# Patient Record
Sex: MALE | Race: BLACK OR AFRICAN AMERICAN | NOT HISPANIC OR LATINO | Employment: OTHER | RURAL
[De-identification: names, ages, dates, MRNs, and addresses within clinical notes are randomized per-mention and may not be internally consistent; named-entity substitution may affect disease eponyms.]

---

## 2019-04-16 ENCOUNTER — HISTORICAL (OUTPATIENT)
Dept: ADMINISTRATIVE | Facility: HOSPITAL | Age: 79
End: 2019-04-16

## 2019-04-17 LAB
LAB AP DIAGNOSIS - HISTORICAL: NORMAL
LAB AP GROSS PATHOLOGY - HISTORICAL: NORMAL
LAB AP SPECIMEN SUBMITTED - HISTORICAL: NORMAL

## 2021-02-26 ENCOUNTER — HISTORICAL (OUTPATIENT)
Dept: ADMINISTRATIVE | Facility: HOSPITAL | Age: 81
End: 2021-02-26

## 2021-02-26 LAB — SARS-COV-2 RNA AMPLIFICATION, QUAL: NEGATIVE

## 2021-06-02 ENCOUNTER — TELEPHONE (OUTPATIENT)
Dept: FAMILY MEDICINE | Facility: CLINIC | Age: 81
End: 2021-06-02

## 2021-06-10 ENCOUNTER — TELEPHONE (OUTPATIENT)
Dept: FAMILY MEDICINE | Facility: CLINIC | Age: 81
End: 2021-06-10

## 2021-06-10 RX ORDER — EZETIMIBE 10 MG/1
10 TABLET ORAL DAILY
COMMUNITY
End: 2021-06-10 | Stop reason: SDUPTHER

## 2021-06-10 RX ORDER — EZETIMIBE 10 MG/1
10 TABLET ORAL DAILY
Qty: 30 TABLET | Refills: 3 | Status: SHIPPED | OUTPATIENT
Start: 2021-06-10 | End: 2021-10-20 | Stop reason: SDUPTHER

## 2021-07-20 ENCOUNTER — OFFICE VISIT (OUTPATIENT)
Dept: FAMILY MEDICINE | Facility: CLINIC | Age: 81
End: 2021-07-20
Payer: MEDICARE

## 2021-07-20 VITALS
WEIGHT: 167.13 LBS | SYSTOLIC BLOOD PRESSURE: 116 MMHG | TEMPERATURE: 98 F | OXYGEN SATURATION: 98 % | HEART RATE: 65 BPM | DIASTOLIC BLOOD PRESSURE: 82 MMHG | RESPIRATION RATE: 18 BRPM | BODY MASS INDEX: 23.4 KG/M2 | HEIGHT: 71 IN

## 2021-07-20 DIAGNOSIS — I10 HYPERTENSION, UNSPECIFIED TYPE: ICD-10-CM

## 2021-07-20 DIAGNOSIS — E11.9 TYPE 2 DIABETES MELLITUS WITHOUT COMPLICATION, WITHOUT LONG-TERM CURRENT USE OF INSULIN: Primary | ICD-10-CM

## 2021-07-20 PROCEDURE — 99213 OFFICE O/P EST LOW 20 MIN: CPT | Mod: ,,, | Performed by: NURSE PRACTITIONER

## 2021-07-20 PROCEDURE — 99213 PR OFFICE/OUTPT VISIT, EST, LEVL III, 20-29 MIN: ICD-10-PCS | Mod: ,,, | Performed by: NURSE PRACTITIONER

## 2021-07-20 RX ORDER — CARVEDILOL 3.12 MG/1
3.12 TABLET ORAL 2 TIMES DAILY WITH MEALS
Qty: 90 TABLET | Refills: 1 | Status: SHIPPED | OUTPATIENT
Start: 2021-07-20 | End: 2021-10-20 | Stop reason: SDUPTHER

## 2021-07-20 RX ORDER — GLIPIZIDE 5 MG/1
5 TABLET ORAL
COMMUNITY
End: 2021-10-20 | Stop reason: SDUPTHER

## 2021-07-20 RX ORDER — SACUBITRIL AND VALSARTAN 97; 103 MG/1; MG/1
1 TABLET, FILM COATED ORAL 2 TIMES DAILY
COMMUNITY
End: 2021-10-20 | Stop reason: SDUPTHER

## 2021-07-20 RX ORDER — ASPIRIN 81 MG/1
81 TABLET ORAL DAILY
COMMUNITY

## 2021-07-20 RX ORDER — CARVEDILOL 3.12 MG/1
3.12 TABLET ORAL 2 TIMES DAILY WITH MEALS
COMMUNITY
End: 2021-07-20 | Stop reason: SDUPTHER

## 2021-07-20 RX ORDER — DAPAGLIFLOZIN 5 MG/1
5 TABLET, FILM COATED ORAL DAILY
COMMUNITY
End: 2021-07-20 | Stop reason: SDUPTHER

## 2021-07-20 RX ORDER — CHLORTHALIDONE 25 MG/1
25 TABLET ORAL DAILY
COMMUNITY
End: 2021-10-20 | Stop reason: SDUPTHER

## 2021-07-20 RX ORDER — FUROSEMIDE 80 MG/1
80 TABLET ORAL DAILY
COMMUNITY
End: 2021-10-20 | Stop reason: SDUPTHER

## 2021-07-20 RX ORDER — TAMSULOSIN HYDROCHLORIDE 0.4 MG/1
0.4 CAPSULE ORAL DAILY
COMMUNITY
End: 2021-10-20 | Stop reason: SDUPTHER

## 2021-07-20 RX ORDER — DAPAGLIFLOZIN 5 MG/1
5 TABLET, FILM COATED ORAL DAILY
Qty: 90 TABLET | Refills: 1 | Status: SHIPPED | OUTPATIENT
Start: 2021-07-20 | End: 2021-10-20 | Stop reason: SDUPTHER

## 2021-07-20 RX ORDER — OMEPRAZOLE 40 MG/1
40 CAPSULE, DELAYED RELEASE ORAL DAILY
COMMUNITY
End: 2021-10-20 | Stop reason: SDUPTHER

## 2021-10-20 ENCOUNTER — OFFICE VISIT (OUTPATIENT)
Dept: FAMILY MEDICINE | Facility: CLINIC | Age: 81
End: 2021-10-20
Payer: COMMERCIAL

## 2021-10-20 VITALS
SYSTOLIC BLOOD PRESSURE: 132 MMHG | DIASTOLIC BLOOD PRESSURE: 68 MMHG | OXYGEN SATURATION: 97 % | TEMPERATURE: 97 F | BODY MASS INDEX: 26.6 KG/M2 | WEIGHT: 190 LBS | HEART RATE: 66 BPM | HEIGHT: 71 IN

## 2021-10-20 DIAGNOSIS — I48.91 ATRIAL FIBRILLATION, UNSPECIFIED TYPE: ICD-10-CM

## 2021-10-20 DIAGNOSIS — I10 HYPERTENSION, UNSPECIFIED TYPE: Primary | ICD-10-CM

## 2021-10-20 DIAGNOSIS — E78.5 HYPERLIPIDEMIA, UNSPECIFIED HYPERLIPIDEMIA TYPE: ICD-10-CM

## 2021-10-20 DIAGNOSIS — K21.9 GASTROESOPHAGEAL REFLUX DISEASE, UNSPECIFIED WHETHER ESOPHAGITIS PRESENT: ICD-10-CM

## 2021-10-20 DIAGNOSIS — I50.9 HEART FAILURE, UNSPECIFIED HF CHRONICITY, UNSPECIFIED HEART FAILURE TYPE: ICD-10-CM

## 2021-10-20 DIAGNOSIS — N40.0 BENIGN PROSTATIC HYPERPLASIA, UNSPECIFIED WHETHER LOWER URINARY TRACT SYMPTOMS PRESENT: ICD-10-CM

## 2021-10-20 DIAGNOSIS — E11.9 TYPE 2 DIABETES MELLITUS WITHOUT COMPLICATION, WITHOUT LONG-TERM CURRENT USE OF INSULIN: ICD-10-CM

## 2021-10-20 LAB
ALBUMIN SERPL BCP-MCNC: 3.6 G/DL (ref 3.5–5)
ALBUMIN/GLOB SERPL: 0.8 {RATIO}
ALP SERPL-CCNC: 87 U/L (ref 45–115)
ALT SERPL W P-5'-P-CCNC: 30 U/L (ref 16–61)
ANION GAP SERPL CALCULATED.3IONS-SCNC: 13 MMOL/L (ref 7–16)
AST SERPL W P-5'-P-CCNC: 21 U/L (ref 15–37)
BASOPHILS # BLD AUTO: 0.04 K/UL (ref 0–0.2)
BASOPHILS NFR BLD AUTO: 0.6 % (ref 0–1)
BILIRUB SERPL-MCNC: 0.4 MG/DL (ref 0–1.2)
BUN SERPL-MCNC: 40 MG/DL (ref 7–18)
BUN/CREAT SERPL: 22 (ref 6–20)
CALCIUM SERPL-MCNC: 9.4 MG/DL (ref 8.5–10.1)
CHLORIDE SERPL-SCNC: 97 MMOL/L (ref 98–107)
CHOLEST SERPL-MCNC: 148 MG/DL (ref 0–200)
CHOLEST/HDLC SERPL: 5.3 {RATIO}
CO2 SERPL-SCNC: 27 MMOL/L (ref 21–32)
CREAT SERPL-MCNC: 1.86 MG/DL (ref 0.7–1.3)
DIFFERENTIAL METHOD BLD: ABNORMAL
EOSINOPHIL # BLD AUTO: 0.11 K/UL (ref 0–0.5)
EOSINOPHIL NFR BLD AUTO: 1.7 % (ref 1–4)
ERYTHROCYTE [DISTWIDTH] IN BLOOD BY AUTOMATED COUNT: 14.6 % (ref 11.5–14.5)
GLOBULIN SER-MCNC: 4.4 G/DL (ref 2–4)
GLUCOSE SERPL-MCNC: 305 MG/DL (ref 74–106)
HCT VFR BLD AUTO: 57.2 % (ref 40–54)
HDLC SERPL-MCNC: 28 MG/DL (ref 40–60)
HGB BLD-MCNC: 18 G/DL (ref 13.5–18)
IMM GRANULOCYTES # BLD AUTO: 0.03 K/UL (ref 0–0.04)
IMM GRANULOCYTES NFR BLD: 0.5 % (ref 0–0.4)
LDLC SERPL CALC-MCNC: ABNORMAL MG/DL
LDLC/HDLC SERPL: ABNORMAL {RATIO}
LYMPHOCYTES # BLD AUTO: 1.67 K/UL (ref 1–4.8)
LYMPHOCYTES NFR BLD AUTO: 25.8 % (ref 27–41)
MCH RBC QN AUTO: 28.7 PG (ref 27–31)
MCHC RBC AUTO-ENTMCNC: 31.5 G/DL (ref 32–36)
MCV RBC AUTO: 91.2 FL (ref 80–96)
MONOCYTES # BLD AUTO: 0.52 K/UL (ref 0–0.8)
MONOCYTES NFR BLD AUTO: 8 % (ref 2–6)
MPC BLD CALC-MCNC: 13.2 FL (ref 9.4–12.4)
NEUTROPHILS # BLD AUTO: 4.1 K/UL (ref 1.8–7.7)
NEUTROPHILS NFR BLD AUTO: 63.4 % (ref 53–65)
NONHDLC SERPL-MCNC: 120 MG/DL
NRBC # BLD AUTO: 0 X10E3/UL
NRBC, AUTO (.00): 0 %
PLATELET # BLD AUTO: 213 K/UL (ref 150–400)
PLATELET MORPHOLOGY: ABNORMAL
POTASSIUM SERPL-SCNC: 4.2 MMOL/L (ref 3.5–5.1)
PROT SERPL-MCNC: 8 G/DL (ref 6.4–8.2)
RBC # BLD AUTO: 6.27 M/UL (ref 4.6–6.2)
RBC MORPH BLD: NORMAL
SODIUM SERPL-SCNC: 133 MMOL/L (ref 136–145)
TRIGL SERPL-MCNC: 427 MG/DL (ref 35–150)
VLDLC SERPL-MCNC: ABNORMAL MG/DL
WBC # BLD AUTO: 6.47 K/UL (ref 4.5–11)

## 2021-10-20 PROCEDURE — 83036 HEMOGLOBIN GLYCOSYLATED A1C: CPT | Mod: ,,, | Performed by: CLINICAL MEDICAL LABORATORY

## 2021-10-20 PROCEDURE — 80053 COMPREHEN METABOLIC PANEL: CPT | Mod: ,,, | Performed by: CLINICAL MEDICAL LABORATORY

## 2021-10-20 PROCEDURE — 80061 LIPID PANEL: ICD-10-PCS | Mod: ,,, | Performed by: CLINICAL MEDICAL LABORATORY

## 2021-10-20 PROCEDURE — 80061 LIPID PANEL: CPT | Mod: ,,, | Performed by: CLINICAL MEDICAL LABORATORY

## 2021-10-20 PROCEDURE — 99214 PR OFFICE/OUTPT VISIT, EST, LEVL IV, 30-39 MIN: ICD-10-PCS | Mod: ,,, | Performed by: NURSE PRACTITIONER

## 2021-10-20 PROCEDURE — 83036 HEMOGLOBIN A1C: ICD-10-PCS | Mod: ,,, | Performed by: CLINICAL MEDICAL LABORATORY

## 2021-10-20 PROCEDURE — 80053 COMPREHENSIVE METABOLIC PANEL: ICD-10-PCS | Mod: ,,, | Performed by: CLINICAL MEDICAL LABORATORY

## 2021-10-20 PROCEDURE — 85025 CBC WITH DIFFERENTIAL: ICD-10-PCS | Mod: ,,, | Performed by: CLINICAL MEDICAL LABORATORY

## 2021-10-20 PROCEDURE — 99214 OFFICE O/P EST MOD 30 MIN: CPT | Mod: ,,, | Performed by: NURSE PRACTITIONER

## 2021-10-20 PROCEDURE — 85025 COMPLETE CBC W/AUTO DIFF WBC: CPT | Mod: ,,, | Performed by: CLINICAL MEDICAL LABORATORY

## 2021-10-20 RX ORDER — TAMSULOSIN HYDROCHLORIDE 0.4 MG/1
0.4 CAPSULE ORAL DAILY
Qty: 90 CAPSULE | Refills: 1 | Status: SHIPPED | OUTPATIENT
Start: 2021-10-20

## 2021-10-20 RX ORDER — SACUBITRIL AND VALSARTAN 97; 103 MG/1; MG/1
1 TABLET, FILM COATED ORAL 2 TIMES DAILY
Qty: 180 TABLET | Refills: 1 | Status: ON HOLD | OUTPATIENT
Start: 2021-10-20 | End: 2022-02-03 | Stop reason: SDUPTHER

## 2021-10-20 RX ORDER — GLIPIZIDE 5 MG/1
5 TABLET ORAL
Qty: 180 TABLET | Refills: 1 | Status: SHIPPED | OUTPATIENT
Start: 2021-10-20 | End: 2021-10-21

## 2021-10-20 RX ORDER — CHLORTHALIDONE 25 MG/1
12.5 TABLET ORAL DAILY
Qty: 45 TABLET | Refills: 1 | Status: ON HOLD | OUTPATIENT
Start: 2021-10-20 | End: 2022-02-03 | Stop reason: SDUPTHER

## 2021-10-20 RX ORDER — OMEPRAZOLE 40 MG/1
40 CAPSULE, DELAYED RELEASE ORAL DAILY
Qty: 90 CAPSULE | Refills: 1 | Status: SHIPPED | OUTPATIENT
Start: 2021-10-20 | End: 2021-12-01 | Stop reason: SDUPTHER

## 2021-10-20 RX ORDER — FUROSEMIDE 80 MG/1
80 TABLET ORAL DAILY
Qty: 90 TABLET | Refills: 1 | Status: SHIPPED | OUTPATIENT
Start: 2021-10-20 | End: 2021-12-01 | Stop reason: SDUPTHER

## 2021-10-20 RX ORDER — CARVEDILOL 3.12 MG/1
3.12 TABLET ORAL 2 TIMES DAILY WITH MEALS
Qty: 180 TABLET | Refills: 1 | Status: SHIPPED | OUTPATIENT
Start: 2021-10-20 | End: 2021-12-01 | Stop reason: DRUGHIGH

## 2021-10-20 RX ORDER — DAPAGLIFLOZIN 5 MG/1
5 TABLET, FILM COATED ORAL DAILY
Qty: 90 TABLET | Refills: 1 | Status: ON HOLD | OUTPATIENT
Start: 2021-10-20 | End: 2022-02-03 | Stop reason: HOSPADM

## 2021-10-20 RX ORDER — EZETIMIBE 10 MG/1
10 TABLET ORAL DAILY
Qty: 90 TABLET | Refills: 1 | Status: SHIPPED | OUTPATIENT
Start: 2021-10-20

## 2021-10-21 LAB
EST. AVERAGE GLUCOSE BLD GHB EST-MCNC: 224 MG/DL
HBA1C MFR BLD HPLC: 9.3 % (ref 4.5–6.6)

## 2021-10-21 RX ORDER — GLIPIZIDE 10 MG/1
10 TABLET ORAL
COMMUNITY
End: 2021-10-21 | Stop reason: SDUPTHER

## 2021-10-21 RX ORDER — GLIPIZIDE 10 MG/1
10 TABLET ORAL
Qty: 180 TABLET | Refills: 1 | Status: SHIPPED | OUTPATIENT
Start: 2021-10-21 | End: 2022-02-10

## 2021-11-14 ENCOUNTER — HOSPITAL ENCOUNTER (EMERGENCY)
Facility: HOSPITAL | Age: 81
Discharge: HOME | End: 2021-11-14
Payer: COMMERCIAL

## 2021-11-14 VITALS
DIASTOLIC BLOOD PRESSURE: 82 MMHG | WEIGHT: 208 LBS | SYSTOLIC BLOOD PRESSURE: 153 MMHG | TEMPERATURE: 98 F | OXYGEN SATURATION: 98 % | RESPIRATION RATE: 16 BRPM | HEIGHT: 71 IN | BODY MASS INDEX: 29.12 KG/M2 | HEART RATE: 112 BPM

## 2021-11-14 DIAGNOSIS — K21.9 GASTROESOPHAGEAL REFLUX DISEASE: ICD-10-CM

## 2021-11-14 DIAGNOSIS — N40.0 BENIGN PROSTATIC HYPERPLASIA: ICD-10-CM

## 2021-11-14 DIAGNOSIS — I50.9 HEART FAILURE: ICD-10-CM

## 2021-11-14 DIAGNOSIS — R31.9 HEMATURIA, UNSPECIFIED TYPE: Primary | ICD-10-CM

## 2021-11-14 DIAGNOSIS — I48.91 ATRIAL FIBRILLATION: ICD-10-CM

## 2021-11-14 DIAGNOSIS — E11.9 TYPE 2 DIABETES MELLITUS WITHOUT COMPLICATION, WITHOUT LONG-TERM CURRENT USE OF INSULIN: ICD-10-CM

## 2021-11-14 DIAGNOSIS — I10 HYPERTENSION: ICD-10-CM

## 2021-11-14 DIAGNOSIS — E78.5 HYPERLIPIDEMIA: ICD-10-CM

## 2021-11-14 LAB
BACTERIA #/AREA URNS HPF: ABNORMAL /HPF
BILIRUB UR QL STRIP: ABNORMAL
CLARITY UR: ABNORMAL
COLOR UR: ABNORMAL
GLUCOSE SERPL-MCNC: 158 MG/DL (ref 70–105)
GLUCOSE UR STRIP-MCNC: >=1000 MG/DL
KETONES UR STRIP-SCNC: 15 MG/DL
LEUKOCYTE ESTERASE UR QL STRIP: NEGATIVE
NITRITE UR QL STRIP: NEGATIVE
PH UR STRIP: 6 PH UNITS
PROT UR QL STRIP: 100
RBC # UR STRIP: ABNORMAL /UL
RBC #/AREA URNS HPF: ABNORMAL /HPF
SP GR UR STRIP: 1.02
SQUAMOUS #/AREA URNS LPF: ABNORMAL /LPF
UROBILINOGEN UR STRIP-ACNC: 0.2 MG/DL
WBC #/AREA URNS HPF: ABNORMAL /HPF

## 2021-11-14 PROCEDURE — 82962 GLUCOSE BLOOD TEST: CPT

## 2021-11-14 PROCEDURE — 99282 EMERGENCY DEPT VISIT SF MDM: CPT

## 2021-11-14 PROCEDURE — 81001 URINALYSIS AUTO W/SCOPE: CPT | Performed by: NURSE PRACTITIONER

## 2021-11-14 PROCEDURE — 81003 URINALYSIS AUTO W/O SCOPE: CPT | Performed by: NURSE PRACTITIONER

## 2021-11-14 PROCEDURE — 99282 EMERGENCY DEPT VISIT SF MDM: CPT | Mod: ,,, | Performed by: NURSE PRACTITIONER

## 2021-11-14 PROCEDURE — 99282 PR EMERGENCY DEPT VISIT,LEVEL II: ICD-10-PCS | Mod: ,,, | Performed by: NURSE PRACTITIONER

## 2021-11-17 ENCOUNTER — OFFICE VISIT (OUTPATIENT)
Dept: FAMILY MEDICINE | Facility: CLINIC | Age: 81
End: 2021-11-17
Payer: COMMERCIAL

## 2021-11-17 VITALS
HEART RATE: 81 BPM | DIASTOLIC BLOOD PRESSURE: 82 MMHG | HEIGHT: 71 IN | TEMPERATURE: 98 F | BODY MASS INDEX: 27.58 KG/M2 | SYSTOLIC BLOOD PRESSURE: 138 MMHG | RESPIRATION RATE: 18 BRPM | OXYGEN SATURATION: 98 % | WEIGHT: 197 LBS

## 2021-11-17 DIAGNOSIS — I48.0 PAROXYSMAL ATRIAL FIBRILLATION: ICD-10-CM

## 2021-11-17 DIAGNOSIS — R31.0 GROSS HEMATURIA: Primary | ICD-10-CM

## 2021-11-17 DIAGNOSIS — Z12.5 ENCOUNTER FOR SCREENING FOR MALIGNANT NEOPLASM OF PROSTATE: ICD-10-CM

## 2021-11-17 DIAGNOSIS — Z23 ENCOUNTER FOR IMMUNIZATION: ICD-10-CM

## 2021-11-17 DIAGNOSIS — N18.9 CHRONIC KIDNEY DISEASE, UNSPECIFIED CKD STAGE: ICD-10-CM

## 2021-11-17 DIAGNOSIS — I42.0 DILATED CARDIOMYOPATHY: ICD-10-CM

## 2021-11-17 LAB
ALBUMIN SERPL BCP-MCNC: 3.6 G/DL (ref 3.5–5)
ALBUMIN/GLOB SERPL: 0.8 {RATIO}
ALP SERPL-CCNC: 105 U/L (ref 45–115)
ALT SERPL W P-5'-P-CCNC: 89 U/L (ref 16–61)
ANION GAP SERPL CALCULATED.3IONS-SCNC: 10 MMOL/L (ref 7–16)
AST SERPL W P-5'-P-CCNC: 32 U/L (ref 15–37)
BACTERIA #/AREA URNS HPF: ABNORMAL /HPF
BASOPHILS # BLD AUTO: 0.02 K/UL (ref 0–0.2)
BASOPHILS NFR BLD AUTO: 0.3 % (ref 0–1)
BILIRUB SERPL-MCNC: 0.5 MG/DL (ref 0–1.2)
BILIRUB UR QL STRIP: NEGATIVE
BUN SERPL-MCNC: 27 MG/DL (ref 7–18)
BUN/CREAT SERPL: 15 (ref 6–20)
CALCIUM SERPL-MCNC: 9.1 MG/DL (ref 8.5–10.1)
CHLORIDE SERPL-SCNC: 106 MMOL/L (ref 98–107)
CLARITY UR: CLEAR
CO2 SERPL-SCNC: 28 MMOL/L (ref 21–32)
COLOR UR: YELLOW
CREAT SERPL-MCNC: 1.85 MG/DL (ref 0.7–1.3)
DIFFERENTIAL METHOD BLD: ABNORMAL
EOSINOPHIL # BLD AUTO: 0.11 K/UL (ref 0–0.5)
EOSINOPHIL NFR BLD AUTO: 1.5 % (ref 1–4)
ERYTHROCYTE [DISTWIDTH] IN BLOOD BY AUTOMATED COUNT: 15.1 % (ref 11.5–14.5)
GLOBULIN SER-MCNC: 4.5 G/DL (ref 2–4)
GLUCOSE SERPL-MCNC: 144 MG/DL (ref 74–106)
GLUCOSE UR STRIP-MCNC: 500 MG/DL
HCT VFR BLD AUTO: 51.6 % (ref 40–54)
HGB BLD-MCNC: 16.4 G/DL (ref 13.5–18)
HYALINE CASTS #/AREA URNS LPF: ABNORMAL /LPF
IMM GRANULOCYTES # BLD AUTO: 0.04 K/UL (ref 0–0.04)
IMM GRANULOCYTES NFR BLD: 0.6 % (ref 0–0.4)
KETONES UR STRIP-SCNC: NEGATIVE MG/DL
LEUKOCYTE ESTERASE UR QL STRIP: NEGATIVE
LYMPHOCYTES # BLD AUTO: 1.62 K/UL (ref 1–4.8)
LYMPHOCYTES NFR BLD AUTO: 22.6 % (ref 27–41)
MCH RBC QN AUTO: 27.6 PG (ref 27–31)
MCHC RBC AUTO-ENTMCNC: 31.8 G/DL (ref 32–36)
MCV RBC AUTO: 86.7 FL (ref 80–96)
MONOCYTES # BLD AUTO: 0.85 K/UL (ref 0–0.8)
MONOCYTES NFR BLD AUTO: 11.9 % (ref 2–6)
MPC BLD CALC-MCNC: 12.6 FL (ref 9.4–12.4)
NEUTROPHILS # BLD AUTO: 4.53 K/UL (ref 1.8–7.7)
NEUTROPHILS NFR BLD AUTO: 63.1 % (ref 53–65)
NITRITE UR QL STRIP: NEGATIVE
NRBC # BLD AUTO: 0 X10E3/UL
NRBC, AUTO (.00): 0 %
PH UR STRIP: 6 PH UNITS
PLATELET # BLD AUTO: 282 K/UL (ref 150–400)
POTASSIUM SERPL-SCNC: 4 MMOL/L (ref 3.5–5.1)
PROT SERPL-MCNC: 8.1 G/DL (ref 6.4–8.2)
PROT UR QL STRIP: 30
PSA SERPL-MCNC: 12.5 NG/ML (ref 0–4.4)
RBC # BLD AUTO: 5.95 M/UL (ref 4.6–6.2)
RBC # UR STRIP: ABNORMAL /UL
RBC #/AREA URNS HPF: ABNORMAL /HPF
SODIUM SERPL-SCNC: 140 MMOL/L (ref 136–145)
SP GR UR STRIP: 1.02
UROBILINOGEN UR STRIP-ACNC: 0.2 MG/DL
WBC # BLD AUTO: 7.17 K/UL (ref 4.5–11)
WBC #/AREA URNS HPF: ABNORMAL /HPF

## 2021-11-17 PROCEDURE — 99213 OFFICE O/P EST LOW 20 MIN: CPT | Mod: 25,,, | Performed by: FAMILY MEDICINE

## 2021-11-17 PROCEDURE — 90472 PR IMMUNIZ,ADMIN,EACH ADDL: ICD-10-PCS | Mod: GZ,,, | Performed by: FAMILY MEDICINE

## 2021-11-17 PROCEDURE — G0008 ADMIN INFLUENZA VIRUS VAC: HCPCS | Mod: ,,, | Performed by: FAMILY MEDICINE

## 2021-11-17 PROCEDURE — G0103 PSA SCREENING: HCPCS | Mod: ,,, | Performed by: CLINICAL MEDICAL LABORATORY

## 2021-11-17 PROCEDURE — 90715 TDAP VACCINE 7 YRS/> IM: CPT | Mod: GZ,,, | Performed by: FAMILY MEDICINE

## 2021-11-17 PROCEDURE — 90662 FLU VACCINE - QUADRIVALENT - HIGH DOSE (65+) PRESERVATIVE FREE IM: ICD-10-PCS | Mod: ,,, | Performed by: FAMILY MEDICINE

## 2021-11-17 PROCEDURE — 80053 COMPREHENSIVE METABOLIC PANEL: ICD-10-PCS | Mod: ,,, | Performed by: CLINICAL MEDICAL LABORATORY

## 2021-11-17 PROCEDURE — 85025 CBC WITH DIFFERENTIAL: ICD-10-PCS | Mod: ,,, | Performed by: CLINICAL MEDICAL LABORATORY

## 2021-11-17 PROCEDURE — 90472 IMMUNIZATION ADMIN EACH ADD: CPT | Mod: GZ,,, | Performed by: FAMILY MEDICINE

## 2021-11-17 PROCEDURE — G0103 PSA, SCREENING: ICD-10-PCS | Mod: ,,, | Performed by: CLINICAL MEDICAL LABORATORY

## 2021-11-17 PROCEDURE — 81001 URINALYSIS AUTO W/SCOPE: CPT | Mod: ,,, | Performed by: CLINICAL MEDICAL LABORATORY

## 2021-11-17 PROCEDURE — 85025 COMPLETE CBC W/AUTO DIFF WBC: CPT | Mod: ,,, | Performed by: CLINICAL MEDICAL LABORATORY

## 2021-11-17 PROCEDURE — G0008 FLU VACCINE - QUADRIVALENT - HIGH DOSE (65+) PRESERVATIVE FREE IM: ICD-10-PCS | Mod: ,,, | Performed by: FAMILY MEDICINE

## 2021-11-17 PROCEDURE — 90715 TDAP VACCINE GREATER THAN OR EQUAL TO 7YO IM: ICD-10-PCS | Mod: GZ,,, | Performed by: FAMILY MEDICINE

## 2021-11-17 PROCEDURE — 90662 IIV NO PRSV INCREASED AG IM: CPT | Mod: ,,, | Performed by: FAMILY MEDICINE

## 2021-11-17 PROCEDURE — 81001 URINALYSIS, REFLEX TO URINE CULTURE: ICD-10-PCS | Mod: ,,, | Performed by: CLINICAL MEDICAL LABORATORY

## 2021-11-17 PROCEDURE — 99213 PR OFFICE/OUTPT VISIT, EST, LEVL III, 20-29 MIN: ICD-10-PCS | Mod: 25,,, | Performed by: FAMILY MEDICINE

## 2021-11-17 PROCEDURE — 80053 COMPREHEN METABOLIC PANEL: CPT | Mod: ,,, | Performed by: CLINICAL MEDICAL LABORATORY

## 2021-11-18 ENCOUNTER — HOSPITAL ENCOUNTER (EMERGENCY)
Facility: HOSPITAL | Age: 81
Discharge: HOME | End: 2021-11-18
Payer: COMMERCIAL

## 2021-11-18 VITALS
DIASTOLIC BLOOD PRESSURE: 84 MMHG | WEIGHT: 196 LBS | RESPIRATION RATE: 18 BRPM | BODY MASS INDEX: 27.44 KG/M2 | OXYGEN SATURATION: 98 % | HEIGHT: 71 IN | SYSTOLIC BLOOD PRESSURE: 130 MMHG | HEART RATE: 104 BPM | TEMPERATURE: 99 F

## 2021-11-18 DIAGNOSIS — K21.9 GASTROESOPHAGEAL REFLUX DISEASE: ICD-10-CM

## 2021-11-18 DIAGNOSIS — I48.91 ATRIAL FIBRILLATION: ICD-10-CM

## 2021-11-18 DIAGNOSIS — N40.0 BENIGN PROSTATIC HYPERPLASIA: ICD-10-CM

## 2021-11-18 DIAGNOSIS — R31.9 HEMATURIA, UNSPECIFIED TYPE: Primary | ICD-10-CM

## 2021-11-18 DIAGNOSIS — I10 HYPERTENSION: ICD-10-CM

## 2021-11-18 DIAGNOSIS — E11.9 TYPE 2 DIABETES MELLITUS WITHOUT COMPLICATION, WITHOUT LONG-TERM CURRENT USE OF INSULIN: ICD-10-CM

## 2021-11-18 DIAGNOSIS — I50.9 HEART FAILURE: ICD-10-CM

## 2021-11-18 DIAGNOSIS — N28.1 BILATERAL RENAL CYSTS: ICD-10-CM

## 2021-11-18 DIAGNOSIS — E78.5 HYPERLIPIDEMIA: ICD-10-CM

## 2021-11-18 LAB
ALBUMIN SERPL BCP-MCNC: 3.7 G/DL (ref 3.5–5)
ALBUMIN/GLOB SERPL: 0.9 {RATIO}
ALP SERPL-CCNC: 116 U/L (ref 45–115)
ALT SERPL W P-5'-P-CCNC: 97 U/L (ref 16–61)
ANION GAP SERPL CALCULATED.3IONS-SCNC: 10 MMOL/L (ref 7–16)
APTT PPP: 39.9 SECONDS (ref 25.2–37.3)
AST SERPL W P-5'-P-CCNC: 49 U/L (ref 15–37)
BACTERIA #/AREA URNS HPF: ABNORMAL /HPF
BASOPHILS # BLD AUTO: 0.02 K/UL (ref 0–0.2)
BASOPHILS NFR BLD AUTO: 0.3 % (ref 0–1)
BILIRUB SERPL-MCNC: 0.7 MG/DL (ref 0–1.2)
BILIRUB UR QL STRIP: NEGATIVE
BUN SERPL-MCNC: 24 MG/DL (ref 7–18)
BUN/CREAT SERPL: 13 (ref 6–20)
CALCIUM SERPL-MCNC: 8.5 MG/DL (ref 8.5–10.1)
CHLORIDE SERPL-SCNC: 105 MMOL/L (ref 98–107)
CLARITY UR: CLEAR
CO2 SERPL-SCNC: 28 MMOL/L (ref 21–32)
COLOR UR: YELLOW
CREAT SERPL-MCNC: 1.79 MG/DL (ref 0.7–1.3)
DIFFERENTIAL METHOD BLD: ABNORMAL
EOSINOPHIL # BLD AUTO: 0.04 K/UL (ref 0–0.5)
EOSINOPHIL NFR BLD AUTO: 0.6 % (ref 1–4)
ERYTHROCYTE [DISTWIDTH] IN BLOOD BY AUTOMATED COUNT: 14.9 % (ref 11.5–14.5)
GLOBULIN SER-MCNC: 4.1 G/DL (ref 2–4)
GLUCOSE SERPL-MCNC: 184 MG/DL (ref 74–106)
GLUCOSE UR STRIP-MCNC: >=1000 MG/DL
HCT VFR BLD AUTO: 49.5 % (ref 40–54)
HGB BLD-MCNC: 16.3 G/DL (ref 13.5–18)
IMM GRANULOCYTES # BLD AUTO: 0.04 K/UL (ref 0–0.04)
IMM GRANULOCYTES NFR BLD: 0.6 % (ref 0–0.4)
INR BLD: 1.09 (ref 0.9–1.1)
KETONES UR STRIP-SCNC: ABNORMAL MG/DL
LEUKOCYTE ESTERASE UR QL STRIP: NEGATIVE
LYMPHOCYTES # BLD AUTO: 1.26 K/UL (ref 1–4.8)
LYMPHOCYTES NFR BLD AUTO: 18.2 % (ref 27–41)
MCH RBC QN AUTO: 27.7 PG (ref 27–31)
MCHC RBC AUTO-ENTMCNC: 32.9 G/DL (ref 32–36)
MCV RBC AUTO: 84.2 FL (ref 80–96)
MONOCYTES # BLD AUTO: 0.69 K/UL (ref 0–0.8)
MONOCYTES NFR BLD AUTO: 9.9 % (ref 2–6)
MPC BLD CALC-MCNC: 11.7 FL (ref 9.4–12.4)
NEUTROPHILS # BLD AUTO: 4.89 K/UL (ref 1.8–7.7)
NEUTROPHILS NFR BLD AUTO: 70.4 % (ref 53–65)
NITRITE UR QL STRIP: NEGATIVE
NRBC # BLD AUTO: 0 X10E3/UL
NRBC, AUTO (.00): 0 %
PH UR STRIP: 6 PH UNITS
PLATELET # BLD AUTO: 255 K/UL (ref 150–400)
POTASSIUM SERPL-SCNC: 4.1 MMOL/L (ref 3.5–5.1)
PROT SERPL-MCNC: 7.8 G/DL (ref 6.4–8.2)
PROT UR QL STRIP: 100
PROTHROMBIN TIME: 14.1 SECONDS (ref 11.7–14.7)
RBC # BLD AUTO: 5.88 M/UL (ref 4.6–6.2)
RBC # UR STRIP: ABNORMAL /UL
RBC #/AREA URNS HPF: ABNORMAL /HPF
SODIUM SERPL-SCNC: 139 MMOL/L (ref 136–145)
SP GR UR STRIP: 1.01
SQUAMOUS #/AREA URNS LPF: ABNORMAL /LPF
UROBILINOGEN UR STRIP-ACNC: 0.2 MG/DL
WBC # BLD AUTO: 6.94 K/UL (ref 4.5–11)
WBC #/AREA URNS HPF: ABNORMAL /HPF
YEAST #/AREA URNS HPF: ABNORMAL /HPF

## 2021-11-18 PROCEDURE — 81003 URINALYSIS AUTO W/O SCOPE: CPT | Performed by: NURSE PRACTITIONER

## 2021-11-18 PROCEDURE — 80053 COMPREHEN METABOLIC PANEL: CPT | Performed by: NURSE PRACTITIONER

## 2021-11-18 PROCEDURE — 81001 URINALYSIS AUTO W/SCOPE: CPT | Performed by: NURSE PRACTITIONER

## 2021-11-18 PROCEDURE — 99283 PR EMERGENCY DEPT VISIT,LEVEL III: ICD-10-PCS | Mod: ,,, | Performed by: NURSE PRACTITIONER

## 2021-11-18 PROCEDURE — 85730 THROMBOPLASTIN TIME PARTIAL: CPT | Performed by: NURSE PRACTITIONER

## 2021-11-18 PROCEDURE — 85025 COMPLETE CBC W/AUTO DIFF WBC: CPT | Performed by: NURSE PRACTITIONER

## 2021-11-18 PROCEDURE — 85610 PROTHROMBIN TIME: CPT | Performed by: NURSE PRACTITIONER

## 2021-11-18 PROCEDURE — 99283 EMERGENCY DEPT VISIT LOW MDM: CPT | Mod: ,,, | Performed by: NURSE PRACTITIONER

## 2021-11-18 PROCEDURE — 36415 COLL VENOUS BLD VENIPUNCTURE: CPT | Performed by: NURSE PRACTITIONER

## 2021-11-18 PROCEDURE — 99285 EMERGENCY DEPT VISIT HI MDM: CPT | Mod: 25

## 2021-12-01 ENCOUNTER — OFFICE VISIT (OUTPATIENT)
Dept: CARDIOLOGY | Facility: CLINIC | Age: 81
End: 2021-12-01
Payer: COMMERCIAL

## 2021-12-01 VITALS
SYSTOLIC BLOOD PRESSURE: 132 MMHG | RESPIRATION RATE: 14 BRPM | HEIGHT: 71 IN | HEART RATE: 100 BPM | BODY MASS INDEX: 30.06 KG/M2 | DIASTOLIC BLOOD PRESSURE: 72 MMHG | WEIGHT: 214.75 LBS

## 2021-12-01 DIAGNOSIS — I42.0 DILATED CARDIOMYOPATHY: Chronic | ICD-10-CM

## 2021-12-01 DIAGNOSIS — I10 HYPERTENSION, UNSPECIFIED TYPE: Chronic | ICD-10-CM

## 2021-12-01 DIAGNOSIS — I48.11 LONGSTANDING PERSISTENT ATRIAL FIBRILLATION: Primary | Chronic | ICD-10-CM

## 2021-12-01 DIAGNOSIS — E11.9 DIABETES MELLITUS WITHOUT COMPLICATION: Chronic | ICD-10-CM

## 2021-12-01 DIAGNOSIS — E78.5 HYPERLIPIDEMIA, UNSPECIFIED HYPERLIPIDEMIA TYPE: Chronic | ICD-10-CM

## 2021-12-01 DIAGNOSIS — K21.9 GASTROESOPHAGEAL REFLUX DISEASE, UNSPECIFIED WHETHER ESOPHAGITIS PRESENT: ICD-10-CM

## 2021-12-01 DIAGNOSIS — I50.9 HEART FAILURE, UNSPECIFIED HF CHRONICITY, UNSPECIFIED HEART FAILURE TYPE: ICD-10-CM

## 2021-12-01 DIAGNOSIS — R06.09 DOE (DYSPNEA ON EXERTION): ICD-10-CM

## 2021-12-01 DIAGNOSIS — F10.21 ALCOHOLISM IN REMISSION: Chronic | ICD-10-CM

## 2021-12-01 PROCEDURE — 93010 ELECTROCARDIOGRAM REPORT: CPT | Mod: S$PBB,,, | Performed by: INTERNAL MEDICINE

## 2021-12-01 PROCEDURE — 93010 EKG 12-LEAD: ICD-10-PCS | Mod: S$PBB,,, | Performed by: INTERNAL MEDICINE

## 2021-12-01 PROCEDURE — 99214 PR OFFICE/OUTPT VISIT, EST, LEVL IV, 30-39 MIN: ICD-10-PCS | Mod: S$PBB,,, | Performed by: INTERNAL MEDICINE

## 2021-12-01 PROCEDURE — 93005 ELECTROCARDIOGRAM TRACING: CPT | Mod: PBBFAC | Performed by: INTERNAL MEDICINE

## 2021-12-01 PROCEDURE — 99214 OFFICE O/P EST MOD 30 MIN: CPT | Mod: S$PBB,,, | Performed by: INTERNAL MEDICINE

## 2021-12-01 PROCEDURE — 99215 OFFICE O/P EST HI 40 MIN: CPT | Mod: PBBFAC | Performed by: INTERNAL MEDICINE

## 2021-12-01 RX ORDER — FUROSEMIDE 80 MG/1
80 TABLET ORAL DAILY
Qty: 90 TABLET | Refills: 3 | Status: ON HOLD | OUTPATIENT
Start: 2021-12-01 | End: 2022-02-03 | Stop reason: SDUPTHER

## 2021-12-01 RX ORDER — CARVEDILOL 6.25 MG/1
6.25 TABLET ORAL 2 TIMES DAILY WITH MEALS
Qty: 180 TABLET | Refills: 3 | Status: ON HOLD | OUTPATIENT
Start: 2021-12-01 | End: 2022-03-04 | Stop reason: HOSPADM

## 2021-12-01 RX ORDER — OMEPRAZOLE 40 MG/1
40 CAPSULE, DELAYED RELEASE ORAL DAILY
Qty: 90 CAPSULE | Refills: 1 | Status: SHIPPED | OUTPATIENT
Start: 2021-12-01

## 2021-12-06 PROBLEM — I42.0 DILATED CARDIOMYOPATHY: Chronic | Status: ACTIVE | Noted: 2021-12-06

## 2021-12-06 PROBLEM — R06.09 DOE (DYSPNEA ON EXERTION): Status: ACTIVE | Noted: 2021-12-06

## 2021-12-06 PROBLEM — F10.21 ALCOHOLISM IN REMISSION: Chronic | Status: ACTIVE | Noted: 2021-12-06

## 2021-12-08 ENCOUNTER — HOSPITAL ENCOUNTER (OUTPATIENT)
Dept: CARDIOLOGY | Facility: HOSPITAL | Age: 81
Discharge: HOME | End: 2021-12-08
Attending: INTERNAL MEDICINE
Payer: COMMERCIAL

## 2021-12-08 DIAGNOSIS — R06.09 DOE (DYSPNEA ON EXERTION): ICD-10-CM

## 2021-12-08 DIAGNOSIS — I48.11 LONGSTANDING PERSISTENT ATRIAL FIBRILLATION: ICD-10-CM

## 2021-12-08 PROCEDURE — 93306 TTE W/DOPPLER COMPLETE: CPT | Mod: 26,,, | Performed by: INTERNAL MEDICINE

## 2021-12-08 PROCEDURE — 93306 ECHO (CUPID ONLY): ICD-10-PCS | Mod: 26,,, | Performed by: INTERNAL MEDICINE

## 2021-12-08 PROCEDURE — 93306 TTE W/DOPPLER COMPLETE: CPT

## 2021-12-21 LAB
AORTIC VALVE CUSP SEPERATION: 15.8 CM
AV INDEX (PROSTH): 0.7
AV MEAN GRADIENT: 4 MMHG
AV VALVE AREA: 2.31 CM2
CV ECHO LV RWT: 0.3 CM
DOP CALC AO VTI: 19.82 CM
DOP CALC LVOT AREA: 3.3 CM2
DOP CALC LVOT DIAMETER: 2.05 CM
DOP CALC LVOT STROKE VOLUME: 45.82 CM3
DOP CALCLVOT PEAK VEL VTI: 13.89 CM
E WAVE DECELERATION TIME: 148 MSEC
ECHO EF ESTIMATED: 16 %
ECHO LV POSTERIOR WALL: 0.92 CM (ref 0.6–1.1)
EJECTION FRACTION: 15 %
FRACTIONAL SHORTENING: 7 % (ref 28–44)
INTERVENTRICULAR SEPTUM: 0.92 CM (ref 0.6–1.1)
IVC DIAMETER: 2.24 CM
LEFT ATRIUM SIZE: 4.63 CM
LEFT INTERNAL DIMENSION IN SYSTOLE: 5.77 CM (ref 2.1–4)
LEFT VENTRICLE DIASTOLIC VOLUME: 196.13 ML
LEFT VENTRICLE SYSTOLIC VOLUME: 164.59 ML
LEFT VENTRICULAR INTERNAL DIMENSION IN DIASTOLE: 6.23 CM (ref 3.5–6)
LEFT VENTRICULAR MASS: 236.68 G
LVOT MG: 2 MMHG
MV PEAK E VEL: 0.76 M/S
PISA TR MAX VEL: 3.42 M/S
RIGHT VENTRICULAR END-DIASTOLIC DIMENSION: 3.77 CM
TR MAX PG: 47 MMHG

## 2022-01-01 ENCOUNTER — HOSPITAL ENCOUNTER (EMERGENCY)
Facility: HOSPITAL | Age: 82
Discharge: HOME | End: 2022-01-01
Attending: EMERGENCY MEDICINE
Payer: COMMERCIAL

## 2022-01-01 VITALS
TEMPERATURE: 98 F | OXYGEN SATURATION: 80 % | SYSTOLIC BLOOD PRESSURE: 118 MMHG | HEART RATE: 127 BPM | BODY MASS INDEX: 40.18 KG/M2 | RESPIRATION RATE: 19 BRPM | DIASTOLIC BLOOD PRESSURE: 94 MMHG | WEIGHT: 287 LBS | HEIGHT: 71 IN

## 2022-01-01 DIAGNOSIS — R07.9 CHEST PAIN: ICD-10-CM

## 2022-01-01 DIAGNOSIS — R06.02 SHORTNESS OF BREATH: ICD-10-CM

## 2022-01-01 DIAGNOSIS — I31.39 PERICARDIAL EFFUSION: ICD-10-CM

## 2022-01-01 DIAGNOSIS — I42.9 CARDIOMYOPATHY, UNSPECIFIED TYPE: ICD-10-CM

## 2022-01-01 DIAGNOSIS — K42.9 UMBILICAL HERNIA WITHOUT OBSTRUCTION AND WITHOUT GANGRENE: Primary | ICD-10-CM

## 2022-01-01 DIAGNOSIS — I50.9 HEART FAILURE: ICD-10-CM

## 2022-01-01 DIAGNOSIS — J90 PLEURAL EFFUSION: ICD-10-CM

## 2022-01-01 DIAGNOSIS — R06.09 DOE (DYSPNEA ON EXERTION): ICD-10-CM

## 2022-01-01 DIAGNOSIS — I50.9 CHRONIC CONGESTIVE HEART FAILURE, UNSPECIFIED HEART FAILURE TYPE: ICD-10-CM

## 2022-01-01 DIAGNOSIS — K21.9 GASTROESOPHAGEAL REFLUX DISEASE: ICD-10-CM

## 2022-01-01 DIAGNOSIS — I10 HYPERTENSION: ICD-10-CM

## 2022-01-01 DIAGNOSIS — R10.9 ABDOMINAL PAIN: ICD-10-CM

## 2022-01-01 DIAGNOSIS — E11.9 DIABETES MELLITUS WITHOUT COMPLICATION: ICD-10-CM

## 2022-01-01 DIAGNOSIS — I48.91 ATRIAL FIBRILLATION: ICD-10-CM

## 2022-01-01 DIAGNOSIS — N40.0 BENIGN PROSTATIC HYPERPLASIA: ICD-10-CM

## 2022-01-01 DIAGNOSIS — F10.21 ALCOHOLISM IN REMISSION: Chronic | ICD-10-CM

## 2022-01-01 DIAGNOSIS — E78.5 HYPERLIPIDEMIA: ICD-10-CM

## 2022-01-01 DIAGNOSIS — I42.0 DILATED CARDIOMYOPATHY: Chronic | ICD-10-CM

## 2022-01-01 LAB
ALBUMIN SERPL BCP-MCNC: 3.6 G/DL (ref 3.5–5)
ALBUMIN/GLOB SERPL: 1.1 {RATIO}
ALP SERPL-CCNC: 108 U/L (ref 45–115)
ALT SERPL W P-5'-P-CCNC: 39 U/L (ref 16–61)
ANION GAP SERPL CALCULATED.3IONS-SCNC: 15 MMOL/L (ref 7–16)
AST SERPL W P-5'-P-CCNC: 29 U/L (ref 15–37)
BACTERIA #/AREA URNS HPF: ABNORMAL /HPF
BASOPHILS # BLD AUTO: 0.02 K/UL (ref 0–0.2)
BASOPHILS NFR BLD AUTO: 0.3 % (ref 0–1)
BILIRUB SERPL-MCNC: 1.3 MG/DL (ref 0–1.2)
BILIRUB UR QL STRIP: NEGATIVE
BUN SERPL-MCNC: 26 MG/DL (ref 7–18)
BUN/CREAT SERPL: 13 (ref 6–20)
CALCIUM SERPL-MCNC: 9.3 MG/DL (ref 8.5–10.1)
CHLORIDE SERPL-SCNC: 102 MMOL/L (ref 98–107)
CLARITY UR: CLEAR
CO2 SERPL-SCNC: 27 MMOL/L (ref 21–32)
COLOR UR: YELLOW
CREAT SERPL-MCNC: 1.95 MG/DL (ref 0.7–1.3)
DIFFERENTIAL METHOD BLD: ABNORMAL
EOSINOPHIL # BLD AUTO: 0.06 K/UL (ref 0–0.5)
EOSINOPHIL NFR BLD AUTO: 0.8 % (ref 1–4)
ERYTHROCYTE [DISTWIDTH] IN BLOOD BY AUTOMATED COUNT: 17.2 % (ref 11.5–14.5)
FINE GRAN CASTS #/AREA URNS LPF: ABNORMAL /LPF
GLOBULIN SER-MCNC: 3.3 G/DL (ref 2–4)
GLUCOSE SERPL-MCNC: 211 MG/DL (ref 74–106)
GLUCOSE UR STRIP-MCNC: 500 MG/DL
HCT VFR BLD AUTO: 52.5 % (ref 40–54)
HGB BLD-MCNC: 16.9 G/DL (ref 13.5–18)
HYALINE CASTS #/AREA URNS LPF: ABNORMAL /LPF
IMM GRANULOCYTES # BLD AUTO: 0.04 K/UL (ref 0–0.04)
IMM GRANULOCYTES NFR BLD: 0.5 % (ref 0–0.4)
KETONES UR STRIP-SCNC: NEGATIVE MG/DL
LEUKOCYTE ESTERASE UR QL STRIP: NEGATIVE
LIPASE SERPL-CCNC: 157 U/L (ref 73–393)
LYMPHOCYTES # BLD AUTO: 1.38 K/UL (ref 1–4.8)
LYMPHOCYTES NFR BLD AUTO: 18.6 % (ref 27–41)
MAGNESIUM SERPL-MCNC: 2.5 MG/DL (ref 1.7–2.3)
MCH RBC QN AUTO: 26.9 PG (ref 27–31)
MCHC RBC AUTO-ENTMCNC: 32.2 G/DL (ref 32–36)
MCV RBC AUTO: 83.6 FL (ref 80–96)
MONOCYTES # BLD AUTO: 0.67 K/UL (ref 0–0.8)
MONOCYTES NFR BLD AUTO: 9 % (ref 2–6)
MPC BLD CALC-MCNC: 12.1 FL (ref 9.4–12.4)
MUCOUS THREADS #/AREA URNS HPF: ABNORMAL /HPF
NEUTROPHILS # BLD AUTO: 5.25 K/UL (ref 1.8–7.7)
NEUTROPHILS NFR BLD AUTO: 70.8 % (ref 53–65)
NITRITE UR QL STRIP: NEGATIVE
NRBC # BLD AUTO: 0 X10E3/UL
NRBC, AUTO (.00): 0 %
NT-PROBNP SERPL-MCNC: ABNORMAL PG/ML (ref 1–450)
PH UR STRIP: 6 PH UNITS
PLATELET # BLD AUTO: 296 K/UL (ref 150–400)
POTASSIUM SERPL-SCNC: 4.1 MMOL/L (ref 3.5–5.1)
PROT SERPL-MCNC: 6.9 G/DL (ref 6.4–8.2)
PROT UR QL STRIP: >=300
RBC # BLD AUTO: 6.28 M/UL (ref 4.6–6.2)
RBC # UR STRIP: ABNORMAL /UL
RBC #/AREA URNS HPF: ABNORMAL /HPF
SODIUM SERPL-SCNC: 140 MMOL/L (ref 136–145)
SP GR UR STRIP: >=1.03
SQUAMOUS #/AREA URNS LPF: ABNORMAL /LPF
TROPONIN I SERPL HS-MCNC: 548.8 PG/ML
TROPONIN I SERPL HS-MCNC: 593.5 PG/ML
UROBILINOGEN UR STRIP-ACNC: 0.2 MG/DL
WBC # BLD AUTO: 7.42 K/UL (ref 4.5–11)
WBC #/AREA URNS HPF: ABNORMAL /HPF

## 2022-01-01 PROCEDURE — 93005 ELECTROCARDIOGRAM TRACING: CPT

## 2022-01-01 PROCEDURE — 93010 EKG 12-LEAD: ICD-10-PCS | Mod: ,,, | Performed by: INTERNAL MEDICINE

## 2022-01-01 PROCEDURE — 84484 ASSAY OF TROPONIN QUANT: CPT | Performed by: EMERGENCY MEDICINE

## 2022-01-01 PROCEDURE — 83690 ASSAY OF LIPASE: CPT | Performed by: EMERGENCY MEDICINE

## 2022-01-01 PROCEDURE — 87077 CULTURE AEROBIC IDENTIFY: CPT | Performed by: EMERGENCY MEDICINE

## 2022-01-01 PROCEDURE — 87086 URINE CULTURE/COLONY COUNT: CPT | Performed by: EMERGENCY MEDICINE

## 2022-01-01 PROCEDURE — 99285 EMERGENCY DEPT VISIT HI MDM: CPT | Mod: 25

## 2022-01-01 PROCEDURE — 85025 COMPLETE CBC W/AUTO DIFF WBC: CPT | Performed by: EMERGENCY MEDICINE

## 2022-01-01 PROCEDURE — 99284 PR EMERGENCY DEPT VISIT,LEVEL IV: ICD-10-PCS | Mod: ,,, | Performed by: EMERGENCY MEDICINE

## 2022-01-01 PROCEDURE — 83735 ASSAY OF MAGNESIUM: CPT | Performed by: EMERGENCY MEDICINE

## 2022-01-01 PROCEDURE — 81001 URINALYSIS AUTO W/SCOPE: CPT | Performed by: EMERGENCY MEDICINE

## 2022-01-01 PROCEDURE — 83880 ASSAY OF NATRIURETIC PEPTIDE: CPT | Performed by: EMERGENCY MEDICINE

## 2022-01-01 PROCEDURE — 93010 ELECTROCARDIOGRAM REPORT: CPT | Mod: ,,, | Performed by: INTERNAL MEDICINE

## 2022-01-01 PROCEDURE — 36415 COLL VENOUS BLD VENIPUNCTURE: CPT | Performed by: EMERGENCY MEDICINE

## 2022-01-01 PROCEDURE — 96374 THER/PROPH/DIAG INJ IV PUSH: CPT

## 2022-01-01 PROCEDURE — 25000003 PHARM REV CODE 250: Performed by: EMERGENCY MEDICINE

## 2022-01-01 PROCEDURE — 80053 COMPREHEN METABOLIC PANEL: CPT | Performed by: EMERGENCY MEDICINE

## 2022-01-01 PROCEDURE — 99284 EMERGENCY DEPT VISIT MOD MDM: CPT | Mod: ,,, | Performed by: EMERGENCY MEDICINE

## 2022-01-01 PROCEDURE — 63600175 PHARM REV CODE 636 W HCPCS: Performed by: EMERGENCY MEDICINE

## 2022-01-01 RX ORDER — FUROSEMIDE 10 MG/ML
40 INJECTION INTRAMUSCULAR; INTRAVENOUS
Status: COMPLETED | OUTPATIENT
Start: 2022-01-01 | End: 2022-01-01

## 2022-01-01 RX ORDER — ASPIRIN 325 MG
325 TABLET ORAL
Status: COMPLETED | OUTPATIENT
Start: 2022-01-01 | End: 2022-01-01

## 2022-01-01 RX ADMIN — ASPIRIN 325 MG ORAL TABLET 325 MG: 325 PILL ORAL at 10:01

## 2022-01-01 RX ADMIN — FUROSEMIDE 40 MG: 10 INJECTION INTRAMUSCULAR; INTRAVENOUS at 10:01

## 2022-01-01 NOTE — ED PROVIDER NOTES
Encounter Date: 1/1/2022       History     Chief Complaint   Patient presents with    Chest Pain     Patient says he has been feeling bad over the past 2 weeks since he had his flu shot.  Over the past 4 days he has been feeling moderately weak and sleeping poorly.  Patient thinks that 1 of his main problems is that he is having difficulty having a bowel movement.  He has been having some straining with a bowel movement and feels some vague discomfort in the epigastrium and periumbilical area associated with some bloating.  Patient did have a bowel movement last night after straining.  He does take stool softeners but denies taking laxatives or enemas.  Over the past couple weeks he has had some shortness of breath with walking.  No associated chest pain other than the vague epigastric discomfort.  Associated some chronic swelling of lower extremities.  Occasional chronic cough.  Past history of prostate cancer 2005 and ER visits in November with hematuria but that has resolved and no longer is been an issue.  Follows with Dr. Leigh says he was told in the recent past that he may need a pacemaker but that changed after his medications were adjusted.  No other associated symptoms or modifying factors.  He has been vaccinated for COVID including the booster.  One episode of vomiting a few days ago.  Has been able to eat and drink near normally        Review of patient's allergies indicates:  No Known Allergies  Past Medical History:   Diagnosis Date    Atrial fibrillation     CHF (congestive heart failure)     Chronic kidney disease     CVA (cerebral vascular accident)     Dilated cardiomyopathy     DM type 2 (diabetes mellitus, type 2)     GERD (gastroesophageal reflux disease)     Hyperlipidemia     Hypertension     Hypertensive heart disease     Hypothyroidism     Left bundle branch block     Prostate disorder      Past Surgical History:   Procedure Laterality Date    CARDIAC CATHETERIZATION Left  11/2018    PROSTATECTOMY       Family History   Problem Relation Age of Onset    Hypertension Mother     Pacemaker/defibrilator Mother     Heart disease Father      Social History     Tobacco Use    Smoking status: Former Smoker     Packs/day: 1.00     Types: Cigarettes, Cigars    Smokeless tobacco: Current User     Types: Chew    Tobacco comment: QUIT 2017   Substance Use Topics    Alcohol use: Not Currently    Drug use: Never     Review of Systems   Constitutional: Positive for fatigue. Negative for fever.   HENT: Negative for congestion and sore throat.    Respiratory: Positive for cough and shortness of breath.    Cardiovascular: Positive for chest pain and leg swelling.   Gastrointestinal: Positive for abdominal distention, constipation, nausea and vomiting.   Genitourinary: Negative for difficulty urinating, dysuria and frequency.   Musculoskeletal: Negative for arthralgias, back pain and myalgias.   Skin: Negative for rash.   Neurological: Negative for syncope, speech difficulty, weakness, light-headedness and numbness.   Hematological: Does not bruise/bleed easily.       Physical Exam     Initial Vitals [01/01/22 0731]   BP Pulse Resp Temp SpO2   114/77 76 18 97.8 °F (36.6 °C) 95 %      MAP       --         Physical Exam    Constitutional: He appears well-developed and well-nourished.   HENT:   Head: Normocephalic and atraumatic.   Eyes: EOM are normal. Pupils are equal, round, and reactive to light.   Neck: Neck supple.   Normal range of motion.  Cardiovascular: Normal rate and regular rhythm.   Pulmonary/Chest: Breath sounds normal. No respiratory distress. He has no wheezes.   Abdominal: Abdomen is soft. Bowel sounds are normal. He exhibits no distension. There is no abdominal tenderness. There is no rebound and no guarding.   Musculoskeletal:         General: Edema (Pitting edema bilateral lower extremities) present. Normal range of motion.      Cervical back: Normal range of motion and neck  supple.     Neurological: He is alert and oriented to person, place, and time.   Skin: Skin is warm and dry.   Psychiatric: He has a normal mood and affect. Thought content normal.         Medical Screening Exam   See Full Note    ED Course   Procedures  Labs Reviewed   COMPREHENSIVE METABOLIC PANEL - Abnormal; Notable for the following components:       Result Value    Glucose 211 (*)     BUN 26 (*)     Creatinine 1.95 (*)     Bilirubin, Total 1.3 (*)     eGFR 35 (*)     All other components within normal limits   MAGNESIUM - Abnormal; Notable for the following components:    Magnesium 2.5 (*)     All other components within normal limits   URINALYSIS, REFLEX TO URINE CULTURE - Abnormal; Notable for the following components:    Protein, UA >=300 (*)     Glucose,   (*)     Blood, UA Trace-Intact (*)     Specific Gravity, UA >=1.030 (*)     All other components within normal limits   CBC WITH DIFFERENTIAL - Abnormal; Notable for the following components:    RBC 6.28 (*)     MCH 26.9 (*)     RDW 17.2 (*)     Neutrophils % 70.8 (*)     Lymphocytes % 18.6 (*)     Monocytes % 9.0 (*)     Eosinophils % 0.8 (*)     Immature Granulocytes % 0.5 (*)     All other components within normal limits   TROPONIN I - Abnormal; Notable for the following components:    Troponin I High Sensitivity 593.5 (*)     All other components within normal limits   NT-PRO NATRIURETIC PEPTIDE - Abnormal; Notable for the following components:    ProBNP 10,552 (*)     All other components within normal limits   URINALYSIS, MICROSCOPIC - Abnormal; Notable for the following components:    Bacteria, UA Moderate (*)     Squamous Epithelial Cells, UA Occasional (*)     Mucus, UA Moderate (*)     Hyaline Casts, UA 2-5 (*)     Fine Granular Casts, UA 0-2 (*)     All other components within normal limits   TROPONIN I - Abnormal; Notable for the following components:    Troponin I High Sensitivity 548.8 (*)     All other components within normal limits    LIPASE - Normal   CULTURE, URINE   CBC W/ AUTO DIFFERENTIAL    Narrative:     The following orders were created for panel order CBC auto differential.  Procedure                               Abnormality         Status                     ---------                               -----------         ------                     CBC with Differential[[id number]]        Abnormal            Final result                 Please view results for these tests on the individual orders.   EXTRA TUBES    Narrative:     The following orders were created for panel order EXTRA TUBES.  Procedure                               Abnormality         Status                     ---------                               -----------         ------                     Light Blue Top Hold[[id number]]                              In process                   Please view results for these tests on the individual orders.   LIGHT BLUE TOP HOLD          Imaging Results          CT Chest Abdomen Without Contrast (XPD) (Final result)  Result time 01/01/22 10:20:31    Final result by Lauren Chaves MD (01/01/22 10:20:31)                 Impression:      1. Generalized anasarca.  2. Moderately large bilateral pleural effusions  3. Compressive atelectasis of the lower lung fields.  4. Small pericardial effusion.  5. Coronary artery calcification.  6. Cardiomegaly.  7. Development of ascites.  8. Development of herniation of bowel into an umbilical hernia previously only containing fat.  I cannot tell with certainty whether this is large or small intestine per 2 doing into the defect, as both are seen adjacent.  Partial obstruction at the hernia cannot be excluded.  9. Urinary bladder indistinctness, correlate with UA for cystitis.  10. Enlarged prostate gland.  Heterogeneous.  Low-density area centrally could be related to previous TURP, and is similar to the November exam, but prostate abscess or prostatitis cannot be excluded.  11. Suspected renal cysts,  some of which appear hemorrhagic.  Ultrasound is necessary for cysts solid differentiation.      Electronically signed by: Lauren Chaves  Date:    01/01/2022  Time:    10:20             Narrative:    EXAMINATION:  CT CHEST ABDOMEN WITHOUT CONTRAST (XPD)    CLINICAL HISTORY:  abdominal pain;  cough shortness of breath chest pain abdominal distension constipation nausea and vomiting.  Pitting edema of the lower extremities    TECHNIQUE:  No intravenous or oral contrast given.    COMPARISON:  CT of the abdomen and pelvis without contrast dated November 18th, 2021    FINDINGS:  CT of the chest:    There is bilaterally symmetric moderate gynecomastia.  There is diffuse anasarca.  The thyroid gland is small.  There is moderate calcific plaque present within a normal caliber thoracic aorta, extending into the origins of the great vessels.  There is aortic valve calcification, and coronary artery calcification.  The heart is enlarged.  There is a small pericardial effusion, with a thickness of 10 mm.  There are mild moderately large bilateral pleural effusions, relatively symmetric.  Compressive atelectasis is noted within the lung bases, no discrete infiltrates are seen.  No pneumothorax is identified.  No discrete pulmonary masses.  No hilar or mediastinal lymphadenopathy.  Bridging osteophytes are noted within the thoracic spine.    CT of the abdomen and pelvis:    Generalized anasarca continues over the abdomen and pelvis.  The liver is normal in size and density.  The spleen is normal in size and contains granulomas.  There is moderate calcific plaque present within a normal caliber abdominal aorta, extending into the origins of the SMA, renal arteries, and both common iliac arteries.  There is a mild amount of ascites present.  It is four-quadrant in location.  There is no pancreatic enlargement or pancreatic ductal dilatation.  There is no adrenal enlargement.  The renal cortical thickness is normal.  There is no  hydronephrosis.  As was noted previously, there are multiple cortical based lesions, some are low density and some are high density.  Overall there is no significant interval change.  These likely represent small hemorrhagic cysts.  Ultrasound is necessary for further characterization.    The prostate gland is enlarged and heterogeneous, and there are findings consistent with a previous TURP.  There could be inflammation of the prostate gland, the areas of low density have not changed significantly since the November exam but correlate with UA for any indication of prostatitis.    The gastric contour is normal.  The loops of small intestine are not dilated.  There is an upper ventral abdominal wall hernia, which contains only fat.  There is an umbilical hernia, which contains bowel, I cannot tell with certainty whether this is smaller large intestine.  No bowel extended into this hernia on the previous exam.  There is gas and fecal material within the colon, but the colon is not as distended as the small intestine.  The urinary bladder demonstrates mild edematous wall thickening.  There is stranding in the surrounding fat.  Severe degenerative changes of the spinal column.  Mild degenerative changes of the hips.                               X-Ray Abdomen Flat And Erect (Final result)  Result time 01/01/22 09:05:00    Final result by Lauren Chaves MD (01/01/22 09:05:00)                 Impression:      Nonspecific gaseous distension of bowel of moderate severity.  No obstruction seen.      Electronically signed by: Lauren Chaves  Date:    01/01/2022  Time:    09:05             Narrative:    EXAMINATION:  XR ABDOMEN FLAT AND ERECT    CLINICAL HISTORY:  Unspecified abdominal pain    TECHNIQUE:  Flat and erect AP views of the abdomen were performed.    COMPARISON:  November 5, 2018.    FINDINGS:  The heart is enlarged, and there are increased parenchymal opacities at the left lung base.  Correlate with chest x-ray.   No free air.  No intra-abdominal organomegaly.  Calcifications project over the left renal outline, in the 4 mm range, and remains stable.  There is gaseous distention of bowel in the upper abdomen.  No obstruction is seen with gas noted in the rectum as well.  Degenerative changes are noted within the spinal column.                               X-Ray Chest 1 View (Final result)  Result time 01/01/22 09:29:16    Final result by Lauren Chaves MD (01/01/22 09:29:16)                 Impression:      Left basilar infiltrate.  Right basilar atelectasis.  Small bilateral pleural effusions.  Cardiomegaly.      Electronically signed by: Lauren Chaves  Date:    01/01/2022  Time:    09:29             Narrative:    EXAMINATION:  XR CHEST 1 VIEW    CLINICAL HISTORY:  Shortness of breath    TECHNIQUE:  Single frontal view of the chest was performed.    COMPARISON:  November 16, 2018.    FINDINGS:  The heart is enlarged.  The pulmonary vasculature is normal. Mild atelectasis at the right mid lung field. Small bilateral pleural effusions. Patchy infiltrate at the left base. Degenerative changes of the spinal column and shoulders.                                 Medications   aspirin tablet 325 mg (325 mg Oral Given 1/1/22 1005)   furosemide injection 40 mg (40 mg Intravenous Given 1/1/22 1003)                 ED Course as of 01/01/22 1102   Sat Jan 01, 2022   0933 Echocardiogram note reviewed from this past month couple weeks ago showed EF of 16%. [PK]   1040 Discussed with cardiologist on-call specifically about the cardiac issues and placed on the cardiac issues with congestive heart failure along patient would not require hospitalization.  However patient is showing on CT scan some evidence of potential partial bowel obstruction.  Will discuss with general surgeon [PK]   1046 Discussed with general surgeon on-call about the periumbilical hernia containing bowel and the patient's symptoms.  No recent vomiting other than a few  days ago.  Patient will be seen by general started Monday.  Patient voiced understanding to return in the interim if his symptoms are worsening or new symptoms develop. [PK]   1056 Repeat exam.  Patient breathing comfortably.  No shortness of breath.  Nontoxic appearing.  Will be discharged home.  Patient understands plan and agrees.  Will double his Lasix and take 80 mg twice a day for 3 days [PK]      ED Course User Index  [PK] Karsten Clifton MD          Clinical Impression:   Final diagnoses:  [R07.9] Chest pain  [R10.9] Abdominal pain  [R06.02] Shortness of breath  [K42.9] Umbilical hernia without obstruction and without gangrene (Primary)  [J90] Pleural effusion  [I31.3] Pericardial effusion  [I42.9] Cardiomyopathy, unspecified type  [I50.9] Chronic congestive heart failure, unspecified heart failure type          ED Disposition Condition    Discharge Stable        ED Prescriptions     None        Follow-up Information    None          Karsten Clifton MD  01/01/22 4386

## 2022-01-01 NOTE — DISCHARGE INSTRUCTIONS
Start taking your Lasix twice a day for the next 3 days.    For your constipation use the stool softener and also use MiraLax daily.    Come back to the ER he you develop vomiting worse abdominal swelling or abdominal pain.    Follow-up with general surgeon Monday at 9:00 a.m. Dr. Cici rosales about your hernia and abdominal discomfort    Also follow-up with your cardiologist Dr. Carlson about your congestive heart failure.

## 2022-01-01 NOTE — ED TRIAGE NOTES
Pt presents to ED with chest pain, shortness of breath, and abdominal pain with nausea and vomiting. Pt reports shortness of breath with exertion. Pt reports symptoms started 4 days ago.

## 2022-01-03 LAB — UA COMPLETE W REFLEX CULTURE PNL UR: ABNORMAL

## 2022-01-21 ENCOUNTER — OFFICE VISIT (OUTPATIENT)
Dept: CARDIOLOGY | Facility: CLINIC | Age: 82
End: 2022-01-21
Payer: COMMERCIAL

## 2022-01-21 VITALS
WEIGHT: 243.81 LBS | HEIGHT: 71 IN | HEART RATE: 52 BPM | SYSTOLIC BLOOD PRESSURE: 110 MMHG | DIASTOLIC BLOOD PRESSURE: 60 MMHG | OXYGEN SATURATION: 96 % | BODY MASS INDEX: 34.13 KG/M2 | RESPIRATION RATE: 16 BRPM

## 2022-01-21 DIAGNOSIS — I48.11 LONGSTANDING PERSISTENT ATRIAL FIBRILLATION: Chronic | ICD-10-CM

## 2022-01-21 DIAGNOSIS — I42.0 DILATED CARDIOMYOPATHY: Chronic | ICD-10-CM

## 2022-01-21 DIAGNOSIS — I10 HYPERTENSION, UNSPECIFIED TYPE: Chronic | ICD-10-CM

## 2022-01-21 DIAGNOSIS — E78.5 HYPERLIPIDEMIA, UNSPECIFIED HYPERLIPIDEMIA TYPE: Chronic | ICD-10-CM

## 2022-01-21 DIAGNOSIS — I50.22 CHRONIC SYSTOLIC CONGESTIVE HEART FAILURE: Primary | Chronic | ICD-10-CM

## 2022-01-21 DIAGNOSIS — Z01.818 OTHER SPECIFIED PRE-OPERATIVE EXAMINATION: ICD-10-CM

## 2022-01-21 DIAGNOSIS — Z79.01 LONG TERM (CURRENT) USE OF ANTICOAGULANTS: ICD-10-CM

## 2022-01-21 DIAGNOSIS — I31.39 PERICARDIAL EFFUSION: ICD-10-CM

## 2022-01-21 DIAGNOSIS — Z01.812 PRE-OPERATIVE LABORATORY EXAMINATION: ICD-10-CM

## 2022-01-21 DIAGNOSIS — N18.9 CHRONIC KIDNEY DISEASE, UNSPECIFIED CKD STAGE: Chronic | ICD-10-CM

## 2022-01-21 PROCEDURE — 99214 OFFICE O/P EST MOD 30 MIN: CPT | Mod: S$PBB,,, | Performed by: INTERNAL MEDICINE

## 2022-01-21 PROCEDURE — 1159F MED LIST DOCD IN RCRD: CPT | Mod: CPTII,,, | Performed by: INTERNAL MEDICINE

## 2022-01-21 PROCEDURE — 1159F PR MEDICATION LIST DOCUMENTED IN MEDICAL RECORD: ICD-10-PCS | Mod: CPTII,,, | Performed by: INTERNAL MEDICINE

## 2022-01-21 PROCEDURE — 99214 PR OFFICE/OUTPT VISIT, EST, LEVL IV, 30-39 MIN: ICD-10-PCS | Mod: S$PBB,,, | Performed by: INTERNAL MEDICINE

## 2022-01-21 PROCEDURE — 93005 ELECTROCARDIOGRAM TRACING: CPT | Mod: PBBFAC | Performed by: INTERNAL MEDICINE

## 2022-01-21 PROCEDURE — 3078F DIAST BP <80 MM HG: CPT | Mod: CPTII,,, | Performed by: INTERNAL MEDICINE

## 2022-01-21 PROCEDURE — 99215 OFFICE O/P EST HI 40 MIN: CPT | Mod: PBBFAC | Performed by: INTERNAL MEDICINE

## 2022-01-21 PROCEDURE — 1160F RVW MEDS BY RX/DR IN RCRD: CPT | Mod: CPTII,,, | Performed by: INTERNAL MEDICINE

## 2022-01-21 PROCEDURE — 1160F PR REVIEW ALL MEDS BY PRESCRIBER/CLIN PHARMACIST DOCUMENTED: ICD-10-PCS | Mod: CPTII,,, | Performed by: INTERNAL MEDICINE

## 2022-01-21 PROCEDURE — 3074F SYST BP LT 130 MM HG: CPT | Mod: CPTII,,, | Performed by: INTERNAL MEDICINE

## 2022-01-21 PROCEDURE — 93010 ELECTROCARDIOGRAM REPORT: CPT | Mod: S$PBB,,, | Performed by: INTERNAL MEDICINE

## 2022-01-21 PROCEDURE — 3078F PR MOST RECENT DIASTOLIC BLOOD PRESSURE < 80 MM HG: ICD-10-PCS | Mod: CPTII,,, | Performed by: INTERNAL MEDICINE

## 2022-01-21 PROCEDURE — 3074F PR MOST RECENT SYSTOLIC BLOOD PRESSURE < 130 MM HG: ICD-10-PCS | Mod: CPTII,,, | Performed by: INTERNAL MEDICINE

## 2022-01-21 PROCEDURE — 93010 EKG 12-LEAD: ICD-10-PCS | Mod: S$PBB,,, | Performed by: INTERNAL MEDICINE

## 2022-01-21 RX ORDER — DIAZEPAM 2 MG/1
5 TABLET ORAL
Status: CANCELLED | OUTPATIENT
Start: 2022-02-24

## 2022-01-21 RX ORDER — DIPHENHYDRAMINE HCL 25 MG
50 CAPSULE ORAL
Status: CANCELLED | OUTPATIENT
Start: 2022-02-24

## 2022-01-21 RX ORDER — SODIUM CHLORIDE 450 MG/100ML
INJECTION, SOLUTION INTRAVENOUS CONTINUOUS
Status: CANCELLED | OUTPATIENT
Start: 2022-02-24

## 2022-01-21 NOTE — PROGRESS NOTES
[facility] note        DATE OF SERVICE: 01/21/2022       PCP: Regina Sprague MD      CHIEF COMPLAINT:   Chief Complaint   Patient presents with    Hospital Follow Up     Chest pain    Shortness of Breath     With exertion and wakes up at night SOB        HISTORY OF PRESENT ILLNESS:  Jeanmarie Michelle is a 81 y.o. male with a PMH of   Past Medical History:   Diagnosis Date    Atrial fibrillation     CHF (congestive heart failure)     Chronic kidney disease     CVA (cerebral vascular accident)     Dilated cardiomyopathy     DM type 2 (diabetes mellitus, type 2)     GERD (gastroesophageal reflux disease)     Hyperlipidemia     Hypertension     Hypertensive heart disease     Hypothyroidism     Left bundle branch block     Prostate disorder      who presents for   Chief Complaint   Patient presents with    Hospital Follow Up     Chest pain    Shortness of Breath     With exertion and wakes up at night SOB          Review of Systems: Review of Systems   Respiratory: Positive for shortness of breath.    Cardiovascular: Negative for chest pain, palpitations and leg swelling.   Neurological: Negative for dizziness and loss of consciousness.        PAST MEDICAL HISTORY:  Past Medical History:   Diagnosis Date    Atrial fibrillation     CHF (congestive heart failure)     Chronic kidney disease     CVA (cerebral vascular accident)     Dilated cardiomyopathy     DM type 2 (diabetes mellitus, type 2)     GERD (gastroesophageal reflux disease)     Hyperlipidemia     Hypertension     Hypertensive heart disease     Hypothyroidism     Left bundle branch block     Prostate disorder        PAST SURGICAL HISTORY:  Past Surgical History:   Procedure Laterality Date    CARDIAC CATHETERIZATION Left 11/2018    PROSTATECTOMY         SOCIAL HISTORY:  Social History     Socioeconomic History    Marital status: Unknown   Tobacco Use    Smoking status: Former Smoker     Packs/day: 1.00     Types:  Cigarettes, Cigars    Smokeless tobacco: Current User     Types: Chew    Tobacco comment: QUIT 2017   Substance and Sexual Activity    Alcohol use: Not Currently    Drug use: Never    Sexual activity: Not Currently       FAMILY HISTORY:  Family History   Problem Relation Age of Onset    Hypertension Mother     Pacemaker/defibrilator Mother     Heart disease Father          ALLERGIES:  Review of patient's allergies indicates:  No Known Allergies     MEDICATIONS:    Current Outpatient Medications:     apixaban (ELIQUIS) 5 mg Tab, Take 1 tablet (5 mg total) by mouth 2 (two) times daily., Disp: 180 tablet, Rfl: 1    aspirin (ECOTRIN) 81 MG EC tablet, Take 81 mg by mouth once daily., Disp: , Rfl:     carvediloL (COREG) 6.25 MG tablet, Take 1 tablet (6.25 mg total) by mouth 2 (two) times daily with meals., Disp: 180 tablet, Rfl: 3    chlorthalidone (HYGROTEN) 25 MG Tab, Take 0.5 tablets (12.5 mg total) by mouth once daily. 1/2 tab po daily, Disp: 45 tablet, Rfl: 1    dapagliflozin (FARXIGA) 5 mg Tab tablet, Take 1 tablet (5 mg total) by mouth once daily., Disp: 90 tablet, Rfl: 1    ezetimibe (ZETIA) 10 mg tablet, Take 1 tablet (10 mg total) by mouth once daily., Disp: 90 tablet, Rfl: 1    furosemide (LASIX) 80 MG tablet, Take 1 tablet (80 mg total) by mouth once daily., Disp: 90 tablet, Rfl: 3    glipiZIDE (GLUCOTROL) 10 MG tablet, Take 1 tablet (10 mg total) by mouth 2 (two) times daily before meals., Disp: 180 tablet, Rfl: 1    omeprazole (PRILOSEC) 40 MG capsule, Take 1 capsule (40 mg total) by mouth once daily., Disp: 90 capsule, Rfl: 1    sacubitriL-valsartan (ENTRESTO)  mg per tablet, Take 1 tablet by mouth 2 (two) times daily., Disp: 180 tablet, Rfl: 1    tamsulosin (FLOMAX) 0.4 mg Cap, Take 1 capsule (0.4 mg total) by mouth once daily., Disp: 90 capsule, Rfl: 1     PHYSICAL EXAM:  /60 (BP Location: Left arm, Patient Position: Sitting, BP Method: Large (Manual))   Pulse (!) 52    "Resp 16   Ht 5' 11" (1.803 m)   Wt 110.6 kg (243 lb 12.8 oz)   SpO2 96%   BMI 34.00 kg/m²   Wt Readings from Last 3 Encounters:   22 110.6 kg (243 lb 12.8 oz)   22 130.2 kg (287 lb)   21 97.4 kg (214 lb 12 oz)      Body mass index is 34 kg/m².    Physical Exam  Vitals reviewed.   Constitutional:       Appearance: Normal appearance.      Comments: In wheelchair   HENT:      Head: Normocephalic and atraumatic.   Neck:      Vascular: JVD present. No carotid bruit.      Comments: JVD to angle of jaw.  Cardiovascular:      Rate and Rhythm: Normal rate and regular rhythm.      Pulses: Normal pulses.           Radial pulses are 2+ on the right side and 2+ on the left side.        Dorsalis pedis pulses are 2+ on the right side and 2+ on the left side.      Heart sounds: Normal heart sounds.   Pulmonary:      Effort: Pulmonary effort is normal.      Breath sounds: Normal breath sounds.   Musculoskeletal:      Right hand: Swelling present.      Left hand: Swelling present.      Right lower le+ Edema present.      Left lower le+ Edema present.      Comments: 1+ edema in hands   Skin:     General: Skin is warm and dry.   Neurological:      Mental Status: He is alert.         LABS REVIEWED:  Lab Results   Component Value Date    WBC 7.42 2022    RBC 6.28 (H) 2022    HGB 16.9 2022    HCT 52.5 2022    MCV 83.6 2022    MCH 26.9 (L) 2022    MCHC 32.2 2022    RDW 17.2 (H) 2022     2022    MPV 12.1 2022    NRBC 0.0 2022    INR 1.09 2021     Lab Results   Component Value Date     2022    K 4.1 2022     2022    CO2 27 2022    BUN 26 (H) 2022    MG 2.5 (H) 2022     Lab Results   Component Value Date    AST 29 2022    ALT 39 2022     Lab Results   Component Value Date     (H) 2022    HGBA1C 9.3 (H) 10/20/2021     Lab Results   Component Value Date    CHOL 148 " 10/20/2021    HDL 28 (L) 10/20/2021    TRIG 427 (H) 10/20/2021    CHOLHDL 5.3 10/20/2021       CARDIAC STUDIES REVIEWED: Results for orders placed during the hospital encounter of 12/08/21    Echo    Interpretation Summary  · Atrial fibrillation with frequent PVC's, rate 85 and occasional runs of wide complex tachycardia.  · The left ventricle is moderately enlarged with eccentric hypertrophy and  · Atrial fibrillation observed.  · Mild right ventricular enlargement.  · Moderate mitral regurgitation.  · Moderate to severe tricuspid regurgitation.  · There is pulmonary hypertension.  · Moderate left atrial enlargement.  · Mild right atrial enlargement.  · The estimated ejection fraction is 15%.     EKG:  Accelerated junctional rhythm with PVC's, marked ST abn, 89 bpm        ASSESSMENT:  Pt as been on maximum medical management for greater the 3 months.     Active Problem List with Overview Notes    Diagnosis Date Noted    Chronic systolic congestive heart failure 01/21/2022     accelerated, decompensated, NYHA class II-III      Chronic kidney disease 01/21/2022     creatinine 1.9      Pericardial effusion 01/21/2022    Alcoholism in remission 12/06/2021    CELESTIN (dyspnea on exertion) 12/06/2021    Dilated cardiomyopathy 12/06/2021    Hypertension 10/20/2021    Diabetes mellitus without complication 10/20/2021    Gastroesophageal reflux disease 10/20/2021    Hyperlipidemia 10/20/2021    Benign prostatic hyperplasia 10/20/2021    Atrial fibrillation 10/20/2021    Heart failure 10/20/2021     VISIT DIAGNOSIS:  Chronic systolic congestive heart failure  Comments:  accelerated, decompensated, NYHA class II-III  Orders:  -     Case Request-Cath Lab: INSERTION, ICD GENERATOR, SINGLE CHAMBER; Standing    Longstanding persistent atrial fibrillation  -     EKG 12-lead; Future  -     X-Ray Chest PA And Lateral; Future; Expected date: 02/22/2022  -     POCT COVID-19 Rapid Screening; Future; Expected date:  02/22/2022  -     CBC Auto Differential; Future; Expected date: 02/22/2022  -     Basic Metabolic Panel; Future; Expected date: 02/22/2022  -     Protime-INR; Future; Expected date: 02/22/2022    Pericardial effusion  -     Ambulatory referral/consult to Cardiology    Dilated cardiomyopathy  Comments:  EF 15%  Orders:  -     X-Ray Chest PA And Lateral; Future; Expected date: 02/22/2022  -     POCT COVID-19 Rapid Screening; Future; Expected date: 02/22/2022  -     CBC Auto Differential; Future; Expected date: 02/22/2022  -     Basic Metabolic Panel; Future; Expected date: 02/22/2022  -     Protime-INR; Future; Expected date: 02/22/2022  -     Case Request-Cath Lab: INSERTION, ICD GENERATOR, SINGLE CHAMBER; Standing    Hyperlipidemia, unspecified hyperlipidemia type    Hypertension, unspecified type  -     X-Ray Chest PA And Lateral; Future; Expected date: 02/22/2022  -     POCT COVID-19 Rapid Screening; Future; Expected date: 02/22/2022  -     CBC Auto Differential; Future; Expected date: 02/22/2022  -     Basic Metabolic Panel; Future; Expected date: 02/22/2022  -     Protime-INR; Future; Expected date: 02/22/2022    Chronic kidney disease, unspecified CKD stage  Comments:  creatinine 1.9  Orders:  -     X-Ray Chest PA And Lateral; Future; Expected date: 02/22/2022  -     POCT COVID-19 Rapid Screening; Future; Expected date: 02/22/2022  -     CBC Auto Differential; Future; Expected date: 02/22/2022  -     Basic Metabolic Panel; Future; Expected date: 02/22/2022  -     Protime-INR; Future; Expected date: 02/22/2022    Other specified pre-operative examination  -     X-Ray Chest PA And Lateral; Future; Expected date: 02/22/2022    Pre-operative laboratory examination  -     POCT COVID-19 Rapid Screening; Future; Expected date: 02/22/2022  -     CBC Auto Differential; Future; Expected date: 02/22/2022  -     Basic Metabolic Panel; Future; Expected date: 02/22/2022  -     Protime-INR; Future; Expected date:  02/22/2022    Long term (current) use of anticoagulants  -     POCT COVID-19 Rapid Screening; Future; Expected date: 02/22/2022  -     CBC Auto Differential; Future; Expected date: 02/22/2022  -     Basic Metabolic Panel; Future; Expected date: 02/22/2022  -     Protime-INR; Future; Expected date: 02/22/2022         PLAN:  1. Increase Lasix to 120 mg one po qd  2. Weigh daily  3. Call Monday to let us know if the Lasix dose is effective or not.   4. Schedule for ICD.  Risks, benefits, and alternative were explained.     Orders Placed This Encounter   Procedures    X-Ray Chest PA And Lateral     Standing Status:   Future     Standing Expiration Date:   1/21/2023     Order Specific Question:   May the Radiologist modify the order per protocol to meet the clinical needs of the patient?     Answer:   Yes     Order Specific Question:   Release to patient     Answer:   Immediate    CBC Auto Differential     Standing Status:   Future     Standing Expiration Date:   3/22/2023    Basic Metabolic Panel     Standing Status:   Future     Standing Expiration Date:   3/22/2023    Protime-INR     Standing Status:   Future     Standing Expiration Date:   7/21/2023    POCT COVID-19 Rapid Screening     Standing Status:   Future     Standing Expiration Date:   3/21/2022     Order Specific Question:   Is the patient symptomatic?     Answer:   No    EKG 12-lead     Standing Status:   Future     Number of Occurrences:   1     Standing Expiration Date:   1/21/2023     Order Specific Question:   Diagnosis     Answer:   A-fib [[id number]]    Case Request-Cath Lab: INSERTION, ICD GENERATOR, SINGLE CHAMBER     Standing Status:   Standing     Number of Occurrences:   1     Order Specific Question:   CPT Code:     Answer:   ID INSRT PULSE GEN W/MULT LEADS [36989]     Order Specific Question:   Medical Necessity:     Answer:   Medically Non-Urgent [100]     Order Specific Question:   Is an on-site pathologist required for this procedure?      Answer:   N/A      RTC after ICD implant.

## 2022-01-24 ENCOUNTER — OFFICE VISIT (OUTPATIENT)
Dept: FAMILY MEDICINE | Facility: CLINIC | Age: 82
End: 2022-01-24
Payer: COMMERCIAL

## 2022-01-24 ENCOUNTER — HOSPITAL ENCOUNTER (INPATIENT)
Facility: HOSPITAL | Age: 82
LOS: 9 days | Discharge: HOME | DRG: 689 | End: 2022-02-03
Attending: INTERNAL MEDICINE | Admitting: INTERNAL MEDICINE
Payer: COMMERCIAL

## 2022-01-24 VITALS
RESPIRATION RATE: 20 BRPM | SYSTOLIC BLOOD PRESSURE: 110 MMHG | HEART RATE: 77 BPM | WEIGHT: 233 LBS | DIASTOLIC BLOOD PRESSURE: 78 MMHG | TEMPERATURE: 99 F | BODY MASS INDEX: 32.62 KG/M2 | OXYGEN SATURATION: 97 % | HEIGHT: 71 IN

## 2022-01-24 DIAGNOSIS — E78.49 OTHER HYPERLIPIDEMIA: ICD-10-CM

## 2022-01-24 DIAGNOSIS — R31.0 HEMATURIA, GROSS: Primary | ICD-10-CM

## 2022-01-24 DIAGNOSIS — I10 ESSENTIAL HYPERTENSION: ICD-10-CM

## 2022-01-24 DIAGNOSIS — I50.22 CHRONIC SYSTOLIC HEART FAILURE: Primary | ICD-10-CM

## 2022-01-24 DIAGNOSIS — E11.9 TYPE 2 DIABETES MELLITUS WITHOUT COMPLICATION, WITHOUT LONG-TERM CURRENT USE OF INSULIN: ICD-10-CM

## 2022-01-24 DIAGNOSIS — R07.9 CHEST PAIN: ICD-10-CM

## 2022-01-24 DIAGNOSIS — N40.0 BENIGN PROSTATIC HYPERPLASIA: ICD-10-CM

## 2022-01-24 DIAGNOSIS — I50.9 ACUTE ON CHRONIC CONGESTIVE HEART FAILURE: ICD-10-CM

## 2022-01-24 DIAGNOSIS — R31.9 HEMATURIA: ICD-10-CM

## 2022-01-24 DIAGNOSIS — I50.9 HEART FAILURE, UNSPECIFIED HF CHRONICITY, UNSPECIFIED HEART FAILURE TYPE: ICD-10-CM

## 2022-01-24 DIAGNOSIS — I10 HYPERTENSION, UNSPECIFIED TYPE: ICD-10-CM

## 2022-01-24 DIAGNOSIS — R79.89 ELEVATED TROPONIN LEVEL: ICD-10-CM

## 2022-01-24 DIAGNOSIS — I10 HYPERTENSION: ICD-10-CM

## 2022-01-24 DIAGNOSIS — R97.20 ELEVATED PSA: ICD-10-CM

## 2022-01-24 DIAGNOSIS — I50.9 HEART FAILURE: ICD-10-CM

## 2022-01-24 DIAGNOSIS — E11.9 DIABETES MELLITUS WITHOUT COMPLICATION: ICD-10-CM

## 2022-01-24 DIAGNOSIS — R31.9 HEMATURIA, UNSPECIFIED TYPE: ICD-10-CM

## 2022-01-24 DIAGNOSIS — I42.0 DILATED CARDIOMYOPATHY: Chronic | ICD-10-CM

## 2022-01-24 DIAGNOSIS — N18.32 STAGE 3B CHRONIC KIDNEY DISEASE: Chronic | ICD-10-CM

## 2022-01-24 DIAGNOSIS — R06.09 DOE (DYSPNEA ON EXERTION): ICD-10-CM

## 2022-01-24 DIAGNOSIS — N18.9 CHRONIC KIDNEY DISEASE, UNSPECIFIED CKD STAGE: ICD-10-CM

## 2022-01-24 DIAGNOSIS — I50.23 ACUTE ON CHRONIC SYSTOLIC CHF (CONGESTIVE HEART FAILURE): ICD-10-CM

## 2022-01-24 DIAGNOSIS — N39.0 UTI (URINARY TRACT INFECTION): ICD-10-CM

## 2022-01-24 DIAGNOSIS — I50.22 CHRONIC SYSTOLIC CONGESTIVE HEART FAILURE: Chronic | ICD-10-CM

## 2022-01-24 DIAGNOSIS — N30.01 ACUTE CYSTITIS WITH HEMATURIA: ICD-10-CM

## 2022-01-24 DIAGNOSIS — E78.5 HYPERLIPIDEMIA: ICD-10-CM

## 2022-01-24 DIAGNOSIS — E03.8 OTHER SPECIFIED HYPOTHYROIDISM: ICD-10-CM

## 2022-01-24 DIAGNOSIS — K21.9 GASTROESOPHAGEAL REFLUX DISEASE: ICD-10-CM

## 2022-01-24 DIAGNOSIS — I50.9 ACUTE ON CHRONIC CONGESTIVE HEART FAILURE, UNSPECIFIED HEART FAILURE TYPE: ICD-10-CM

## 2022-01-24 DIAGNOSIS — I48.11 LONGSTANDING PERSISTENT ATRIAL FIBRILLATION: ICD-10-CM

## 2022-01-24 DIAGNOSIS — E11.42 TYPE 2 DIABETES MELLITUS WITH DIABETIC POLYNEUROPATHY, WITHOUT LONG-TERM CURRENT USE OF INSULIN: ICD-10-CM

## 2022-01-24 DIAGNOSIS — I31.39 PERICARDIAL EFFUSION: ICD-10-CM

## 2022-01-24 DIAGNOSIS — Z92.29 HX OF LONG TERM USE OF BLOOD THINNERS: ICD-10-CM

## 2022-01-24 DIAGNOSIS — H57.9 EYE EXAM ABNORMAL: ICD-10-CM

## 2022-01-24 DIAGNOSIS — F10.21 ALCOHOLISM IN REMISSION: Chronic | ICD-10-CM

## 2022-01-24 DIAGNOSIS — N18.9 CHRONIC KIDNEY DISEASE: Chronic | ICD-10-CM

## 2022-01-24 DIAGNOSIS — I48.20 CHRONIC A-FIB: ICD-10-CM

## 2022-01-24 LAB
ALBUMIN SERPL BCP-MCNC: 3.4 G/DL (ref 3.5–5)
ALBUMIN/GLOB SERPL: 1.1 {RATIO}
ALP SERPL-CCNC: 104 U/L (ref 45–115)
ALT SERPL W P-5'-P-CCNC: 36 U/L (ref 16–61)
ANION GAP SERPL CALCULATED.3IONS-SCNC: 14 MMOL/L (ref 7–16)
APTT PPP: 39.6 SECONDS (ref 25.2–37.3)
AST SERPL W P-5'-P-CCNC: 30 U/L (ref 15–37)
BACTERIA #/AREA URNS HPF: ABNORMAL /HPF
BASOPHILS # BLD AUTO: 0.01 K/UL (ref 0–0.2)
BASOPHILS NFR BLD AUTO: 0.1 % (ref 0–1)
BILIRUB SERPL-MCNC: 1.4 MG/DL (ref 0–1.2)
BILIRUB UR QL STRIP: ABNORMAL
BUN SERPL-MCNC: 30 MG/DL (ref 7–18)
BUN/CREAT SERPL: 15 (ref 6–20)
CALCIUM SERPL-MCNC: 9.4 MG/DL (ref 8.5–10.1)
CHLORIDE SERPL-SCNC: 103 MMOL/L (ref 98–107)
CLARITY UR: ABNORMAL
CO2 SERPL-SCNC: 33 MMOL/L (ref 21–32)
COLOR UR: ABNORMAL
CREAT SERPL-MCNC: 2.01 MG/DL (ref 0.7–1.3)
DIFFERENTIAL METHOD BLD: ABNORMAL
EOSINOPHIL # BLD AUTO: 0.01 K/UL (ref 0–0.5)
EOSINOPHIL NFR BLD AUTO: 0.1 % (ref 1–4)
ERYTHROCYTE [DISTWIDTH] IN BLOOD BY AUTOMATED COUNT: 18.1 % (ref 11.5–14.5)
FLUAV AG UPPER RESP QL IA.RAPID: NEGATIVE
FLUBV AG UPPER RESP QL IA.RAPID: NEGATIVE
GLOBULIN SER-MCNC: 3.2 G/DL (ref 2–4)
GLUCOSE SERPL-MCNC: 162 MG/DL (ref 74–106)
GLUCOSE UR STRIP-MCNC: 100 MG/DL
HCT VFR BLD AUTO: 52.3 % (ref 40–54)
HGB BLD-MCNC: 16.1 G/DL (ref 13.5–18)
IMM GRANULOCYTES # BLD AUTO: 0.07 K/UL (ref 0–0.04)
IMM GRANULOCYTES NFR BLD: 0.6 % (ref 0–0.4)
INR BLD: 1.33 (ref 0.9–1.1)
KETONES UR STRIP-SCNC: NEGATIVE MG/DL
LEUKOCYTE ESTERASE UR QL STRIP: ABNORMAL
LYMPHOCYTES # BLD AUTO: 0.77 K/UL (ref 1–4.8)
LYMPHOCYTES NFR BLD AUTO: 6.9 % (ref 27–41)
LYMPHOCYTES NFR BLD MANUAL: 8 % (ref 27–41)
MAGNESIUM SERPL-MCNC: 2.6 MG/DL (ref 1.7–2.3)
MCH RBC QN AUTO: 27.1 PG (ref 27–31)
MCHC RBC AUTO-ENTMCNC: 30.8 G/DL (ref 32–36)
MCV RBC AUTO: 87.9 FL (ref 80–96)
MONOCYTES # BLD AUTO: 0.74 K/UL (ref 0–0.8)
MONOCYTES NFR BLD AUTO: 6.6 % (ref 2–6)
MONOCYTES NFR BLD MANUAL: 4 % (ref 2–6)
MPC BLD CALC-MCNC: 12.2 FL (ref 9.4–12.4)
NEUTROPHILS # BLD AUTO: 9.63 K/UL (ref 1.8–7.7)
NEUTROPHILS NFR BLD AUTO: 85.7 % (ref 53–65)
NEUTS SEG NFR BLD MANUAL: 88 % (ref 50–62)
NITRITE UR QL STRIP: NEGATIVE
NRBC # BLD AUTO: 0 X10E3/UL
NRBC, AUTO (.00): 0 %
NT-PROBNP SERPL-MCNC: ABNORMAL PG/ML (ref 1–450)
PH UR STRIP: 6 PH UNITS
PLATELET # BLD AUTO: 245 K/UL (ref 150–400)
PLATELET MORPHOLOGY: NORMAL
POTASSIUM SERPL-SCNC: 3.8 MMOL/L (ref 3.5–5.1)
PROT SERPL-MCNC: 6.6 G/DL (ref 6.4–8.2)
PROT UR QL STRIP: >=300
PROTHROMBIN TIME: 16.4 SECONDS (ref 11.7–14.7)
RBC # BLD AUTO: 5.95 M/UL (ref 4.6–6.2)
RBC # UR STRIP: ABNORMAL /UL
RBC #/AREA URNS HPF: ABNORMAL /HPF
RBC MORPH BLD: NORMAL
SARS-COV+SARS-COV-2 AG RESP QL IA.RAPID: NEGATIVE
SODIUM SERPL-SCNC: 146 MMOL/L (ref 136–145)
SP GR UR STRIP: 1.02
TROPONIN I SERPL HS-MCNC: 596.8 PG/ML
UROBILINOGEN UR STRIP-ACNC: 0.2 MG/DL
WBC # BLD AUTO: 11.23 K/UL (ref 4.5–11)
WBC #/AREA URNS HPF: ABNORMAL /HPF
WBC CLUMPS, UA: ABNORMAL /HPF

## 2022-01-24 PROCEDURE — 99214 OFFICE O/P EST MOD 30 MIN: CPT | Mod: ,,, | Performed by: FAMILY MEDICINE

## 2022-01-24 PROCEDURE — 81001 URINALYSIS AUTO W/SCOPE: CPT | Performed by: NURSE PRACTITIONER

## 2022-01-24 PROCEDURE — 93010 EKG 12-LEAD: ICD-10-PCS | Mod: ,,, | Performed by: INTERNAL MEDICINE

## 2022-01-24 PROCEDURE — 1160F RVW MEDS BY RX/DR IN RCRD: CPT | Mod: ,,, | Performed by: FAMILY MEDICINE

## 2022-01-24 PROCEDURE — 87086 URINE CULTURE/COLONY COUNT: CPT | Performed by: NURSE PRACTITIONER

## 2022-01-24 PROCEDURE — 63600175 PHARM REV CODE 636 W HCPCS: Performed by: NURSE PRACTITIONER

## 2022-01-24 PROCEDURE — 1126F AMNT PAIN NOTED NONE PRSNT: CPT | Mod: ,,, | Performed by: FAMILY MEDICINE

## 2022-01-24 PROCEDURE — 1126F PR PAIN SEVERITY QUANTIFIED, NO PAIN PRESENT: ICD-10-PCS | Mod: ,,, | Performed by: FAMILY MEDICINE

## 2022-01-24 PROCEDURE — 99285 PR EMERGENCY DEPT VISIT,LEVEL V: ICD-10-PCS | Mod: ,,, | Performed by: NURSE PRACTITIONER

## 2022-01-24 PROCEDURE — 1159F PR MEDICATION LIST DOCUMENTED IN MEDICAL RECORD: ICD-10-PCS | Mod: ,,, | Performed by: FAMILY MEDICINE

## 2022-01-24 PROCEDURE — 83735 ASSAY OF MAGNESIUM: CPT | Performed by: NURSE PRACTITIONER

## 2022-01-24 PROCEDURE — 83880 ASSAY OF NATRIURETIC PEPTIDE: CPT | Performed by: NURSE PRACTITIONER

## 2022-01-24 PROCEDURE — 3078F DIAST BP <80 MM HG: CPT | Mod: ,,, | Performed by: FAMILY MEDICINE

## 2022-01-24 PROCEDURE — 96365 THER/PROPH/DIAG IV INF INIT: CPT

## 2022-01-24 PROCEDURE — 1101F PT FALLS ASSESS-DOCD LE1/YR: CPT | Mod: ,,, | Performed by: FAMILY MEDICINE

## 2022-01-24 PROCEDURE — 87077 CULTURE AEROBIC IDENTIFY: CPT | Performed by: NURSE PRACTITIONER

## 2022-01-24 PROCEDURE — 85025 COMPLETE CBC W/AUTO DIFF WBC: CPT | Performed by: NURSE PRACTITIONER

## 2022-01-24 PROCEDURE — 96376 TX/PRO/DX INJ SAME DRUG ADON: CPT

## 2022-01-24 PROCEDURE — 99220 PR INITIAL OBSERVATION CARE,LEVL III: ICD-10-PCS | Mod: ,,, | Performed by: INTERNAL MEDICINE

## 2022-01-24 PROCEDURE — 85610 PROTHROMBIN TIME: CPT | Performed by: NURSE PRACTITIONER

## 2022-01-24 PROCEDURE — 99285 EMERGENCY DEPT VISIT HI MDM: CPT | Mod: ,,, | Performed by: NURSE PRACTITIONER

## 2022-01-24 PROCEDURE — 99285 EMERGENCY DEPT VISIT HI MDM: CPT | Mod: 25

## 2022-01-24 PROCEDURE — 93010 ELECTROCARDIOGRAM REPORT: CPT | Mod: ,,, | Performed by: INTERNAL MEDICINE

## 2022-01-24 PROCEDURE — 3288F FALL RISK ASSESSMENT DOCD: CPT | Mod: ,,, | Performed by: FAMILY MEDICINE

## 2022-01-24 PROCEDURE — 1160F PR REVIEW ALL MEDS BY PRESCRIBER/CLIN PHARMACIST DOCUMENTED: ICD-10-PCS | Mod: ,,, | Performed by: FAMILY MEDICINE

## 2022-01-24 PROCEDURE — 99220 PR INITIAL OBSERVATION CARE,LEVL III: CPT | Mod: ,,, | Performed by: INTERNAL MEDICINE

## 2022-01-24 PROCEDURE — 3078F PR MOST RECENT DIASTOLIC BLOOD PRESSURE < 80 MM HG: ICD-10-PCS | Mod: ,,, | Performed by: FAMILY MEDICINE

## 2022-01-24 PROCEDURE — 80053 COMPREHEN METABOLIC PANEL: CPT | Performed by: NURSE PRACTITIONER

## 2022-01-24 PROCEDURE — 25000003 PHARM REV CODE 250: Performed by: NURSE PRACTITIONER

## 2022-01-24 PROCEDURE — 1101F PR PT FALLS ASSESS DOC 0-1 FALLS W/OUT INJ PAST YR: ICD-10-PCS | Mod: ,,, | Performed by: FAMILY MEDICINE

## 2022-01-24 PROCEDURE — 3074F SYST BP LT 130 MM HG: CPT | Mod: ,,, | Performed by: FAMILY MEDICINE

## 2022-01-24 PROCEDURE — 87428 SARSCOV & INF VIR A&B AG IA: CPT | Performed by: NURSE PRACTITIONER

## 2022-01-24 PROCEDURE — 99214 PR OFFICE/OUTPT VISIT, EST, LEVL IV, 30-39 MIN: ICD-10-PCS | Mod: ,,, | Performed by: FAMILY MEDICINE

## 2022-01-24 PROCEDURE — 1159F MED LIST DOCD IN RCRD: CPT | Mod: ,,, | Performed by: FAMILY MEDICINE

## 2022-01-24 PROCEDURE — 3074F PR MOST RECENT SYSTOLIC BLOOD PRESSURE < 130 MM HG: ICD-10-PCS | Mod: ,,, | Performed by: FAMILY MEDICINE

## 2022-01-24 PROCEDURE — 96375 TX/PRO/DX INJ NEW DRUG ADDON: CPT

## 2022-01-24 PROCEDURE — 36415 COLL VENOUS BLD VENIPUNCTURE: CPT | Performed by: NURSE PRACTITIONER

## 2022-01-24 PROCEDURE — 84484 ASSAY OF TROPONIN QUANT: CPT | Performed by: NURSE PRACTITIONER

## 2022-01-24 PROCEDURE — 3288F PR FALLS RISK ASSESSMENT DOCUMENTED: ICD-10-PCS | Mod: ,,, | Performed by: FAMILY MEDICINE

## 2022-01-24 PROCEDURE — 93005 ELECTROCARDIOGRAM TRACING: CPT

## 2022-01-24 PROCEDURE — 85730 THROMBOPLASTIN TIME PARTIAL: CPT | Performed by: NURSE PRACTITIONER

## 2022-01-24 RX ORDER — LATANOPROST 50 UG/ML
1 SOLUTION/ DROPS OPHTHALMIC DAILY
COMMUNITY
Start: 2022-01-23

## 2022-01-24 RX ORDER — FUROSEMIDE 80 MG/1
80 TABLET ORAL 2 TIMES DAILY
Qty: 180 TABLET | Refills: 1 | Status: CANCELLED | OUTPATIENT
Start: 2022-01-24 | End: 2023-01-24

## 2022-01-24 RX ORDER — FUROSEMIDE 10 MG/ML
40 INJECTION INTRAMUSCULAR; INTRAVENOUS
Status: COMPLETED | OUTPATIENT
Start: 2022-01-24 | End: 2022-01-24

## 2022-01-24 RX ORDER — DORZOLAMIDE HYDROCHLORIDE AND TIMOLOL MALEATE 20; 5 MG/ML; MG/ML
1 SOLUTION/ DROPS OPHTHALMIC 2 TIMES DAILY
COMMUNITY
Start: 2022-01-23

## 2022-01-24 RX ADMIN — FUROSEMIDE 40 MG: 10 INJECTION INTRAMUSCULAR; INTRAVENOUS at 08:01

## 2022-01-24 RX ADMIN — CEFTRIAXONE SODIUM 1 G: 1 INJECTION, POWDER, FOR SOLUTION INTRAMUSCULAR; INTRAVENOUS at 10:01

## 2022-01-24 NOTE — PROGRESS NOTES
Clinic Note    Patient Name: Jeanmarie Michelle  : 1940  MRN: [id number]    HPI:    Chief Complaint   Patient presents with    Edema     Bilateral hands and legs (2-3+ pitting edema)     Breathing Problem     Difficulty breathing at night     Follow-up     3 month follow up      Mr. Jeanmarie Michelle is a 81 y.o. male who present to clinic today with CC of 3 month follow up on chronic disease processes including atrial fibrillation, CHF, CKD, h/o CVA, dilated cardiomyopathy, Type II DM, HTN, HLD, hypothyroidism, and GERD.   Patient reports edema in bilateral legs and hands. States that he does have some intermittent issues with SOB at night. Patient saw Dr. Carlson (Cardiology) last week. Lasix was increased from 80 mg to 120 mg PO Daily. Patient was advised to call back to Dr. Carlson's office this morning if symptoms are not improved. Patient reports that symptoms are not improved and according to his home scales he has gained weight. States his weight at baseline is 212 pounds and he weighed 234 pounds on his scales at home.   Patient's eye doctor (Dr. Valenzuela) also called and recommended carotid dopplers.  Patient has a h/o elevated PSA but reports he did not go to Urology appt. He is agreeable to getting this rescheduled.  Patient reports a h/o CKD but states he has not followed up with Nephrology (Dr. Ta) in some time. States that the last time he went down there they told him he didn't have an appt. States he would like to be scheduled to see Dr. Ta again.   Otherwise, patient reports chronic issues are well controlled on current medication regimen. Report normal blood pressure and blood glucose readings at home.  Denies any problems or side effects with medications.   Patient is, otherwise, without complaints.     Medications:    Current Outpatient Medications on File Prior to Visit   Medication Sig Dispense Refill    apixaban (ELIQUIS) 5 mg Tab Take 1 tablet (5 mg total) by mouth 2 (two) times daily. 180  tablet 1    aspirin (ECOTRIN) 81 MG EC tablet Take 81 mg by mouth once daily.      carvediloL (COREG) 6.25 MG tablet Take 1 tablet (6.25 mg total) by mouth 2 (two) times daily with meals. 180 tablet 3    chlorthalidone (HYGROTEN) 25 MG Tab Take 0.5 tablets (12.5 mg total) by mouth once daily. 1/2 tab po daily 45 tablet 1    dapagliflozin (FARXIGA) 5 mg Tab tablet Take 1 tablet (5 mg total) by mouth once daily. 90 tablet 1    dorzolamide-timolol 2-0.5% (COSOPT) 22.3-6.8 mg/mL ophthalmic solution Place 1 drop into both eyes 2 (two) times a day.      ezetimibe (ZETIA) 10 mg tablet Take 1 tablet (10 mg total) by mouth once daily. 90 tablet 1    furosemide (LASIX) 80 MG tablet Take 1 tablet (80 mg total) by mouth once daily. (Patient taking differently: Take 80 mg by mouth 2 (two) times a day.  Told pt to increase to BID instead daily) 90 tablet 3    glipiZIDE (GLUCOTROL) 10 MG tablet Take 1 tablet (10 mg total) by mouth 2 (two) times daily before meals. 180 tablet 1    latanoprost 0.005 % ophthalmic solution Place 1 drop into both eyes Daily.      omeprazole (PRILOSEC) 40 MG capsule Take 1 capsule (40 mg total) by mouth once daily. 90 capsule 1    sacubitriL-valsartan (ENTRESTO)  mg per tablet Take 1 tablet by mouth 2 (two) times daily. 180 tablet 1    tamsulosin (FLOMAX) 0.4 mg Cap Take 1 capsule (0.4 mg total) by mouth once daily. 90 capsule 1     No current facility-administered medications on file prior to visit.       Allergies: Patient has no known allergies.      Past Medical History:    Past Medical History:   Diagnosis Date    Atrial fibrillation     CHF (congestive heart failure)     Chronic kidney disease     CVA (cerebral vascular accident)     Dilated cardiomyopathy     DM type 2 (diabetes mellitus, type 2)     GERD (gastroesophageal reflux disease)     Hyperlipidemia     Hypertension     Hypertensive heart disease     Hypothyroidism     Left bundle branch block      "Prostate disorder        Past Surgical History:    Past Surgical History:   Procedure Laterality Date    CARDIAC CATHETERIZATION Left 11/2018    PROSTATECTOMY           Social History:    Social History     Tobacco Use   Smoking Status Former Smoker    Packs/day: 1.00    Types: Cigarettes, Cigars   Smokeless Tobacco Current User    Types: Chew   Tobacco Comment    QUIT 2017     Social History     Substance and Sexual Activity   Alcohol Use Not Currently     Social History     Substance and Sexual Activity   Drug Use Never         Family History:    Family History   Problem Relation Age of Onset    Hypertension Mother     Pacemaker/defibrilator Mother     Heart disease Father        Review of Systems:    Review of Systems   Constitutional: Positive for unexpected weight change. Negative for appetite change, chills, fatigue and fever.   Eyes: Negative for visual disturbance.   Respiratory: Positive for shortness of breath. Negative for cough.    Cardiovascular: Positive for leg swelling. Negative for chest pain.   Gastrointestinal: Negative for abdominal pain, change in bowel habit, constipation, diarrhea, nausea, vomiting and change in bowel habit.   Musculoskeletal: Negative for arthralgias.   Integumentary:  Negative for rash.   Neurological: Negative for dizziness and headaches.   Psychiatric/Behavioral: The patient is not nervous/anxious.         Vitals:    Vitals:    01/24/22 1105   BP: 110/78   BP Location: Left arm   Patient Position: Sitting   BP Method: Large (Manual)   Pulse: 77   Resp: 20   Temp: 98.5 °F (36.9 °C)   TempSrc: Oral   SpO2: 97%   Weight: 105.7 kg (233 lb)   Height: 5' 11" (1.803 m)          Physical Exam:    Physical Exam  Constitutional:       General: He is not in acute distress.     Appearance: Normal appearance.   HENT:      Nose: Nose normal.      Mouth/Throat:      Mouth: Mucous membranes are moist.      Pharynx: Oropharynx is clear.   Eyes:      Conjunctiva/sclera: Conjunctivae " normal.   Cardiovascular:      Rate and Rhythm: Normal rate and regular rhythm.      Heart sounds: Normal heart sounds. No murmur heard.      Pulmonary:      Effort: Pulmonary effort is normal. No respiratory distress.      Breath sounds: Normal breath sounds. No wheezing, rhonchi or rales.   Abdominal:      General: Bowel sounds are normal.      Palpations: Abdomen is soft.      Tenderness: There is no abdominal tenderness.   Musculoskeletal:      Cervical back: Neck supple.      Right lower leg: Edema present.      Left lower leg: Edema present.      Comments: 2+ pitting edema bilateral lower extremities  + swelling bilateral hands   Skin:     Findings: No rash.   Neurological:      General: No focal deficit present.      Mental Status: He is alert. Mental status is at baseline.   Psychiatric:         Mood and Affect: Mood normal.         Assessment/Plan:   Chronic systolic heart failure       - Reviewed chart. Patient saw Dr. Carlson last week. Lasix was increased. Dr. Carlson's office advised patient to call today if symptoms did not improve with medication changes. Patient reports he has not talked to Dr. Carlson's office but is not improved. Reports persistent swelling, shortness of breath with exertion and with lying down at night. Reports weight gain. Reports normal weight is 212 and he weighed 234 on home scales this morning.   - Called Dr. Carlson's office. Received call back this afternoon. Dr. Carlson recommends patient go to ER at [facility] in [place] to be admitted for dobutamine infusion. Attempted to notify patient but was unable to reach him. Called patient's nephew (Emergency contact). He is working in [place] but advised he would call his brother to take him. He called back to confirm he had contacted his brother and they were on the way to [facility].   - Spoke with nurse in [facility]. Patient is there and waiting to be evaluated.     Type 2 diabetes mellitus with diabetic polyneuropathy, without  long-term current use of insulin  -     CBC Auto Differential; Future; Expected date: 01/24/2022  -     Comprehensive Metabolic Panel; Future; Expected date: 01/24/2022  -     Lipid Panel; Future; Expected date: 01/24/2022  -     Hemoglobin A1C; Future; Expected date: 01/24/2022    Essential hypertension  -     CBC Auto Differential; Future; Expected date: 01/24/2022  -     Comprehensive Metabolic Panel; Future; Expected date: 01/24/2022  -     Lipid Panel; Future; Expected date: 01/24/2022    Other hyperlipidemia  -     CBC Auto Differential; Future; Expected date: 01/24/2022  -     Comprehensive Metabolic Panel; Future; Expected date: 01/24/2022  -     Lipid Panel; Future; Expected date: 01/24/2022    Other specified hypothyroidism  -     TSH; Future; Expected date: 01/24/2022    Eye exam abnormal  -     Radiology US Carotid Bilateral; Future; Expected date: 01/24/2022    Chronic kidney disease, unspecified CKD stage  -     Ambulatory referral/consult to Nephrology; Future; Expected date: 01/31/2022    Elevated PSA  -     Ambulatory referral/consult to Urology; Future; Expected date: 01/31/2022    RTC in 2 weeks for follow up on edema.  RTC sooner if needed.   Patient voiced understanding and is agreeable to plan.      Mariajose Sprague MD    Family Medicine

## 2022-01-25 PROBLEM — I50.23 ACUTE ON CHRONIC SYSTOLIC CHF (CONGESTIVE HEART FAILURE): Status: ACTIVE | Noted: 2022-01-25

## 2022-01-25 PROBLEM — I48.20 CHRONIC A-FIB: Status: RESOLVED | Noted: 2021-10-20 | Resolved: 2022-01-25

## 2022-01-25 PROBLEM — N30.01 ACUTE CYSTITIS WITH HEMATURIA: Status: RESOLVED | Noted: 2022-01-24 | Resolved: 2022-01-25

## 2022-01-25 PROBLEM — I48.20 CHRONIC A-FIB: Status: ACTIVE | Noted: 2021-10-20

## 2022-01-25 PROBLEM — I50.22 CHRONIC SYSTOLIC CONGESTIVE HEART FAILURE: Chronic | Status: RESOLVED | Noted: 2022-01-21 | Resolved: 2022-01-25

## 2022-01-25 LAB
ANION GAP SERPL CALCULATED.3IONS-SCNC: 11 MMOL/L (ref 7–16)
BUN SERPL-MCNC: 28 MG/DL (ref 7–18)
BUN/CREAT SERPL: 15 (ref 6–20)
CALCIUM SERPL-MCNC: 8.8 MG/DL (ref 8.5–10.1)
CHLORIDE SERPL-SCNC: 105 MMOL/L (ref 98–107)
CO2 SERPL-SCNC: 35 MMOL/L (ref 21–32)
CREAT SERPL-MCNC: 1.86 MG/DL (ref 0.7–1.3)
GLUCOSE SERPL-MCNC: 102 MG/DL (ref 70–105)
GLUCOSE SERPL-MCNC: 108 MG/DL (ref 70–105)
GLUCOSE SERPL-MCNC: 114 MG/DL (ref 70–105)
GLUCOSE SERPL-MCNC: 135 MG/DL (ref 74–106)
GLUCOSE SERPL-MCNC: 151 MG/DL (ref 70–105)
MAGNESIUM SERPL-MCNC: 2.5 MG/DL (ref 1.7–2.3)
POTASSIUM SERPL-SCNC: 3.1 MMOL/L (ref 3.5–5.1)
SODIUM SERPL-SCNC: 148 MMOL/L (ref 136–145)
TROPONIN I SERPL HS-MCNC: 514.8 PG/ML
TROPONIN I SERPL HS-MCNC: 536 PG/ML
TROPONIN I SERPL HS-MCNC: 546.4 PG/ML

## 2022-01-25 PROCEDURE — 99232 PR SUBSEQUENT HOSPITAL CARE,LEVL II: ICD-10-PCS | Mod: ,,, | Performed by: INTERNAL MEDICINE

## 2022-01-25 PROCEDURE — 11000001 HC ACUTE MED/SURG PRIVATE ROOM

## 2022-01-25 PROCEDURE — 80048 BASIC METABOLIC PNL TOTAL CA: CPT | Performed by: INTERNAL MEDICINE

## 2022-01-25 PROCEDURE — 63600175 PHARM REV CODE 636 W HCPCS: Performed by: INTERNAL MEDICINE

## 2022-01-25 PROCEDURE — 25000003 PHARM REV CODE 250: Performed by: INTERNAL MEDICINE

## 2022-01-25 PROCEDURE — 63600175 PHARM REV CODE 636 W HCPCS: Performed by: NURSE PRACTITIONER

## 2022-01-25 PROCEDURE — 99223 1ST HOSP IP/OBS HIGH 75: CPT | Mod: ,,, | Performed by: STUDENT IN AN ORGANIZED HEALTH CARE EDUCATION/TRAINING PROGRAM

## 2022-01-25 PROCEDURE — 99232 SBSQ HOSP IP/OBS MODERATE 35: CPT | Mod: ,,, | Performed by: INTERNAL MEDICINE

## 2022-01-25 PROCEDURE — 83735 ASSAY OF MAGNESIUM: CPT | Performed by: STUDENT IN AN ORGANIZED HEALTH CARE EDUCATION/TRAINING PROGRAM

## 2022-01-25 PROCEDURE — 25000003 PHARM REV CODE 250: Performed by: STUDENT IN AN ORGANIZED HEALTH CARE EDUCATION/TRAINING PROGRAM

## 2022-01-25 PROCEDURE — 63600175 PHARM REV CODE 636 W HCPCS: Performed by: STUDENT IN AN ORGANIZED HEALTH CARE EDUCATION/TRAINING PROGRAM

## 2022-01-25 PROCEDURE — 36415 COLL VENOUS BLD VENIPUNCTURE: CPT | Performed by: INTERNAL MEDICINE

## 2022-01-25 PROCEDURE — 99223 PR INITIAL HOSPITAL CARE,LEVL III: ICD-10-PCS | Mod: ,,, | Performed by: STUDENT IN AN ORGANIZED HEALTH CARE EDUCATION/TRAINING PROGRAM

## 2022-01-25 PROCEDURE — 84484 ASSAY OF TROPONIN QUANT: CPT | Performed by: INTERNAL MEDICINE

## 2022-01-25 PROCEDURE — 82962 GLUCOSE BLOOD TEST: CPT

## 2022-01-25 RX ORDER — CARVEDILOL 3.12 MG/1
6.25 TABLET ORAL 2 TIMES DAILY WITH MEALS
Status: DISCONTINUED | OUTPATIENT
Start: 2022-01-25 | End: 2022-01-25

## 2022-01-25 RX ORDER — CARVEDILOL 3.12 MG/1
3.12 TABLET ORAL 2 TIMES DAILY WITH MEALS
Status: DISCONTINUED | OUTPATIENT
Start: 2022-01-25 | End: 2022-01-26

## 2022-01-25 RX ORDER — SPIRONOLACTONE 25 MG/1
25 TABLET ORAL DAILY
Status: DISCONTINUED | OUTPATIENT
Start: 2022-01-25 | End: 2022-01-29

## 2022-01-25 RX ORDER — GLUCAGON 1 MG
1 KIT INJECTION
Status: DISCONTINUED | OUTPATIENT
Start: 2022-01-25 | End: 2022-02-03 | Stop reason: HOSPADM

## 2022-01-25 RX ORDER — IBUPROFEN 200 MG
24 TABLET ORAL
Status: DISCONTINUED | OUTPATIENT
Start: 2022-01-25 | End: 2022-01-25 | Stop reason: RX

## 2022-01-25 RX ORDER — DOBUTAMINE HYDROCHLORIDE 400 MG/100ML
2.5 INJECTION INTRAVENOUS CONTINUOUS
Status: DISCONTINUED | OUTPATIENT
Start: 2022-01-25 | End: 2022-02-02

## 2022-01-25 RX ORDER — ONDANSETRON 2 MG/ML
4 INJECTION INTRAMUSCULAR; INTRAVENOUS EVERY 8 HOURS PRN
Status: DISCONTINUED | OUTPATIENT
Start: 2022-01-25 | End: 2022-02-03 | Stop reason: HOSPADM

## 2022-01-25 RX ORDER — FUROSEMIDE 10 MG/ML
60 INJECTION INTRAMUSCULAR; INTRAVENOUS
Status: DISCONTINUED | OUTPATIENT
Start: 2022-01-25 | End: 2022-01-25

## 2022-01-25 RX ORDER — ASPIRIN 81 MG/1
81 TABLET ORAL DAILY
Status: DISCONTINUED | OUTPATIENT
Start: 2022-01-25 | End: 2022-02-03 | Stop reason: HOSPADM

## 2022-01-25 RX ORDER — NALOXONE HCL 0.4 MG/ML
0.02 VIAL (ML) INJECTION
Status: DISCONTINUED | OUTPATIENT
Start: 2022-01-25 | End: 2022-02-03 | Stop reason: HOSPADM

## 2022-01-25 RX ORDER — PANTOPRAZOLE SODIUM 40 MG/1
40 TABLET, DELAYED RELEASE ORAL DAILY
Refills: 1 | Status: DISCONTINUED | OUTPATIENT
Start: 2022-01-25 | End: 2022-02-03 | Stop reason: HOSPADM

## 2022-01-25 RX ORDER — FUROSEMIDE 10 MG/ML
20 INJECTION INTRAMUSCULAR; INTRAVENOUS ONCE
Status: COMPLETED | OUTPATIENT
Start: 2022-01-25 | End: 2022-01-25

## 2022-01-25 RX ORDER — INSULIN ASPART 100 [IU]/ML
0-5 INJECTION, SOLUTION INTRAVENOUS; SUBCUTANEOUS
Status: DISCONTINUED | OUTPATIENT
Start: 2022-01-25 | End: 2022-02-03 | Stop reason: HOSPADM

## 2022-01-25 RX ORDER — FUROSEMIDE 10 MG/ML
80 INJECTION INTRAMUSCULAR; INTRAVENOUS DAILY
Status: DISCONTINUED | OUTPATIENT
Start: 2022-01-25 | End: 2022-01-25

## 2022-01-25 RX ORDER — EZETIMIBE 10 MG/1
10 TABLET ORAL DAILY
Status: DISCONTINUED | OUTPATIENT
Start: 2022-01-25 | End: 2022-02-03 | Stop reason: HOSPADM

## 2022-01-25 RX ORDER — SODIUM CHLORIDE 0.9 % (FLUSH) 0.9 %
10 SYRINGE (ML) INJECTION EVERY 12 HOURS PRN
Status: DISCONTINUED | OUTPATIENT
Start: 2022-01-25 | End: 2022-02-03 | Stop reason: HOSPADM

## 2022-01-25 RX ORDER — POTASSIUM CHLORIDE 20 MEQ/1
60 TABLET, EXTENDED RELEASE ORAL ONCE
Status: COMPLETED | OUTPATIENT
Start: 2022-01-25 | End: 2022-01-25

## 2022-01-25 RX ORDER — TAMSULOSIN HYDROCHLORIDE 0.4 MG/1
0.4 CAPSULE ORAL DAILY
Status: DISCONTINUED | OUTPATIENT
Start: 2022-01-25 | End: 2022-01-29

## 2022-01-25 RX ORDER — IBUPROFEN 200 MG
16 TABLET ORAL
Status: DISCONTINUED | OUTPATIENT
Start: 2022-01-25 | End: 2022-01-25 | Stop reason: RX

## 2022-01-25 RX ORDER — FUROSEMIDE 10 MG/ML
80 INJECTION INTRAMUSCULAR; INTRAVENOUS
Status: DISCONTINUED | OUTPATIENT
Start: 2022-01-25 | End: 2022-01-26

## 2022-01-25 RX ADMIN — POTASSIUM CHLORIDE 60 MEQ: 20 TABLET, EXTENDED RELEASE ORAL at 09:01

## 2022-01-25 RX ADMIN — SACUBITRIL AND VALSARTAN 1 TABLET: 97; 103 TABLET, FILM COATED ORAL at 09:01

## 2022-01-25 RX ADMIN — CEFTRIAXONE SODIUM 1 G: 1 INJECTION, POWDER, FOR SOLUTION INTRAMUSCULAR; INTRAVENOUS at 09:01

## 2022-01-25 RX ADMIN — ASPIRIN 81 MG: 81 TABLET, COATED ORAL at 09:01

## 2022-01-25 RX ADMIN — SPIRONOLACTONE 25 MG: 25 TABLET ORAL at 09:01

## 2022-01-25 RX ADMIN — PANTOPRAZOLE SODIUM 40 MG: 40 TABLET, DELAYED RELEASE ORAL at 09:01

## 2022-01-25 RX ADMIN — CARVEDILOL 3.12 MG: 3.12 TABLET, FILM COATED ORAL at 08:01

## 2022-01-25 RX ADMIN — FUROSEMIDE 60 MG: 10 INJECTION, SOLUTION INTRAMUSCULAR; INTRAVENOUS at 09:01

## 2022-01-25 RX ADMIN — CARVEDILOL 3.12 MG: 3.12 TABLET, FILM COATED ORAL at 04:01

## 2022-01-25 RX ADMIN — APIXABAN 5 MG: 5 TABLET, FILM COATED ORAL at 09:01

## 2022-01-25 RX ADMIN — TAMSULOSIN HYDROCHLORIDE 0.4 MG: 0.4 CAPSULE ORAL at 09:01

## 2022-01-25 RX ADMIN — FUROSEMIDE 20 MG: 10 INJECTION, SOLUTION INTRAMUSCULAR; INTRAVENOUS at 11:01

## 2022-01-25 RX ADMIN — DOBUTAMINE HYDROCHLORIDE 2.5 MCG/KG/MIN: 400 INJECTION INTRAVENOUS at 11:01

## 2022-01-25 RX ADMIN — FUROSEMIDE 80 MG: 10 INJECTION INTRAMUSCULAR; INTRAVENOUS at 04:01

## 2022-01-25 NOTE — CONSULTS
[address]  Urology  Consult Note    Patient Name: Jeanmarie Michelle  MRN: [id number]  Admission Date: 1/24/2022  Hospital Length of Stay: 0   Code Status: Full Code   Attending Provider: Geneva Li MD   Consulting Provider: Austin Nath Jr, MD  Primary Care Physician: Regina Sprague MD  Principal Problem:<principal problem not specified>    Consults    Subjective:     HPI:  Patient is an 81-year-old male with a history of persistent atrial fibrillation on Eliquis, end-stage dilated cardiomyopathy the with last known EF of 15% scheduled to undergo ICD per Dr. Leigh in February, history of stage III CKD, type 2 diabetes and hypertension who was initially seen at Dr. Leigh office on Friday for shortness of breath with increased peripheral edema. Lasix was increased from  mg and pt felt better overall.  Patient was seen at his PCPs office earlier today for a 3 month follow-up and patient's PCP felt that patient was volume overloaded and after discussion was Dr. Leigh patient was instructed to come to the ED for evaluation.  In ED, patient was found to have lost weight from the time of Dr. Leigh visit by 14 lb. Chest x-ray showed mild pulmonary edema which appeared chronic from previous chest x-ray 3 weeks ago.  [place]NP [place] 24100 [place] [address]. Troponin was elevated, but pt has chronically elevated troponin and EKG showed no ST-T abnormalities. Pt had hematuria in ED and when asked when this was started, pt stated that this started at late morning today, but failed to mention it to his PCP. Subsequent UA demonstrated a presence of UTI. Source of hematuria is likely due to UTI. Pt will be admitted for further evaluation and intervention.    Hematuria:  This is likely secondary to urinary tract infection.  Will empirically start patient on Rocephin.  Will temporarily hold Eliquis for now until hematuria clears.    Acute cystitis with  hematuria:  Cause of hematuria is felt to be due to UTI.  Will start patient on Rocephin while awaiting culture.    Chronic kidney disease:  Patient has CKD stage 3.  Serum creatinine is at his baseline.  Will continue to monitor serum creatinine and urine output.  Patient likely has cardiorenal syndrome.  ---------------------------------------------------------------------------------------------------------------------------------------------------------  The above note is from Dr. Toni Crain's H&P from today.    I received a phone call from Caridad Arriaga RN from the ER about Mr. Michelle and his hematuria.  Dr. Crain believes his hematuria is from an active UTI.  She has temporarily stopped his Eliquis until his urine clears.  A urine for C&S was taken while he was in the ER and Dr. Crain has started Mr. Michelle on Rocephin while awaiting the urine culture.    I sent my RNFA, Misha Neri, to see Mr. Michelle.  No Epps catheter was inserted in Mr. Michelle and he is voiding without problems.  Mr. Michelle told Mr. Neri that his voiding is getting clearer each time he voids and that his last void was clear.  We will place a nurses order to save his last voided urine for us to see.       No new subjective & objective note has been filed under this hospital service since the last note was generated.    Mr. Michelle was seen along with his daughter and son-in-law who were in the room with him.  He gives a history of having prostate gland surgery about 5 years ago in [place].  It sounds like he had a TURP for bladder outlet obstruction.  He had an episode of gross hematuria few weeks ago that subsided without treatment.  Now he has had gross hematuria again as mentioned and Eliquis has been stopped.  It is possible hematuria is related to infection but it certainly could be due to other causes also.  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  He needs  cystoscopy to make sure that he does not have a bladder tumor or some other serious cause.  We will try to do that while he is hospitalized.  We will follow.      Assessment and Plan:     Hematuria  Mr. Michelle says his urine is getting clearer with each void and that his last urine was clear.  He voided in the toilet so I was unable to see it.  Have placed a nursing order to save patient's LAST voided urine for us to see.        VTE Risk Mitigation (From admission, onward)         Ordered     apixaban tablet 5 mg  2 times daily         01/25/22 1253     Place sequential compression device  Until discontinued         01/25/22 0111                Thank you for your consult. I will follow-up with patient. Please contact us if you have any additional questions.    Austin Nath Jr, MD  Urology  [facility] - [address] [place] [address]

## 2022-01-25 NOTE — CONSULTS
[facility] - Emergency Department  Cardiology  Consult Note    Patient Name: Jeanmarie Michelle  MRN: [id number]  Admission Date: 1/24/2022  Hospital Length of Stay: 0 days  Code Status: Full Code   Attending Provider: Geneva Li MD   Consulting Provider: Ani Munguia DO  Primary Care Physician: Regina Sprague MD  Principal Problem:<principal problem not specified>    Patient information was obtained from patient, past medical records and ER records.     Consults  Subjective:     Chief Complaint:  SOB     HPI:   Mr. Jeanmarie Michelle is a 82y/o M with persistent atrial fibrillation, nonischemic dilated cardiomyopathy- HFrEF with last EF 15% (12/8/21) scheduled to undergo ICD placement with Dr. Carlson in February 2022, stage III CKD, HTN and T2DM presenting with SOB with increased edema. He was recently seen at his cardiologist's office (1/21/22) and his Lasix was increased from 80mg to 120mg PO qd with plans for an ICD next month. On day of admission he had seen his PCP who noted his weight had increased approximately 20 lbs within the past few weeks and was advised to go to the ED. In the ER he describes he has been SOB for 2-3 weeks with increasing lower extremity and abdominal edema. He sleeps on 2 pillows a night and occasionally sleeps in an upright chair. He reports having CELESTIN, orthopnea and nocturnal dyspnea. He also reports having hematuria starting yesterday. In the ED his VS were stable with HR 69, BP 98/69 with pertinent labs including a troponin trending from 596, 536, 514 to 546. His troponins have been consistently elevated in the 500s, 3 weeks ago. His EKG revealed atrial fibrillation with frequent PVCs with HR 89. His U/A revealed TNTC RBC with 20-50 WBC, culture pending.     Currently the patient is in no acute distress but is hypervolemic with significant edema of his lower extremeties and abdomen. He denies chest pain and is able to urinate. He has received IV lasix 40mg once in the ED with  900 cc of urine output for the past 24 hours. Current weight is 104.3kg. Plan is to IV diurese with dobutamine and lasix. Eliquis is being held due to his hematuria. Will continue to monitor.       Past Medical History:   Diagnosis Date    Atrial fibrillation     CHF (congestive heart failure)     Chronic kidney disease     CVA (cerebral vascular accident)     Dilated cardiomyopathy     DM type 2 (diabetes mellitus, type 2)     GERD (gastroesophageal reflux disease)     Hyperlipidemia     Hypertension     Hypertensive heart disease     Hypothyroidism     Left bundle branch block     Prostate disorder        Past Surgical History:   Procedure Laterality Date    CARDIAC CATHETERIZATION Left 11/2018    PROSTATECTOMY         Review of patient's allergies indicates:  No Known Allergies    No current facility-administered medications on file prior to encounter.     Current Outpatient Medications on File Prior to Encounter   Medication Sig    apixaban (ELIQUIS) 5 mg Tab Take 1 tablet (5 mg total) by mouth 2 (two) times daily.    aspirin (ECOTRIN) 81 MG EC tablet Take 81 mg by mouth once daily.    carvediloL (COREG) 6.25 MG tablet Take 1 tablet (6.25 mg total) by mouth 2 (two) times daily with meals.    chlorthalidone (HYGROTEN) 25 MG Tab Take 0.5 tablets (12.5 mg total) by mouth once daily. 1/2 tab po daily    dapagliflozin (FARXIGA) 5 mg Tab tablet Take 1 tablet (5 mg total) by mouth once daily.    dorzolamide-timolol 2-0.5% (COSOPT) 22.3-6.8 mg/mL ophthalmic solution Place 1 drop into both eyes 2 (two) times a day.    ezetimibe (ZETIA) 10 mg tablet Take 1 tablet (10 mg total) by mouth once daily.    furosemide (LASIX) 80 MG tablet Take 1 tablet (80 mg total) by mouth once daily. (Patient taking differently: Take 80 mg by mouth 2 (two) times a day.  Told pt to increase to BID instead daily)    glipiZIDE (GLUCOTROL) 10 MG tablet Take 1 tablet (10 mg total) by mouth 2 (two) times daily before  meals.    latanoprost 0.005 % ophthalmic solution Place 1 drop into both eyes Daily.    omeprazole (PRILOSEC) 40 MG capsule Take 1 capsule (40 mg total) by mouth once daily.    sacubitriL-valsartan (ENTRESTO)  mg per tablet Take 1 tablet by mouth 2 (two) times daily.    tamsulosin (FLOMAX) 0.4 mg Cap Take 1 capsule (0.4 mg total) by mouth once daily.     Family History     Problem Relation (Age of Onset)    Heart disease Father    Hypertension Mother    Pacemaker/defibrilator Mother        Tobacco Use    Smoking status: Former Smoker     Packs/day: 1.00     Types: Cigarettes, Cigars    Smokeless tobacco: Current User     Types: Chew    Tobacco comment: QUIT 2017   Substance and Sexual Activity    Alcohol use: Not Currently    Drug use: Never    Sexual activity: Not Currently     Review of Systems   Constitutional: Positive for weight gain. Negative for chills, diaphoresis, malaise/fatigue and night sweats.   HENT: Negative for congestion and sore throat.    Eyes: Negative for visual disturbance.   Cardiovascular: Positive for dyspnea on exertion, leg swelling, orthopnea and paroxysmal nocturnal dyspnea. Negative for chest pain.   Respiratory: Positive for shortness of breath. Negative for cough and wheezing.    Hematologic/Lymphatic: Negative for adenopathy.   Gastrointestinal: Negative for abdominal pain, constipation, diarrhea, nausea and vomiting.   Genitourinary: Positive for hematuria. Negative for dysuria.   Neurological: Negative for headaches.   Psychiatric/Behavioral: Negative for altered mental status.     Objective:     Vital Signs (Most Recent):  Temp: 97.5 °F (36.4 °C) (01/25/22 0735)  Pulse: 88 (01/25/22 1135)  Resp: (!) 22 (01/25/22 1135)  BP: 99/68 (01/25/22 1139)  SpO2: (!) 94 % (01/25/22 1135) Vital Signs (24h Range):  Temp:  [97.5 °F (36.4 °C)-98.2 °F (36.8 °C)] 97.5 °F (36.4 °C)  Pulse:  [69-99] 88  Resp:  [12-22] 22  SpO2:  [88 %-100 %] 94 %  BP: ()/(56-87) 99/68      Weight: 104.3 kg (230 lb)  Body mass index is 32.08 kg/m².    SpO2: (!) 94 %  O2 Device (Oxygen Therapy): room air      Intake/Output Summary (Last 24 hours) at 1/25/2022 1230  Last data filed at 1/25/2022 0300  Gross per 24 hour   Intake --   Output 900 ml   Net -900 ml       Lines/Drains/Airways     Peripheral Intravenous Line                 Peripheral IV - Single Lumen 01/24/22 2026 22 G Right Hand <1 day                Physical Exam  Vitals reviewed.   Constitutional:       General: He is not in acute distress.     Appearance: Normal appearance.   HENT:      Head: Normocephalic and atraumatic.      Right Ear: External ear normal.      Left Ear: External ear normal.      Nose: Nose normal. No congestion.      Mouth/Throat:      Mouth: Mucous membranes are moist.      Pharynx: Oropharynx is clear. No oropharyngeal exudate.   Eyes:      Extraocular Movements: Extraocular movements intact.      Pupils: Pupils are equal, round, and reactive to light.   Neck:      Comments: Positive JVD  Cardiovascular:      Rate and Rhythm: Normal rate. Rhythm irregular.      Pulses: Normal pulses.      Heart sounds: Normal heart sounds.   Pulmonary:      Effort: Pulmonary effort is normal.      Breath sounds: Normal breath sounds.   Abdominal:      General: Abdomen is flat. Bowel sounds are normal.   Musculoskeletal:         General: Swelling ( 2+ pitting edema extending from the lower extremities to his abdomen) present. Normal range of motion.      Cervical back: Normal range of motion.      Right lower leg: Edema present.      Left lower leg: Edema present.   Skin:     General: Skin is warm.      Capillary Refill: Capillary refill takes less than 2 seconds.   Neurological:      General: No focal deficit present.      Mental Status: He is alert and oriented to person, place, and time.   Psychiatric:         Mood and Affect: Mood normal.         Significant Labs:   Recent Lab Results       01/25/22  1134   01/25/22  0936    01/25/22  0545   01/25/22  0505   01/25/22  0055        Influenza B, Molecular               Albumin/Globulin Ratio               Albumin               Alkaline Phosphatase               ALT               Anion Gap       11         Appearance, UA               aPTT               AST               Bacteria, UA               Baso #               Basophil %               Bilirubin (UA)               BILIRUBIN TOTAL               BUN       28         BUN/CREAT RATIO       15         Calcium       8.8         Chloride       105         CO2       35         Color, UA               COVID-19 Ag               Creatinine       1.86         Differential Type               eGFR if non        37         Eos #               Eosinophil %               Globulin, Total               Glucose       135         Glucose, UA               Hematocrit               Hemoglobin               Immature Grans (Abs)               Immature Granulocytes               Influenza A               INR               Ketones, UA               Leukocytes, UA               Lymph #               Lymph %               Magnesium   2.5             MCH               MCHC               MCV               Mono #               Mono %               MPV               Neutrophils, Abs               Neutrophils Relative               NITRITE UA               nRBC               NT-proBNP               NUCLEATED RBC ABSOLUTE               Occult Blood UA               pH, UA               PLATELET MORPHOLOGY               Platelets               POC Glucose 102     114           Potassium       3.1         PROTEIN TOTAL               Protein, UA               Protime               RBC               RBC Morphology               RBC, UA               RDW               Segmented Neutrophils, Man %               Sodium       148         Specific [place], UA               Troponin I High Sensitivity   546.4     514.8   536.0       Urine Culture, Routine                UROBILINOGEN UA               WBC Clumps, UA               WBC, UA               WBC                                01/24/22 2014 01/24/22  1858   01/24/22  1856        Influenza B, Molecular Negative           Albumin/Globulin Ratio     1.1       Albumin     3.4       Alkaline Phosphatase     104       ALT     36       Anion Gap     14       Appearance, UA   Cloudy         aPTT     39.6       AST     30       Bacteria, UA   Many         Baso #     0.01       Basophil %     0.1       Bilirubin (UA)   Small         BILIRUBIN TOTAL     1.4       BUN     30       BUN/CREAT RATIO     15       Calcium     9.4       Chloride     103       CO2     33       Color, UA   Red         COVID-19 Ag Negative           Creatinine     2.01       Differential Type     Manual       eGFR if non      34       Eos #     0.01       Eosinophil %     0.1       Globulin, Total     3.2       Glucose     162       Glucose, UA   100          Hematocrit     52.3       Hemoglobin     16.1       Immature Grans (Abs)     0.07       Immature Granulocytes     0.6       Influenza A Negative           INR     1.33       Ketones, UA   Negative         Leukocytes, UA   Moderate         Lymph #     0.77       Lymph %     6.9            8       Magnesium     2.6       MCH     27.1       MCHC     30.8       MCV     87.9       Mono #     0.74       Mono %     6.6            4       MPV     12.2       Neutrophils, Abs     9.63       Neutrophils Relative     85.7       NITRITE UA   Negative         nRBC     0.0       NT-proBNP     15,350       NUCLEATED RBC ABSOLUTE     0.00       Occult Blood UA   Large         pH, UA   6.0         PLATELET MORPHOLOGY     Normal       Platelets     245       POC Glucose           Potassium     3.8       PROTEIN TOTAL     6.6       Protein, UA   >=300         Protime     16.4       RBC     5.95       RBC Morphology     Normal       RBC, UA   Too Numerous To Count         RDW     18.1       Segmented  Neutrophils, Man %     88       Sodium     146       Specific [place], UA   1.025         Troponin I High Sensitivity     596.8       Urine Culture, Routine   >100,000 Gram-negative Bacilli  Comment: Identification and Susceptibility to Follow  [P]         UROBILINOGEN UA   0.2         WBC Clumps, UA   Few         WBC, UA   20-50         WBC     11.23              [P] - Preliminary Result             Significant Imaging: All pertinent imaging has been reviewed     Assessment and Plan:     Acute on chronic systolic CHF (congestive heart failure)  HFrEF, nonsichemic cardiomyopathy: EF 15% with moderate-severe TR (12/8/21); NYHA III-IV Stage D  Plan for ICD placement by Dr. Carlson next month  BNP at 15,000 (10,000 at baseline)  GDMT: carvedilol 3.125mg BID, Entresto 97-103mg BID, spironolactone 25mg qd  - holding chlorthalidone after adding spironolactone   - holding farxiga considering hematuria with UTI; restart on discharge   - consider torsemide 20mg BID on discharge   Increased lasix from IV lasix 60mg BID IV lasix 80mg BID  Started a dobutamine infusion to enhance IV diuresis      Atrial fibrillation, chronic  Persistent, longstanding atrial fibrillation  Currently in rate controlled atrial fibrillation with frequent multifocal PVCs  [id number] of 4  Currently on eliquis 5mg BID     Chronic kidney disease  Pt has CKD Stage 3  Serum Cr 2.01 which is at his baseline  Continue to monitor with IV diuresis    Elevated troponin level  Pt has a history of chronically elevated troponins in the 500s  EKG reveals atrial fibrillation with PVCs  Elevated troponins most likely 2/2 myocardial injury due to his heart failure exacerbation    Hematuria  Currently receiving rocephin for UTI  Primary following    Diabetes mellitus without complication  Holding farxiga, currently on SSI    Hypertension  Continue diuretics as above      VTE Risk Mitigation (From admission, onward)         Ordered     apixaban tablet 5 mg  2 times  daily         01/25/22 1253     Place sequential compression device  Until discontinued         01/25/22 0111                Thank you for your consult. I will follow-up with patient. Please contact us if you have any additional questions.    Ani Munguia, DO PGY-3 Resident  Cardiology   [facility] [place] - Emergency Department

## 2022-01-25 NOTE — PLAN OF CARE
Problem: Adult Inpatient Plan of Care  Goal: Plan of Care Review  Outcome: Ongoing, Progressing  Goal: Patient-Specific Goal (Individualized)  Outcome: Ongoing, Progressing  Goal: Absence of Hospital-Acquired Illness or Injury  Outcome: Ongoing, Progressing  Goal: Optimal Comfort and Wellbeing  Outcome: Ongoing, Progressing  Goal: Readiness for Transition of Care  Outcome: Ongoing, Progressing     Problem: Skin Injury Risk Increased  Goal: Skin Health and Integrity  Outcome: Ongoing, Progressing     Problem: Fluid and Electrolyte Imbalance (Acute Kidney Injury/Impairment)  Goal: Fluid and Electrolyte Balance  Outcome: Ongoing, Progressing     Problem: Oral Intake Inadequate (Acute Kidney Injury/Impairment)  Goal: Optimal Nutrition Intake  Outcome: Ongoing, Progressing     Problem: Renal Function Impairment (Acute Kidney Injury/Impairment)  Goal: Effective Renal Function  Outcome: Ongoing, Progressing     Problem: Fall Injury Risk  Goal: Absence of Fall and Fall-Related Injury  Outcome: Ongoing, Progressing     Problem: Diabetes Comorbidity  Goal: Blood Glucose Level Within Targeted Range  Outcome: Ongoing, Progressing

## 2022-01-25 NOTE — ASSESSMENT & PLAN NOTE
Cause of hematuria is felt to be due to UTI.  Will start patient on Rocephin while awaiting culture

## 2022-01-25 NOTE — ASSESSMENT & PLAN NOTE
Patient has a known dilated cardiomyopathy with last known EF of 15%.  Patient was scheduled to have ICD placed by Dr. Leigh next month.  Patient was recently seen by Dr. Leigh last Friday and with findings of volume overload, patient's Lasix has been increased from 80 mg a day to 120 mg a day.  Patient stated that he is feeling better overall.  Patient's weight actually decreased by 14 lb since the time he visited Dr. Leigh.  Nonetheless, chest x-ray showed persistence of pulmonary edema with pleural effusions and BNP was elevated to 15,000 from a baseline of 10,000. Will diurese patient further with IV Lasix    1/25-cardiology to see pt

## 2022-01-25 NOTE — HPI
Patient is an 81-year-old male with a history of persistent atrial fibrillation on Eliquis, end-stage dilated cardiomyopathy the with last known EF of 15% scheduled to undergo ICD per Dr. Leigh in February, history of stage III CKD, type 2 diabetes and hypertension who was initially seen at Dr. Leigh office on Friday for shortness of breath with increased peripheral edema. Lasix was increased from  mg and pt felt better overall.  Patient was seen at his PCPs office earlier today for a 3 month follow-up and patient's PCP felt that patient was volume overloaded and after discussion was Dr. Leigh patient was instructed to come to the ED for evaluation.  In ED, patient was found to have lost weight from the time of Dr. Leigh visit by 14 lb.  Chest x-ray showed mild pulmonary edema which appeared chronic from previous chest x-ray 3 weeks ago.  BNP was elevated at 05476 from a baseline of 08016. Troponin was elevated, but pt has chronically elevated troponin and EKG showed no ST-T abnormalities. Pt had hematuria in ED and when asked when this was started, pt stated that this started at late morning today, but failed to mention it to his PCP. Subsequent UA demonstrated a presence of UTI. Source of hematuria is likely due to UTI. Pt will be admitted for further evaluation and intervention.

## 2022-01-25 NOTE — ASSESSMENT & PLAN NOTE
Patient has a history of chronically elevated troponin level in 500s and today patient's troponin level was 596.  There was no ST-T abnormalities accompanying elevated troponin level.  No recent ischemia evaluation on record. Will consult Cardiology in a.m. for recommendations    1/25-cardiology seen pt   responds to verbal commands/responds to pain/alert and oriented x 3

## 2022-01-25 NOTE — ASSESSMENT & PLAN NOTE
HFrEF, nonsichemic cardiomyopathy: EF 15% with moderate-severe TR (12/8/21); NYHA III-IV Stage D  Plan for ICD placement by Dr. Carlson next month  BNP at 15,000 (10,000 at baseline)  GDMT: carvedilol 3.125mg BID, Entresto 97-103mg BID, spironolactone 25mg qd  - holding chlorthalidone after adding spironolactone   - holding farxiga considering hematuria with UTI; restart on discharge   - consider torsemide 20mg BID on discharge   Increased lasix from IV lasix 60mg BID IV lasix 80mg BID  Started a dobutamine infusion to enhance IV diuresis     Alcohol withdrawal

## 2022-01-25 NOTE — H&P
[facility] Department  Hospital Medicine  History & Physical    Patient Name: Jeanmarie Michelle  MRN: [id number]  Patient Class: Emergency  Admission Date: 1/24/2022  Attending Physician: Toni Crain MD  Primary Care Provider: Regina Sprague MD         Patient information was obtained from patient and ER records.     Subjective:     Principal Problem:<principal problem not specified>    Chief Complaint:   Chief Complaint   Patient presents with    Hematuria        HPI: Patient is an 81-year-old male with a history of persistent atrial fibrillation on Eliquis, end-stage dilated cardiomyopathy the with last known EF of 15% scheduled to undergo ICD per Dr. Leigh in February, history of stage III CKD, type 2 diabetes and hypertension who was initially seen at Dr. Leigh office on Friday for shortness of breath with increased peripheral edema. Lasix was increased from  mg and pt felt better overall.  Patient was seen at his PCPs office earlier today for a 3 month follow-up and patient's PCP felt that patient was volume overloaded and after discussion was Dr. Leigh patient was instructed to come to the ED for evaluation.  In ED, patient was found to have lost weight from the time of Dr. Leigh visit by 14 lb.  Chest x-ray showed mild pulmonary edema which appeared chronic from previous chest x-ray 3 weeks ago.  [place] [address] [place] [address]. Troponin was elevated, but pt has chronically elevated troponin and EKG showed no ST-T abnormalities. Pt had hematuria in ED and when asked when this was started, pt stated that this started at late morning today, but failed to mention it to his PCP. Subsequent UA demonstrated a presence of UTI. Source of hematuria is likely due to UTI. Pt will be admitted for further evaluation and intervention.      No new subjective & objective note has been filed under this hospital service since the last note was  generated.    Assessment/Plan:     Elevated troponin level  Patient has a history of chronically elevated troponin level in 500s and today patient's troponin level was 596.  There was no ST-T abnormalities accompanying elevated troponin level.  No recent ischemia evaluation on record. Will consult Cardiology in a.m. for recommendations      Hematuria  This is likely secondary to urinary tract infection.  Will empirically start patient on Rocephin.  Will temporarily hold Eliquis for now until hematuria clears      Acute cystitis with hematuria  Cause of hematuria is felt to be due to UTI.  Will start patient on Rocephin while awaiting culture      Chronic kidney disease  Patient has CKD stage 3.  Serum creatinine is at his baseline.  Will continue to monitor serum creatinine and urine output.  Patient likely has cardiorenal syndrome      Chronic systolic congestive heart failure  Patient has a known dilated cardiomyopathy with last known EF of 15%.  Patient was scheduled to have ICD placed by Dr. Leigh next month.  Patient was recently seen by Dr. Leigh last Friday and with findings of volume overload, patient's Lasix has been increased from 80 mg a day to 120 mg a day.  Patient stated that he is feeling better overall.  Patient's weight actually decreased by 14 lb since the time he visited Dr. Leigh.  Nonetheless, chest x-ray showed persistence of pulmonary edema with pleural effusions and BNP was elevated to 15,000 from a baseline of 10,000. Will diurese patient further with IV Lasix      Atrial fibrillation  Patient with Long standing persistent (>12 months) atrial fibrillation which is controlled currently with Beta Blocker. Patient is currently in atrial fibrillation.[id number] Score: 3. Anticoagulation indicated. Anticoagulation has been done with Eliquis. However, with gross hematuria, will temporarily hold Eliquis for now until gross hematuria resolves      Hypertension  Continue present  management    T2DM       Start SSI to maintain CBG in the range of 130 to 180's           Min NIA Crain MD  Department of Hospital Medicine   [facility] - Emergency Department

## 2022-01-25 NOTE — ASSESSMENT & PLAN NOTE
Patient with Long standing persistent (>12 months) atrial fibrillation which is controlled currently with Beta Blocker. Patient is currently in atrial fibrillation.[id number] Score: 3. Anticoagulation indicated. Anticoagulation done with Eliquis.  Will elect to continue patient on Eliquis.

## 2022-01-25 NOTE — ASSESSMENT & PLAN NOTE
Patient has CKD stage 3.  Serum creatinine is at his baseline.  Will continue to monitor serum creatinine and urine output.  Patient likely has cardiorenal syndrome

## 2022-01-25 NOTE — ASSESSMENT & PLAN NOTE
Hold oral hypoglycemics and start patient on sliding scale insulin to maintain CBG in the range of 130-180s

## 2022-01-25 NOTE — ASSESSMENT & PLAN NOTE
Patient with Long standing persistent (>12 months) atrial fibrillation which is controlled currently with Beta Blocker. Patient is currently in atrial fibrillation.[id number] Score: 3. Anticoagulation indicated. Anticoagulation done with Eliquis.  Will elect to continue patient on Eliquis.

## 2022-01-25 NOTE — ASSESSMENT & PLAN NOTE
Persistent, longstanding atrial fibrillation  Currently in rate controlled atrial fibrillation with frequent multifocal PVCs  [id number] of 4  Currently on eliquis 5mg BID

## 2022-01-25 NOTE — NURSING
Patient is admitted inpatient but will remain as a  Boarder in Exam Room # 9 until inpatient hospital bed is available.

## 2022-01-25 NOTE — SUBJECTIVE & OBJECTIVE
Interval History:     ISELA  Says he is breathing better    Review of Systems   Constitutional: Negative for chills, diaphoresis, fatigue and fever.   HENT: Negative for congestion, hearing loss, nosebleeds, postnasal drip, sore throat, tinnitus and trouble swallowing.    Eyes: Negative for photophobia, pain, discharge, itching and visual disturbance.   Respiratory: Positive for shortness of breath. Negative for cough, wheezing and stridor.    Cardiovascular: Positive for leg swelling. Negative for chest pain and palpitations.   Gastrointestinal: Negative for abdominal distention, abdominal pain, anal bleeding, blood in stool, constipation, diarrhea, nausea and vomiting.   Endocrine: Negative for cold intolerance, heat intolerance, polydipsia, polyphagia and polyuria.   Genitourinary: Negative for decreased urine volume, difficulty urinating, dysuria, flank pain, frequency, hematuria and urgency.   Musculoskeletal: Negative for arthralgias, back pain, gait problem, joint swelling, myalgias, neck pain and neck stiffness.   Skin: Negative for color change, pallor and rash.   Allergic/Immunologic: Negative for immunocompromised state.   Neurological: Negative for dizziness, tremors, seizures, syncope, facial asymmetry, speech difficulty, weakness, light-headedness, numbness and headaches.   Hematological: Negative for adenopathy. Does not bruise/bleed easily.     Objective:     Vital Signs (Most Recent):  Temp: 97.5 °F (36.4 °C) (01/25/22 0735)  Pulse: 95 (01/25/22 1242)  Resp: 16 (01/25/22 1210)  BP: 133/82 (01/25/22 1242)  SpO2: (!) 94 % (01/25/22 1135) Vital Signs (24h Range):  Temp:  [97.5 °F (36.4 °C)-98.2 °F (36.8 °C)] 97.5 °F (36.4 °C)  Pulse:  [69-99] 95  Resp:  [12-22] 16  SpO2:  [88 %-100 %] 94 %  BP: ()/(56-87) 133/82     Weight: 104.3 kg (230 lb)  Body mass index is 32.08 kg/m².    Intake/Output Summary (Last 24 hours) at 1/25/2022 1249  Last data filed at 1/25/2022 0300  Gross per 24 hour   Intake --    Output 900 ml   Net -900 ml      Physical Exam  Vitals reviewed.   Constitutional:       General: He is not in acute distress.     Appearance: Normal appearance.   HENT:      Head: Normocephalic and atraumatic.      Right Ear: External ear normal.      Left Ear: External ear normal.   Eyes:      Extraocular Movements: Extraocular movements intact.      Pupils: Pupils are equal, round, and reactive to light.   Cardiovascular:      Rate and Rhythm: Normal rate and regular rhythm.      Pulses: Normal pulses.      Heart sounds: Normal heart sounds. No murmur heard.      Pulmonary:      Effort: Pulmonary effort is normal. No respiratory distress.      Breath sounds: Normal breath sounds. No wheezing.   Chest:      Chest wall: No tenderness.   Abdominal:      Palpations: Abdomen is soft. There is no mass.      Tenderness: There is no abdominal tenderness. There is no right CVA tenderness or left CVA tenderness.      Comments: Protuberant with ascites.     Musculoskeletal:         General: No swelling or tenderness. Normal range of motion.      Comments: +2 pitting edema present BLE, and +1 pitting edema noted in bilateral upper extremity   Skin:     General: Skin is warm and dry.      Capillary Refill: Capillary refill takes less than 2 seconds.   Neurological:      General: No focal deficit present.      Mental Status: He is alert and oriented to person, place, and time. Mental status is at baseline.   Psychiatric:         Mood and Affect: Mood normal.         Thought Content: Thought content normal.         Significant Labs: All pertinent labs within the past 24 hours have been reviewed.    Significant Imaging: I have reviewed all pertinent imaging results/findings within the past 24 hours.

## 2022-01-25 NOTE — ASSESSMENT & PLAN NOTE
This is likely secondary to urinary tract infection.  Will empirically start patient on Rocephin.  As stated above, will elect to continue patient on Eliquis ( for Afib )

## 2022-01-25 NOTE — SUBJECTIVE & OBJECTIVE
Past Medical History:   Diagnosis Date    Atrial fibrillation     CHF (congestive heart failure)     Chronic kidney disease     CVA (cerebral vascular accident)     Dilated cardiomyopathy     DM type 2 (diabetes mellitus, type 2)     GERD (gastroesophageal reflux disease)     Hyperlipidemia     Hypertension     Hypertensive heart disease     Hypothyroidism     Left bundle branch block     Prostate disorder        Past Surgical History:   Procedure Laterality Date    CARDIAC CATHETERIZATION Left 11/2018    PROSTATECTOMY         Review of patient's allergies indicates:  No Known Allergies    No current facility-administered medications on file prior to encounter.     Current Outpatient Medications on File Prior to Encounter   Medication Sig    apixaban (ELIQUIS) 5 mg Tab Take 1 tablet (5 mg total) by mouth 2 (two) times daily.    aspirin (ECOTRIN) 81 MG EC tablet Take 81 mg by mouth once daily.    carvediloL (COREG) 6.25 MG tablet Take 1 tablet (6.25 mg total) by mouth 2 (two) times daily with meals.    chlorthalidone (HYGROTEN) 25 MG Tab Take 0.5 tablets (12.5 mg total) by mouth once daily. 1/2 tab po daily    dapagliflozin (FARXIGA) 5 mg Tab tablet Take 1 tablet (5 mg total) by mouth once daily.    dorzolamide-timolol 2-0.5% (COSOPT) 22.3-6.8 mg/mL ophthalmic solution Place 1 drop into both eyes 2 (two) times a day.    ezetimibe (ZETIA) 10 mg tablet Take 1 tablet (10 mg total) by mouth once daily.    furosemide (LASIX) 80 MG tablet Take 1 tablet (80 mg total) by mouth once daily. (Patient taking differently: Take 80 mg by mouth 2 (two) times a day.  Told pt to increase to BID instead daily)    glipiZIDE (GLUCOTROL) 10 MG tablet Take 1 tablet (10 mg total) by mouth 2 (two) times daily before meals.    latanoprost 0.005 % ophthalmic solution Place 1 drop into both eyes Daily.    omeprazole (PRILOSEC) 40 MG capsule Take 1 capsule (40 mg total) by mouth once daily.    sacubitriL-valsartan  (ENTRESTO)  mg per tablet Take 1 tablet by mouth 2 (two) times daily.    tamsulosin (FLOMAX) 0.4 mg Cap Take 1 capsule (0.4 mg total) by mouth once daily.     Family History     Problem Relation (Age of Onset)    Heart disease Father    Hypertension Mother    Pacemaker/defibrilator Mother        Tobacco Use    Smoking status: Former Smoker     Packs/day: 1.00     Types: Cigarettes, Cigars    Smokeless tobacco: Current User     Types: Chew    Tobacco comment: QUIT 2017   Substance and Sexual Activity    Alcohol use: Not Currently    Drug use: Never    Sexual activity: Not Currently     Review of Systems   Constitutional: Negative for chills, diaphoresis, fatigue and fever.   HENT: Negative for congestion, hearing loss, nosebleeds, postnasal drip, sore throat, tinnitus and trouble swallowing.    Eyes: Negative for photophobia, pain, discharge, itching and visual disturbance.   Respiratory: Positive for shortness of breath. Negative for cough, wheezing and stridor.    Cardiovascular: Positive for leg swelling. Negative for chest pain and palpitations.   Gastrointestinal: Negative for abdominal distention, abdominal pain, anal bleeding, blood in stool, constipation, diarrhea, nausea and vomiting.   Endocrine: Negative for cold intolerance, heat intolerance, polydipsia, polyphagia and polyuria.   Genitourinary: Negative for decreased urine volume, difficulty urinating, dysuria, flank pain, frequency, hematuria and urgency.   Musculoskeletal: Negative for arthralgias, back pain, gait problem, joint swelling, myalgias, neck pain and neck stiffness.   Skin: Negative for color change, pallor and rash.   Allergic/Immunologic: Negative for immunocompromised state.   Neurological: Negative for dizziness, tremors, seizures, syncope, facial asymmetry, speech difficulty, weakness, light-headedness, numbness and headaches.   Hematological: Negative for adenopathy. Does not bruise/bleed easily.     Objective:     Vital  Signs (Most Recent):  Temp: 97.5 °F (36.4 °C) (01/24/22 1601)  Pulse: 87 (01/24/22 2209)  Resp: 20 (01/24/22 2206)  BP: 106/70 (01/24/22 2209)  SpO2: 96 % (01/24/22 2206) Vital Signs (24h Range):  Temp:  [97.5 °F (36.4 °C)-98.5 °F (36.9 °C)] 97.5 °F (36.4 °C)  Pulse:  [69-90] 87  Resp:  [16-22] 20  SpO2:  [93 %-100 %] 96 %  BP: ()/(64-87) 106/70     Weight: 104.3 kg (230 lb)  Body mass index is 32.08 kg/m².    Physical Exam  Vitals reviewed.   Constitutional:       General: He is not in acute distress.     Appearance: Normal appearance.   HENT:      Head: Normocephalic and atraumatic.      Right Ear: External ear normal.      Left Ear: External ear normal.   Eyes:      Extraocular Movements: Extraocular movements intact.      Pupils: Pupils are equal, round, and reactive to light.   Cardiovascular:      Rate and Rhythm: Normal rate and regular rhythm.      Pulses: Normal pulses.      Heart sounds: Normal heart sounds. No murmur heard.      Pulmonary:      Effort: Pulmonary effort is normal. No respiratory distress.      Breath sounds: Normal breath sounds. No wheezing.   Chest:      Chest wall: No tenderness.   Abdominal:      Palpations: Abdomen is soft. There is no mass.      Tenderness: There is no abdominal tenderness. There is no right CVA tenderness or left CVA tenderness.      Comments: Protuberant with ascites.     Musculoskeletal:         General: No swelling or tenderness. Normal range of motion.      Comments: +2 pitting edema present BLE, and +1 pitting edema noted in bilateral upper extremity   Skin:     General: Skin is warm and dry.      Capillary Refill: Capillary refill takes less than 2 seconds.   Neurological:      General: No focal deficit present.      Mental Status: He is alert and oriented to person, place, and time. Mental status is at baseline.   Psychiatric:         Mood and Affect: Mood normal.         Thought Content: Thought content normal.           CRANIAL NERVES     CN III, IV,  VI   Pupils are equal, round, and reactive to light.       Significant Labs: All pertinent labs within the past 24 hours have been reviewed.    Significant Imaging: I have reviewed all pertinent imaging results/findings within the past 24 hours.

## 2022-01-25 NOTE — SUBJECTIVE & OBJECTIVE
Past Medical History:   Diagnosis Date    Atrial fibrillation     CHF (congestive heart failure)     Chronic kidney disease     CVA (cerebral vascular accident)     Dilated cardiomyopathy     DM type 2 (diabetes mellitus, type 2)     GERD (gastroesophageal reflux disease)     Hyperlipidemia     Hypertension     Hypertensive heart disease     Hypothyroidism     Left bundle branch block     Prostate disorder        Past Surgical History:   Procedure Laterality Date    CARDIAC CATHETERIZATION Left 11/2018    PROSTATECTOMY         Review of patient's allergies indicates:  No Known Allergies    No current facility-administered medications on file prior to encounter.     Current Outpatient Medications on File Prior to Encounter   Medication Sig    apixaban (ELIQUIS) 5 mg Tab Take 1 tablet (5 mg total) by mouth 2 (two) times daily.    aspirin (ECOTRIN) 81 MG EC tablet Take 81 mg by mouth once daily.    carvediloL (COREG) 6.25 MG tablet Take 1 tablet (6.25 mg total) by mouth 2 (two) times daily with meals.    chlorthalidone (HYGROTEN) 25 MG Tab Take 0.5 tablets (12.5 mg total) by mouth once daily. 1/2 tab po daily    dapagliflozin (FARXIGA) 5 mg Tab tablet Take 1 tablet (5 mg total) by mouth once daily.    dorzolamide-timolol 2-0.5% (COSOPT) 22.3-6.8 mg/mL ophthalmic solution Place 1 drop into both eyes 2 (two) times a day.    ezetimibe (ZETIA) 10 mg tablet Take 1 tablet (10 mg total) by mouth once daily.    furosemide (LASIX) 80 MG tablet Take 1 tablet (80 mg total) by mouth once daily. (Patient taking differently: Take 80 mg by mouth 2 (two) times a day.  Told pt to increase to BID instead daily)    glipiZIDE (GLUCOTROL) 10 MG tablet Take 1 tablet (10 mg total) by mouth 2 (two) times daily before meals.    latanoprost 0.005 % ophthalmic solution Place 1 drop into both eyes Daily.    omeprazole (PRILOSEC) 40 MG capsule Take 1 capsule (40 mg total) by mouth once daily.    sacubitriL-valsartan  (ENTRESTO)  mg per tablet Take 1 tablet by mouth 2 (two) times daily.    tamsulosin (FLOMAX) 0.4 mg Cap Take 1 capsule (0.4 mg total) by mouth once daily.     Family History     Problem Relation (Age of Onset)    Heart disease Father    Hypertension Mother    Pacemaker/defibrilator Mother        Tobacco Use    Smoking status: Former Smoker     Packs/day: 1.00     Types: Cigarettes, Cigars    Smokeless tobacco: Current User     Types: Chew    Tobacco comment: QUIT 2017   Substance and Sexual Activity    Alcohol use: Not Currently    Drug use: Never    Sexual activity: Not Currently     Review of Systems   Constitutional: Positive for weight gain. Negative for chills, diaphoresis, malaise/fatigue and night sweats.   HENT: Negative for congestion and sore throat.    Eyes: Negative for visual disturbance.   Cardiovascular: Positive for dyspnea on exertion, leg swelling, orthopnea and paroxysmal nocturnal dyspnea. Negative for chest pain.   Respiratory: Positive for shortness of breath. Negative for cough and wheezing.    Hematologic/Lymphatic: Negative for adenopathy.   Gastrointestinal: Negative for abdominal pain, constipation, diarrhea, nausea and vomiting.   Genitourinary: Positive for hematuria. Negative for dysuria.   Neurological: Negative for headaches.   Psychiatric/Behavioral: Negative for altered mental status.     Objective:     Vital Signs (Most Recent):  Temp: 97.5 °F (36.4 °C) (01/25/22 0735)  Pulse: 88 (01/25/22 1135)  Resp: (!) 22 (01/25/22 1135)  BP: 99/68 (01/25/22 1139)  SpO2: (!) 94 % (01/25/22 1135) Vital Signs (24h Range):  Temp:  [97.5 °F (36.4 °C)-98.2 °F (36.8 °C)] 97.5 °F (36.4 °C)  Pulse:  [69-99] 88  Resp:  [12-22] 22  SpO2:  [88 %-100 %] 94 %  BP: ()/(56-87) 99/68     Weight: 104.3 kg (230 lb)  Body mass index is 32.08 kg/m².    SpO2: (!) 94 %  O2 Device (Oxygen Therapy): room air      Intake/Output Summary (Last 24 hours) at 1/25/2022 1230  Last data filed at 1/25/2022  0300  Gross per 24 hour   Intake --   Output 900 ml   Net -900 ml       Lines/Drains/Airways     Peripheral Intravenous Line                 Peripheral IV - Single Lumen 01/24/22 2026 22 G Right Hand <1 day                Physical Exam  Vitals reviewed.   Constitutional:       General: He is not in acute distress.     Appearance: Normal appearance.   HENT:      Head: Normocephalic and atraumatic.      Right Ear: External ear normal.      Left Ear: External ear normal.      Nose: Nose normal. No congestion.      Mouth/Throat:      Mouth: Mucous membranes are moist.      Pharynx: Oropharynx is clear. No oropharyngeal exudate.   Eyes:      Extraocular Movements: Extraocular movements intact.      Pupils: Pupils are equal, round, and reactive to light.   Neck:      Comments: Positive JVD  Cardiovascular:      Rate and Rhythm: Normal rate. Rhythm irregular.      Pulses: Normal pulses.      Heart sounds: Normal heart sounds.   Pulmonary:      Effort: Pulmonary effort is normal.      Breath sounds: Normal breath sounds.   Abdominal:      General: Abdomen is flat. Bowel sounds are normal.   Musculoskeletal:         General: Swelling ( 2+ pitting edema extending from the lower extremities to his abdomen) present. Normal range of motion.      Cervical back: Normal range of motion.      Right lower leg: Edema present.      Left lower leg: Edema present.   Skin:     General: Skin is warm.      Capillary Refill: Capillary refill takes less than 2 seconds.   Neurological:      General: No focal deficit present.      Mental Status: He is alert and oriented to person, place, and time.   Psychiatric:         Mood and Affect: Mood normal.         Significant Labs:   Recent Lab Results       01/25/22  1134   01/25/22  0949   01/25/22  0545   01/25/22  0505   01/25/22  0055        Influenza B, Molecular               Albumin/Globulin Ratio               Albumin               Alkaline Phosphatase               ALT               Anion  Gap       11         Appearance, UA               aPTT               AST               Bacteria, UA               Baso #               Basophil %               Bilirubin (UA)               BILIRUBIN TOTAL               BUN       28         BUN/CREAT RATIO       15         Calcium       8.8         Chloride       105         CO2       35         Color, UA               COVID-19 Ag               Creatinine       1.86         Differential Type               eGFR if non        37         Eos #               Eosinophil %               Globulin, Total               Glucose       135         Glucose, UA               Hematocrit               Hemoglobin               Immature Grans (Abs)               Immature Granulocytes               Influenza A               INR               Ketones, UA               Leukocytes, UA               Lymph #               Lymph %               Magnesium   2.5             MCH               MCHC               MCV               Mono #               Mono %               MPV               Neutrophils, Abs               Neutrophils Relative               NITRITE UA               nRBC               NT-proBNP               NUCLEATED RBC ABSOLUTE               Occult Blood UA               pH, UA               PLATELET MORPHOLOGY               Platelets               POC Glucose 102     114           Potassium       3.1         PROTEIN TOTAL               Protein, UA               Protime               RBC               RBC Morphology               RBC, UA               RDW               Segmented Neutrophils, Man %               Sodium       148         Specific [place], UA               Troponin I High Sensitivity   546.4     514.8   536.0       Urine Culture, Routine               UROBILINOGEN UA               WBC Clumps, UA               WBC, UA               WBC                                01/24/22 2014 01/24/22  1858   01/24/22  1856        Influenza B, Molecular  Negative           Albumin/Globulin Ratio     1.1       Albumin     3.4       Alkaline Phosphatase     104       ALT     36       Anion Gap     14       Appearance, UA   Cloudy         aPTT     39.6       AST     30       Bacteria, UA   Many         Baso #     0.01       Basophil %     0.1       Bilirubin (UA)   Small         BILIRUBIN TOTAL     1.4       BUN     30       BUN/CREAT RATIO     15       Calcium     9.4       Chloride     103       CO2     33       Color, UA   Red         COVID-19 Ag Negative           Creatinine     2.01       Differential Type     Manual       eGFR if non      34       Eos #     0.01       Eosinophil %     0.1       Globulin, Total     3.2       Glucose     162       Glucose, UA   100          Hematocrit     52.3       Hemoglobin     16.1       Immature Grans (Abs)     0.07       Immature Granulocytes     0.6       Influenza A Negative           INR     1.33       Ketones, UA   Negative         Leukocytes, UA   Moderate         Lymph #     0.77       Lymph %     6.9            8       Magnesium     2.6       MCH     27.1       MCHC     30.8       MCV     87.9       Mono #     0.74       Mono %     6.6            4       MPV     12.2       Neutrophils, Abs     9.63       Neutrophils Relative     85.7       NITRITE UA   Negative         nRBC     0.0       NT-proBNP     15,350       NUCLEATED RBC ABSOLUTE     0.00       Occult Blood UA   Large         pH, UA   6.0         PLATELET MORPHOLOGY     Normal       Platelets     245       POC Glucose           Potassium     3.8       PROTEIN TOTAL     6.6       Protein, UA   >=300         Protime     16.4       RBC     5.95       RBC Morphology     Normal       RBC, UA   Too Numerous To Count         RDW     18.1       Segmented Neutrophils, Man %     88       Sodium     146       Specific [place], UA   1.025         Troponin I High Sensitivity     596.8       Urine Culture, Routine   >100,000 Gram-negative Bacilli  Comment:  Identification and Susceptibility to Follow  [P]         UROBILINOGEN UA   0.2         WBC Clumps, UA   Few         WBC, UA   20-50         WBC     11.23              [P] - Preliminary Result             Significant Imaging: All pertinent imaging has been reviewed

## 2022-01-25 NOTE — ASSESSMENT & PLAN NOTE
Patient has a known dilated cardiomyopathy with last known EF of 15%.  Patient was scheduled to have ICD placed by Dr. Leigh next month.  Patient was recently seen by Dr. Leigh last Friday and with findings of volume overload, patient's Lasix has been increased from 80 mg a day to 120 mg a day.  Patient stated that he is feeling better overall.  Patient's weight actually decreased by 14 lb since the time he visited Dr. Leigh.  Nonetheless, chest x-ray showed persistence of pulmonary edema with pleural effusions and BNP was elevated to 15,000 from a baseline of 10,000. Will diurese patient further with IV Lasix

## 2022-01-25 NOTE — ASSESSMENT & PLAN NOTE
Mr. Michelle says his urine is getting clearer with each void and that his last urine was clear.  He voided in the toilet so I was unable to see it.  Have placed a nursing order to save patient's LAST voided urine for us to see.

## 2022-01-25 NOTE — PLAN OF CARE
Problem: Adult Inpatient Plan of Care  Goal: Plan of Care Review  Outcome: Ongoing, Progressing  Flowsheets (Taken 1/25/2022 0400)  Plan of Care Reviewed With: patient  Goal: Patient-Specific Goal (Individualized)  Outcome: Ongoing, Progressing  Goal: Absence of Hospital-Acquired Illness or Injury  Outcome: Ongoing, Progressing  Goal: Optimal Comfort and Wellbeing  Outcome: Ongoing, Progressing  Goal: Readiness for Transition of Care  Outcome: Ongoing, Progressing     Problem: Skin Injury Risk Increased  Goal: Skin Health and Integrity  Outcome: Ongoing, Progressing     Problem: Fluid and Electrolyte Imbalance (Acute Kidney Injury/Impairment)  Goal: Fluid and Electrolyte Balance  Outcome: Ongoing, Progressing     Problem: Oral Intake Inadequate (Acute Kidney Injury/Impairment)  Goal: Optimal Nutrition Intake  Outcome: Ongoing, Progressing     Problem: Renal Function Impairment (Acute Kidney Injury/Impairment)  Goal: Effective Renal Function  Outcome: Ongoing, Progressing     Problem: Fall Injury Risk  Goal: Absence of Fall and Fall-Related Injury  Outcome: Ongoing, Progressing

## 2022-01-25 NOTE — HPI
Mr. Jeanmarie Michelle is a 82y/o M with persistent atrial fibrillation, nonischemic dilated cardiomyopathy- HFrEF with last EF 15% (12/8/21) scheduled to undergo ICD placement with Dr. Carlson in February 2022, stage III CKD, HTN and T2DM presenting with SOB with increased edema. He was recently seen at his cardiologist's office (1/21/22) and his Lasix was increased from 80mg to 120mg PO qd with plans for an ICD next month. On day of admission he had seen his PCP who noted his weight had increased approximately 20 lbs within the past few weeks and was advised to go to the ED. In the ER he describes he has been SOB for 2-3 weeks with increasing lower extremity and abdominal edema. He sleeps on 2 pillows a night and occasionally sleeps in an upright chair. He reports having CELESTIN, orthopnea and nocturnal dyspnea. He also reports having hematuria starting yesterday. In the ED his VS were stable with HR 69, BP 98/69 with pertinent labs including a troponin trending from 596, 536, 514 to 546. His troponins have been consistently elevated in the 500s, 3 weeks ago. His EKG revealed atrial fibrillation with frequent PVCs with HR 89. His U/A revealed TNTC RBC with 20-50 WBC, culture pending.     Currently the patient is in no acute distress but is hypervolemic with significant edema of his lower extremeties and abdomen. He denies chest pain and is able to urinate. He has received IV lasix 40mg once in the ED with 900 cc of urine output for the past 24 hours. Current weight is 104.3kg. Plan is to IV diurese with dobutamine and lasix. Eliquis is being held due to his hematuria. Will continue to monitor.

## 2022-01-25 NOTE — ASSESSMENT & PLAN NOTE
Pt has a history of chronically elevated troponins in the 500s  EKG reveals atrial fibrillation with PVCs  Elevated troponins most likely 2/2 myocardial injury due to his heart failure exacerbation

## 2022-01-25 NOTE — HPI
Patient is an 81-year-old male with a history of persistent atrial fibrillation on Eliquis, end-stage dilated cardiomyopathy the with last known EF of 15% scheduled to undergo ICD per Dr. Leigh in February, history of stage III CKD, type 2 diabetes and hypertension who was initially seen at Dr. Leigh office on Friday for shortness of breath with increased peripheral edema. Lasix was increased from  mg and pt felt better overall.  Patient was seen at his PCPs office earlier today for a 3 month follow-up and patient's PCP felt that patient was volume overloaded and after discussion was Dr. Leigh patient was instructed to come to the ED for evaluation.  In ED, patient was found to have lost weight from the time of Dr. Leigh visit by 14 lb. Chest x-ray showed mild pulmonary edema which appeared chronic from previous chest x-ray 3 weeks ago.  [place]NP [place] [address] from [place] [address]. Troponin was elevated, but pt has chronically elevated troponin and EKG showed no ST-T abnormalities. Pt had hematuria in ED and when asked when this was started, pt stated that this started at late morning today, but failed to mention it to his PCP. Subsequent UA demonstrated a presence of UTI. Source of hematuria is likely due to UTI. Pt will be admitted for further evaluation and intervention.    Hematuria:  This is likely secondary to urinary tract infection.  Will empirically start patient on Rocephin.  Will temporarily hold Eliquis for now until hematuria clears.    Acute cystitis with hematuria:  Cause of hematuria is felt to be due to UTI.  Will start patient on Rocephin while awaiting culture.    Chronic kidney disease:  Patient has CKD stage 3.  Serum creatinine is at his baseline.  Will continue to monitor serum creatinine and urine output.  Patient likely has cardiorenal  syndrome.  ---------------------------------------------------------------------------------------------------------------------------------------------------------  The above note is from Dr. Toni Crain's H&P from today.    I received a phone call from Caridad Arriaga RN from the ER about Mr. Michelle and his hematuria.  Dr. Crain believes his hematuria is from an active UTI.  She has temporarily stopped his Eliquis until his urine clears.  A urine for C&S was taken while he was in the ER and Dr. Crain has started Mr. Michelle on Rocephin while awaiting the urine culture.    I sent my RNFA, Misha Neri, to see Mr. Michelle.  No Epps catheter was inserted in Mr. Michelle and he is voiding without problems.  Mr. Michelle told Mr. Neri that his voiding is getting clearer each time he voids and that his last void was clear.  We will place a nurses order to save his last voided urine for us to see.

## 2022-01-25 NOTE — PROGRESS NOTES
[facility] Emergency Department  Hospital Medicine  Progress Note    Patient Name: Jeanmarie Michelle  MRN: [id number]  Patient Class: IP- Inpatient   Admission Date: 1/24/2022  Length of Stay: 0 days  Attending Physician: Geneva Li MD  Primary Care Provider: Regina Sprague MD        Subjective:     Principal Problem:<principal problem not specified>        HPI:  Patient is an 81-year-old male with a history of persistent atrial fibrillation on Eliquis, end-stage dilated cardiomyopathy the with last known EF of 15% scheduled to undergo ICD per Dr. Leigh in February, history of stage III CKD, type 2 diabetes and hypertension who was initially seen at Dr. Leigh office on Friday for shortness of breath with increased peripheral edema. Lasix was increased from  mg and pt felt better overall.  Patient was seen at his PCPs office earlier today for a 3 month follow-up and patient's PCP felt that patient was volume overloaded and after discussion was Dr. Leigh patient was instructed to come to the ED for evaluation.  In ED, patient was found to have lost weight from the time of Dr. Leigh visit by 14 lb.  Chest x-ray showed mild pulmonary edema which appeared chronic from previous chest x-ray 3 weeks ago.  [place] [address] [place] [address]. Troponin was elevated, but pt has chronically elevated troponin and EKG showed no ST-T abnormalities. Pt had hematuria in ED and when asked when this was started, pt stated that this started at late morning today, but failed to mention it to his PCP. Subsequent UA demonstrated a presence of UTI. Source of hematuria is likely due to UTI. Pt will be admitted for further evaluation and intervention.      Overview/Hospital Course:  No notes on file    Interval History:     NAEO  Says he is breathing better    Review of Systems   Constitutional: Negative for chills, diaphoresis, fatigue and fever.   HENT: Negative for congestion, hearing loss,  nosebleeds, postnasal drip, sore throat, tinnitus and trouble swallowing.    Eyes: Negative for photophobia, pain, discharge, itching and visual disturbance.   Respiratory: Positive for shortness of breath. Negative for cough, wheezing and stridor.    Cardiovascular: Positive for leg swelling. Negative for chest pain and palpitations.   Gastrointestinal: Negative for abdominal distention, abdominal pain, anal bleeding, blood in stool, constipation, diarrhea, nausea and vomiting.   Endocrine: Negative for cold intolerance, heat intolerance, polydipsia, polyphagia and polyuria.   Genitourinary: Negative for decreased urine volume, difficulty urinating, dysuria, flank pain, frequency, hematuria and urgency.   Musculoskeletal: Negative for arthralgias, back pain, gait problem, joint swelling, myalgias, neck pain and neck stiffness.   Skin: Negative for color change, pallor and rash.   Allergic/Immunologic: Negative for immunocompromised state.   Neurological: Negative for dizziness, tremors, seizures, syncope, facial asymmetry, speech difficulty, weakness, light-headedness, numbness and headaches.   Hematological: Negative for adenopathy. Does not bruise/bleed easily.     Objective:     Vital Signs (Most Recent):  Temp: 97.5 °F (36.4 °C) (01/25/22 0735)  Pulse: 95 (01/25/22 1242)  Resp: 16 (01/25/22 1210)  BP: 133/82 (01/25/22 1242)  SpO2: (!) 94 % (01/25/22 1135) Vital Signs (24h Range):  Temp:  [97.5 °F (36.4 °C)-98.2 °F (36.8 °C)] 97.5 °F (36.4 °C)  Pulse:  [69-99] 95  Resp:  [12-22] 16  SpO2:  [88 %-100 %] 94 %  BP: ()/(56-87) 133/82     Weight: 104.3 kg (230 lb)  Body mass index is 32.08 kg/m².    Intake/Output Summary (Last 24 hours) at 1/25/2022 1249  Last data filed at 1/25/2022 0300  Gross per 24 hour   Intake --   Output 900 ml   Net -900 ml      Physical Exam  Vitals reviewed.   Constitutional:       General: He is not in acute distress.     Appearance: Normal appearance.   HENT:      Head:  Normocephalic and atraumatic.      Right Ear: External ear normal.      Left Ear: External ear normal.   Eyes:      Extraocular Movements: Extraocular movements intact.      Pupils: Pupils are equal, round, and reactive to light.   Cardiovascular:      Rate and Rhythm: Normal rate and regular rhythm.      Pulses: Normal pulses.      Heart sounds: Normal heart sounds. No murmur heard.      Pulmonary:      Effort: Pulmonary effort is normal. No respiratory distress.      Breath sounds: Normal breath sounds. No wheezing.   Chest:      Chest wall: No tenderness.   Abdominal:      Palpations: Abdomen is soft. There is no mass.      Tenderness: There is no abdominal tenderness. There is no right CVA tenderness or left CVA tenderness.      Comments: Protuberant with ascites.     Musculoskeletal:         General: No swelling or tenderness. Normal range of motion.      Comments: +2 pitting edema present BLE, and +1 pitting edema noted in bilateral upper extremity   Skin:     General: Skin is warm and dry.      Capillary Refill: Capillary refill takes less than 2 seconds.   Neurological:      General: No focal deficit present.      Mental Status: He is alert and oriented to person, place, and time. Mental status is at baseline.   Psychiatric:         Mood and Affect: Mood normal.         Thought Content: Thought content normal.         Significant Labs: All pertinent labs within the past 24 hours have been reviewed.    Significant Imaging: I have reviewed all pertinent imaging results/findings within the past 24 hours.      Assessment/Plan:      Elevated troponin level  Patient has a history of chronically elevated troponin level in 500s and today patient's troponin level was 596.  There was no ST-T abnormalities accompanying elevated troponin level.  No recent ischemia evaluation on record. Will consult Cardiology in a.m. for recommendations    1/25-cardiology seen pt    Hematuria  This is likely secondary to urinary tract  infection.  Will empirically start patient on Rocephin.  As stated above, will elect to continue patient on Eliquis ( for Afib )      Acute cystitis with hematuria  Cause of hematuria is felt to be due to UTI.  Will start patient on Rocephin while awaiting culture      Chronic kidney disease  Patient has CKD stage 3.  Serum creatinine is at his baseline.  Will continue to monitor serum creatinine and urine output.  Patient likely has cardiorenal syndrome      Chronic systolic congestive heart failure  Patient has a known dilated cardiomyopathy with last known EF of 15%.  Patient was scheduled to have ICD placed by Dr. Leigh next month.  Patient was recently seen by Dr. Leigh last Friday and with findings of volume overload, patient's Lasix has been increased from 80 mg a day to 120 mg a day.  Patient stated that he is feeling better overall.  Patient's weight actually decreased by 14 lb since the time he visited Dr. Leigh.  Nonetheless, chest x-ray showed persistence of pulmonary edema with pleural effusions and BNP was elevated to 15,000 from a baseline of 10,000. Will diurese patient further with IV Lasix    1/25-cardiology to see pt      Atrial fibrillation  Patient with Long standing persistent (>12 months) atrial fibrillation which is controlled currently with Beta Blocker. Patient is currently in atrial fibrillation.[id number] Score: 3. Anticoagulation indicated. Anticoagulation done with Eliquis.  Will elect to continue patient on Eliquis.           Diabetes mellitus without complication  Hold oral hypoglycemics and start patient on sliding scale insulin to maintain CBG in the range of 130-180s      Hypertension  Continue present management        VTE Risk Mitigation (From admission, onward)         Ordered     Place sequential compression device  Until discontinued         01/25/22 0111                Discharge Planning   MADDIE:      Code Status: Full Code   Is the patient medically ready for  discharge?:     Reason for patient still in hospital (select all that apply): Treatment                     Rehmat JAY Li MD  Department of Hospital Medicine   [facility] - Emergency Department

## 2022-01-25 NOTE — ED PROVIDER NOTES
Encounter Date: 1/24/2022       History     Chief Complaint   Patient presents with    Hematuria     Patient is brought to ER by his nephew.  He is referred from Dr White office for shortness of breath.  Patient is followed by Dr Carlson also who recommended him coming to ER for admission for worsening of CHF.  Patient states shortness of breath is not worse, but he is having hematuria and swelling of his penis and scrotum.  Patient is taking eliquis for history of Afib.  He states the hematuria is not new and has been discussed with Dr Carlson, but he does not recall plan of treatment.  Patient is poor historian.  He denies chest pain or shortness of breath at present.     The history is provided by the patient and a relative. No  was used.     Review of patient's allergies indicates:  No Known Allergies  Past Medical History:   Diagnosis Date    Atrial fibrillation     CHF (congestive heart failure)     Chronic kidney disease     CVA (cerebral vascular accident)     Dilated cardiomyopathy     DM type 2 (diabetes mellitus, type 2)     GERD (gastroesophageal reflux disease)     Hyperlipidemia     Hypertension     Hypertensive heart disease     Hypothyroidism     Left bundle branch block     Prostate disorder      Past Surgical History:   Procedure Laterality Date    CARDIAC CATHETERIZATION Left 11/2018    PROSTATECTOMY       Family History   Problem Relation Age of Onset    Hypertension Mother     Pacemaker/defibrilator Mother     Heart disease Father      Social History     Tobacco Use    Smoking status: Former Smoker     Packs/day: 1.00     Types: Cigarettes, Cigars    Smokeless tobacco: Current User     Types: Chew    Tobacco comment: QUIT 2017   Substance Use Topics    Alcohol use: Not Currently    Drug use: Never     Review of Systems   Constitutional: Positive for fatigue.   Respiratory: Positive for shortness of breath.    Cardiovascular: Positive for  leg swelling (bilateral).   Genitourinary: Positive for hematuria and scrotal swelling.   All other systems reviewed and are negative.      Physical Exam     Initial Vitals [01/24/22 1601]   BP Pulse Resp Temp SpO2   98/69 87 (!) 22 97.5 °F (36.4 °C) 98 %      MAP       --         Physical Exam    Nursing note and vitals reviewed.  Constitutional: He appears well-developed and well-nourished.   HENT:   Head: Normocephalic.   Right Ear: External ear normal.   Left Ear: External ear normal.   Nose: Nose normal.   Mouth/Throat: Oropharynx is clear and moist.   Eyes: Conjunctivae and EOM are normal. Pupils are equal, round, and reactive to light.   Neck: Neck supple.   Normal range of motion.  Cardiovascular: Normal rate, regular rhythm, normal heart sounds and intact distal pulses.   Pulmonary/Chest: Breath sounds normal.   Abdominal: Abdomen is soft. Bowel sounds are normal.   Genitourinary:    Rectum normal.   Rectum:      Guaiac result positive.   Guaiac positive stool. : Acceptable.Prostate is enlarged. Right testis shows swelling. Left testis shows swelling.    Genitourinary Comments: Penile/ scrotal edema.      Musculoskeletal:         General: Edema (1+ bilateral lower extremities) present. Normal range of motion.      Cervical back: Normal range of motion and neck supple.     Neurological: He is alert and oriented to person, place, and time. He has normal strength. GCS score is 15. GCS eye subscore is 4. GCS verbal subscore is 5. GCS motor subscore is 6.   Skin: Skin is warm and dry. Capillary refill takes less than 2 seconds.   Psychiatric: He has a normal mood and affect. His behavior is normal. Judgment and thought content normal.         Medical Screening Exam   See Full Note    ED Course   Procedures  Labs Reviewed   URINALYSIS, REFLEX TO URINE CULTURE - Abnormal; Notable for the following components:       Result Value    Color, UA Red (*)     Clarity, UA Cloudy (*)     Leukocytes, UA  Moderate (*)     Protein, UA >=300 (*)     Glucose,   (*)     Bilirubin, UA Small (*)     Blood, UA Large (*)     All other components within normal limits   COMPREHENSIVE METABOLIC PANEL - Abnormal; Notable for the following components:    Sodium 146 (*)     CO2 33 (*)     Glucose 162 (*)     BUN 30 (*)     Creatinine 2.01 (*)     Albumin 3.4 (*)     Bilirubin, Total 1.4 (*)     eGFR 34 (*)     All other components within normal limits   APTT - Abnormal; Notable for the following components:    PTT 39.6 (*)     All other components within normal limits   PROTIME-INR - Abnormal; Notable for the following components:    PT 16.4 (*)     INR 1.33 (*)     All other components within normal limits   MAGNESIUM - Abnormal; Notable for the following components:    Magnesium 2.6 (*)     All other components within normal limits   TROPONIN I - Abnormal; Notable for the following components:    Troponin I High Sensitivity 596.8 (*)     All other components within normal limits   NT-PRO NATRIURETIC PEPTIDE - Abnormal; Notable for the following components:    ProBNP 15,350 (*)     All other components within normal limits   CBC WITH DIFFERENTIAL - Abnormal; Notable for the following components:    WBC 11.23 (*)     MCHC 30.8 (*)     RDW 18.1 (*)     Neutrophils % 85.7 (*)     Lymphocytes % 6.9 (*)     Monocytes % 6.6 (*)     Eosinophils % 0.1 (*)     Immature Granulocytes % 0.6 (*)     Neutrophils, Abs 9.63 (*)     Lymphocytes, Absolute 0.77 (*)     Immature Granulocytes, Absolute 0.07 (*)     All other components within normal limits   MANUAL DIFFERENTIAL - Abnormal; Notable for the following components:    Segmented Neutrophils, Man % 88 (*)     Lymphocytes, Man % 8 (*)     All other components within normal limits   URINALYSIS, MICROSCOPIC - Abnormal; Notable for the following components:    WBC, UA 20-50 (*)     RBC, UA Too Numerous To Count (*)     Bacteria, UA Many (*)     WBC Clumps Few (*)     All other components  within normal limits   SARS-COV2 (COVID) W/ FLU ANTIGEN - Normal    Narrative:     Negative SARS-CoV results should not be used as the sole basis for treatment or patient management decisions; negative results should be considered in the context of a patient's recent exposures, history and the presene of clinical signs and symptoms consistent with COVID-19.  Negative results should be treated as presumptive and confirmed by molecular assay, if necessary for patient management.   CULTURE, URINE   CBC W/ AUTO DIFFERENTIAL    Narrative:     The following orders were created for panel order CBC auto differential.  Procedure                               Abnormality         Status                     ---------                               -----------         ------                     CBC with Differential[[id number]]        Abnormal            Final result               Manual Differential[[id number]]          Abnormal            Final result                 Please view results for these tests on the individual orders.   EXTRA TUBES    Narrative:     The following orders were created for panel order EXTRA TUBES.  Procedure                               Abnormality         Status                     ---------                               -----------         ------                     Lavender Top Hold[[id number]]                                In process                 Gold Top Hold[[id number]]                                    In process                 Pink Top Hold[[id number]]                                    In process                   Please view results for these tests on the individual orders.   LAVENDER TOP HOLD   GOLD TOP HOLD   PINK TOP HOLD   POCT OCCULT BLOOD (STOOL)          Imaging Results          X-Ray Chest AP Portable (Final result)  Result time 01/24/22 19:26:41    Final result by Ash Chavez DO (01/24/22 19:26:41)                 Impression:      As above.    Point of Service: [facility]  Hospital      Electronically signed by: Ash Chavez  Date:    01/24/2022  Time:    19:26             Narrative:    EXAMINATION:  XR CHEST AP PORTABLE    CLINICAL HISTORY:  shortness of breath;    COMPARISON:  Chest x-ray January 1, 2022    TECHNIQUE:  Frontal view/views of the chest.    FINDINGS:  Mild-to-moderate cardiomegaly and/or pericardial effusion.  Subtle ground-glass atelectasis/infiltrate of the bilateral lower lungs with questionable small bilateral pleural fluid.  Visualized osseous and surrounding soft tissue structures appear grossly unchanged.                                 Medications   furosemide injection 40 mg (40 mg Intravenous Given 1/24/22 2028)   cefTRIAXone (ROCEPHIN) 1 g in dextrose 5 % in water (D5W) 5 % 50 mL IVPB (MB+) (0 g Intravenous Stopped 1/24/22 2310)           APC / Resident Notes:   Discussed with Dr Alexis, will admit for Dr Carlson if patient agrees for possible left heart cath in AM.   Dr Crain consulted for admission, will admit to hospitalist service.               Clinical Impression:   Final diagnoses:  [I48.20] Chronic a-fib  [R31.0] Hematuria, gross (Primary)  [I50.9] Acute on chronic congestive heart failure, unspecified heart failure type  [Z92.29] Hx of long term use of blood thinners          ED Disposition Condition    Admit               ROLY Chaparro  01/24/22 8035

## 2022-01-26 PROBLEM — N39.0 UTI (URINARY TRACT INFECTION): Status: ACTIVE | Noted: 2022-01-26

## 2022-01-26 LAB
ANION GAP SERPL CALCULATED.3IONS-SCNC: 13 MMOL/L (ref 7–16)
BUN SERPL-MCNC: 30 MG/DL (ref 7–18)
BUN/CREAT SERPL: 16 (ref 6–20)
CALCIUM SERPL-MCNC: 8.6 MG/DL (ref 8.5–10.1)
CHLORIDE SERPL-SCNC: 105 MMOL/L (ref 98–107)
CO2 SERPL-SCNC: 33 MMOL/L (ref 21–32)
CREAT SERPL-MCNC: 1.87 MG/DL (ref 0.7–1.3)
GLUCOSE SERPL-MCNC: 150 MG/DL (ref 74–106)
GLUCOSE SERPL-MCNC: 151 MG/DL (ref 70–105)
GLUCOSE SERPL-MCNC: 182 MG/DL (ref 70–105)
GLUCOSE SERPL-MCNC: 196 MG/DL (ref 70–105)
GLUCOSE SERPL-MCNC: 265 MG/DL (ref 70–105)
POTASSIUM SERPL-SCNC: 3.6 MMOL/L (ref 3.5–5.1)
SODIUM SERPL-SCNC: 147 MMOL/L (ref 136–145)
UA COMPLETE W REFLEX CULTURE PNL UR: ABNORMAL

## 2022-01-26 PROCEDURE — 80048 BASIC METABOLIC PNL TOTAL CA: CPT | Performed by: INTERNAL MEDICINE

## 2022-01-26 PROCEDURE — 51798 US URINE CAPACITY MEASURE: CPT

## 2022-01-26 PROCEDURE — 93005 ELECTROCARDIOGRAM TRACING: CPT

## 2022-01-26 PROCEDURE — 63600175 PHARM REV CODE 636 W HCPCS: Performed by: INTERNAL MEDICINE

## 2022-01-26 PROCEDURE — 93010 ELECTROCARDIOGRAM REPORT: CPT | Mod: ,,, | Performed by: INTERNAL MEDICINE

## 2022-01-26 PROCEDURE — 99233 SBSQ HOSP IP/OBS HIGH 50: CPT | Mod: ,,, | Performed by: STUDENT IN AN ORGANIZED HEALTH CARE EDUCATION/TRAINING PROGRAM

## 2022-01-26 PROCEDURE — 93010 EKG 12-LEAD: ICD-10-PCS | Mod: ,,, | Performed by: INTERNAL MEDICINE

## 2022-01-26 PROCEDURE — 36415 COLL VENOUS BLD VENIPUNCTURE: CPT | Performed by: INTERNAL MEDICINE

## 2022-01-26 PROCEDURE — 82962 GLUCOSE BLOOD TEST: CPT

## 2022-01-26 PROCEDURE — 99233 SBSQ HOSP IP/OBS HIGH 50: CPT | Mod: ,,, | Performed by: INTERNAL MEDICINE

## 2022-01-26 PROCEDURE — 63600175 PHARM REV CODE 636 W HCPCS: Performed by: NURSE PRACTITIONER

## 2022-01-26 PROCEDURE — 11000001 HC ACUTE MED/SURG PRIVATE ROOM

## 2022-01-26 PROCEDURE — 25000003 PHARM REV CODE 250: Performed by: INTERNAL MEDICINE

## 2022-01-26 PROCEDURE — 25000003 PHARM REV CODE 250: Performed by: STUDENT IN AN ORGANIZED HEALTH CARE EDUCATION/TRAINING PROGRAM

## 2022-01-26 PROCEDURE — 99233 PR SUBSEQUENT HOSPITAL CARE,LEVL III: ICD-10-PCS | Mod: ,,, | Performed by: INTERNAL MEDICINE

## 2022-01-26 PROCEDURE — 99233 PR SUBSEQUENT HOSPITAL CARE,LEVL III: ICD-10-PCS | Mod: ,,, | Performed by: STUDENT IN AN ORGANIZED HEALTH CARE EDUCATION/TRAINING PROGRAM

## 2022-01-26 RX ORDER — FUROSEMIDE 10 MG/ML
120 INJECTION INTRAMUSCULAR; INTRAVENOUS
Status: DISCONTINUED | OUTPATIENT
Start: 2022-01-26 | End: 2022-01-28

## 2022-01-26 RX ORDER — POTASSIUM CHLORIDE 20 MEQ/1
40 TABLET, EXTENDED RELEASE ORAL ONCE
Status: COMPLETED | OUTPATIENT
Start: 2022-01-26 | End: 2022-01-26

## 2022-01-26 RX ADMIN — ASPIRIN 81 MG: 81 TABLET, COATED ORAL at 08:01

## 2022-01-26 RX ADMIN — CARVEDILOL 3.12 MG: 3.12 TABLET, FILM COATED ORAL at 08:01

## 2022-01-26 RX ADMIN — APIXABAN 5 MG: 5 TABLET, FILM COATED ORAL at 08:01

## 2022-01-26 RX ADMIN — CEFTRIAXONE SODIUM 1 G: 1 INJECTION, POWDER, FOR SOLUTION INTRAMUSCULAR; INTRAVENOUS at 09:01

## 2022-01-26 RX ADMIN — PANTOPRAZOLE SODIUM 40 MG: 40 TABLET, DELAYED RELEASE ORAL at 08:01

## 2022-01-26 RX ADMIN — SACUBITRIL AND VALSARTAN 1 TABLET: 97; 103 TABLET, FILM COATED ORAL at 10:01

## 2022-01-26 RX ADMIN — EZETIMIBE 10 MG: 10 TABLET ORAL at 08:01

## 2022-01-26 RX ADMIN — APIXABAN 5 MG: 5 TABLET, FILM COATED ORAL at 09:01

## 2022-01-26 RX ADMIN — FUROSEMIDE 120 MG: 10 INJECTION INTRAMUSCULAR; INTRAVENOUS at 04:01

## 2022-01-26 RX ADMIN — POTASSIUM CHLORIDE 40 MEQ: 20 TABLET, EXTENDED RELEASE ORAL at 08:01

## 2022-01-26 RX ADMIN — TAMSULOSIN HYDROCHLORIDE 0.4 MG: 0.4 CAPSULE ORAL at 08:01

## 2022-01-26 RX ADMIN — SPIRONOLACTONE 25 MG: 25 TABLET ORAL at 08:01

## 2022-01-26 RX ADMIN — FUROSEMIDE 80 MG: 10 INJECTION INTRAMUSCULAR; INTRAVENOUS at 06:01

## 2022-01-26 RX ADMIN — SACUBITRIL AND VALSARTAN 1 TABLET: 97; 103 TABLET, FILM COATED ORAL at 09:01

## 2022-01-26 NOTE — PLAN OF CARE
[facility] Emergency Department - [address] [place], [place] [address]  Initial Discharge Assessment       Primary Care Provider: Regina Sprague MD    Admission Diagnosis: Elevated troponin level [R77.8]  Chronic a-fib [I48.20]  Hx of long term use of blood thinners [Z92.29]  Hematuria, gross [R31.0]  Acute cystitis with hematuria [N30.01]  Chest pain [R07.9]  Longstanding persistent atrial fibrillation [I48.11]  Hypertension, unspecified type [I10]  Hematuria, unspecified type [R31.9]  Stage 3b chronic kidney disease [N18.32]  Acute on chronic congestive heart failure, unspecified heart failure type [I50.9]    Admission Date: 1/24/2022  Expected Discharge Date:     Discharge Barriers Identified: None    Payor: [employer] / Plan: [employer] MEDICARE HMO / Product Type: Medicare Advantage /     Extended Emergency Contact Information  Primary Emergency Contact: main real  Mobile Phone: [phone]  Relation: Relative   needed? No    Discharge Plan A: Home  Discharge Plan B: Home      [facility] [address] [place] [address]  Phone: [phone] Fax: [phone]      Initial Assessment (most recent)     Adult Discharge Assessment - 01/26/22 1131        Discharge Assessment    Assessment Type Discharge Planning Assessment     Source of Information patient     Lives With alone     Do you expect to return to your current living situation? Yes     Current cognitive status: Alert/Oriented     Equipment Currently Used at Home none     Do you currently have service(s) that help you manage your care at home? No     Who is going to help you get home at discharge? main real, family member     Discharge Plan A Home     Discharge Plan B Home     DME Needed Upon Discharge  none     Discharge Plan discussed with: Patient     Discharge Barriers Identified None                 SS spoke with pt, states he lives at home alone. Not current with HH and uses no DME. Plan at d/s is  to return home. IM obtained. SS following for d/c needs.

## 2022-01-26 NOTE — PLAN OF CARE
Problem: Adult Inpatient Plan of Care  Goal: Plan of Care Review  Outcome: Ongoing, Progressing  Flowsheets (Taken 1/25/2022 2202)  Plan of Care Reviewed With: patient  Goal: Absence of Hospital-Acquired Illness or Injury  Outcome: Ongoing, Progressing  Intervention: Prevent Skin Injury  Flowsheets (Taken 1/25/2022 2202)  Body Position: position changed independently  Skin Protection:   adhesive use limited   incontinence pads utilized   tubing/devices free from skin contact   transparent dressing maintained  Goal: Optimal Comfort and Wellbeing  Outcome: Ongoing, Progressing  Intervention: Monitor Pain and Promote Comfort  Flowsheets (Taken 1/25/2022 2202)  Pain Management Interventions:   pain management plan reviewed with patient/caregiver   relaxation techniques promoted  Intervention: Provide Person-Centered Care  Flowsheets (Taken 1/25/2022 2202)  Trust Relationship/Rapport:   care explained   questions encouraged   reassurance provided   choices provided   emotional support provided   thoughts/feelings acknowledged   questions answered   empathic listening provided     Problem: Fluid and Electrolyte Imbalance (Acute Kidney Injury/Impairment)  Goal: Fluid and Electrolyte Balance  Outcome: Ongoing, Progressing  Intervention: Monitor and Manage Fluid and Electrolyte Balance  Flowsheets (Taken 1/25/2022 2202)  Fluid/Electrolyte Management: fluids provided     Problem: Fall Injury Risk  Goal: Absence of Fall and Fall-Related Injury  Outcome: Ongoing, Progressing  Intervention: Identify and Manage Contributors  Flowsheets (Taken 1/25/2022 2202)  Self-Care Promotion:   independence encouraged   BADL personal objects within reach   meal set-up provided   BADL personal routines maintained   safe use of adaptive equipment encouraged  Medication Review/Management: medications reviewed     Problem: Diabetes Comorbidity  Goal: Blood Glucose Level Within Targeted Range  Outcome: Ongoing, Progressing  Intervention: Monitor  and Manage Glycemia  Flowsheets (Taken 1/25/2022 2202)  Glycemic Management:   blood glucose monitored   supplemental insulin given

## 2022-01-26 NOTE — PROGRESS NOTES
Mr. Michelle was seen this afternoon.  He is now wearing a condom catheter but no urine is in the bag.  He is having some scrotal swelling and he has his scrotum elevated on a towel and has an ice pack on his scrotum.  Scrotal edemas probably secondary to his CHF.    As far as his hematuria, he had an episode of gross hematuria few weeks ago that subsided without treatment.  Now he has had gross hematuria again as mentioned and Eliquis has been stopped.  It is possible the hematuria is related to infection but Dr. Nath believes it certainly could be due to other causes also.  Dr. Nath says it may be related to bleeding from his prostatic hyperplasia as he is sure he still has residual tissue left behind.    Plan:  Dr. Nath needs to do cystoscopy to make sure that he does not have a bladder tumor and to see if he can find the source of the hematuria.  I explained the procedure to Mr. Michelle and he is agreeable to having the Cystoscopy to see where the bleeding may be coming from.  We will do the Cystoscopy tomorrow at the patient's bedside.    Dr. Nath will continue to follow with you.    Jean-Claude Neri, GETACHEW, TANISHA,  [facility]

## 2022-01-26 NOTE — ASSESSMENT & PLAN NOTE
HFrEF, nonsichemic cardiomyopathy: EF 15% with moderate-severe TR (12/8/21); NYHA III-IV Stage D  Plan for ICD placement by Dr. Carlson next month  BNP at 15,000 (10,000 at baseline)  GDMT: carvedilol 3.125mg BID, Entresto 97-103mg BID, spironolactone 25mg qd  - holding chlorthalidone after adding spironolactone   - holding farxiga considering hematuria with UTI; restart on discharge   - consider torsemide 20mg BID on discharge   Increased lasix from IV lasix 60mg BID IV lasix 80mg BID  Started a dobutamine infusion to enhance IV diuresis    1/26/22  - continue diuresis with IV lasix and dobutamine as above   - daily weights, strict I&Os  - urine is positive for e.coli, do not recommend restarting SGLT2  (Farxiga) at discharge as this may be cause for recurrent UTI   - continue entresto, coreg, aldactone as above  - recommend torsemide 20mg bid in place of lasix at discharge

## 2022-01-26 NOTE — SUBJECTIVE & OBJECTIVE
Interval History:     ISELA  Says he is breathing better  Urine clearing up despite being on apixaban  Continues to be on dobutamin drip    Review of Systems   Constitutional: Negative for chills, diaphoresis, fatigue and fever.   HENT: Negative for congestion, hearing loss, nosebleeds, postnasal drip, sore throat, tinnitus and trouble swallowing.    Eyes: Negative for photophobia, pain, discharge, itching and visual disturbance.   Respiratory: Positive for shortness of breath. Negative for cough, wheezing and stridor.    Cardiovascular: Positive for leg swelling. Negative for chest pain and palpitations.   Gastrointestinal: Negative for abdominal distention, abdominal pain, anal bleeding, blood in stool, constipation, diarrhea, nausea and vomiting.   Endocrine: Negative for cold intolerance, heat intolerance, polydipsia, polyphagia and polyuria.   Genitourinary: Negative for decreased urine volume, difficulty urinating, dysuria, flank pain, frequency, hematuria and urgency.   Musculoskeletal: Negative for arthralgias, back pain, gait problem, joint swelling, myalgias, neck pain and neck stiffness.   Skin: Negative for color change, pallor and rash.   Allergic/Immunologic: Negative for immunocompromised state.   Neurological: Negative for dizziness, tremors, seizures, syncope, facial asymmetry, speech difficulty, weakness, light-headedness, numbness and headaches.   Hematological: Negative for adenopathy. Does not bruise/bleed easily.     Objective:     Vital Signs (Most Recent):  Temp: 98.6 °F (37 °C) (01/26/22 0735)  Pulse: 66 (01/26/22 0735)  Resp: 20 (01/26/22 0735)  BP: 115/71 (01/26/22 0735)  SpO2: 97 % (01/26/22 0735) Vital Signs (24h Range):  Temp:  [97.5 °F (36.4 °C)-98.6 °F (37 °C)] 98.6 °F (37 °C)  Pulse:  [] 66  Resp:  [16-22] 20  SpO2:  [94 %-99 %] 97 %  BP: ()/(56-88) 115/71     Weight: 107.6 kg (237 lb 3.4 oz)  Body mass index is 33.08 kg/m².  No intake or output data in the 24 hours ending  01/26/22 1020   Physical Exam  Vitals reviewed.   Constitutional:       General: He is not in acute distress.     Appearance: Normal appearance.   HENT:      Head: Normocephalic and atraumatic.      Right Ear: External ear normal.      Left Ear: External ear normal.   Eyes:      Extraocular Movements: Extraocular movements intact.      Pupils: Pupils are equal, round, and reactive to light.   Cardiovascular:      Rate and Rhythm: Normal rate and regular rhythm.      Pulses: Normal pulses.      Heart sounds: Normal heart sounds. No murmur heard.      Pulmonary:      Effort: Pulmonary effort is normal. No respiratory distress.      Breath sounds: Normal breath sounds. No wheezing.   Chest:      Chest wall: No tenderness.   Abdominal:      Palpations: Abdomen is soft. There is no mass.      Tenderness: There is no abdominal tenderness. There is no right CVA tenderness or left CVA tenderness.      Comments: Protuberant with ascites.     Musculoskeletal:         General: No swelling or tenderness. Normal range of motion.      Comments: +2 pitting edema present BLE, and +1 pitting edema noted in bilateral upper extremity   Skin:     General: Skin is warm and dry.      Capillary Refill: Capillary refill takes less than 2 seconds.   Neurological:      General: No focal deficit present.      Mental Status: He is alert and oriented to person, place, and time. Mental status is at baseline.   Psychiatric:         Mood and Affect: Mood normal.         Thought Content: Thought content normal.         Significant Labs: All pertinent labs within the past 24 hours have been reviewed.    Significant Imaging: I have reviewed all pertinent imaging results/findings within the past 24 hours.

## 2022-01-26 NOTE — PROGRESS NOTES
[address] Medicine  Progress Note    Patient Name: Jeanmarie Michelle  MRN: [id number]  Patient Class: IP- Inpatient   Admission Date: 1/24/2022  Length of Stay: 1 days  Attending Physician: Geneva Li MD  Primary Care Provider: Regina Sprague MD        Subjective:     Principal Problem:Acute on chronic systolic CHF (congestive heart failure)        HPI:  Patient is an 81-year-old male with a history of persistent atrial fibrillation on Eliquis, end-stage dilated cardiomyopathy the with last known EF of 15% scheduled to undergo ICD per Dr. Leigh in February, history of stage III CKD, type 2 diabetes and hypertension who was initially seen at Dr. Leigh office on Friday for shortness of breath with increased peripheral edema. Lasix was increased from  mg and pt felt better overall.  Patient was seen at his PCPs office earlier today for a 3 month follow-up and patient's PCP felt that patient was volume overloaded and after discussion was Dr. Leigh patient was instructed to come to the ED for evaluation.  In ED, patient was found to have lost weight from the time of Dr. Leigh visit by 14 lb.  Chest x-ray showed mild pulmonary edema which appeared chronic from previous chest x-ray 3 weeks ago.  [place] [address] from [place] [address]. Troponin was elevated, but pt has chronically elevated troponin and EKG showed no ST-T abnormalities. Pt had hematuria in ED and when asked when this was started, pt stated that this started at late morning today, but failed to mention it to his PCP. Subsequent UA demonstrated a presence of UTI. Source of hematuria is likely due to UTI. Pt will be admitted for further evaluation and intervention.      Overview/Hospital Course:  No notes on file    Interval History:     ISELA  Says he is breathing better  Urine clearing up despite being on apixaban  Continues to be on dobutamin drip    Review of Systems    Constitutional: Negative for chills, diaphoresis, fatigue and fever.   HENT: Negative for congestion, hearing loss, nosebleeds, postnasal drip, sore throat, tinnitus and trouble swallowing.    Eyes: Negative for photophobia, pain, discharge, itching and visual disturbance.   Respiratory: Positive for shortness of breath. Negative for cough, wheezing and stridor.    Cardiovascular: Positive for leg swelling. Negative for chest pain and palpitations.   Gastrointestinal: Negative for abdominal distention, abdominal pain, anal bleeding, blood in stool, constipation, diarrhea, nausea and vomiting.   Endocrine: Negative for cold intolerance, heat intolerance, polydipsia, polyphagia and polyuria.   Genitourinary: Negative for decreased urine volume, difficulty urinating, dysuria, flank pain, frequency, hematuria and urgency.   Musculoskeletal: Negative for arthralgias, back pain, gait problem, joint swelling, myalgias, neck pain and neck stiffness.   Skin: Negative for color change, pallor and rash.   Allergic/Immunologic: Negative for immunocompromised state.   Neurological: Negative for dizziness, tremors, seizures, syncope, facial asymmetry, speech difficulty, weakness, light-headedness, numbness and headaches.   Hematological: Negative for adenopathy. Does not bruise/bleed easily.     Objective:     Vital Signs (Most Recent):  Temp: 98.6 °F (37 °C) (01/26/22 0735)  Pulse: 66 (01/26/22 0735)  Resp: 20 (01/26/22 0735)  BP: 115/71 (01/26/22 0735)  SpO2: 97 % (01/26/22 0735) Vital Signs (24h Range):  Temp:  [97.5 °F (36.4 °C)-98.6 °F (37 °C)] 98.6 °F (37 °C)  Pulse:  [] 66  Resp:  [16-22] 20  SpO2:  [94 %-99 %] 97 %  BP: ()/(56-88) 115/71     Weight: 107.6 kg (237 lb 3.4 oz)  Body mass index is 33.08 kg/m².  No intake or output data in the 24 hours ending 01/26/22 1020   Physical Exam  Vitals reviewed.   Constitutional:       General: He is not in acute distress.     Appearance: Normal appearance.   HENT:       Head: Normocephalic and atraumatic.      Right Ear: External ear normal.      Left Ear: External ear normal.   Eyes:      Extraocular Movements: Extraocular movements intact.      Pupils: Pupils are equal, round, and reactive to light.   Cardiovascular:      Rate and Rhythm: Normal rate and regular rhythm.      Pulses: Normal pulses.      Heart sounds: Normal heart sounds. No murmur heard.      Pulmonary:      Effort: Pulmonary effort is normal. No respiratory distress.      Breath sounds: Normal breath sounds. No wheezing.   Chest:      Chest wall: No tenderness.   Abdominal:      Palpations: Abdomen is soft. There is no mass.      Tenderness: There is no abdominal tenderness. There is no right CVA tenderness or left CVA tenderness.      Comments: Protuberant with ascites.     Musculoskeletal:         General: No swelling or tenderness. Normal range of motion.      Comments: +2 pitting edema present BLE, and +1 pitting edema noted in bilateral upper extremity   Skin:     General: Skin is warm and dry.      Capillary Refill: Capillary refill takes less than 2 seconds.   Neurological:      General: No focal deficit present.      Mental Status: He is alert and oriented to person, place, and time. Mental status is at baseline.   Psychiatric:         Mood and Affect: Mood normal.         Thought Content: Thought content normal.         Significant Labs: All pertinent labs within the past 24 hours have been reviewed.    Significant Imaging: I have reviewed all pertinent imaging results/findings within the past 24 hours.      Assessment/Plan:      UTI (urinary tract infection)  uti 2/2 e coli  Cont rocephen      Elevated troponin level  Patient has a history of chronically elevated troponin level in 500s and today patient's troponin level was 596.  There was no ST-T abnormalities accompanying elevated troponin level.  No recent ischemia evaluation on record. Will consult Cardiology in a.m. for  recommendations    1/25-cardiology seen pt    Hematuria  This is likely secondary to urinary tract infection.  Will empirically start patient on Rocephin.  As stated above, will elect to continue patient on Eliquis ( for Afib )      Chronic kidney disease  Patient has CKD stage 3.  Serum creatinine is at his baseline.  Will continue to monitor serum creatinine and urine output.  Patient likely has cardiorenal syndrome      Atrial fibrillation, chronic  Patient with Long standing persistent (>12 months) atrial fibrillation which is controlled currently with Beta Blocker. Patient is currently in atrial fibrillation.[id number] Score: 3. Anticoagulation indicated. Anticoagulation done with Eliquis.  Will elect to continue patient on Eliquis.           Diabetes mellitus without complication  Hold oral hypoglycemics and start patient on sliding scale insulin to maintain CBG in the range of 130-180s      Hypertension  Continue present management      VTE Risk Mitigation (From admission, onward)         Ordered     apixaban tablet 5 mg  2 times daily         01/25/22 1253     Place sequential compression device  Until discontinued         01/25/22 0111                Discharge Planning   MADDIE:      Code Status: Full Code   Is the patient medically ready for discharge?:     Reason for patient still in hospital (select all that apply): Treatment                     Rehmat JAY Li MD  Department of Hospital Medicine   [address]

## 2022-01-26 NOTE — PROGRESS NOTES
[address]  Cardiology  Progress Note    Patient Name: Jeanmarie Michelle  MRN: [id number]  Admission Date: 1/24/2022  Hospital Length of Stay: 1 days  Code Status: Full Code   Attending Physician: Geneva Li MD   Primary Care Physician: Regina Sprague MD  Expected Discharge Date:   Principal Problem:Acute on chronic systolic CHF (congestive heart failure)    Subjective:       Interval History: Unsure of amount of urine output as pt states he is urinating a lot but no output documented for yesterday and urinal contains minimal urine on exam. Pt remains with significant BLE edema and BLE cool to touch.     Review of Systems   Constitutional: Positive for weight gain. Negative for chills, diaphoresis, malaise/fatigue and night sweats.   HENT: Negative for congestion and sore throat.    Eyes: Negative for visual disturbance.   Cardiovascular: Positive for dyspnea on exertion, leg swelling, orthopnea and paroxysmal nocturnal dyspnea. Negative for chest pain.   Respiratory: Positive for shortness of breath. Negative for cough and wheezing.    Hematologic/Lymphatic: Negative for adenopathy.   Gastrointestinal: Negative for abdominal pain, constipation, diarrhea, nausea and vomiting.   Genitourinary: Positive for hematuria. Negative for dysuria.   Neurological: Negative for headaches.   Psychiatric/Behavioral: Negative for altered mental status.     Objective:     Vital Signs (Most Recent):  Temp: 97.9 °F (36.6 °C) (01/26/22 1100)  Pulse: 73 (01/26/22 1100)  Resp: 20 (01/26/22 1100)  BP: (!) 95/58 (01/26/22 1100)  SpO2: 96 % (01/26/22 1100) Vital Signs (24h Range):  Temp:  [97.5 °F (36.4 °C)-98.6 °F (37 °C)] 97.9 °F (36.6 °C)  Pulse:  [] 73  Resp:  [18-20] 20  SpO2:  [95 %-99 %] 96 %  BP: ()/(56-88) 95/58     Weight: 107.6 kg (237 lb 3.4 oz)  Body mass index is 33.08 kg/m².     SpO2: 96 %  O2 Device (Oxygen Therapy): room air    No intake or output data in the 24 hours ending  01/26/22 1300    Lines/Drains/Airways     Peripheral Intravenous Line                 Peripheral IV - Single Lumen 01/26/22 1014 20 G Distal;Posterior;Right Forearm <1 day                Physical Exam  Vitals reviewed.   Constitutional:       General: He is not in acute distress.     Appearance: Normal appearance.   Eyes:      Pupils: Pupils are equal, round, and reactive to light.   Neck:      Comments: Positive JVD  Cardiovascular:      Rate and Rhythm: Normal rate. Rhythm irregular.      Pulses: Normal pulses.      Heart sounds: Normal heart sounds.   Pulmonary:      Effort: Pulmonary effort is normal.      Breath sounds: Normal breath sounds.   Abdominal:      General: Abdomen is flat.   Musculoskeletal:         General: Swelling ( 2+ pitting edema extending from the lower extremities to his abdomen) present. Normal range of motion.      Right lower leg: Edema present.      Left lower leg: Edema present.   Skin:     Comments: BLE cool to touch   Neurological:      General: No focal deficit present.      Mental Status: He is alert and oriented to person, place, and time.   Psychiatric:         Mood and Affect: Mood normal.         Significant Labs:   All pertinent lab results from the last 24 hours have been reviewed. and   Recent Lab Results       01/26/22  1222   01/26/22  0712   01/26/22  0536   01/25/22  2028   01/25/22  1725        Anion Gap   13             BUN   30             BUN/CREAT RATIO   16             Calcium   8.6             Chloride   105             CO2   33             Creatinine   1.87             eGFR if non    37             Glucose   150             POC Glucose 182     151   151   108       Potassium   3.6             Sodium   147                                  Significant Imaging: Echocardiogram:   Transthoracic echo (TTE) complete (Cupid Only):   Results for orders placed or performed during the hospital encounter of 12/08/21   Echo   Result Value Ref Range    IVC  diameter 2.24 cm    Left Ventricular Outflow Tract Mean Gradient 2.00 mmHg    AORTIC VALVE CUSP SEPERATION [id number] cm    LVIDd 6.23 (A) 3.5 - 6.0 cm    IVS 0.92 0.6 - 1.1 cm    Posterior Wall 0.92 0.6 - 1.1 cm    LVIDs 5.77 (A) 2.1 - 4.0 cm    FS 7 28 - 44 %    LV mass 236.68 g    LA size 4.63 cm    RVDD 3.77 cm    Left Ventricle Relative Wall Thickness 0.30 cm    AV mean gradient 4 mmHg    AV valve area 2.31 cm2    AV index (prosthetic) 0.70     E wave deceleration time 148 msec    LVOT diameter 2.05 cm    LVOT area 3.3 cm2    LVOT peak VTI 13.89 cm    Ao VTI 19.82 cm    LVOT stroke volume 45.82 cm3    MV Peak E Arash 0.76 m/s    TR Max Arash 3.42 m/s    LV Systolic Volume 164.59 mL    LV Diastolic Volume 196.13 mL    Echo EF Estimated 16 %    Triscuspid Valve Regurgitation Peak Gradient 47 mmHg    EF 15 %    Narrative    · Atrial fibrillation with frequent PVC's, rate 85 and occasional runs of   wide complex tachycardia.  · The left ventricle is moderately enlarged with  · Atrial fibrillation observed.  · Mild right ventricular enlargement.  · Moderate mitral regurgitation.  · Moderate to severe tricuspid regurgitation.  · There is pulmonary hypertension.  · Moderate left atrial enlargement.  · Mild right atrial enlargement.        Assessment and Plan:     Brief HPI: Mr. Jeanmarie Michelle is a 82y/o M with persistent atrial fibrillation, nonischemic dilated cardiomyopathy- HFrEF with last EF 15% (12/8/21) scheduled to undergo ICD placement with Dr. Carlson in February 2022, stage III CKD, HTN and T2DM presenting with SOB with increased edema. He was recently seen at his cardiologist's office (1/21/22) and his Lasix was increased from 80mg to 120mg PO qd with plans for an ICD next month. On day of admission he had seen his PCP who noted his weight had increased approximately 20 lbs within the past few weeks and was advised to go to the ED. In the ER he describes he has been SOB for 2-3 weeks with increasing lower  extremity and abdominal edema. He sleeps on 2 pillows a night and occasionally sleeps in an upright chair. He reports having CELESTIN, orthopnea and nocturnal dyspnea. He also reports having hematuria starting yesterday. In the ED his VS were stable with HR 69, BP 98/69 with pertinent labs including a troponin trending from 596, 536, 514 to 546. His troponins have been consistently elevated in the 500s, 3 weeks ago. His EKG revealed atrial fibrillation with frequent PVCs with HR 89. His U/A revealed TNTC RBC with 20-50 WBC, culture pending.     Currently the patient is in no acute distress but is hypervolemic with significant edema of his lower extremeties and abdomen. He denies chest pain and is able to urinate. He has received IV lasix 40mg once in the ED with 900 cc of urine output for the past 24 hours. Current weight is 104.3kg. Plan is to IV diurese with dobutamine and lasix. Eliquis is being held due to his hematuria. Will continue to monitor.         * Acute on chronic systolic CHF (congestive heart failure)  HFrEF, nonsichemic cardiomyopathy: EF 15% with moderate-severe TR (12/8/21); NYHA III-IV Stage D  Plan for ICD placement by Dr. Carlson next month  BNP at 15,000 (10,000 at baseline)  GDMT: carvedilol 3.125mg BID, Entresto 97-103mg BID, spironolactone 25mg qd  - holding chlorthalidone after adding spironolactone   - holding farxiga considering hematuria with UTI; restart on discharge   - consider torsemide 20mg BID on discharge   Increased lasix from IV lasix 60mg BID IV lasix 80mg BID  Started a dobutamine infusion to enhance IV diuresis    1/26/22  - continue diuresis with IV lasix and dobutamine as above   - daily weights, strict I&Os  - urine is positive for e.coli, do not recommend restarting SGLT2  (Farxiga) at discharge as this may be cause for recurrent UTI   - continue entresto, coreg, aldactone as above  - recommend torsemide 20mg bid in place of lasix at discharge      Elevated troponin  level  Pt has a history of chronically elevated troponins in the 500s  EKG reveals atrial fibrillation with PVCs  Elevated troponins most likely 2/2 myocardial injury due to his heart failure exacerbation    Hematuria  Currently receiving rocephin for UTI  Primary following    Chronic kidney disease  Pt has CKD Stage 3  Serum Cr 2.01 which is at his baseline  Continue to monitor with IV diuresis    Chronic a-fib  Persistent, longstanding atrial fibrillation  Currently in rate controlled atrial fibrillation with frequent multifocal PVCs  [id number] of 4  Currently on eliquis 5mg BID     Diabetes mellitus without complication  Holding farxiga, currently on SSI    Hypertension  Continue current meds        VTE Risk Mitigation (From admission, onward)         Ordered     apixaban tablet 5 mg  2 times daily         01/25/22 1253     Place sequential compression device  Until discontinued         01/25/22 0111                ROLY Moran  Cardiology  [facility] - [address] [place] [address]

## 2022-01-26 NOTE — ASSESSMENT & PLAN NOTE
Persistent, longstanding atrial fibrillation  Currently in rate controlled atrial fibrillation with frequent multifocal PVCs  [id number] of 4  Currently on eliquis 5mg BID

## 2022-01-26 NOTE — SUBJECTIVE & OBJECTIVE
Interval History: Unsure of amount of urine output as pt states he is urinating a lot but no output documented for yesterday and urinal contains minimal urine on exam. Pt remains with significant BLE edema and BLE cool to touch.     Review of Systems   Constitutional: Positive for weight gain. Negative for chills, diaphoresis, malaise/fatigue and night sweats.   HENT: Negative for congestion and sore throat.    Eyes: Negative for visual disturbance.   Cardiovascular: Positive for dyspnea on exertion, leg swelling, orthopnea and paroxysmal nocturnal dyspnea. Negative for chest pain.   Respiratory: Positive for shortness of breath. Negative for cough and wheezing.    Hematologic/Lymphatic: Negative for adenopathy.   Gastrointestinal: Negative for abdominal pain, constipation, diarrhea, nausea and vomiting.   Genitourinary: Positive for hematuria. Negative for dysuria.   Neurological: Negative for headaches.   Psychiatric/Behavioral: Negative for altered mental status.     Objective:     Vital Signs (Most Recent):  Temp: 97.9 °F (36.6 °C) (01/26/22 1100)  Pulse: 73 (01/26/22 1100)  Resp: 20 (01/26/22 1100)  BP: (!) 95/58 (01/26/22 1100)  SpO2: 96 % (01/26/22 1100) Vital Signs (24h Range):  Temp:  [97.5 °F (36.4 °C)-98.6 °F (37 °C)] 97.9 °F (36.6 °C)  Pulse:  [] 73  Resp:  [18-20] 20  SpO2:  [95 %-99 %] 96 %  BP: ()/(56-88) 95/58     Weight: 107.6 kg (237 lb 3.4 oz)  Body mass index is 33.08 kg/m².     SpO2: 96 %  O2 Device (Oxygen Therapy): room air    No intake or output data in the 24 hours ending 01/26/22 1300    Lines/Drains/Airways     Peripheral Intravenous Line                 Peripheral IV - Single Lumen 01/26/22 1014 20 G Distal;Posterior;Right Forearm <1 day                Physical Exam  Vitals reviewed.   Constitutional:       General: He is not in acute distress.     Appearance: Normal appearance.   Eyes:      Pupils: Pupils are equal, round, and reactive to light.   Neck:      Comments:  Positive JVD  Cardiovascular:      Rate and Rhythm: Normal rate. Rhythm irregular.      Pulses: Normal pulses.      Heart sounds: Normal heart sounds.   Pulmonary:      Effort: Pulmonary effort is normal.      Breath sounds: Normal breath sounds.   Abdominal:      General: Abdomen is flat.   Musculoskeletal:         General: Swelling ( 2+ pitting edema extending from the lower extremities to his abdomen) present. Normal range of motion.      Right lower leg: Edema present.      Left lower leg: Edema present.   Skin:     Comments: BLE cool to touch   Neurological:      General: No focal deficit present.      Mental Status: He is alert and oriented to person, place, and time.   Psychiatric:         Mood and Affect: Mood normal.         Significant Labs:   All pertinent lab results from the last 24 hours have been reviewed. and   Recent Lab Results       01/26/22  1222   01/26/22  0712   01/26/22  0536   01/25/22  2028   01/25/22  1725        Anion Gap   13             BUN   30             BUN/CREAT RATIO   16             Calcium   8.6             Chloride   105             CO2   33             Creatinine   1.87             eGFR if non    37             Glucose   150             POC Glucose 182     151   151   108       Potassium   3.6             Sodium   147                                  Significant Imaging: Echocardiogram:   Transthoracic echo (TTE) complete (Cupid Only):   Results for orders placed or performed during the hospital encounter of 12/08/21   Echo   Result Value Ref Range    IVC diameter 2.24 cm    Left Ventricular Outflow Tract Mean Gradient 2.00 mmHg    AORTIC VALVE CUSP SEPERATION 15.824690202008464 cm    LVIDd 6.23 (A) 3.5 - 6.0 cm    IVS 0.92 0.6 - 1.1 cm    Posterior Wall 0.92 0.6 - 1.1 cm    LVIDs 5.77 (A) 2.1 - 4.0 cm    FS 7 28 - 44 %    LV mass 236.68 g    LA size 4.63 cm    RVDD 3.77 cm    Left Ventricle Relative Wall Thickness 0.30 cm    AV mean gradient 4 mmHg    AV valve  area 2.31 cm2    AV index (prosthetic) 0.70     E wave deceleration time 148 msec    LVOT diameter 2.05 cm    LVOT area 3.3 cm2    LVOT peak VTI 13.89 cm    Ao VTI 19.82 cm    LVOT stroke volume 45.82 cm3    MV Peak E Raash 0.76 m/s    TR Max Arash 3.42 m/s    LV Systolic Volume 164.59 mL    LV Diastolic Volume 196.13 mL    Echo EF Estimated 16 %    Triscuspid Valve Regurgitation Peak Gradient 47 mmHg    EF 15 %    Narrative    · Atrial fibrillation with frequent PVC's, rate 85 and occasional runs of   wide complex tachycardia.  · The left ventricle is moderately enlarged with  · Atrial fibrillation observed.  · Mild right ventricular enlargement.  · Moderate mitral regurgitation.  · Moderate to severe tricuspid regurgitation.  · There is pulmonary hypertension.  · Moderate left atrial enlargement.  · Mild right atrial enlargement.

## 2022-01-26 NOTE — PLAN OF CARE
Problem: Adult Inpatient Plan of Care  Goal: Plan of Care Review  Outcome: Ongoing, Progressing  Goal: Patient-Specific Goal (Individualized)  Outcome: Ongoing, Progressing  Goal: Readiness for Transition of Care  Outcome: Ongoing, Progressing     Problem: Skin Injury Risk Increased  Goal: Skin Health and Integrity  Outcome: Ongoing, Progressing     Problem: Fluid and Electrolyte Imbalance (Acute Kidney Injury/Impairment)  Goal: Fluid and Electrolyte Balance  Outcome: Ongoing, Progressing     Problem: Oral Intake Inadequate (Acute Kidney Injury/Impairment)  Goal: Optimal Nutrition Intake  Outcome: Ongoing, Progressing     Problem: Renal Function Impairment (Acute Kidney Injury/Impairment)  Goal: Effective Renal Function  Outcome: Ongoing, Progressing     Problem: Fall Injury Risk  Goal: Absence of Fall and Fall-Related Injury  Outcome: Ongoing, Progressing     Problem: Diabetes Comorbidity  Goal: Blood Glucose Level Within Targeted Range  Outcome: Ongoing, Progressing     Problem: Infection  Goal: Absence of Infection Signs and Symptoms  Outcome: Ongoing, Progressing

## 2022-01-27 LAB
ANION GAP SERPL CALCULATED.3IONS-SCNC: 15 MMOL/L (ref 7–16)
BUN SERPL-MCNC: 26 MG/DL (ref 7–18)
BUN/CREAT SERPL: 16 (ref 6–20)
CALCIUM SERPL-MCNC: 8.3 MG/DL (ref 8.5–10.1)
CHLORIDE SERPL-SCNC: 104 MMOL/L (ref 98–107)
CO2 SERPL-SCNC: 30 MMOL/L (ref 21–32)
CREAT SERPL-MCNC: 1.58 MG/DL (ref 0.7–1.3)
GLUCOSE SERPL-MCNC: 174 MG/DL (ref 70–105)
GLUCOSE SERPL-MCNC: 183 MG/DL (ref 70–105)
GLUCOSE SERPL-MCNC: 197 MG/DL (ref 74–106)
GLUCOSE SERPL-MCNC: 205 MG/DL (ref 70–105)
GLUCOSE SERPL-MCNC: 244 MG/DL (ref 70–105)
HCT VFR BLD AUTO: 44.3 % (ref 40–54)
HGB BLD-MCNC: 14 G/DL (ref 13.5–18)
POTASSIUM SERPL-SCNC: 3.9 MMOL/L (ref 3.5–5.1)
SODIUM SERPL-SCNC: 145 MMOL/L (ref 136–145)

## 2022-01-27 PROCEDURE — 63600175 PHARM REV CODE 636 W HCPCS: Performed by: NURSE PRACTITIONER

## 2022-01-27 PROCEDURE — 85014 HEMATOCRIT: CPT | Performed by: INTERNAL MEDICINE

## 2022-01-27 PROCEDURE — 25000003 PHARM REV CODE 250: Performed by: STUDENT IN AN ORGANIZED HEALTH CARE EDUCATION/TRAINING PROGRAM

## 2022-01-27 PROCEDURE — 99233 PR SUBSEQUENT HOSPITAL CARE,LEVL III: ICD-10-PCS | Mod: ,,, | Performed by: INTERNAL MEDICINE

## 2022-01-27 PROCEDURE — 25000003 PHARM REV CODE 250: Performed by: INTERNAL MEDICINE

## 2022-01-27 PROCEDURE — 63600175 PHARM REV CODE 636 W HCPCS: Performed by: INTERNAL MEDICINE

## 2022-01-27 PROCEDURE — 99233 SBSQ HOSP IP/OBS HIGH 50: CPT | Mod: ,,, | Performed by: INTERNAL MEDICINE

## 2022-01-27 PROCEDURE — 82962 GLUCOSE BLOOD TEST: CPT

## 2022-01-27 PROCEDURE — 11000001 HC ACUTE MED/SURG PRIVATE ROOM

## 2022-01-27 PROCEDURE — 80048 BASIC METABOLIC PNL TOTAL CA: CPT | Performed by: INTERNAL MEDICINE

## 2022-01-27 RX ADMIN — SPIRONOLACTONE 25 MG: 25 TABLET ORAL at 09:01

## 2022-01-27 RX ADMIN — INSULIN ASPART 2 UNITS: 100 INJECTION, SOLUTION INTRAVENOUS; SUBCUTANEOUS at 05:01

## 2022-01-27 RX ADMIN — CEFTRIAXONE SODIUM 1 G: 1 INJECTION, POWDER, FOR SOLUTION INTRAMUSCULAR; INTRAVENOUS at 08:01

## 2022-01-27 RX ADMIN — FUROSEMIDE 120 MG: 10 INJECTION INTRAMUSCULAR; INTRAVENOUS at 03:01

## 2022-01-27 RX ADMIN — PANTOPRAZOLE SODIUM 40 MG: 40 TABLET, DELAYED RELEASE ORAL at 09:01

## 2022-01-27 RX ADMIN — SACUBITRIL AND VALSARTAN 1 TABLET: 97; 103 TABLET, FILM COATED ORAL at 08:01

## 2022-01-27 RX ADMIN — APIXABAN 5 MG: 5 TABLET, FILM COATED ORAL at 09:01

## 2022-01-27 RX ADMIN — ASPIRIN 81 MG: 81 TABLET, COATED ORAL at 09:01

## 2022-01-27 RX ADMIN — EZETIMIBE 10 MG: 10 TABLET ORAL at 09:01

## 2022-01-27 RX ADMIN — DOBUTAMINE HYDROCHLORIDE 5 MCG/KG/MIN: 400 INJECTION INTRAVENOUS at 09:01

## 2022-01-27 RX ADMIN — TAMSULOSIN HYDROCHLORIDE 0.4 MG: 0.4 CAPSULE ORAL at 09:01

## 2022-01-27 RX ADMIN — APIXABAN 5 MG: 5 TABLET, FILM COATED ORAL at 08:01

## 2022-01-27 RX ADMIN — FUROSEMIDE 120 MG: 10 INJECTION INTRAMUSCULAR; INTRAVENOUS at 05:01

## 2022-01-27 RX ADMIN — SACUBITRIL AND VALSARTAN 1 TABLET: 97; 103 TABLET, FILM COATED ORAL at 09:01

## 2022-01-27 NOTE — SUBJECTIVE & OBJECTIVE
Interval History:     ISELA  Says he is doing much better now , renal fx improving.     Review of Systems   Constitutional: Negative for chills, diaphoresis, fatigue and fever.   HENT: Negative for congestion, hearing loss, nosebleeds, postnasal drip, sore throat, tinnitus and trouble swallowing.    Eyes: Negative for photophobia, pain, discharge, itching and visual disturbance.   Respiratory: Positive for shortness of breath. Negative for cough, wheezing and stridor.    Cardiovascular: Positive for leg swelling. Negative for chest pain and palpitations.   Gastrointestinal: Negative for abdominal distention, abdominal pain, anal bleeding, blood in stool, constipation, diarrhea, nausea and vomiting.   Endocrine: Negative for cold intolerance, heat intolerance, polydipsia, polyphagia and polyuria.   Genitourinary: Negative for decreased urine volume, difficulty urinating, dysuria, flank pain, frequency, hematuria and urgency.   Musculoskeletal: Negative for arthralgias, back pain, gait problem, joint swelling, myalgias, neck pain and neck stiffness.   Skin: Negative for color change, pallor and rash.   Allergic/Immunologic: Negative for immunocompromised state.   Neurological: Negative for dizziness, tremors, seizures, syncope, facial asymmetry, speech difficulty, weakness, light-headedness, numbness and headaches.   Hematological: Negative for adenopathy. Does not bruise/bleed easily.     Objective:     Vital Signs (Most Recent):  Temp: 97.5 °F (36.4 °C) (01/27/22 0745)  Pulse: 76 (01/27/22 0745)  Resp: 18 (01/27/22 0745)  BP: 112/84 (01/27/22 0745)  SpO2: 97 % (01/27/22 0745) Vital Signs (24h Range):  Temp:  [97.5 °F (36.4 °C)-98.6 °F (37 °C)] 97.5 °F (36.4 °C)  Pulse:  [] 76  Resp:  [18-20] 18  SpO2:  [95 %-98 %] 97 %  BP: (109-152)/() 112/84     Weight: 107.6 kg (237 lb 3.4 oz)  Body mass index is 33.08 kg/m².    Intake/Output Summary (Last 24 hours) at 1/27/2022 1138  Last data filed at 1/27/2022  0800  Gross per 24 hour   Intake 170 ml   Output 3050 ml   Net -2880 ml      Physical Exam  Vitals reviewed.   Constitutional:       General: He is not in acute distress.     Appearance: Normal appearance.   HENT:      Head: Normocephalic and atraumatic.      Right Ear: External ear normal.      Left Ear: External ear normal.   Eyes:      Extraocular Movements: Extraocular movements intact.      Pupils: Pupils are equal, round, and reactive to light.   Cardiovascular:      Rate and Rhythm: Normal rate and regular rhythm.      Pulses: Normal pulses.      Heart sounds: Normal heart sounds. No murmur heard.      Pulmonary:      Effort: Pulmonary effort is normal. No respiratory distress.      Breath sounds: Normal breath sounds. No wheezing.   Chest:      Chest wall: No tenderness.   Abdominal:      Palpations: Abdomen is soft. There is no mass.      Tenderness: There is no abdominal tenderness. There is no right CVA tenderness or left CVA tenderness.      Comments: Protuberant with ascites.     Musculoskeletal:         General: No swelling or tenderness. Normal range of motion.      Comments: +2 pitting edema present BLE, and +1 pitting edema noted in bilateral upper extremity   Skin:     General: Skin is warm and dry.      Capillary Refill: Capillary refill takes less than 2 seconds.   Neurological:      General: No focal deficit present.      Mental Status: He is alert and oriented to person, place, and time. Mental status is at baseline.   Psychiatric:         Mood and Affect: Mood normal.         Thought Content: Thought content normal.         Significant Labs: All pertinent labs within the past 24 hours have been reviewed.    Significant Imaging: I have reviewed all pertinent imaging results/findings within the past 24 hours.

## 2022-01-27 NOTE — PROCEDURES
Preoperative diagnosis:  Gross hematuria undetermined etiology    Postoperative diagnosis:  Same with evidence of recent bleeding from prostatic urethra and bladder mucosa    Procedure: Flexible cystoscopy    Description:  The patient was prepared in his bed for flexible cystoscopy.  The urethra could not be anesthetized because of the large amount of edema of the foreskin from patient's congestive failure.  We gave up on being able to anesthetize the urethra.  Preparation was carried out with Betadine best we could.  Foreskin was pulled and the Olympus 16 [employer] [place] flexible cystoscope was passed through the os of the foreskin until we could see the urethral meatus.  Scope was put into the urethral meatus but it was impossible to anesthetize the urethra.  Anterior urethra was clear.  The posterior urethra was entered and there did appear to be some evidence of recent bleeding from the prostatic urethra.  Patient was post prostatectomy and there was irregular appearing regrowth of prostate tissue. He had either had TURP or probably a lesser procedure such as a laser prostatectomy, GreenLight, etc.  as there was of good amount of tissue left but he was open with no evidence of obstructive uropathy.  There was nodular regrowth like would be expected with any previous procedure.  It appeared that there had probably been some recent bleeding from the prostatic urethra.  The bladder was entered and again the mucosa had evidence of recent bleeding like would be seen with hemorrhagic cystitis.  I could not identify ureteral orifices but I did not see any evidence of bladder tumors or other serious causes for hematuria.  There was generalized petechial changes and evidence that probably there had been some bleeding like would be expected with hemorrhagic cystitis.  I did not get the bladder fully distended as I did not want to overdistend because of the danger of creating more bleeding.  The scope was removed and again there  appeared to be changes in the prostatic urethra suggesting recent bleeding.  There is no obstruction of significance present.  Scope was removed and patient allowed to void.  I feel he has benign hematuria.

## 2022-01-27 NOTE — PROGRESS NOTES
[address] Medicine  Progress Note    Patient Name: Jeanmarie Michelle  MRN: [id number]  Patient Class: IP- Inpatient   Admission Date: 1/24/2022  Length of Stay: 2 days  Attending Physician: Geneva Li MD  Primary Care Provider: Regina Sprague MD        Subjective:     Principal Problem:Acute on chronic systolic CHF (congestive heart failure)        HPI:  Patient is an 81-year-old male with a history of persistent atrial fibrillation on Eliquis, end-stage dilated cardiomyopathy the with last known EF of 15% scheduled to undergo ICD per Dr. Leigh in February, history of stage III CKD, type 2 diabetes and hypertension who was initially seen at Dr. Leigh office on Friday for shortness of breath with increased peripheral edema. Lasix was increased from  mg and pt felt better overall.  Patient was seen at his PCPs office earlier today for a 3 month follow-up and patient's PCP felt that patient was volume overloaded and after discussion was Dr. Leigh patient was instructed to come to the ED for evaluation.  In ED, patient was found to have lost weight from the time of Dr. Leigh visit by 14 lb.  Chest x-ray showed mild pulmonary edema which appeared chronic from previous chest x-ray 3 weeks ago.  [place] [address] [place] [address]. Troponin was elevated, but pt has chronically elevated troponin and EKG showed no ST-T abnormalities. Pt had hematuria in ED and when asked when this was started, pt stated that this started at late morning today, but failed to mention it to his PCP. Subsequent UA demonstrated a presence of UTI. Source of hematuria is likely due to UTI. Pt will be admitted for further evaluation and intervention.      Overview/Hospital Course:  No notes on file    Interval History:     ADELAIDEO  Says he is doing much better now , renal fx improving.     Review of Systems   Constitutional: Negative for chills, diaphoresis, fatigue  and fever.   HENT: Negative for congestion, hearing loss, nosebleeds, postnasal drip, sore throat, tinnitus and trouble swallowing.    Eyes: Negative for photophobia, pain, discharge, itching and visual disturbance.   Respiratory: Positive for shortness of breath. Negative for cough, wheezing and stridor.    Cardiovascular: Positive for leg swelling. Negative for chest pain and palpitations.   Gastrointestinal: Negative for abdominal distention, abdominal pain, anal bleeding, blood in stool, constipation, diarrhea, nausea and vomiting.   Endocrine: Negative for cold intolerance, heat intolerance, polydipsia, polyphagia and polyuria.   Genitourinary: Negative for decreased urine volume, difficulty urinating, dysuria, flank pain, frequency, hematuria and urgency.   Musculoskeletal: Negative for arthralgias, back pain, gait problem, joint swelling, myalgias, neck pain and neck stiffness.   Skin: Negative for color change, pallor and rash.   Allergic/Immunologic: Negative for immunocompromised state.   Neurological: Negative for dizziness, tremors, seizures, syncope, facial asymmetry, speech difficulty, weakness, light-headedness, numbness and headaches.   Hematological: Negative for adenopathy. Does not bruise/bleed easily.     Objective:     Vital Signs (Most Recent):  Temp: 97.5 °F (36.4 °C) (01/27/22 0745)  Pulse: 76 (01/27/22 0745)  Resp: 18 (01/27/22 0745)  BP: 112/84 (01/27/22 0745)  SpO2: 97 % (01/27/22 0745) Vital Signs (24h Range):  Temp:  [97.5 °F (36.4 °C)-98.6 °F (37 °C)] 97.5 °F (36.4 °C)  Pulse:  [] 76  Resp:  [18-20] 18  SpO2:  [95 %-98 %] 97 %  BP: (109-152)/() 112/84     Weight: 107.6 kg (237 lb 3.4 oz)  Body mass index is 33.08 kg/m².    Intake/Output Summary (Last 24 hours) at 1/27/2022 1138  Last data filed at 1/27/2022 0800  Gross per 24 hour   Intake 170 ml   Output 3050 ml   Net -2880 ml      Physical Exam  Vitals reviewed.   Constitutional:       General: He is not in acute  distress.     Appearance: Normal appearance.   HENT:      Head: Normocephalic and atraumatic.      Right Ear: External ear normal.      Left Ear: External ear normal.   Eyes:      Extraocular Movements: Extraocular movements intact.      Pupils: Pupils are equal, round, and reactive to light.   Cardiovascular:      Rate and Rhythm: Normal rate and regular rhythm.      Pulses: Normal pulses.      Heart sounds: Normal heart sounds. No murmur heard.      Pulmonary:      Effort: Pulmonary effort is normal. No respiratory distress.      Breath sounds: Normal breath sounds. No wheezing.   Chest:      Chest wall: No tenderness.   Abdominal:      Palpations: Abdomen is soft. There is no mass.      Tenderness: There is no abdominal tenderness. There is no right CVA tenderness or left CVA tenderness.      Comments: Protuberant with ascites.     Musculoskeletal:         General: No swelling or tenderness. Normal range of motion.      Comments: +2 pitting edema present BLE, and +1 pitting edema noted in bilateral upper extremity   Skin:     General: Skin is warm and dry.      Capillary Refill: Capillary refill takes less than 2 seconds.   Neurological:      General: No focal deficit present.      Mental Status: He is alert and oriented to person, place, and time. Mental status is at baseline.   Psychiatric:         Mood and Affect: Mood normal.         Thought Content: Thought content normal.         Significant Labs: All pertinent labs within the past 24 hours have been reviewed.    Significant Imaging: I have reviewed all pertinent imaging results/findings within the past 24 hours.      Assessment/Plan:      UTI (urinary tract infection)  uti 2/2 e coli  Cont rocephen      Elevated troponin level  Patient has a history of chronically elevated troponin level in 500s and today patient's troponin level was 596.  There was no ST-T abnormalities accompanying elevated troponin level.  No recent ischemia evaluation on record. Will  consult Cardiology in a.m. for recommendations    1/25-cardiology seen pt    Hematuria  This is likely secondary to urinary tract infection.  Will empirically start patient on Rocephin.  As stated above, will elect to continue patient on Eliquis ( for Afib )      Chronic kidney disease  Patient has CKD stage 3.  Serum creatinine is at his baseline.  Will continue to monitor serum creatinine and urine output.  Patient likely has cardiorenal syndrome      Chronic a-fib  Patient with Long standing persistent (>12 months) atrial fibrillation which is controlled currently with Beta Blocker. Patient is currently in atrial fibrillation.[id number] Score: 3. Anticoagulation indicated. Anticoagulation done with Eliquis.  Will elect to continue patient on Eliquis.           Diabetes mellitus without complication  Hold oral hypoglycemics and start patient on sliding scale insulin to maintain CBG in the range of 130-180s      Hypertension  Continue present management        VTE Risk Mitigation (From admission, onward)         Ordered     apixaban tablet 5 mg  2 times daily         01/25/22 1253     Place sequential compression device  Until discontinued         01/25/22 0111                Discharge Planning   AMDDIE:      Code Status: Full Code   Is the patient medically ready for discharge?:     Reason for patient still in hospital (select all that apply): Treatment  Discharge Plan A: Home                  Rehmat JAY Li MD  Department of Hospital Medicine   [address]

## 2022-01-27 NOTE — PLAN OF CARE
Problem: Adult Inpatient Plan of Care  Goal: Plan of Care Review  Outcome: Ongoing, Progressing  Goal: Patient-Specific Goal (Individualized)  Outcome: Ongoing, Progressing  Goal: Absence of Hospital-Acquired Illness or Injury  Outcome: Ongoing, Progressing  Goal: Optimal Comfort and Wellbeing  Outcome: Ongoing, Progressing  Goal: Readiness for Transition of Care  Outcome: Ongoing, Progressing     Problem: Skin Injury Risk Increased  Goal: Skin Health and Integrity  Outcome: Ongoing, Progressing     Problem: Fluid and Electrolyte Imbalance (Acute Kidney Injury/Impairment)  Goal: Fluid and Electrolyte Balance  Outcome: Ongoing, Progressing     Problem: Oral Intake Inadequate (Acute Kidney Injury/Impairment)  Goal: Optimal Nutrition Intake  Outcome: Ongoing, Progressing     Problem: Renal Function Impairment (Acute Kidney Injury/Impairment)  Goal: Effective Renal Function  Outcome: Ongoing, Progressing     Problem: Fall Injury Risk  Goal: Absence of Fall and Fall-Related Injury  Outcome: Ongoing, Progressing     Problem: Diabetes Comorbidity  Goal: Blood Glucose Level Within Targeted Range  Outcome: Ongoing, Progressing     Problem: Infection  Goal: Absence of Infection Signs and Symptoms  Outcome: Ongoing, Progressing

## 2022-01-28 LAB
ANION GAP SERPL CALCULATED.3IONS-SCNC: 13 MMOL/L (ref 7–16)
BUN SERPL-MCNC: 24 MG/DL (ref 7–18)
BUN/CREAT SERPL: 15 (ref 6–20)
CALCIUM SERPL-MCNC: 8 MG/DL (ref 8.5–10.1)
CHLORIDE SERPL-SCNC: 101 MMOL/L (ref 98–107)
CO2 SERPL-SCNC: 29 MMOL/L (ref 21–32)
CREAT SERPL-MCNC: 1.63 MG/DL (ref 0.7–1.3)
GLUCOSE SERPL-MCNC: 202 MG/DL (ref 70–105)
GLUCOSE SERPL-MCNC: 208 MG/DL (ref 70–105)
GLUCOSE SERPL-MCNC: 228 MG/DL (ref 70–105)
GLUCOSE SERPL-MCNC: 284 MG/DL (ref 70–105)
GLUCOSE SERPL-MCNC: 308 MG/DL (ref 74–106)
POTASSIUM SERPL-SCNC: 3.2 MMOL/L (ref 3.5–5.1)
SODIUM SERPL-SCNC: 140 MMOL/L (ref 136–145)

## 2022-01-28 PROCEDURE — 25000003 PHARM REV CODE 250: Performed by: INTERNAL MEDICINE

## 2022-01-28 PROCEDURE — 99233 SBSQ HOSP IP/OBS HIGH 50: CPT | Mod: ,,, | Performed by: INTERNAL MEDICINE

## 2022-01-28 PROCEDURE — 99232 PR SUBSEQUENT HOSPITAL CARE,LEVL II: ICD-10-PCS | Mod: ,,, | Performed by: STUDENT IN AN ORGANIZED HEALTH CARE EDUCATION/TRAINING PROGRAM

## 2022-01-28 PROCEDURE — 80048 BASIC METABOLIC PNL TOTAL CA: CPT | Performed by: INTERNAL MEDICINE

## 2022-01-28 PROCEDURE — 63600175 PHARM REV CODE 636 W HCPCS: Performed by: INTERNAL MEDICINE

## 2022-01-28 PROCEDURE — 63600175 PHARM REV CODE 636 W HCPCS: Performed by: NURSE PRACTITIONER

## 2022-01-28 PROCEDURE — 25000003 PHARM REV CODE 250: Performed by: STUDENT IN AN ORGANIZED HEALTH CARE EDUCATION/TRAINING PROGRAM

## 2022-01-28 PROCEDURE — 99233 PR SUBSEQUENT HOSPITAL CARE,LEVL III: ICD-10-PCS | Mod: ,,, | Performed by: INTERNAL MEDICINE

## 2022-01-28 PROCEDURE — 99232 SBSQ HOSP IP/OBS MODERATE 35: CPT | Mod: ,,, | Performed by: STUDENT IN AN ORGANIZED HEALTH CARE EDUCATION/TRAINING PROGRAM

## 2022-01-28 PROCEDURE — 82962 GLUCOSE BLOOD TEST: CPT

## 2022-01-28 PROCEDURE — 36415 COLL VENOUS BLD VENIPUNCTURE: CPT | Performed by: INTERNAL MEDICINE

## 2022-01-28 PROCEDURE — 11000001 HC ACUTE MED/SURG PRIVATE ROOM

## 2022-01-28 RX ORDER — FUROSEMIDE 10 MG/ML
80 INJECTION INTRAMUSCULAR; INTRAVENOUS
Status: DISCONTINUED | OUTPATIENT
Start: 2022-01-28 | End: 2022-01-28

## 2022-01-28 RX ORDER — FUROSEMIDE 10 MG/ML
INJECTION INTRAMUSCULAR; INTRAVENOUS
Status: DISPENSED
Start: 2022-01-28 | End: 2022-01-29

## 2022-01-28 RX ORDER — POTASSIUM CHLORIDE 20 MEQ/1
40 TABLET, EXTENDED RELEASE ORAL ONCE
Status: COMPLETED | OUTPATIENT
Start: 2022-01-28 | End: 2022-01-28

## 2022-01-28 RX ORDER — FUROSEMIDE 10 MG/ML
80 INJECTION INTRAMUSCULAR; INTRAVENOUS
Status: DISCONTINUED | OUTPATIENT
Start: 2022-01-28 | End: 2022-02-01

## 2022-01-28 RX ADMIN — FUROSEMIDE 80 MG: 10 INJECTION INTRAMUSCULAR; INTRAVENOUS at 03:01

## 2022-01-28 RX ADMIN — APIXABAN 5 MG: 5 TABLET, FILM COATED ORAL at 08:01

## 2022-01-28 RX ADMIN — SACUBITRIL AND VALSARTAN 1 TABLET: 97; 103 TABLET, FILM COATED ORAL at 08:01

## 2022-01-28 RX ADMIN — FUROSEMIDE 120 MG: 10 INJECTION INTRAMUSCULAR; INTRAVENOUS at 05:01

## 2022-01-28 RX ADMIN — TAMSULOSIN HYDROCHLORIDE 0.4 MG: 0.4 CAPSULE ORAL at 08:01

## 2022-01-28 RX ADMIN — PANTOPRAZOLE SODIUM 40 MG: 40 TABLET, DELAYED RELEASE ORAL at 08:01

## 2022-01-28 RX ADMIN — POTASSIUM CHLORIDE 40 MEQ: 1500 TABLET, EXTENDED RELEASE ORAL at 01:01

## 2022-01-28 RX ADMIN — INSULIN ASPART 3 UNITS: 100 INJECTION, SOLUTION INTRAVENOUS; SUBCUTANEOUS at 12:01

## 2022-01-28 RX ADMIN — SACUBITRIL AND VALSARTAN 1 TABLET: 97; 103 TABLET, FILM COATED ORAL at 09:01

## 2022-01-28 RX ADMIN — SPIRONOLACTONE 25 MG: 25 TABLET ORAL at 08:01

## 2022-01-28 RX ADMIN — ASPIRIN 81 MG: 81 TABLET, COATED ORAL at 08:01

## 2022-01-28 RX ADMIN — DOBUTAMINE HYDROCHLORIDE 5 MCG/KG/MIN: 400 INJECTION INTRAVENOUS at 03:01

## 2022-01-28 RX ADMIN — EZETIMIBE 10 MG: 10 TABLET ORAL at 08:01

## 2022-01-28 RX ADMIN — INSULIN ASPART 2 UNITS: 100 INJECTION, SOLUTION INTRAVENOUS; SUBCUTANEOUS at 05:01

## 2022-01-28 RX ADMIN — CEFTRIAXONE SODIUM 1 G: 1 INJECTION, POWDER, FOR SOLUTION INTRAMUSCULAR; INTRAVENOUS at 08:01

## 2022-01-28 NOTE — SUBJECTIVE & OBJECTIVE
Interval History:     ISELA  Feels improved  Cystoscopy yesterday  Continues on dobutamine drip per cardiology     Review of Systems   Constitutional: Negative for chills, diaphoresis, fatigue and fever.   HENT: Negative for congestion, hearing loss, nosebleeds, postnasal drip, sore throat, tinnitus and trouble swallowing.    Eyes: Negative for photophobia, pain, discharge, itching and visual disturbance.   Respiratory: Positive for shortness of breath. Negative for cough, wheezing and stridor.    Cardiovascular: Positive for leg swelling. Negative for chest pain and palpitations.   Gastrointestinal: Negative for abdominal distention, abdominal pain, anal bleeding, blood in stool, constipation, diarrhea, nausea and vomiting.   Endocrine: Negative for cold intolerance, heat intolerance, polydipsia, polyphagia and polyuria.   Genitourinary: Negative for decreased urine volume, difficulty urinating, dysuria, flank pain, frequency, hematuria and urgency.   Musculoskeletal: Negative for arthralgias, back pain, gait problem, joint swelling, myalgias, neck pain and neck stiffness.   Skin: Negative for color change, pallor and rash.   Allergic/Immunologic: Negative for immunocompromised state.   Neurological: Negative for dizziness, tremors, seizures, syncope, facial asymmetry, speech difficulty, weakness, light-headedness, numbness and headaches.   Hematological: Negative for adenopathy. Does not bruise/bleed easily.     Objective:     Vital Signs (Most Recent):  Temp: 98.7 °F (37.1 °C) (01/28/22 1100)  Pulse: 64 (01/28/22 1100)  Resp: 18 (01/28/22 1100)  BP: 118/68 (01/28/22 1100)  SpO2: 96 % (01/28/22 1100) Vital Signs (24h Range):  Temp:  [97 °F (36.1 °C)-98.7 °F (37.1 °C)] 98.7 °F (37.1 °C)  Pulse:  [52-84] 64  Resp:  [17-20] 18  SpO2:  [96 %-100 %] 96 %  BP: (111-124)/(64-87) 118/68     Weight: 107.6 kg (237 lb 3.4 oz)  Body mass index is 33.08 kg/m².    Intake/Output Summary (Last 24 hours) at 1/28/2022 1230  Last  data filed at 1/28/2022 1100  Gross per 24 hour   Intake 290 ml   Output 3900 ml   Net -3610 ml      Physical Exam  Vitals reviewed.   Constitutional:       General: He is not in acute distress.     Appearance: Normal appearance.   HENT:      Head: Normocephalic and atraumatic.      Right Ear: External ear normal.      Left Ear: External ear normal.   Eyes:      Extraocular Movements: Extraocular movements intact.      Pupils: Pupils are equal, round, and reactive to light.   Cardiovascular:      Rate and Rhythm: Normal rate and regular rhythm.      Pulses: Normal pulses.      Heart sounds: Normal heart sounds. No murmur heard.      Pulmonary:      Effort: Pulmonary effort is normal. No respiratory distress.      Breath sounds: Normal breath sounds. No wheezing.   Chest:      Chest wall: No tenderness.   Abdominal:      Palpations: Abdomen is soft. There is no mass.      Tenderness: There is no abdominal tenderness. There is no right CVA tenderness or left CVA tenderness.      Comments: Protuberant with ascites.     Musculoskeletal:         General: No swelling or tenderness. Normal range of motion.      Comments: +2 pitting edema present BLE, and +1 pitting edema noted in bilateral upper extremity   Skin:     General: Skin is warm and dry.      Capillary Refill: Capillary refill takes less than 2 seconds.   Neurological:      General: No focal deficit present.      Mental Status: He is alert and oriented to person, place, and time. Mental status is at baseline.   Psychiatric:         Mood and Affect: Mood normal.         Thought Content: Thought content normal.         Significant Labs: All pertinent labs within the past 24 hours have been reviewed.    Significant Imaging: I have reviewed all pertinent imaging results/findings within the past 24 hours.   0

## 2022-01-28 NOTE — ASSESSMENT & PLAN NOTE
HFrEF, nonsichemic cardiomyopathy: EF 15% with moderate-severe TR (12/8/21); NYHA III-IV Stage D  Plan for ICD placement by Dr. Carlson next month  BNP at 15,000 (10,000 at baseline)  GDMT: carvedilol 3.125mg BID, Entresto 97-103mg BID, spironolactone 25mg qd  - holding chlorthalidone after adding spironolactone   - holding farxiga considering hematuria with UTI; restart on discharge   - consider torsemide 20mg BID on discharge   Increased lasix from IV lasix 60mg BID IV lasix 80mg BID  Started a dobutamine infusion to enhance IV diuresis      1/26/22  - continue diuresis with IV lasix and dobutamine as above   - daily weights, strict I&Os  - urine is positive for e.coli, do not recommend restarting SGLT2  (Farxiga) at discharge as this may be cause for recurrent UTI   - continue entresto, coreg, aldactone as above  - recommend torsemide 20mg bid in place of lasix at discharge    1/28/22:  - creatinine has trended up  - decrease IV lasix to 80mg BID (was 120mg BID)  - continue dobutamine gtt  - continue Entresto, Aladactone  - recommend torsemide 20mg BID in place of lasix at discharge  - urine is positive for e.coli, do not recommend restarting SGLT2  (Farxiga) at discharge as this may be cause for recurrent UTI

## 2022-01-28 NOTE — SUBJECTIVE & OBJECTIVE
Interval History: Pt states he is breathing and feeling better today. He is laying almost flat in bed without difficulty. Edema still present to BLE and abdomen. Creatinine is increased from yesterday. Will decrease lasix dose.    Review of Systems   Cardiovascular: Positive for leg swelling. Negative for chest pain, irregular heartbeat, orthopnea and palpitations.   Respiratory: Positive for shortness of breath.    All other systems reviewed and are negative.    Objective:     Vital Signs (Most Recent):  Temp: 98.7 °F (37.1 °C) (01/28/22 1100)  Pulse: 64 (01/28/22 1100)  Resp: 18 (01/28/22 1100)  BP: 118/68 (01/28/22 1100)  SpO2: 96 % (01/28/22 1100) Vital Signs (24h Range):  Temp:  [97 °F (36.1 °C)-98.7 °F (37.1 °C)] 98.7 °F (37.1 °C)  Pulse:  [52-84] 64  Resp:  [17-20] 18  SpO2:  [96 %-99 %] 96 %  BP: (111-124)/(64-87) 118/68     Weight: 107.6 kg (237 lb 3.4 oz)  Body mass index is 33.08 kg/m².     SpO2: 96 %  O2 Device (Oxygen Therapy): room air      Intake/Output Summary (Last 24 hours) at 1/28/2022 1617  Last data filed at 1/28/2022 1100  Gross per 24 hour   Intake 290 ml   Output 2800 ml   Net -2510 ml       Lines/Drains/Airways     Peripheral Intravenous Line                 Peripheral IV - Single Lumen 01/26/22 1014 20 G Distal;Posterior;Right Forearm 2 days                Physical Exam  Vitals reviewed.   Constitutional:       General: He is not in acute distress.     Appearance: Normal appearance.   Eyes:      Pupils: Pupils are equal, round, and reactive to light.   Neck:      Comments: Positive JVD  Cardiovascular:      Rate and Rhythm: Normal rate. Rhythm irregular.      Pulses: Normal pulses.      Heart sounds: Normal heart sounds.   Pulmonary:      Effort: Pulmonary effort is normal.      Breath sounds: Normal breath sounds.   Musculoskeletal:         General: Swelling ( 2+ pitting edema extending from the lower extremities to his abdomen) present. Normal range of motion.      Right lower leg: Edema  present.      Left lower leg: Edema present.   Neurological:      General: No focal deficit present.      Mental Status: He is alert and oriented to person, place, and time.   Psychiatric:         Mood and Affect: Mood normal.         Significant Labs:   All pertinent lab results from the last 24 hours have been reviewed. and   Recent Lab Results       01/28/22  1158   01/28/22  1010   01/28/22  0522   01/27/22  2353   01/27/22  1636        Anion Gap   13             BUN   24             BUN/CREAT RATIO   15             Calcium   8.0             Chloride   101             CO2   29             Creatinine   1.63             eGFR if non    43             Glucose   308             POC Glucose 284     202   208   244       Potassium   3.2             Sodium   140                                  Significant Imaging: Echocardiogram:   Transthoracic echo (TTE) complete (Cupid Only):   Results for orders placed or performed during the hospital encounter of 12/08/21   Echo   Result Value Ref Range    IVC diameter 2.24 cm    Left Ventricular Outflow Tract Mean Gradient 2.00 mmHg    AORTIC VALVE CUSP SEPERATION [id number] cm    LVIDd 6.23 (A) 3.5 - 6.0 cm    IVS 0.92 0.6 - 1.1 cm    Posterior Wall 0.92 0.6 - 1.1 cm    LVIDs 5.77 (A) 2.1 - 4.0 cm    FS 7 28 - 44 %    LV mass 236.68 g    LA size 4.63 cm    RVDD 3.77 cm    Left Ventricle Relative Wall Thickness 0.30 cm    AV mean gradient 4 mmHg    AV valve area 2.31 cm2    AV index (prosthetic) 0.70     E wave deceleration time 148 msec    LVOT diameter 2.05 cm    LVOT area 3.3 cm2    LVOT peak VTI 13.89 cm    Ao VTI 19.82 cm    LVOT stroke volume 45.82 cm3    MV Peak E Arash 0.76 m/s    TR Max Arash 3.42 m/s    LV Systolic Volume 164.59 mL    LV Diastolic Volume 196.13 mL    Echo EF Estimated 16 %    Triscuspid Valve Regurgitation Peak Gradient 47 mmHg    EF 15 %    Narrative    · Atrial fibrillation with frequent PVC's, rate 85 and occasional runs of   wide  complex tachycardia.  · The left ventricle is moderately enlarged with  · Atrial fibrillation observed.  · Mild right ventricular enlargement.  · Moderate mitral regurgitation.  · Moderate to severe tricuspid regurgitation.  · There is pulmonary hypertension.  · Moderate left atrial enlargement.  · Mild right atrial enlargement.

## 2022-01-28 NOTE — PROGRESS NOTES
[address] Medicine  Progress Note    Patient Name: Jeanmarie Michelle  MRN: [id number]  Patient Class: IP- Inpatient   Admission Date: 1/24/2022  Length of Stay: 3 days  Attending Physician: Geneva Li MD  Primary Care Provider: Regina Sprague MD        Subjective:     Principal Problem:Acute on chronic systolic CHF (congestive heart failure)        HPI:  Patient is an 81-year-old male with a history of persistent atrial fibrillation on Eliquis, end-stage dilated cardiomyopathy the with last known EF of 15% scheduled to undergo ICD per Dr. Leigh in February, history of stage III CKD, type 2 diabetes and hypertension who was initially seen at Dr. Leigh office on Friday for shortness of breath with increased peripheral edema. Lasix was increased from  mg and pt felt better overall.  Patient was seen at his PCPs office earlier today for a 3 month follow-up and patient's PCP felt that patient was volume overloaded and after discussion was Dr. Leigh patient was instructed to come to the ED for evaluation.  In ED, patient was found to have lost weight from the time of Dr. Leigh visit by 14 lb.  Chest x-ray showed mild pulmonary edema which appeared chronic from previous chest x-ray 3 weeks ago.  [place] [address] [place] [address]. Troponin was elevated, but pt has chronically elevated troponin and EKG showed no ST-T abnormalities. Pt had hematuria in ED and when asked when this was started, pt stated that this started at late morning today, but failed to mention it to his PCP. Subsequent UA demonstrated a presence of UTI. Source of hematuria is likely due to UTI. Pt will be admitted for further evaluation and intervention.      Overview/Hospital Course:  No notes on file    Interval History:     NAEO  Feels improved  Cystoscopy yesterday  Continues on dobutamine drip per cardiology     Review of Systems   Constitutional: Negative for  chills, diaphoresis, fatigue and fever.   HENT: Negative for congestion, hearing loss, nosebleeds, postnasal drip, sore throat, tinnitus and trouble swallowing.    Eyes: Negative for photophobia, pain, discharge, itching and visual disturbance.   Respiratory: Positive for shortness of breath. Negative for cough, wheezing and stridor.    Cardiovascular: Positive for leg swelling. Negative for chest pain and palpitations.   Gastrointestinal: Negative for abdominal distention, abdominal pain, anal bleeding, blood in stool, constipation, diarrhea, nausea and vomiting.   Endocrine: Negative for cold intolerance, heat intolerance, polydipsia, polyphagia and polyuria.   Genitourinary: Negative for decreased urine volume, difficulty urinating, dysuria, flank pain, frequency, hematuria and urgency.   Musculoskeletal: Negative for arthralgias, back pain, gait problem, joint swelling, myalgias, neck pain and neck stiffness.   Skin: Negative for color change, pallor and rash.   Allergic/Immunologic: Negative for immunocompromised state.   Neurological: Negative for dizziness, tremors, seizures, syncope, facial asymmetry, speech difficulty, weakness, light-headedness, numbness and headaches.   Hematological: Negative for adenopathy. Does not bruise/bleed easily.     Objective:     Vital Signs (Most Recent):  Temp: 98.7 °F (37.1 °C) (01/28/22 1100)  Pulse: 64 (01/28/22 1100)  Resp: 18 (01/28/22 1100)  BP: 118/68 (01/28/22 1100)  SpO2: 96 % (01/28/22 1100) Vital Signs (24h Range):  Temp:  [97 °F (36.1 °C)-98.7 °F (37.1 °C)] 98.7 °F (37.1 °C)  Pulse:  [52-84] 64  Resp:  [17-20] 18  SpO2:  [96 %-100 %] 96 %  BP: (111-124)/(64-87) 118/68     Weight: 107.6 kg (237 lb 3.4 oz)  Body mass index is 33.08 kg/m².    Intake/Output Summary (Last 24 hours) at 1/28/2022 1230  Last data filed at 1/28/2022 1100  Gross per 24 hour   Intake 290 ml   Output 3900 ml   Net -3610 ml      Physical Exam  Vitals reviewed.   Constitutional:       General:  He is not in acute distress.     Appearance: Normal appearance.   HENT:      Head: Normocephalic and atraumatic.      Right Ear: External ear normal.      Left Ear: External ear normal.   Eyes:      Extraocular Movements: Extraocular movements intact.      Pupils: Pupils are equal, round, and reactive to light.   Cardiovascular:      Rate and Rhythm: Normal rate and regular rhythm.      Pulses: Normal pulses.      Heart sounds: Normal heart sounds. No murmur heard.      Pulmonary:      Effort: Pulmonary effort is normal. No respiratory distress.      Breath sounds: Normal breath sounds. No wheezing.   Chest:      Chest wall: No tenderness.   Abdominal:      Palpations: Abdomen is soft. There is no mass.      Tenderness: There is no abdominal tenderness. There is no right CVA tenderness or left CVA tenderness.      Comments: Protuberant with ascites.     Musculoskeletal:         General: No swelling or tenderness. Normal range of motion.      Comments: +2 pitting edema present BLE, and +1 pitting edema noted in bilateral upper extremity   Skin:     General: Skin is warm and dry.      Capillary Refill: Capillary refill takes less than 2 seconds.   Neurological:      General: No focal deficit present.      Mental Status: He is alert and oriented to person, place, and time. Mental status is at baseline.   Psychiatric:         Mood and Affect: Mood normal.         Thought Content: Thought content normal.         Significant Labs: All pertinent labs within the past 24 hours have been reviewed.    Significant Imaging: I have reviewed all pertinent imaging results/findings within the past 24 hours.      Assessment/Plan:      UTI (urinary tract infection)  uti 2/2 e coli  Cont rocephen      Elevated troponin level  Patient has a history of chronically elevated troponin level in 500s and today patient's troponin level was 596.  There was no ST-T abnormalities accompanying elevated troponin level.  No recent ischemia  evaluation on record. Will consult Cardiology in a.m. for recommendations    1/25-cardiology seen pt    Hematuria  This is likely secondary to urinary tract infection.  Will empirically start patient on Rocephin.  As stated above, will elect to continue patient on Eliquis ( for Afib )      Chronic kidney disease  Patient has CKD stage 3.  Serum creatinine is at his baseline.  Will continue to monitor serum creatinine and urine output.  Patient likely has cardiorenal syndrome      Chronic a-fib  Patient with Long standing persistent (>12 months) atrial fibrillation which is controlled currently with Beta Blocker. Patient is currently in atrial fibrillation.[id number] Score: 3. Anticoagulation indicated. Anticoagulation done with Eliquis.  Will elect to continue patient on Eliquis.           Diabetes mellitus without complication  Hold oral hypoglycemics and start patient on sliding scale insulin to maintain CBG in the range of 130-180s      Hypertension  Continue present management        VTE Risk Mitigation (From admission, onward)         Ordered     apixaban tablet 5 mg  2 times daily         01/25/22 1253     Place sequential compression device  Until discontinued         01/25/22 0111                Discharge Planning   MADDIE:      Code Status: Full Code   Is the patient medically ready for discharge?:     Reason for patient still in hospital (select all that apply): Treatment  Discharge Plan A: Home                  Rehmat JAY Li MD  Department of Hospital Medicine   [address]

## 2022-01-28 NOTE — PLAN OF CARE
Problem: Adult Inpatient Plan of Care  Goal: Plan of Care Review  Outcome: Ongoing, Progressing  Flowsheets (Taken 1/27/2022 2140)  Plan of Care Reviewed With: patient  Goal: Absence of Hospital-Acquired Illness or Injury  Outcome: Ongoing, Progressing  Intervention: Prevent and Manage VTE (Venous Thromboembolism) Risk  Flowsheets (Taken 1/27/2022 2140)  VTE Prevention/Management:   ROM (active) performed   fluids promoted  Intervention: Prevent Infection  Flowsheets (Taken 1/27/2022 2140)  Infection Prevention:   cohorting utilized   environmental surveillance performed   equipment surfaces disinfected   hand hygiene promoted   visitors restricted/screened   rest/sleep promoted   personal protective equipment utilized   single patient room provided  Goal: Optimal Comfort and Wellbeing  Outcome: Ongoing, Progressing  Intervention: Monitor Pain and Promote Comfort  Flowsheets (Taken 1/27/2022 2140)  Pain Management Interventions:   pain management plan reviewed with patient/caregiver   relaxation techniques promoted  Intervention: Provide Person-Centered Care  Flowsheets (Taken 1/27/2022 2140)  Trust Relationship/Rapport:   care explained   choices provided   emotional support provided   empathic listening provided   questions answered   questions encouraged   reassurance provided   thoughts/feelings acknowledged     Problem: Skin Injury Risk Increased  Goal: Skin Health and Integrity  Outcome: Ongoing, Progressing  Intervention: Optimize Skin Protection  Flowsheets (Taken 1/27/2022 2140)  Pressure Reduction Techniques:   frequent weight shift encouraged   heels elevated off bed   positioned off wounds   pressure points protected   weight shift assistance provided   rest period provided between sit times  Pressure Reduction Devices: positioning supports utilized  Skin Protection:   adhesive use limited   incontinence pads utilized   tubing/devices free from skin contact   transparent dressing maintained  Head of  Bed (HOB) Positioning: HOB elevated  Intervention: Promote and Optimize Oral Intake  Flowsheets (Taken 1/27/2022 2140)  Oral Nutrition Promotion:   physical activity promoted   safe use of adaptive equipment encouraged   rest periods promoted     Problem: Fall Injury Risk  Goal: Absence of Fall and Fall-Related Injury  Outcome: Ongoing, Progressing  Intervention: Identify and Manage Contributors  Flowsheets (Taken 1/27/2022 2140)  Self-Care Promotion:   independence encouraged   BADL personal objects within reach   BADL personal routines maintained   meal set-up provided   safe use of adaptive equipment encouraged  Medication Review/Management: medications reviewed     Problem: Diabetes Comorbidity  Goal: Blood Glucose Level Within Targeted Range  Outcome: Ongoing, Progressing  Intervention: Monitor and Manage Glycemia  Flowsheets (Taken 1/27/2022 2140)  Glycemic Management:   blood glucose monitored   supplemental insulin given

## 2022-01-28 NOTE — ASSESSMENT & PLAN NOTE
Persistent, longstanding atrial fibrillation  Currently in rate controlled atrial fibrillation with frequent multifocal PVCs  [id number] of 4  Currently on eliquis 5mg BID

## 2022-01-28 NOTE — PROGRESS NOTES
[address]  Cardiology  Progress Note    Patient Name: Jeanmarie Michelle  MRN: [id number]  Admission Date: 1/24/2022  Hospital Length of Stay: 3 days  Code Status: Full Code   Attending Physician: Geneva Li MD   Primary Care Physician: Regina Sprague MD  Expected Discharge Date:   Principal Problem:Acute on chronic systolic CHF (congestive heart failure)    Subjective:     Interval History: Pt states he is breathing and feeling better today. He is laying almost flat in bed without difficulty. Edema still present to BLE and abdomen. Creatinine is increased from yesterday. Will decrease lasix dose.    Review of Systems   Cardiovascular: Positive for leg swelling. Negative for chest pain, irregular heartbeat, orthopnea and palpitations.   Respiratory: Positive for shortness of breath.    All other systems reviewed and are negative.    Objective:     Vital Signs (Most Recent):  Temp: 98.7 °F (37.1 °C) (01/28/22 1100)  Pulse: 64 (01/28/22 1100)  Resp: 18 (01/28/22 1100)  BP: 118/68 (01/28/22 1100)  SpO2: 96 % (01/28/22 1100) Vital Signs (24h Range):  Temp:  [97 °F (36.1 °C)-98.7 °F (37.1 °C)] 98.7 °F (37.1 °C)  Pulse:  [52-84] 64  Resp:  [17-20] 18  SpO2:  [96 %-99 %] 96 %  BP: (111-124)/(64-87) 118/68     Weight: 107.6 kg (237 lb 3.4 oz)  Body mass index is 33.08 kg/m².     SpO2: 96 %  O2 Device (Oxygen Therapy): room air      Intake/Output Summary (Last 24 hours) at 1/28/2022 1617  Last data filed at 1/28/2022 1100  Gross per 24 hour   Intake 290 ml   Output 2800 ml   Net -2510 ml       Lines/Drains/Airways     Peripheral Intravenous Line                 Peripheral IV - Single Lumen 01/26/22 1014 20 G Distal;Posterior;Right Forearm 2 days                Physical Exam  Vitals reviewed.   Constitutional:       General: He is not in acute distress.     Appearance: Normal appearance.   Eyes:      Pupils: Pupils are equal, round, and reactive to light.   Neck:      Comments: Positive  JVD  Cardiovascular:      Rate and Rhythm: Normal rate. Rhythm irregular.      Pulses: Normal pulses.      Heart sounds: Normal heart sounds.   Pulmonary:      Effort: Pulmonary effort is normal.      Breath sounds: Normal breath sounds.   Musculoskeletal:         General: Swelling ( 2+ pitting edema extending from the lower extremities to his abdomen) present. Normal range of motion.      Right lower leg: Edema present.      Left lower leg: Edema present.   Neurological:      General: No focal deficit present.      Mental Status: He is alert and oriented to person, place, and time.   Psychiatric:         Mood and Affect: Mood normal.         Significant Labs:   All pertinent lab results from the last 24 hours have been reviewed. and   Recent Lab Results       01/28/22  1158   01/28/22  1010   01/28/22  0522   01/27/22  2353   01/27/22  1636        Anion Gap   13             BUN   24             BUN/CREAT RATIO   15             Calcium   8.0             Chloride   101             CO2   29             Creatinine   1.63             eGFR if non    43             Glucose   308             POC Glucose 284     202   208   244       Potassium   3.2             Sodium   140                                  Significant Imaging: Echocardiogram:   Transthoracic echo (TTE) complete (Cupid Only):   Results for orders placed or performed during the hospital encounter of 12/08/21   Echo   Result Value Ref Range    IVC diameter 2.24 cm    Left Ventricular Outflow Tract Mean Gradient 2.00 mmHg    AORTIC VALVE CUSP SEPERATION [id number] cm    LVIDd 6.23 (A) 3.5 - 6.0 cm    IVS 0.92 0.6 - 1.1 cm    Posterior Wall 0.92 0.6 - 1.1 cm    LVIDs 5.77 (A) 2.1 - 4.0 cm    FS 7 28 - 44 %    LV mass 236.68 g    LA size 4.63 cm    RVDD 3.77 cm    Left Ventricle Relative Wall Thickness 0.30 cm    AV mean gradient 4 mmHg    AV valve area 2.31 cm2    AV index (prosthetic) 0.70     E wave deceleration time 148 msec    LVOT  diameter 2.05 cm    LVOT area 3.3 cm2    LVOT peak VTI 13.89 cm    Ao VTI 19.82 cm    LVOT stroke volume 45.82 cm3    MV Peak E Arash 0.76 m/s    TR Max Arash 3.42 m/s    LV Systolic Volume 164.59 mL    LV Diastolic Volume 196.13 mL    Echo EF Estimated 16 %    Triscuspid Valve Regurgitation Peak Gradient 47 mmHg    EF 15 %    Narrative    · Atrial fibrillation with frequent PVC's, rate 85 and occasional runs of   wide complex tachycardia.  · The left ventricle is moderately enlarged with  · Atrial fibrillation observed.  · Mild right ventricular enlargement.  · Moderate mitral regurgitation.  · Moderate to severe tricuspid regurgitation.  · There is pulmonary hypertension.  · Moderate left atrial enlargement.  · Mild right atrial enlargement.        Assessment and Plan:     Brief HPI: Mr. Jeanmarie Michelle is a 80y/o M with persistent atrial fibrillation, nonischemic dilated cardiomyopathy- HFrEF with last EF 15% (12/8/21) scheduled to undergo ICD placement with Dr. Carlson in February 2022, stage III CKD, HTN and T2DM presenting with SOB with increased edema. He was recently seen at his cardiologist's office (1/21/22) and his Lasix was increased from 80mg to 120mg PO qd with plans for an ICD next month. On day of admission he had seen his PCP who noted his weight had increased approximately 20 lbs within the past few weeks and was advised to go to the ED. In the ER he describes he has been SOB for 2-3 weeks with increasing lower extremity and abdominal edema. He sleeps on 2 pillows a night and occasionally sleeps in an upright chair. He reports having CELESTIN, orthopnea and nocturnal dyspnea. He also reports having hematuria starting yesterday. In the ED his VS were stable with HR 69, BP 98/69 with pertinent labs including a troponin trending from 596, 536, 514 to 546. His troponins have been consistently elevated in the 500s, 3 weeks ago. His EKG revealed atrial fibrillation with frequent PVCs with HR 89. His U/A revealed TNTC  RBC with 20-50 WBC, culture pending.     Currently the patient is in no acute distress but is hypervolemic with significant edema of his lower extremeties and abdomen. He denies chest pain and is able to urinate. He has received IV lasix 40mg once in the ED with 900 cc of urine output for the past 24 hours. Current weight is 104.3kg. Plan is to IV diurese with dobutamine and lasix. Eliquis is being held due to his hematuria. Will continue to monitor.         * Acute on chronic systolic CHF (congestive heart failure)  HFrEF, nonsichemic cardiomyopathy: EF 15% with moderate-severe TR (12/8/21); NYHA III-IV Stage D  Plan for ICD placement by Dr. Carlson next month  BNP at 15,000 (10,000 at baseline)  GDMT: carvedilol 3.125mg BID, Entresto 97-103mg BID, spironolactone 25mg qd  - holding chlorthalidone after adding spironolactone   - holding farxiga considering hematuria with UTI; restart on discharge   - consider torsemide 20mg BID on discharge   Increased lasix from IV lasix 60mg BID IV lasix 80mg BID  Started a dobutamine infusion to enhance IV diuresis      1/26/22  - continue diuresis with IV lasix and dobutamine as above   - daily weights, strict I&Os  - urine is positive for e.coli, do not recommend restarting SGLT2  (Farxiga) at discharge as this may be cause for recurrent UTI   - continue entresto, coreg, aldactone as above  - recommend torsemide 20mg bid in place of lasix at discharge    1/28/22:  - creatinine has trended up  - decrease IV lasix to 80mg BID (was 120mg BID)  - continue dobutamine gtt  - continue Entresto, Aladactone  - recommend torsemide 20mg BID in place of lasix at discharge  - urine is positive for e.coli, do not recommend restarting SGLT2  (Farxiga) at discharge as this may be cause for recurrent UTI      Elevated troponin level  Pt has a history of chronically elevated troponins in the 500s  EKG reveals atrial fibrillation with PVCs  Elevated troponins most likely 2/2 myocardial injury  due to his heart failure exacerbation    Hematuria  Currently receiving rocephin for UTI  Primary following    Chronic kidney disease  Pt has CKD Stage 3  Serum Cr 2.01 which is at his baseline  Continue to monitor with IV diuresis    1/28/22:  - creatinine is 1.63 (was 1.58 yesterday)  - decreased IV lasix dose  - continue dobutamine gtt  - bmp in AM    Chronic a-fib  Persistent, longstanding atrial fibrillation  Currently in rate controlled atrial fibrillation with frequent multifocal PVCs  [id number] of 4  Currently on eliquis 5mg BID       Hypertension  - BP is controlled at present  - Continue current meds (Entresto, Aldactone)            VTE Risk Mitigation (From admission, onward)         Ordered     apixaban tablet 5 mg  2 times daily         01/25/22 1253     Place sequential compression device  Until discontinued         01/25/22 0111                ROLY Moran  Cardiology  [facility] - [address] [place] [address]

## 2022-01-28 NOTE — ASSESSMENT & PLAN NOTE
Pt has CKD Stage 3  Serum Cr 2.01 which is at his baseline  Continue to monitor with IV diuresis    1/28/22:  - creatinine is 1.63 (was 1.58 yesterday)  - decreased IV lasix dose  - continue dobutamine gtt  - bmp in AM

## 2022-01-28 NOTE — PLAN OF CARE
Problem: Adult Inpatient Plan of Care  Goal: Plan of Care Review  Outcome: Ongoing, Progressing  Flowsheets (Taken 1/28/2022 1135)  Plan of Care Reviewed With: patient  Goal: Patient-Specific Goal (Individualized)  Outcome: Ongoing, Progressing  Goal: Absence of Hospital-Acquired Illness or Injury  Outcome: Ongoing, Progressing  Intervention: Prevent Skin Injury  Flowsheets (Taken 1/28/2022 1135)  Body Position: position maintained  Goal: Optimal Comfort and Wellbeing  Outcome: Ongoing, Progressing  Goal: Readiness for Transition of Care  Outcome: Ongoing, Progressing     Problem: Skin Injury Risk Increased  Goal: Skin Health and Integrity  Outcome: Ongoing, Progressing     Problem: Fluid and Electrolyte Imbalance (Acute Kidney Injury/Impairment)  Goal: Fluid and Electrolyte Balance  Outcome: Ongoing, Progressing     Problem: Oral Intake Inadequate (Acute Kidney Injury/Impairment)  Goal: Optimal Nutrition Intake  Outcome: Ongoing, Progressing     Problem: Renal Function Impairment (Acute Kidney Injury/Impairment)  Goal: Effective Renal Function  Outcome: Ongoing, Progressing

## 2022-01-29 LAB
ALBUMIN SERPL BCP-MCNC: 2.7 G/DL (ref 3.5–5)
ALBUMIN/GLOB SERPL: 0.8 {RATIO}
ALP SERPL-CCNC: 96 U/L (ref 45–115)
ALT SERPL W P-5'-P-CCNC: 23 U/L (ref 16–61)
ANION GAP SERPL CALCULATED.3IONS-SCNC: 12 MMOL/L (ref 7–16)
ANION GAP SERPL CALCULATED.3IONS-SCNC: 9 MMOL/L (ref 7–16)
AST SERPL W P-5'-P-CCNC: 31 U/L (ref 15–37)
BILIRUB SERPL-MCNC: 0.8 MG/DL (ref 0–1.2)
BUN SERPL-MCNC: 22 MG/DL (ref 7–18)
BUN SERPL-MCNC: 23 MG/DL (ref 7–18)
BUN/CREAT SERPL: 15 (ref 6–20)
BUN/CREAT SERPL: 16 (ref 6–20)
CALCIUM SERPL-MCNC: 8 MG/DL (ref 8.5–10.1)
CALCIUM SERPL-MCNC: 8.1 MG/DL (ref 8.5–10.1)
CHLORIDE SERPL-SCNC: 102 MMOL/L (ref 98–107)
CHLORIDE SERPL-SCNC: 102 MMOL/L (ref 98–107)
CO2 SERPL-SCNC: 29 MMOL/L (ref 21–32)
CO2 SERPL-SCNC: 34 MMOL/L (ref 21–32)
CREAT SERPL-MCNC: 1.42 MG/DL (ref 0.7–1.3)
CREAT SERPL-MCNC: 1.46 MG/DL (ref 0.7–1.3)
GLOBULIN SER-MCNC: 3.4 G/DL (ref 2–4)
GLUCOSE SERPL-MCNC: 150 MG/DL (ref 70–105)
GLUCOSE SERPL-MCNC: 205 MG/DL (ref 74–106)
GLUCOSE SERPL-MCNC: 218 MG/DL (ref 70–105)
GLUCOSE SERPL-MCNC: 231 MG/DL (ref 70–105)
GLUCOSE SERPL-MCNC: 232 MG/DL (ref 70–105)
GLUCOSE SERPL-MCNC: 278 MG/DL (ref 74–106)
LACTATE SERPL-SCNC: 2.2 MMOL/L (ref 0.4–2)
LACTATE SERPL-SCNC: 2.3 MMOL/L (ref 0.4–2)
POTASSIUM SERPL-SCNC: 3.5 MMOL/L (ref 3.5–5.1)
POTASSIUM SERPL-SCNC: 4.1 MMOL/L (ref 3.5–5.1)
PROT SERPL-MCNC: 6.1 G/DL (ref 6.4–8.2)
SODIUM SERPL-SCNC: 139 MMOL/L (ref 136–145)
SODIUM SERPL-SCNC: 141 MMOL/L (ref 136–145)

## 2022-01-29 PROCEDURE — 25000003 PHARM REV CODE 250: Performed by: INTERNAL MEDICINE

## 2022-01-29 PROCEDURE — 36415 COLL VENOUS BLD VENIPUNCTURE: CPT | Performed by: INTERNAL MEDICINE

## 2022-01-29 PROCEDURE — 82962 GLUCOSE BLOOD TEST: CPT

## 2022-01-29 PROCEDURE — 80048 BASIC METABOLIC PNL TOTAL CA: CPT | Performed by: INTERNAL MEDICINE

## 2022-01-29 PROCEDURE — 83605 ASSAY OF LACTIC ACID: CPT | Performed by: INTERNAL MEDICINE

## 2022-01-29 PROCEDURE — C9399 UNCLASSIFIED DRUGS OR BIOLOG: HCPCS | Performed by: INTERNAL MEDICINE

## 2022-01-29 PROCEDURE — 99233 PR SUBSEQUENT HOSPITAL CARE,LEVL III: ICD-10-PCS | Mod: ,,, | Performed by: INTERNAL MEDICINE

## 2022-01-29 PROCEDURE — 80048 BASIC METABOLIC PNL TOTAL CA: CPT | Mod: XB | Performed by: INTERNAL MEDICINE

## 2022-01-29 PROCEDURE — 99233 SBSQ HOSP IP/OBS HIGH 50: CPT | Mod: ,,, | Performed by: INTERNAL MEDICINE

## 2022-01-29 PROCEDURE — 63600175 PHARM REV CODE 636 W HCPCS: Performed by: INTERNAL MEDICINE

## 2022-01-29 PROCEDURE — 11000001 HC ACUTE MED/SURG PRIVATE ROOM

## 2022-01-29 PROCEDURE — P9045 ALBUMIN (HUMAN), 5%, 250 ML: HCPCS | Performed by: INTERNAL MEDICINE

## 2022-01-29 RX ORDER — ALBUMIN HUMAN 50 G/1000ML
12.5 SOLUTION INTRAVENOUS ONCE
Status: COMPLETED | OUTPATIENT
Start: 2022-01-29 | End: 2022-01-29

## 2022-01-29 RX ADMIN — ASPIRIN 81 MG: 81 TABLET, COATED ORAL at 08:01

## 2022-01-29 RX ADMIN — CEFTRIAXONE SODIUM 1 G: 1 INJECTION, POWDER, FOR SOLUTION INTRAMUSCULAR; INTRAVENOUS at 09:01

## 2022-01-29 RX ADMIN — EZETIMIBE 10 MG: 10 TABLET ORAL at 08:01

## 2022-01-29 RX ADMIN — PANTOPRAZOLE SODIUM 40 MG: 40 TABLET, DELAYED RELEASE ORAL at 08:01

## 2022-01-29 RX ADMIN — APIXABAN 5 MG: 5 TABLET, FILM COATED ORAL at 09:01

## 2022-01-29 RX ADMIN — INSULIN DETEMIR 10 UNITS: 100 INJECTION, SOLUTION SUBCUTANEOUS at 09:01

## 2022-01-29 RX ADMIN — INSULIN ASPART 1 UNITS: 100 INJECTION, SOLUTION INTRAVENOUS; SUBCUTANEOUS at 12:01

## 2022-01-29 RX ADMIN — TAMSULOSIN HYDROCHLORIDE 0.4 MG: 0.4 CAPSULE ORAL at 08:01

## 2022-01-29 RX ADMIN — APIXABAN 5 MG: 5 TABLET, FILM COATED ORAL at 08:01

## 2022-01-29 RX ADMIN — FUROSEMIDE 80 MG: 10 INJECTION INTRAMUSCULAR; INTRAVENOUS at 04:01

## 2022-01-29 RX ADMIN — INSULIN ASPART 2 UNITS: 100 INJECTION, SOLUTION INTRAVENOUS; SUBCUTANEOUS at 11:01

## 2022-01-29 RX ADMIN — FUROSEMIDE 80 MG: 10 INJECTION INTRAMUSCULAR; INTRAVENOUS at 03:01

## 2022-01-29 RX ADMIN — ALBUMIN (HUMAN) 12.5 G: 12.5 INJECTION, SOLUTION INTRAVENOUS at 08:01

## 2022-01-29 NOTE — SUBJECTIVE & OBJECTIVE
Interval History:     ISELA  Feels improved  Continues on dobutamine drip per cardiology  Urine cleared up now     Review of Systems   Constitutional: Negative for chills, diaphoresis, fatigue and fever.   HENT: Negative for congestion, hearing loss, nosebleeds, postnasal drip, sore throat, tinnitus and trouble swallowing.    Eyes: Negative for photophobia, pain, discharge, itching and visual disturbance.   Respiratory: Positive for shortness of breath. Negative for cough, wheezing and stridor.    Cardiovascular: Positive for leg swelling. Negative for chest pain and palpitations.   Gastrointestinal: Negative for abdominal distention, abdominal pain, anal bleeding, blood in stool, constipation, diarrhea, nausea and vomiting.   Endocrine: Negative for cold intolerance, heat intolerance, polydipsia, polyphagia and polyuria.   Genitourinary: Negative for decreased urine volume, difficulty urinating, dysuria, flank pain, frequency, hematuria and urgency.   Musculoskeletal: Negative for arthralgias, back pain, gait problem, joint swelling, myalgias, neck pain and neck stiffness.   Skin: Negative for color change, pallor and rash.   Allergic/Immunologic: Negative for immunocompromised state.   Neurological: Negative for dizziness, tremors, seizures, syncope, facial asymmetry, speech difficulty, weakness, light-headedness, numbness and headaches.   Hematological: Negative for adenopathy. Does not bruise/bleed easily.     Objective:     Vital Signs (Most Recent):  Temp: 97.6 °F (36.4 °C) (01/29/22 0734)  Pulse: 90 (01/29/22 0734)  Resp: 20 (01/29/22 0734)  BP: (!) 86/63 (01/29/22 0734)  SpO2: 100 % (01/29/22 0734) Vital Signs (24h Range):  Temp:  [97.1 °F (36.2 °C)-97.9 °F (36.6 °C)] 97.6 °F (36.4 °C)  Pulse:  [] 90  Resp:  [18-20] 20  SpO2:  [92 %-100 %] 100 %  BP: ()/(56-78) 86/63     Weight: 101.3 kg (223 lb 5.2 oz)  Body mass index is 31.15 kg/m².    Intake/Output Summary (Last 24 hours) at 1/29/2022  1131  Last data filed at 1/29/2022 0900  Gross per 24 hour   Intake 290 ml   Output 1802 ml   Net -1512 ml      Physical Exam  Vitals reviewed.   Constitutional:       General: He is not in acute distress.     Appearance: Normal appearance.   HENT:      Head: Normocephalic and atraumatic.      Right Ear: External ear normal.      Left Ear: External ear normal.   Eyes:      Extraocular Movements: Extraocular movements intact.      Pupils: Pupils are equal, round, and reactive to light.   Cardiovascular:      Rate and Rhythm: Normal rate and regular rhythm.      Pulses: Normal pulses.      Heart sounds: Normal heart sounds. No murmur heard.      Pulmonary:      Effort: Pulmonary effort is normal. No respiratory distress.      Breath sounds: Normal breath sounds. No wheezing.   Chest:      Chest wall: No tenderness.   Abdominal:      Palpations: Abdomen is soft. There is no mass.      Tenderness: There is no abdominal tenderness. There is no right CVA tenderness or left CVA tenderness.      Comments: Protuberant with ascites.     Musculoskeletal:         General: No swelling or tenderness. Normal range of motion.      Comments: +2 pitting edema present BLE, and +1 pitting edema noted in bilateral upper extremity   Skin:     General: Skin is warm and dry.      Capillary Refill: Capillary refill takes less than 2 seconds.   Neurological:      General: No focal deficit present.      Mental Status: He is alert and oriented to person, place, and time. Mental status is at baseline.   Psychiatric:         Mood and Affect: Mood normal.         Thought Content: Thought content normal.         Significant Labs: All pertinent labs within the past 24 hours have been reviewed.    Significant Imaging: I have reviewed all pertinent imaging results/findings within the past 24 hours.

## 2022-01-29 NOTE — PROGRESS NOTES
[address] Medicine  Progress Note    Patient Name: Jeanmarie Michelle  MRN: [id number]  Patient Class: IP- Inpatient   Admission Date: 1/24/2022  Length of Stay: 4 days  Attending Physician: Geneva Li MD  Primary Care Provider: Regina Sprague MD        Subjective:     Principal Problem:Acute on chronic systolic CHF (congestive heart failure)        HPI:  Patient is an 81-year-old male with a history of persistent atrial fibrillation on Eliquis, end-stage dilated cardiomyopathy the with last known EF of 15% scheduled to undergo ICD per Dr. eLigh in February, history of stage III CKD, type 2 diabetes and hypertension who was initially seen at Dr. Leigh office on Friday for shortness of breath with increased peripheral edema. Lasix was increased from  mg and pt felt better overall.  Patient was seen at his PCPs office earlier today for a 3 month follow-up and patient's PCP felt that patient was volume overloaded and after discussion was Dr. Leigh patient was instructed to come to the ED for evaluation.  In ED, patient was found to have lost weight from the time of Dr. Leigh visit by 14 lb.  Chest x-ray showed mild pulmonary edema which appeared chronic from previous chest x-ray 3 weeks ago.  [place] [address] [place] [address]. Troponin was elevated, but pt has chronically elevated troponin and EKG showed no ST-T abnormalities. Pt had hematuria in ED and when asked when this was started, pt stated that this started at late morning today, but failed to mention it to his PCP. Subsequent UA demonstrated a presence of UTI. Source of hematuria is likely due to UTI. Pt will be admitted for further evaluation and intervention.      Overview/Hospital Course:  No notes on file    Interval History:     NAEO  Feels improved  Continues on dobutamine drip per cardiology  Urine cleared up now     Review of Systems   Constitutional: Negative for  chills, diaphoresis, fatigue and fever.   HENT: Negative for congestion, hearing loss, nosebleeds, postnasal drip, sore throat, tinnitus and trouble swallowing.    Eyes: Negative for photophobia, pain, discharge, itching and visual disturbance.   Respiratory: Positive for shortness of breath. Negative for cough, wheezing and stridor.    Cardiovascular: Positive for leg swelling. Negative for chest pain and palpitations.   Gastrointestinal: Negative for abdominal distention, abdominal pain, anal bleeding, blood in stool, constipation, diarrhea, nausea and vomiting.   Endocrine: Negative for cold intolerance, heat intolerance, polydipsia, polyphagia and polyuria.   Genitourinary: Negative for decreased urine volume, difficulty urinating, dysuria, flank pain, frequency, hematuria and urgency.   Musculoskeletal: Negative for arthralgias, back pain, gait problem, joint swelling, myalgias, neck pain and neck stiffness.   Skin: Negative for color change, pallor and rash.   Allergic/Immunologic: Negative for immunocompromised state.   Neurological: Negative for dizziness, tremors, seizures, syncope, facial asymmetry, speech difficulty, weakness, light-headedness, numbness and headaches.   Hematological: Negative for adenopathy. Does not bruise/bleed easily.     Objective:     Vital Signs (Most Recent):  Temp: 97.6 °F (36.4 °C) (01/29/22 0734)  Pulse: 90 (01/29/22 0734)  Resp: 20 (01/29/22 0734)  BP: (!) 86/63 (01/29/22 0734)  SpO2: 100 % (01/29/22 0734) Vital Signs (24h Range):  Temp:  [97.1 °F (36.2 °C)-97.9 °F (36.6 °C)] 97.6 °F (36.4 °C)  Pulse:  [] 90  Resp:  [18-20] 20  SpO2:  [92 %-100 %] 100 %  BP: ()/(56-78) 86/63     Weight: 101.3 kg (223 lb 5.2 oz)  Body mass index is 31.15 kg/m².    Intake/Output Summary (Last 24 hours) at 1/29/2022 1131  Last data filed at 1/29/2022 0900  Gross per 24 hour   Intake 290 ml   Output 1802 ml   Net -1512 ml      Physical Exam  Vitals reviewed.   Constitutional:        General: He is not in acute distress.     Appearance: Normal appearance.   HENT:      Head: Normocephalic and atraumatic.      Right Ear: External ear normal.      Left Ear: External ear normal.   Eyes:      Extraocular Movements: Extraocular movements intact.      Pupils: Pupils are equal, round, and reactive to light.   Cardiovascular:      Rate and Rhythm: Normal rate and regular rhythm.      Pulses: Normal pulses.      Heart sounds: Normal heart sounds. No murmur heard.      Pulmonary:      Effort: Pulmonary effort is normal. No respiratory distress.      Breath sounds: Normal breath sounds. No wheezing.   Chest:      Chest wall: No tenderness.   Abdominal:      Palpations: Abdomen is soft. There is no mass.      Tenderness: There is no abdominal tenderness. There is no right CVA tenderness or left CVA tenderness.      Comments: Protuberant with ascites.     Musculoskeletal:         General: No swelling or tenderness. Normal range of motion.      Comments: +2 pitting edema present BLE, and +1 pitting edema noted in bilateral upper extremity   Skin:     General: Skin is warm and dry.      Capillary Refill: Capillary refill takes less than 2 seconds.   Neurological:      General: No focal deficit present.      Mental Status: He is alert and oriented to person, place, and time. Mental status is at baseline.   Psychiatric:         Mood and Affect: Mood normal.         Thought Content: Thought content normal.         Significant Labs: All pertinent labs within the past 24 hours have been reviewed.    Significant Imaging: I have reviewed all pertinent imaging results/findings within the past 24 hours.      Assessment/Plan:      UTI (urinary tract infection)  uti 2/2 e coli  Cont rocephen      Elevated troponin level  Patient has a history of chronically elevated troponin level in 500s and today patient's troponin level was 596.  There was no ST-T abnormalities accompanying elevated troponin level.  No recent ischemia  evaluation on record. Will consult Cardiology in a.m. for recommendations    1/25-cardiology seen pt    Hematuria  This is likely secondary to urinary tract infection.  Will empirically start patient on Rocephin.  As stated above, will elect to continue patient on Eliquis ( for Afib )      Chronic kidney disease  Patient has CKD stage 3.  Serum creatinine is at his baseline.  Will continue to monitor serum creatinine and urine output.  Patient likely has cardiorenal syndrome      Chronic a-fib  Patient with Long standing persistent (>12 months) atrial fibrillation which is controlled currently with Beta Blocker. Patient is currently in atrial fibrillation.[id number] Score: 3. Anticoagulation indicated. Anticoagulation done with Eliquis.  Will elect to continue patient on Eliquis.           Diabetes mellitus without complication  Hold oral hypoglycemics and start patient on sliding scale insulin to maintain CBG in the range of 130-180s      Hypertension  Continue present management        VTE Risk Mitigation (From admission, onward)         Ordered     apixaban tablet 5 mg  2 times daily         01/25/22 1253     Place sequential compression device  Until discontinued         01/25/22 0111                Discharge Planning   MADDIE:      Code Status: Full Code   Is the patient medically ready for discharge?:     Reason for patient still in hospital (select all that apply): Treatment  Discharge Plan A: Home                  Rehmat JAY Li MD  Department of Hospital Medicine   [address]

## 2022-01-29 NOTE — PROGRESS NOTES
Patient seen earlier today and is improved with hematuria.  Urine is completely clear now.  Voiding reasonably well.  I do not think any additional workup is needed for his hematuria.  We will be glad to see again if he has any additional problems.

## 2022-01-30 LAB
ANION GAP SERPL CALCULATED.3IONS-SCNC: 12 MMOL/L (ref 7–16)
BUN SERPL-MCNC: 20 MG/DL (ref 7–18)
BUN/CREAT SERPL: 15 (ref 6–20)
CALCIUM SERPL-MCNC: 8.2 MG/DL (ref 8.5–10.1)
CHLORIDE SERPL-SCNC: 100 MMOL/L (ref 98–107)
CO2 SERPL-SCNC: 30 MMOL/L (ref 21–32)
CREAT SERPL-MCNC: 1.37 MG/DL (ref 0.7–1.3)
GLUCOSE SERPL-MCNC: 146 MG/DL (ref 70–105)
GLUCOSE SERPL-MCNC: 146 MG/DL (ref 70–105)
GLUCOSE SERPL-MCNC: 192 MG/DL (ref 70–105)
GLUCOSE SERPL-MCNC: 218 MG/DL (ref 70–105)
GLUCOSE SERPL-MCNC: 250 MG/DL (ref 74–106)
POTASSIUM SERPL-SCNC: 3.7 MMOL/L (ref 3.5–5.1)
SODIUM SERPL-SCNC: 138 MMOL/L (ref 136–145)

## 2022-01-30 PROCEDURE — 99232 PR SUBSEQUENT HOSPITAL CARE,LEVL II: ICD-10-PCS | Mod: ,,, | Performed by: STUDENT IN AN ORGANIZED HEALTH CARE EDUCATION/TRAINING PROGRAM

## 2022-01-30 PROCEDURE — 25000003 PHARM REV CODE 250: Performed by: INTERNAL MEDICINE

## 2022-01-30 PROCEDURE — 63600175 PHARM REV CODE 636 W HCPCS: Performed by: INTERNAL MEDICINE

## 2022-01-30 PROCEDURE — 63600175 PHARM REV CODE 636 W HCPCS: Performed by: NURSE PRACTITIONER

## 2022-01-30 PROCEDURE — C9399 UNCLASSIFIED DRUGS OR BIOLOG: HCPCS | Performed by: INTERNAL MEDICINE

## 2022-01-30 PROCEDURE — 80048 BASIC METABOLIC PNL TOTAL CA: CPT | Performed by: INTERNAL MEDICINE

## 2022-01-30 PROCEDURE — 99232 SBSQ HOSP IP/OBS MODERATE 35: CPT | Mod: ,,, | Performed by: STUDENT IN AN ORGANIZED HEALTH CARE EDUCATION/TRAINING PROGRAM

## 2022-01-30 PROCEDURE — 99233 PR SUBSEQUENT HOSPITAL CARE,LEVL III: ICD-10-PCS | Mod: ,,, | Performed by: INTERNAL MEDICINE

## 2022-01-30 PROCEDURE — 99233 SBSQ HOSP IP/OBS HIGH 50: CPT | Mod: ,,, | Performed by: INTERNAL MEDICINE

## 2022-01-30 PROCEDURE — 82962 GLUCOSE BLOOD TEST: CPT

## 2022-01-30 PROCEDURE — 11000001 HC ACUTE MED/SURG PRIVATE ROOM

## 2022-01-30 PROCEDURE — 36415 COLL VENOUS BLD VENIPUNCTURE: CPT | Performed by: INTERNAL MEDICINE

## 2022-01-30 RX ORDER — TAMSULOSIN HYDROCHLORIDE 0.4 MG/1
0.4 CAPSULE ORAL DAILY
Status: DISCONTINUED | OUTPATIENT
Start: 2022-01-30 | End: 2022-02-03 | Stop reason: HOSPADM

## 2022-01-30 RX ADMIN — EZETIMIBE 10 MG: 10 TABLET ORAL at 08:01

## 2022-01-30 RX ADMIN — INSULIN DETEMIR 10 UNITS: 100 INJECTION, SOLUTION SUBCUTANEOUS at 08:01

## 2022-01-30 RX ADMIN — DOBUTAMINE HYDROCHLORIDE 5 MCG/KG/MIN: 400 INJECTION INTRAVENOUS at 12:01

## 2022-01-30 RX ADMIN — FUROSEMIDE 80 MG: 10 INJECTION INTRAMUSCULAR; INTRAVENOUS at 04:01

## 2022-01-30 RX ADMIN — ASPIRIN 81 MG: 81 TABLET, COATED ORAL at 08:01

## 2022-01-30 RX ADMIN — PANTOPRAZOLE SODIUM 40 MG: 40 TABLET, DELAYED RELEASE ORAL at 08:01

## 2022-01-30 RX ADMIN — TAMSULOSIN HYDROCHLORIDE 0.4 MG: 0.4 CAPSULE ORAL at 10:01

## 2022-01-30 RX ADMIN — APIXABAN 5 MG: 5 TABLET, FILM COATED ORAL at 08:01

## 2022-01-30 RX ADMIN — CEFTRIAXONE SODIUM 1 G: 1 INJECTION, POWDER, FOR SOLUTION INTRAMUSCULAR; INTRAVENOUS at 08:01

## 2022-01-30 NOTE — ASSESSMENT & PLAN NOTE
HFrEF, nonsichemic cardiomyopathy: EF 15% with moderate-severe TR (12/8/21); NYHA III-IV Stage D  Plan for ICD placement by Dr. Carlson next month  BNP at 15,000 (10,000 at baseline)  GDMT: carvedilol 3.125mg BID, Entresto 97-103mg BID, spironolactone 25mg qd  - holding chlorthalidone after adding spironolactone   - holding farxiga considering hematuria with UTI; restart on discharge   - consider torsemide 20mg BID on discharge   Increased lasix from IV lasix 60mg BID IV lasix 80mg BID  Started a dobutamine infusion to enhance IV diuresis      1/26/22  - continue diuresis with IV lasix and dobutamine as above   - daily weights, strict I&Os  - urine is positive for e.coli, do not recommend restarting SGLT2  (Farxiga) at discharge as this may be cause for recurrent UTI   - continue entresto, coreg, aldactone as above  - recommend torsemide 20mg bid in place of lasix at discharge    1/28/22:  - creatinine has trended up  - decrease IV lasix to 80mg BID (was 120mg BID)  - continue dobutamine gtt  - continue Entresto, Aladactone  - recommend torsemide 20mg BID in place of lasix at discharge  - urine is positive for e.coli, do not recommend restarting SGLT2  (Farxiga) at discharge as this may be cause for recurrent UTI    1/30/22:  - Creatinine stable on IV lasix 80mg bid and IV dobutamine  - MAPs around 60s; holding all GDMT for now  - Remains floridly volume overloaded (Swelling up to thighs now)

## 2022-01-30 NOTE — SUBJECTIVE & OBJECTIVE
Interval History:     ISELA  Feels improved  bp running low yesterday, therefore held entresto and aldactone, per cariodlogy cont dobutamine, daughter wanting to know if ICD could be done inpatient, defer to cardiology    Review of Systems   Constitutional: Negative for chills, diaphoresis, fatigue and fever.   HENT: Negative for congestion, hearing loss, nosebleeds, postnasal drip, sore throat, tinnitus and trouble swallowing.    Eyes: Negative for photophobia, pain, discharge, itching and visual disturbance.   Respiratory: Negative for cough, shortness of breath, wheezing and stridor.    Cardiovascular: Positive for leg swelling. Negative for chest pain and palpitations.   Gastrointestinal: Negative for abdominal distention, abdominal pain, anal bleeding, blood in stool, constipation, diarrhea, nausea and vomiting.   Endocrine: Negative for cold intolerance, heat intolerance, polydipsia, polyphagia and polyuria.   Genitourinary: Negative for decreased urine volume, difficulty urinating, dysuria, flank pain, frequency, hematuria and urgency.   Musculoskeletal: Negative for arthralgias, back pain, gait problem, joint swelling, myalgias, neck pain and neck stiffness.   Skin: Negative for color change, pallor and rash.   Allergic/Immunologic: Negative for immunocompromised state.   Neurological: Negative for dizziness, tremors, seizures, syncope, facial asymmetry, speech difficulty, weakness, light-headedness, numbness and headaches.   Hematological: Negative for adenopathy. Does not bruise/bleed easily.     Objective:     Vital Signs (Most Recent):  Temp: 98.2 °F (36.8 °C) (01/30/22 1200)  Pulse: 82 (01/30/22 1200)  Resp: 19 (01/30/22 1200)  BP: 115/76 (01/30/22 1200)  SpO2: 98 % (01/30/22 1200) Vital Signs (24h Range):  Temp:  [97.2 °F (36.2 °C)-98.8 °F (37.1 °C)] 98.2 °F (36.8 °C)  Pulse:  [] 82  Resp:  [18-19] 19  SpO2:  [95 %-100 %] 98 %  BP: ()/(43-76) 115/76     Weight: 99.7 kg (219 lb 12.8 oz)  Body  mass index is 30.66 kg/m².    Intake/Output Summary (Last 24 hours) at 1/30/2022 1218  Last data filed at 1/30/2022 0600  Gross per 24 hour   Intake 2.7 ml   Output 1300 ml   Net -1297.3 ml      Physical Exam  Vitals reviewed.   Constitutional:       General: He is not in acute distress.     Appearance: Normal appearance.   HENT:      Head: Normocephalic and atraumatic.      Right Ear: External ear normal.      Left Ear: External ear normal.   Eyes:      Extraocular Movements: Extraocular movements intact.      Pupils: Pupils are equal, round, and reactive to light.   Cardiovascular:      Rate and Rhythm: Normal rate and regular rhythm.      Pulses: Normal pulses.      Heart sounds: Normal heart sounds. No murmur heard.      Pulmonary:      Effort: Pulmonary effort is normal. No respiratory distress.      Breath sounds: Normal breath sounds. No wheezing.   Chest:      Chest wall: No tenderness.   Abdominal:      Palpations: Abdomen is soft. There is no mass.      Tenderness: There is no abdominal tenderness. There is no right CVA tenderness or left CVA tenderness.      Comments: Protuberant with ascites.     Musculoskeletal:         General: No swelling or tenderness. Normal range of motion.      Comments: +2 pitting edema present BLE, and +1 pitting edema noted in bilateral upper extremity   Skin:     General: Skin is warm and dry.      Capillary Refill: Capillary refill takes less than 2 seconds.   Neurological:      General: No focal deficit present.      Mental Status: He is alert and oriented to person, place, and time. Mental status is at baseline.   Psychiatric:         Mood and Affect: Mood normal.         Thought Content: Thought content normal.         Significant Labs: All pertinent labs within the past 24 hours have been reviewed.    Significant Imaging: I have reviewed all pertinent imaging results/findings within the past 24 hours.

## 2022-01-30 NOTE — ASSESSMENT & PLAN NOTE
Persistent, longstanding atrial fibrillation  Currently in rate controlled atrial fibrillation with frequent multifocal PVCs  [id number] of 4  Currently on eliquis 5mg BID

## 2022-01-30 NOTE — PLAN OF CARE
Problem: Adult Inpatient Plan of Care  Goal: Plan of Care Review  Outcome: Ongoing, Progressing  Goal: Patient-Specific Goal (Individualized)  Outcome: Ongoing, Progressing  Goal: Absence of Hospital-Acquired Illness or Injury  Outcome: Ongoing, Progressing  Goal: Optimal Comfort and Wellbeing  Outcome: Ongoing, Progressing  Goal: Readiness for Transition of Care  Outcome: Ongoing, Progressing     Problem: Skin Injury Risk Increased  Goal: Skin Health and Integrity  Outcome: Ongoing, Progressing     Problem: Fluid and Electrolyte Imbalance (Acute Kidney Injury/Impairment)  Goal: Fluid and Electrolyte Balance  Outcome: Ongoing, Progressing     Problem: Oral Intake Inadequate (Acute Kidney Injury/Impairment)  Goal: Optimal Nutrition Intake  Outcome: Ongoing, Progressing     Problem: Renal Function Impairment (Acute Kidney Injury/Impairment)  Goal: Effective Renal Function  Outcome: Ongoing, Progressing

## 2022-01-30 NOTE — PROGRESS NOTES
[address] Medicine  Progress Note    Patient Name: Jeanmarie Michelle  MRN: [id number]  Patient Class: IP- Inpatient   Admission Date: 1/24/2022  Length of Stay: 5 days  Attending Physician: Geneva Li MD  Primary Care Provider: Regina Sprague MD        Subjective:     Principal Problem:Acute on chronic systolic CHF (congestive heart failure)        HPI:  Patient is an 81-year-old male with a history of persistent atrial fibrillation on Eliquis, end-stage dilated cardiomyopathy the with last known EF of 15% scheduled to undergo ICD per Dr. Leigh in February, history of stage III CKD, type 2 diabetes and hypertension who was initially seen at Dr. Leigh office on Friday for shortness of breath with increased peripheral edema. Lasix was increased from  mg and pt felt better overall.  Patient was seen at his PCPs office earlier today for a 3 month follow-up and patient's PCP felt that patient was volume overloaded and after discussion was Dr. Leigh patient was instructed to come to the ED for evaluation.  In ED, patient was found to have lost weight from the time of Dr. Leigh visit by 14 lb.  Chest x-ray showed mild pulmonary edema which appeared chronic from previous chest x-ray 3 weeks ago.  [place] [address] from [place] [address]. Troponin was elevated, but pt has chronically elevated troponin and EKG showed no ST-T abnormalities. Pt had hematuria in ED and when asked when this was started, pt stated that this started at late morning today, but failed to mention it to his PCP. Subsequent UA demonstrated a presence of UTI. Source of hematuria is likely due to UTI. Pt will be admitted for further evaluation and intervention.      Overview/Hospital Course:  No notes on file    Interval History:     NAEO  Feels improved  bp running low yesterday, therefore held entresto and aldactone, per cariodlogy cont dobutamine, daughter wanting to know  if ICD could be done inpatient, defer to cardiology    Review of Systems   Constitutional: Negative for chills, diaphoresis, fatigue and fever.   HENT: Negative for congestion, hearing loss, nosebleeds, postnasal drip, sore throat, tinnitus and trouble swallowing.    Eyes: Negative for photophobia, pain, discharge, itching and visual disturbance.   Respiratory: Negative for cough, shortness of breath, wheezing and stridor.    Cardiovascular: Positive for leg swelling. Negative for chest pain and palpitations.   Gastrointestinal: Negative for abdominal distention, abdominal pain, anal bleeding, blood in stool, constipation, diarrhea, nausea and vomiting.   Endocrine: Negative for cold intolerance, heat intolerance, polydipsia, polyphagia and polyuria.   Genitourinary: Negative for decreased urine volume, difficulty urinating, dysuria, flank pain, frequency, hematuria and urgency.   Musculoskeletal: Negative for arthralgias, back pain, gait problem, joint swelling, myalgias, neck pain and neck stiffness.   Skin: Negative for color change, pallor and rash.   Allergic/Immunologic: Negative for immunocompromised state.   Neurological: Negative for dizziness, tremors, seizures, syncope, facial asymmetry, speech difficulty, weakness, light-headedness, numbness and headaches.   Hematological: Negative for adenopathy. Does not bruise/bleed easily.     Objective:     Vital Signs (Most Recent):  Temp: 98.2 °F (36.8 °C) (01/30/22 1200)  Pulse: 82 (01/30/22 1200)  Resp: 19 (01/30/22 1200)  BP: 115/76 (01/30/22 1200)  SpO2: 98 % (01/30/22 1200) Vital Signs (24h Range):  Temp:  [97.2 °F (36.2 °C)-98.8 °F (37.1 °C)] 98.2 °F (36.8 °C)  Pulse:  [] 82  Resp:  [18-19] 19  SpO2:  [95 %-100 %] 98 %  BP: ()/(43-76) 115/76     Weight: 99.7 kg (219 lb 12.8 oz)  Body mass index is 30.66 kg/m².    Intake/Output Summary (Last 24 hours) at 1/30/2022 1218  Last data filed at 1/30/2022 0600  Gross per 24 hour   Intake 2.7 ml   Output  1300 ml   Net -1297.3 ml      Physical Exam  Vitals reviewed.   Constitutional:       General: He is not in acute distress.     Appearance: Normal appearance.   HENT:      Head: Normocephalic and atraumatic.      Right Ear: External ear normal.      Left Ear: External ear normal.   Eyes:      Extraocular Movements: Extraocular movements intact.      Pupils: Pupils are equal, round, and reactive to light.   Cardiovascular:      Rate and Rhythm: Normal rate and regular rhythm.      Pulses: Normal pulses.      Heart sounds: Normal heart sounds. No murmur heard.      Pulmonary:      Effort: Pulmonary effort is normal. No respiratory distress.      Breath sounds: Normal breath sounds. No wheezing.   Chest:      Chest wall: No tenderness.   Abdominal:      Palpations: Abdomen is soft. There is no mass.      Tenderness: There is no abdominal tenderness. There is no right CVA tenderness or left CVA tenderness.      Comments: Protuberant with ascites.     Musculoskeletal:         General: No swelling or tenderness. Normal range of motion.      Comments: +2 pitting edema present BLE, and +1 pitting edema noted in bilateral upper extremity   Skin:     General: Skin is warm and dry.      Capillary Refill: Capillary refill takes less than 2 seconds.   Neurological:      General: No focal deficit present.      Mental Status: He is alert and oriented to person, place, and time. Mental status is at baseline.   Psychiatric:         Mood and Affect: Mood normal.         Thought Content: Thought content normal.         Significant Labs: All pertinent labs within the past 24 hours have been reviewed.    Significant Imaging: I have reviewed all pertinent imaging results/findings within the past 24 hours.      Assessment/Plan:      UTI (urinary tract infection)  uti 2/2 e coli  Cont rocephen      Elevated troponin level  Patient has a history of chronically elevated troponin level in 500s and today patient's troponin level was 596.   There was no ST-T abnormalities accompanying elevated troponin level.  No recent ischemia evaluation on record. Will consult Cardiology in a.m. for recommendations    1/25-cardiology seen pt    Hematuria  This is likely secondary to urinary tract infection.  Will empirically start patient on Rocephin.  As stated above, will elect to continue patient on Eliquis ( for Afib )      Chronic kidney disease  Patient has CKD stage 3.  Serum creatinine is at his baseline.  Will continue to monitor serum creatinine and urine output.  Patient likely has cardiorenal syndrome      Chronic a-fib  Patient with Long standing persistent (>12 months) atrial fibrillation which is controlled currently with Beta Blocker. Patient is currently in atrial fibrillation.[id number] Score: 3. Anticoagulation indicated. Anticoagulation done with Eliquis.  Will elect to continue patient on Eliquis.           Diabetes mellitus without complication  Hold oral hypoglycemics and start patient on sliding scale insulin to maintain CBG in the range of 130-180s      Hypertension  Continue present management        VTE Risk Mitigation (From admission, onward)         Ordered     apixaban tablet 5 mg  2 times daily         01/25/22 1253     Place sequential compression device  Until discontinued         01/25/22 0111                Discharge Planning   MADDIE:      Code Status: Full Code   Is the patient medically ready for discharge?:     Reason for patient still in hospital (select all that apply): Treatment  Discharge Plan A: Home                  Rehmat JAY Li MD  Department of Hospital Medicine   [address]

## 2022-01-30 NOTE — PROGRESS NOTES
[address]  [facility]  Progress Note    Patient Name: Jeanmarie Michelle  MRN: [id number]  Admission Date: 1/24/2022  Hospital Length of Stay: 5 days  Code Status: Full Code   Attending Physician: Geneva Li MD   Primary Care Physician: Regina Sprague MD  Expected Discharge Date:   Principal Problem:Acute on chronic systolic CHF (congestive heart failure)    Subjective:     Hospital Course:   No notes on file    Interval History: Doing well. Sitting up in chair. States he feels significant better.     Review of Systems   Cardiovascular: Positive for leg swelling. Negative for chest pain, irregular heartbeat, orthopnea and palpitations.   Respiratory: Positive for shortness of breath.    All other systems reviewed and are negative.    Objective:     Vital Signs (Most Recent):  Temp: 98.8 °F (37.1 °C) (01/30/22 0745)  Pulse: (!) 52 (01/30/22 0745)  Resp: 18 (01/30/22 0745)  BP: 98/67 (01/30/22 0745)  SpO2: 98 % (01/30/22 0745) Vital Signs (24h Range):  Temp:  [96.4 °F (35.8 °C)-98.8 °F (37.1 °C)] 98.8 °F (37.1 °C)  Pulse:  [] 52  Resp:  [18-19] 18  SpO2:  [95 %-100 %] 98 %  BP: ()/(43-70) 98/67     Weight: 99.7 kg (219 lb 12.8 oz)  Body mass index is 30.66 kg/m².     SpO2: 98 %  O2 Device (Oxygen Therapy): room air      Intake/Output Summary (Last 24 hours) at 1/30/2022 1152  Last data filed at 1/30/2022 0600  Gross per 24 hour   Intake 2.7 ml   Output 1300 ml   Net -1297.3 ml       Lines/Drains/Airways     Peripheral Intravenous Line                 Peripheral IV - Single Lumen 01/26/22 1014 20 G Distal;Posterior;Right Forearm 4 days         Peripheral IV - Single Lumen 01/29/22 1901 22 G Left;Posterior Hand <1 day                Physical Exam  Vitals reviewed.   Constitutional:       General: He is not in acute distress.     Appearance: Normal appearance.   HENT:      Nose: Nose normal.   Eyes:      Pupils: Pupils are equal, round, and reactive to light.   Neck:       Comments: Positive JVD  Cardiovascular:      Rate and Rhythm: Normal rate. Rhythm irregular.      Pulses: Normal pulses.      Heart sounds: Normal heart sounds.   Pulmonary:      Effort: Pulmonary effort is normal.      Breath sounds: Normal breath sounds.   Abdominal:      Comments: Swelling up to lower abdomen     Musculoskeletal:         General: Swelling ( 2+ pitting edema extending from the lower extremities to his abdomen) present. Normal range of motion.      Right lower leg: Edema present.      Left lower leg: Edema present.   Neurological:      General: No focal deficit present.      Mental Status: He is alert and oriented to person, place, and time.   Psychiatric:         Mood and Affect: Mood normal.         Significant Labs:   BMP:   Recent Labs   Lab 01/29/22  0450 01/29/22  1733   * 278*    139   K 3.5 4.1    102   CO2 34* 29   BUN 22* 23*   CREATININE 1.42* 1.46*   CALCIUM 8.0* 8.1*   , CMP   Recent Labs   Lab 01/29/22  0450 01/29/22  1733    139   K 3.5 4.1    102   CO2 34* 29   * 278*   BUN 22* 23*   CREATININE 1.42* 1.46*   CALCIUM 8.0* 8.1*   PROT  --  6.1*   ALBUMIN  --  2.7*   BILITOT  --  0.8   ALKPHOS  --  96   AST  --  31   ALT  --  23   ANIONGAP 9 12   EGFRNONAA 51* 49*   , CBC No results for input(s): WBC, HGB, HCT, PLT in the last 48 hours., Troponin No results for input(s): TROPONINI in the last 48 hours. and All pertinent lab results from the last 24 hours have been reviewed.    Significant Imaging: Echocardiogram:   Transthoracic echo (TTE) complete (Cupid Only):   Results for orders placed or performed during the hospital encounter of 12/08/21   Echo   Result Value Ref Range    IVC diameter 2.24 cm    Left Ventricular Outflow Tract Mean Gradient 2.00 mmHg    AORTIC VALVE CUSP SEPERATION 15.053835433510997 cm    LVIDd 6.23 (A) 3.5 - 6.0 cm    IVS 0.92 0.6 - 1.1 cm    Posterior Wall 0.92 0.6 - 1.1 cm    LVIDs 5.77 (A) 2.1 - 4.0 cm    FS 7 28 - 44 %     LV mass 236.68 g    LA size 4.63 cm    RVDD 3.77 cm    Left Ventricle Relative Wall Thickness 0.30 cm    AV mean gradient 4 mmHg    AV valve area 2.31 cm2    AV index (prosthetic) 0.70     E wave deceleration time 148 msec    LVOT diameter 2.05 cm    LVOT area 3.3 cm2    LVOT peak VTI 13.89 cm    Ao VTI 19.82 cm    LVOT stroke volume 45.82 cm3    MV Peak E Arash 0.76 m/s    TR Max Arash 3.42 m/s    LV Systolic Volume 164.59 mL    LV Diastolic Volume 196.13 mL    Echo EF Estimated 16 %    Triscuspid Valve Regurgitation Peak Gradient 47 mmHg    EF 15 %    Narrative    · Atrial fibrillation with frequent PVC's, rate 85 and occasional runs of   wide complex tachycardia.  · The left ventricle is moderately enlarged with  · Atrial fibrillation observed.  · Mild right ventricular enlargement.  · Moderate mitral regurgitation.  · Moderate to severe tricuspid regurgitation.  · There is pulmonary hypertension.  · Moderate left atrial enlargement.  · Mild right atrial enlargement.        Assessment and Plan:     Brief HPI:     * Acute on chronic systolic CHF (congestive heart failure)  HFrEF, nonsichemic cardiomyopathy: EF 15% with moderate-severe TR (12/8/21); NYHA III-IV Stage D  Plan for ICD placement by Dr. Carlson next month  BNP at 15,000 (10,000 at baseline)  GDMT: carvedilol 3.125mg BID, Entresto 97-103mg BID, spironolactone 25mg qd  - holding chlorthalidone after adding spironolactone   - holding farxiga considering hematuria with UTI; restart on discharge   - consider torsemide 20mg BID on discharge   Increased lasix from IV lasix 60mg BID IV lasix 80mg BID  Started a dobutamine infusion to enhance IV diuresis      1/26/22  - continue diuresis with IV lasix and dobutamine as above   - daily weights, strict I&Os  - urine is positive for e.coli, do not recommend restarting SGLT2  (Farxiga) at discharge as this may be cause for recurrent UTI   - continue entresto, coreg, aldactone as above  - recommend torsemide 20mg  bid in place of lasix at discharge    1/28/22:  - creatinine has trended up  - decrease IV lasix to 80mg BID (was 120mg BID)  - continue dobutamine gtt  - continue Entresto, Aladactone  - recommend torsemide 20mg BID in place of lasix at discharge  - urine is positive for e.coli, do not recommend restarting SGLT2  (Farxiga) at discharge as this may be cause for recurrent UTI    1/30/22:  - Creatinine stable on IV lasix 80mg bid and IV dobutamine  - MAPs around 60s; holding all GDMT for now  - Remains floridly volume overloaded (Swelling up to thighs now)        UTI (urinary tract infection)  ECOli UTI  S/P cystoscopy  Hold SGLT2i    Elevated troponin level  Pt has a history of chronically elevated troponins in the 500s  EKG reveals atrial fibrillation with PVCs  Elevated troponins most likely 2/2 myocardial injury due to his heart failure exacerbation    Hematuria  Currently receiving rocephin for UTI  Primary following    Chronic kidney disease  Pt has CKD Stage 3  Serum Cr 2.01 which is at his baseline  Continue to monitor with IV diuresis    1/28/22:  - creatinine is 1.63 (was 1.58 yesterday)  - decreased IV lasix dose  - continue dobutamine gtt  - bmp in AM    Chronic a-fib  Persistent, longstanding atrial fibrillation  Currently in rate controlled atrial fibrillation with frequent multifocal PVCs  [id number] of 4  Currently on eliquis 5mg BID     Diabetes mellitus without complication  Holding farxiga, currently on SSI    Hypertension  - BP is controlled at present  - Continue current meds (Entresto, Aldactone)            VTE Risk Mitigation (From admission, onward)         Ordered     apixaban tablet 5 mg  2 times daily         01/25/22 1253     Place sequential compression device  Until discontinued         01/25/22 0111                Jude Robb MD  Cardiology  [facility] - [address] [place] [address]

## 2022-01-30 NOTE — SUBJECTIVE & OBJECTIVE
Interval History: Doing well. Sitting up in chair. States he feels significant better.     Review of Systems   Cardiovascular: Positive for leg swelling. Negative for chest pain, irregular heartbeat, orthopnea and palpitations.   Respiratory: Positive for shortness of breath.    All other systems reviewed and are negative.    Objective:     Vital Signs (Most Recent):  Temp: 98.8 °F (37.1 °C) (01/30/22 0745)  Pulse: (!) 52 (01/30/22 0745)  Resp: 18 (01/30/22 0745)  BP: 98/67 (01/30/22 0745)  SpO2: 98 % (01/30/22 0745) Vital Signs (24h Range):  Temp:  [96.4 °F (35.8 °C)-98.8 °F (37.1 °C)] 98.8 °F (37.1 °C)  Pulse:  [] 52  Resp:  [18-19] 18  SpO2:  [95 %-100 %] 98 %  BP: ()/(43-70) 98/67     Weight: 99.7 kg (219 lb 12.8 oz)  Body mass index is 30.66 kg/m².     SpO2: 98 %  O2 Device (Oxygen Therapy): room air      Intake/Output Summary (Last 24 hours) at 1/30/2022 1152  Last data filed at 1/30/2022 0600  Gross per 24 hour   Intake 2.7 ml   Output 1300 ml   Net -1297.3 ml       Lines/Drains/Airways     Peripheral Intravenous Line                 Peripheral IV - Single Lumen 01/26/22 1014 20 G Distal;Posterior;Right Forearm 4 days         Peripheral IV - Single Lumen 01/29/22 1901 22 G Left;Posterior Hand <1 day                Physical Exam  Vitals reviewed.   Constitutional:       General: He is not in acute distress.     Appearance: Normal appearance.   HENT:      Nose: Nose normal.   Eyes:      Pupils: Pupils are equal, round, and reactive to light.   Neck:      Comments: Positive JVD  Cardiovascular:      Rate and Rhythm: Normal rate. Rhythm irregular.      Pulses: Normal pulses.      Heart sounds: Normal heart sounds.   Pulmonary:      Effort: Pulmonary effort is normal.      Breath sounds: Normal breath sounds.   Abdominal:      Comments: Swelling up to lower abdomen     Musculoskeletal:         General: Swelling ( 2+ pitting edema extending from the lower extremities to his abdomen) present. Normal  range of motion.      Right lower leg: Edema present.      Left lower leg: Edema present.   Neurological:      General: No focal deficit present.      Mental Status: He is alert and oriented to person, place, and time.   Psychiatric:         Mood and Affect: Mood normal.         Significant Labs:   BMP:   Recent Labs   Lab 01/29/22  0450 01/29/22  1733   * 278*    139   K 3.5 4.1    102   CO2 34* 29   BUN 22* 23*   CREATININE 1.42* 1.46*   CALCIUM 8.0* 8.1*   , CMP   Recent Labs   Lab 01/29/22  0450 01/29/22  1733    139   K 3.5 4.1    102   CO2 34* 29   * 278*   BUN 22* 23*   CREATININE 1.42* 1.46*   CALCIUM 8.0* 8.1*   PROT  --  6.1*   ALBUMIN  --  2.7*   BILITOT  --  0.8   ALKPHOS  --  96   AST  --  31   ALT  --  23   ANIONGAP 9 12   EGFRNONAA 51* 49*   , CBC No results for input(s): WBC, HGB, HCT, PLT in the last 48 hours., Troponin No results for input(s): TROPONINI in the last 48 hours. and All pertinent lab results from the last 24 hours have been reviewed.    Significant Imaging: Echocardiogram:   Transthoracic echo (TTE) complete (Cupid Only):   Results for orders placed or performed during the hospital encounter of 12/08/21   Echo   Result Value Ref Range    IVC diameter 2.24 cm    Left Ventricular Outflow Tract Mean Gradient 2.00 mmHg    AORTIC VALVE CUSP SEPERATION 15.551785023917179 cm    LVIDd 6.23 (A) 3.5 - 6.0 cm    IVS 0.92 0.6 - 1.1 cm    Posterior Wall 0.92 0.6 - 1.1 cm    LVIDs 5.77 (A) 2.1 - 4.0 cm    FS 7 28 - 44 %    LV mass 236.68 g    LA size 4.63 cm    RVDD 3.77 cm    Left Ventricle Relative Wall Thickness 0.30 cm    AV mean gradient 4 mmHg    AV valve area 2.31 cm2    AV index (prosthetic) 0.70     E wave deceleration time 148 msec    LVOT diameter 2.05 cm    LVOT area 3.3 cm2    LVOT peak VTI 13.89 cm    Ao VTI 19.82 cm    LVOT stroke volume 45.82 cm3    MV Peak E Arash 0.76 m/s    TR Max Arash 3.42 m/s    LV Systolic Volume 164.59 mL    LV Diastolic  Volume 196.13 mL    Echo EF Estimated 16 %    Triscuspid Valve Regurgitation Peak Gradient 47 mmHg    EF 15 %    Narrative    · Atrial fibrillation with frequent PVC's, rate 85 and occasional runs of   wide complex tachycardia.  · The left ventricle is moderately enlarged with  · Atrial fibrillation observed.  · Mild right ventricular enlargement.  · Moderate mitral regurgitation.  · Moderate to severe tricuspid regurgitation.  · There is pulmonary hypertension.  · Moderate left atrial enlargement.  · Mild right atrial enlargement.

## 2022-01-31 LAB
ANION GAP SERPL CALCULATED.3IONS-SCNC: 14 MMOL/L (ref 7–16)
BUN SERPL-MCNC: 21 MG/DL (ref 7–18)
BUN/CREAT SERPL: 17 (ref 6–20)
CALCIUM SERPL-MCNC: 8.4 MG/DL (ref 8.5–10.1)
CHLORIDE SERPL-SCNC: 101 MMOL/L (ref 98–107)
CO2 SERPL-SCNC: 28 MMOL/L (ref 21–32)
CREAT SERPL-MCNC: 1.24 MG/DL (ref 0.7–1.3)
GLUCOSE SERPL-MCNC: 164 MG/DL (ref 70–105)
GLUCOSE SERPL-MCNC: 199 MG/DL (ref 70–105)
GLUCOSE SERPL-MCNC: 242 MG/DL (ref 70–105)
GLUCOSE SERPL-MCNC: 86 MG/DL (ref 70–105)
GLUCOSE SERPL-MCNC: 92 MG/DL (ref 74–106)
POTASSIUM SERPL-SCNC: 3.9 MMOL/L (ref 3.5–5.1)
SODIUM SERPL-SCNC: 139 MMOL/L (ref 136–145)

## 2022-01-31 PROCEDURE — 99233 SBSQ HOSP IP/OBS HIGH 50: CPT | Mod: ,,, | Performed by: INTERNAL MEDICINE

## 2022-01-31 PROCEDURE — 82962 GLUCOSE BLOOD TEST: CPT

## 2022-01-31 PROCEDURE — 80048 BASIC METABOLIC PNL TOTAL CA: CPT | Performed by: INTERNAL MEDICINE

## 2022-01-31 PROCEDURE — 63600175 PHARM REV CODE 636 W HCPCS: Performed by: NURSE PRACTITIONER

## 2022-01-31 PROCEDURE — C9399 UNCLASSIFIED DRUGS OR BIOLOG: HCPCS | Performed by: INTERNAL MEDICINE

## 2022-01-31 PROCEDURE — 99233 PR SUBSEQUENT HOSPITAL CARE,LEVL III: ICD-10-PCS | Mod: ,,, | Performed by: INTERNAL MEDICINE

## 2022-01-31 PROCEDURE — 63600175 PHARM REV CODE 636 W HCPCS: Performed by: INTERNAL MEDICINE

## 2022-01-31 PROCEDURE — 25000003 PHARM REV CODE 250: Performed by: INTERNAL MEDICINE

## 2022-01-31 PROCEDURE — 11000001 HC ACUTE MED/SURG PRIVATE ROOM

## 2022-01-31 PROCEDURE — 36415 COLL VENOUS BLD VENIPUNCTURE: CPT | Performed by: INTERNAL MEDICINE

## 2022-01-31 RX ORDER — METOLAZONE 2.5 MG/1
2.5 TABLET ORAL ONCE
Status: COMPLETED | OUTPATIENT
Start: 2022-01-31 | End: 2022-01-31

## 2022-01-31 RX ADMIN — CEFTRIAXONE SODIUM 1 G: 1 INJECTION, POWDER, FOR SOLUTION INTRAMUSCULAR; INTRAVENOUS at 08:01

## 2022-01-31 RX ADMIN — APIXABAN 5 MG: 5 TABLET, FILM COATED ORAL at 08:01

## 2022-01-31 RX ADMIN — EZETIMIBE 10 MG: 10 TABLET ORAL at 08:01

## 2022-01-31 RX ADMIN — FUROSEMIDE 80 MG: 10 INJECTION INTRAMUSCULAR; INTRAVENOUS at 05:01

## 2022-01-31 RX ADMIN — TAMSULOSIN HYDROCHLORIDE 0.4 MG: 0.4 CAPSULE ORAL at 08:01

## 2022-01-31 RX ADMIN — PANTOPRAZOLE SODIUM 40 MG: 40 TABLET, DELAYED RELEASE ORAL at 09:01

## 2022-01-31 RX ADMIN — DOBUTAMINE HYDROCHLORIDE 5 MCG/KG/MIN: 400 INJECTION INTRAVENOUS at 10:01

## 2022-01-31 RX ADMIN — INSULIN ASPART 2 UNITS: 100 INJECTION, SOLUTION INTRAVENOUS; SUBCUTANEOUS at 05:01

## 2022-01-31 RX ADMIN — INSULIN DETEMIR 10 UNITS: 100 INJECTION, SOLUTION SUBCUTANEOUS at 08:01

## 2022-01-31 RX ADMIN — ASPIRIN 81 MG: 81 TABLET, COATED ORAL at 08:01

## 2022-01-31 RX ADMIN — METOLAZONE 2.5 MG: 2.5 TABLET ORAL at 08:01

## 2022-01-31 NOTE — ASSESSMENT & PLAN NOTE
- Persistent, longstanding atrial fibrillation  - Currently in rate controlled atrial fibrillation with frequent multifocal PVCs  - [id number] of 4  - Currently on eliquis 5mg BID

## 2022-01-31 NOTE — PROGRESS NOTES
"Mr. Michelle was seen this morning and his urine is completely clear.  He is voiding without complaints.  His penile and scrotal edema has gone down and I was able to retract his foreskin to see the urethral meatus.    Dr. Nath does not think any additional workup is needed for his hematuria.  When Dr. Nath did the Cystoscopy on Mr. Michelle he said "the bladder mucosa had evidence of recent bleeding like would be seen with hemorrhagic cystitis. I did not see any evidence of bladder tumors or other serious causes for hematuria."  He said he felt Mr. Michelle has benign hematuria.  Dr. Nath will be glad to see Mr. Michelle again if he has any additional problems.      Mr. Michelle says he's feeling a lot better this morning than he has in the past several days.    GETACHEW Dolan, TANISHA,  [facility]  "

## 2022-01-31 NOTE — PROGRESS NOTES
[address]  Cardiology  Progress Note    Patient Name: Jeanmarie Michelle  MRN: [id number]  Admission Date: 1/24/2022  Hospital Length of Stay: 6 days  Code Status: Full Code   Attending Physician: Geneva Li MD   Primary Care Physician: Regina Sprague MD  Expected Discharge Date:   Principal Problem:Acute on chronic systolic CHF (congestive heart failure)    Subjective:       Interval History: Sitting up in bed awake, alert. Denies chest pain or SOB. States he is feeling and breathing better and has noticed his swelling is improving also.     Review of Systems   Cardiovascular: Positive for dyspnea on exertion and leg swelling. Negative for chest pain, irregular heartbeat, orthopnea and palpitations.   Respiratory: Negative for shortness of breath.    All other systems reviewed and are negative.    Objective:     Vital Signs (Most Recent):  Temp: 97.4 °F (36.3 °C) (01/31/22 1000)  Pulse: 80 (01/31/22 1000)  Resp: 18 (01/31/22 1000)  BP: (!) 106/54 (01/31/22 1000)  SpO2: 99 % (01/31/22 1000) Vital Signs (24h Range):  Temp:  [97.2 °F (36.2 °C)-97.9 °F (36.6 °C)] 97.4 °F (36.3 °C)  Pulse:  [78-98] 80  Resp:  [18-20] 18  SpO2:  [97 %-100 %] 99 %  BP: ()/(54-70) 106/54     Weight: 99.9 kg (220 lb 3.8 oz)  Body mass index is 30.72 kg/m².     SpO2: 99 %  O2 Device (Oxygen Therapy): room air      Intake/Output Summary (Last 24 hours) at 1/31/2022 1357  Last data filed at 1/31/2022 0400  Gross per 24 hour   Intake --   Output 1050 ml   Net -1050 ml       Lines/Drains/Airways     Peripheral Intravenous Line                 Peripheral IV - Single Lumen 01/26/22 1014 20 G Distal;Posterior;Right Forearm 5 days         Peripheral IV - Single Lumen 01/29/22 1901 22 G Left;Posterior Hand 1 day                Physical Exam  Constitutional:       General: He is not in acute distress.     Appearance: He is not ill-appearing.   Eyes:      Pupils: Pupils are equal, round, and reactive to light.    Cardiovascular:      Rate and Rhythm: Normal rate. Rhythm irregularly irregular.      Pulses: Normal pulses and intact distal pulses.      Heart sounds: Normal heart sounds.      Comments: Elevated JVP  Pulmonary:      Effort: Pulmonary effort is normal.      Breath sounds: Normal breath sounds.   Musculoskeletal:      Cervical back: Normal range of motion and neck supple.      Right lower le+ Pitting Edema present.      Left lower le+ Pitting Edema present.      Comments: 2+ pitting edema to BLE extending up to his thighs   Skin:     General: Skin is warm and dry.   Neurological:      General: No focal deficit present.      Mental Status: He is alert.         Significant Labs:   All pertinent lab results from the last 24 hours have been reviewed. and   Recent Lab Results       22  1021   22  0629   22  0512   22  0103   22  1748        Anion Gap   14       12       BUN   21       20       BUN/CREAT RATIO   17       15       Calcium   8.4       8.2       Chloride   101       100       CO2   28       30       Creatinine   1.24       1.37       eGFR if non    59       53       Glucose   92       250       POC Glucose 199     86   164         Potassium   3.9       3.7       Sodium   139       138                        22  1555        Anion Gap       BUN       BUN/CREAT RATIO       Calcium       Chloride       CO2       Creatinine       eGFR if non African American       Glucose       POC Glucose 192       Potassium       Sodium             Significant Imaging: Echocardiogram:   Transthoracic echo (TTE) complete (Cupid Only):   Results for orders placed or performed during the hospital encounter of 21   Echo   Result Value Ref Range    IVC diameter 2.24 cm    Left Ventricular Outflow Tract Mean Gradient 2.00 mmHg    AORTIC VALVE CUSP SEPERATION [id number] cm    LVIDd 6.23 (A) 3.5 - 6.0 cm    IVS 0.92 0.6 - 1.1 cm    Posterior Wall 0.92 0.6 -  1.1 cm    LVIDs 5.77 (A) 2.1 - 4.0 cm    FS 7 28 - 44 %    LV mass 236.68 g    LA size 4.63 cm    RVDD 3.77 cm    Left Ventricle Relative Wall Thickness 0.30 cm    AV mean gradient 4 mmHg    AV valve area 2.31 cm2    AV index (prosthetic) 0.70     E wave deceleration time 148 msec    LVOT diameter 2.05 cm    LVOT area 3.3 cm2    LVOT peak VTI 13.89 cm    Ao VTI 19.82 cm    LVOT stroke volume 45.82 cm3    MV Peak E Arash 0.76 m/s    TR Max Arash 3.42 m/s    LV Systolic Volume 164.59 mL    LV Diastolic Volume 196.13 mL    Echo EF Estimated 16 %    Triscuspid Valve Regurgitation Peak Gradient 47 mmHg    EF 15 %    Narrative    · Atrial fibrillation with frequent PVC's, rate 85 and occasional runs of   wide complex tachycardia.  · The left ventricle is moderately enlarged with  · Atrial fibrillation observed.  · Mild right ventricular enlargement.  · Moderate mitral regurgitation.  · Moderate to severe tricuspid regurgitation.  · There is pulmonary hypertension.  · Moderate left atrial enlargement.  · Mild right atrial enlargement.        Assessment and Plan:     Brief HPI: Mr. Jeanmarie Michelle is a 80y/o M with persistent atrial fibrillation, nonischemic dilated cardiomyopathy- HFrEF with last EF 15% (12/8/21) scheduled to undergo ICD placement with Dr. Carlson in February 2022, stage III CKD, HTN and T2DM presenting with SOB with increased edema. He was recently seen at his cardiologist's office (1/21/22) and his Lasix was increased from 80mg to 120mg PO qd with plans for an ICD next month. On day of admission he had seen his PCP who noted his weight had increased approximately 20 lbs within the past few weeks and was advised to go to the ED. In the ER he describes he has been SOB for 2-3 weeks with increasing lower extremity and abdominal edema. He sleeps on 2 pillows a night and occasionally sleeps in an upright chair. He reports having CELESTIN, orthopnea and nocturnal dyspnea. He also reports having hematuria starting  yesterday. In the ED his VS were stable with HR 69, BP 98/69 with pertinent labs including a troponin trending from 596, 536, 514 to 546. His troponins have been consistently elevated in the 500s, 3 weeks ago. His EKG revealed atrial fibrillation with frequent PVCs with HR 89. His U/A revealed TNTC RBC with 20-50 WBC, culture pending.     Currently the patient is in no acute distress but is hypervolemic with significant edema of his lower extremeties and abdomen. He denies chest pain and is able to urinate. He has received IV lasix 40mg once in the ED with 900 cc of urine output for the past 24 hours. Current weight is 104.3kg. Plan is to IV diurese with dobutamine and lasix. Eliquis is being held due to his hematuria. Will continue to monitor.         * Acute on chronic systolic CHF (congestive heart failure)  HFrEF, nonsichemic cardiomyopathy: EF 15% with moderate-severe TR (12/8/21); NYHA III-IV Stage D  Plan for ICD placement by Dr. Carlson next month  BNP at 15,000 (10,000 at baseline)  GDMT: carvedilol 3.125mg BID, Entresto 97-103mg BID, spironolactone 25mg qd  - holding chlorthalidone after adding spironolactone   - holding farxiga considering hematuria with UTI; restart on discharge   - consider torsemide 20mg BID on discharge   Increased lasix from IV lasix 60mg BID IV lasix 80mg BID  Started a dobutamine infusion to enhance IV diuresis      1/26/22  - continue diuresis with IV lasix and dobutamine as above   - daily weights, strict I&Os  - urine is positive for e.coli, do not recommend restarting SGLT2  (Farxiga) at discharge as this may be cause for recurrent UTI   - continue entresto, coreg, aldactone as above  - recommend torsemide 20mg bid in place of lasix at discharge    1/28/22:  - creatinine has trended up  - decrease IV lasix to 80mg BID (was 120mg BID)  - continue dobutamine gtt  - continue Entresto, Aladactone  - recommend torsemide 20mg BID in place of lasix at discharge  - urine is positive  for e.coli, do not recommend restarting SGLT2  (Farxiga) at discharge as this may be cause for recurrent UTI    1/30/22:  - Creatinine stable on IV lasix 80mg bid and IV dobutamine  - MAPs around 60s; holding all GDMT for now  - Remains floridly volume overloaded (Swelling up to thighs now)    1/31/22:  - creatinine continues to improve with diuresis, now 1.24  - continue IV lasix 80mg bid and IV dobutamine  - MAPs around 60s; holding all GDMT for now  - remains fluid volume overloaded but improving (swelling up to thighs)        UTI (urinary tract infection)  - ECOli UTI  - S/P cystoscopy  - Hold SGLT2i    Elevated troponin level  - Pt has a history of chronically elevated troponins in the 500s  - EKG reveals atrial fibrillation with PVCs  - Elevated troponins most likely 2/2 myocardial injury due to his heart failure exacerbation    Hematuria  Currently receiving rocephin for UTI  Primary following    Chronic kidney disease  Pt has CKD Stage 3  Serum Cr 2.01 which is at his baseline  Continue to monitor with IV diuresis    1/28/22:  - creatinine is 1.63 (was 1.58 yesterday)  - decreased IV lasix dose  - continue dobutamine gtt  - bmp in AM    1/31/22:  - creatinine continues to improve with diuresis, 1.24 today  - continue IV lasix and dobutamine  - bmp in AM    Chronic a-fib  - Persistent, longstanding atrial fibrillation  - Currently in rate controlled atrial fibrillation with frequent multifocal PVCs  - [id number] of 4  - Currently on eliquis 5mg BID     Diabetes mellitus without complication  Holding farxiga, currently on SSI    Hypertension  - MAPs around 60s; holding all GDMT for now            VTE Risk Mitigation (From admission, onward)         Ordered     apixaban tablet 5 mg  2 times daily         01/25/22 1253     Place sequential compression device  Until discontinued         01/25/22 0111              PT seen and examined, chart reviewed, essentially agree with findings as documented.    CC: alyssa  of breath, lower extremity swelling  HPI: Pt reports is less short of breath.  Denies chest pain, pressure or palpitations  PE: agree with findings as documented    Labs, Xrays, EKGs, tele reviewed.    IMP/Plan:    1. Acute on chronic systolic heart failure, improving with IV diuresis, continues positive fluid balance, will increase diuresis, continue IV inotropic tx.   .  2. Elevated troponin, most consistent with Type 2 MI.       ROLY Moran  Cardiology  [facility] - [address] [place] [address]

## 2022-01-31 NOTE — SUBJECTIVE & OBJECTIVE
Interval History: Sitting up in bed awake, alert. Denies chest pain or SOB. States he is feeling and breathing better and has noticed his swelling is improving also.     Review of Systems   Cardiovascular: Positive for dyspnea on exertion and leg swelling. Negative for chest pain, irregular heartbeat, orthopnea and palpitations.   Respiratory: Negative for shortness of breath.    All other systems reviewed and are negative.    Objective:     Vital Signs (Most Recent):  Temp: 97.4 °F (36.3 °C) (22 1000)  Pulse: 80 (22 1000)  Resp: 18 (22 1000)  BP: (!) 106/54 (22 1000)  SpO2: 99 % (22 1000) Vital Signs (24h Range):  Temp:  [97.2 °F (36.2 °C)-97.9 °F (36.6 °C)] 97.4 °F (36.3 °C)  Pulse:  [78-98] 80  Resp:  [18-20] 18  SpO2:  [97 %-100 %] 99 %  BP: ()/(54-70) 106/54     Weight: 99.9 kg (220 lb 3.8 oz)  Body mass index is 30.72 kg/m².     SpO2: 99 %  O2 Device (Oxygen Therapy): room air      Intake/Output Summary (Last 24 hours) at 2022 1357  Last data filed at 2022 0400  Gross per 24 hour   Intake --   Output 1050 ml   Net -1050 ml       Lines/Drains/Airways     Peripheral Intravenous Line                 Peripheral IV - Single Lumen 22 1014 20 G Distal;Posterior;Right Forearm 5 days         Peripheral IV - Single Lumen 22 1901 22 G Left;Posterior Hand 1 day                Physical Exam  Constitutional:       General: He is not in acute distress.     Appearance: He is not ill-appearing.   Eyes:      Pupils: Pupils are equal, round, and reactive to light.   Cardiovascular:      Rate and Rhythm: Normal rate. Rhythm irregularly irregular.      Pulses: Normal pulses and intact distal pulses.      Heart sounds: Normal heart sounds.      Comments: Elevated JVP  Pulmonary:      Effort: Pulmonary effort is normal.      Breath sounds: Normal breath sounds.   Musculoskeletal:      Cervical back: Normal range of motion and neck supple.      Right lower le+ Pitting  Edema present.      Left lower le+ Pitting Edema present.      Comments: 2+ pitting edema to BLE extending up to his thighs   Skin:     General: Skin is warm and dry.   Neurological:      General: No focal deficit present.      Mental Status: He is alert.         Significant Labs:   All pertinent lab results from the last 24 hours have been reviewed. and   Recent Lab Results       22  1021   22  0629   22  0512   22  0103   22  1748        Anion Gap   14       12       BUN   21       20       BUN/CREAT RATIO   17       15       Calcium   8.4       8.2       Chloride   101       100       CO2   28       30       Creatinine   1.24       1.37       eGFR if non    59       53       Glucose   92       250       POC Glucose 199     86   164         Potassium   3.9       3.7       Sodium   139       138                        22  1555        Anion Gap       BUN       BUN/CREAT RATIO       Calcium       Chloride       CO2       Creatinine       eGFR if non African American       Glucose       POC Glucose 192       Potassium       Sodium             Significant Imaging: Echocardiogram:   Transthoracic echo (TTE) complete (Cupid Only):   Results for orders placed or performed during the hospital encounter of 21   Echo   Result Value Ref Range    IVC diameter 2.24 cm    Left Ventricular Outflow Tract Mean Gradient 2.00 mmHg    AORTIC VALVE CUSP SEPERATION 15.029244330254373 cm    LVIDd 6.23 (A) 3.5 - 6.0 cm    IVS 0.92 0.6 - 1.1 cm    Posterior Wall 0.92 0.6 - 1.1 cm    LVIDs 5.77 (A) 2.1 - 4.0 cm    FS 7 28 - 44 %    LV mass 236.68 g    LA size 4.63 cm    RVDD 3.77 cm    Left Ventricle Relative Wall Thickness 0.30 cm    AV mean gradient 4 mmHg    AV valve area 2.31 cm2    AV index (prosthetic) 0.70     E wave deceleration time 148 msec    LVOT diameter 2.05 cm    LVOT area 3.3 cm2    LVOT peak VTI 13.89 cm    Ao VTI 19.82 cm    LVOT stroke volume 45.82 cm3    MV  Peak E Arash 0.76 m/s    TR Max Arash 3.42 m/s    LV Systolic Volume 164.59 mL    LV Diastolic Volume 196.13 mL    Echo EF Estimated 16 %    Triscuspid Valve Regurgitation Peak Gradient 47 mmHg    EF 15 %    Narrative    · Atrial fibrillation with frequent PVC's, rate 85 and occasional runs of   wide complex tachycardia.  · The left ventricle is moderately enlarged with  · Atrial fibrillation observed.  · Mild right ventricular enlargement.  · Moderate mitral regurgitation.  · Moderate to severe tricuspid regurgitation.  · There is pulmonary hypertension.  · Moderate left atrial enlargement.  · Mild right atrial enlargement.

## 2022-01-31 NOTE — ASSESSMENT & PLAN NOTE
- Pt has a history of chronically elevated troponins in the 500s  - EKG reveals atrial fibrillation with PVCs  - Elevated troponins most likely 2/2 myocardial injury due to his heart failure exacerbation

## 2022-01-31 NOTE — ASSESSMENT & PLAN NOTE
Pt has CKD Stage 3  Serum Cr 2.01 which is at his baseline  Continue to monitor with IV diuresis    1/28/22:  - creatinine is 1.63 (was 1.58 yesterday)  - decreased IV lasix dose  - continue dobutamine gtt  - bmp in AM    1/31/22:  - creatinine continues to improve with diuresis, 1.24 today  - continue IV lasix and dobutamine  - bmp in AM

## 2022-01-31 NOTE — PROGRESS NOTES
[address] Medicine  Progress Note    Patient Name: Jeanmarie Michelle  MRN: [id number]  Patient Class: IP- Inpatient   Admission Date: 1/24/2022  Length of Stay: 6 days  Attending Physician: Geneva Li MD  Primary Care Provider: Regina Sprague MD        Subjective:     Principal Problem:Acute on chronic systolic CHF (congestive heart failure)        HPI:  Patient is an 81-year-old male with a history of persistent atrial fibrillation on Eliquis, end-stage dilated cardiomyopathy the with last known EF of 15% scheduled to undergo ICD per Dr. Leigh in February, history of stage III CKD, type 2 diabetes and hypertension who was initially seen at Dr. Leigh office on Friday for shortness of breath with increased peripheral edema. Lasix was increased from  mg and pt felt better overall.  Patient was seen at his PCPs office earlier today for a 3 month follow-up and patient's PCP felt that patient was volume overloaded and after discussion was Dr. Leigh patient was instructed to come to the ED for evaluation.  In ED, patient was found to have lost weight from the time of Dr. Leigh visit by 14 lb.  Chest x-ray showed mild pulmonary edema which appeared chronic from previous chest x-ray 3 weeks ago.  [place] [address] [place] [address]. Troponin was elevated, but pt has chronically elevated troponin and EKG showed no ST-T abnormalities. Pt had hematuria in ED and when asked when this was started, pt stated that this started at late morning today, but failed to mention it to his PCP. Subsequent UA demonstrated a presence of UTI. Source of hematuria is likely due to UTI. Pt will be admitted for further evaluation and intervention.      Overview/Hospital Course:  No notes on file    Interval History:     NAEO  Feels improved  Continues to have improvement with dobutamine drip along w/ iv lasix  Renal fx improving  Continues to have a lot of  swelling  Will give one time dose of metolazone.     Review of Systems   Constitutional: Negative for chills, diaphoresis, fatigue and fever.   HENT: Negative for congestion, hearing loss, nosebleeds, postnasal drip, sore throat, tinnitus and trouble swallowing.    Eyes: Negative for photophobia, pain, discharge, itching and visual disturbance.   Respiratory: Negative for cough, shortness of breath, wheezing and stridor.    Cardiovascular: Positive for leg swelling. Negative for chest pain and palpitations.   Gastrointestinal: Negative for abdominal distention, abdominal pain, anal bleeding, blood in stool, constipation, diarrhea, nausea and vomiting.   Endocrine: Negative for cold intolerance, heat intolerance, polydipsia, polyphagia and polyuria.   Genitourinary: Negative for decreased urine volume, difficulty urinating, dysuria, flank pain, frequency, hematuria and urgency.   Musculoskeletal: Negative for arthralgias, back pain, gait problem, joint swelling, myalgias, neck pain and neck stiffness.   Skin: Negative for color change, pallor and rash.   Allergic/Immunologic: Negative for immunocompromised state.   Neurological: Negative for dizziness, tremors, seizures, syncope, facial asymmetry, speech difficulty, weakness, light-headedness, numbness and headaches.   Hematological: Negative for adenopathy. Does not bruise/bleed easily.     Objective:     Vital Signs (Most Recent):  Temp: 97.3 °F (36.3 °C) (01/31/22 0720)  Pulse: 97 (01/31/22 0720)  Resp: 18 (01/31/22 0720)  BP: 111/67 (01/31/22 0720)  SpO2: 97 % (01/31/22 0720) Vital Signs (24h Range):  Temp:  [97.2 °F (36.2 °C)-98.2 °F (36.8 °C)] 97.3 °F (36.3 °C)  Pulse:  [78-98] 97  Resp:  [18-20] 18  SpO2:  [97 %-100 %] 97 %  BP: ()/(61-76) 111/67     Weight: 99.9 kg (220 lb 3.8 oz)  Body mass index is 30.72 kg/m².    Intake/Output Summary (Last 24 hours) at 1/31/2022 1055  Last data filed at 1/31/2022 0400  Gross per 24 hour   Intake --   Output 1050 ml    Net -1050 ml      Physical Exam  Vitals reviewed.   Constitutional:       General: He is not in acute distress.     Appearance: Normal appearance.   HENT:      Head: Normocephalic and atraumatic.      Right Ear: External ear normal.      Left Ear: External ear normal.   Eyes:      Extraocular Movements: Extraocular movements intact.      Pupils: Pupils are equal, round, and reactive to light.   Cardiovascular:      Rate and Rhythm: Normal rate and regular rhythm.      Pulses: Normal pulses.      Heart sounds: Normal heart sounds. No murmur heard.      Pulmonary:      Effort: Pulmonary effort is normal. No respiratory distress.      Breath sounds: Normal breath sounds. No wheezing.   Chest:      Chest wall: No tenderness.   Abdominal:      Palpations: Abdomen is soft. There is no mass.      Tenderness: There is no abdominal tenderness. There is no right CVA tenderness or left CVA tenderness.      Comments: Protuberant with ascites.     Musculoskeletal:         General: No swelling or tenderness. Normal range of motion.      Right lower leg: Edema present.      Left lower leg: Edema present.      Comments: +2 pitting edema present BLE, and +1 pitting edema noted in bilateral upper extremity   Skin:     General: Skin is warm and dry.      Capillary Refill: Capillary refill takes less than 2 seconds.   Neurological:      General: No focal deficit present.      Mental Status: He is alert and oriented to person, place, and time. Mental status is at baseline.   Psychiatric:         Mood and Affect: Mood normal.         Thought Content: Thought content normal.         Significant Labs: All pertinent labs within the past 24 hours have been reviewed.    Significant Imaging: I have reviewed all pertinent imaging results/findings within the past 24 hours.      Assessment/Plan:      * Acute on chronic systolic CHF (congestive heart failure)    Cardiology consulted  Cont dobutamine drip and iv diuretics.       UTI (urinary  tract infection)  uti 2/2 e coli  Cont rocephen      Elevated troponin level  Patient has a history of chronically elevated troponin level in 500s and today patient's troponin level was 596.  There was no ST-T abnormalities accompanying elevated troponin level.  No recent ischemia evaluation on record. Will consult Cardiology in a.m. for recommendations    1/25-cardiology seen pt    Hematuria  This is likely secondary to urinary tract infection.  Will empirically start patient on Rocephin.  As stated above, will elect to continue patient on Eliquis ( for Afib )      Chronic kidney disease  Patient has CKD stage 3.  Serum creatinine is at his baseline.  Will continue to monitor serum creatinine and urine output.  Patient likely has cardiorenal syndrome      Chronic a-fib  Patient with Long standing persistent (>12 months) atrial fibrillation which is controlled currently with Beta Blocker. Patient is currently in atrial fibrillation.[id number] Score: 3. Anticoagulation indicated. Anticoagulation done with Eliquis.  Will elect to continue patient on Eliquis.           Diabetes mellitus without complication  Hold oral hypoglycemics and start patient on sliding scale insulin to maintain CBG in the range of 130-180s      Hypertension  Continue present management        VTE Risk Mitigation (From admission, onward)         Ordered     apixaban tablet 5 mg  2 times daily         01/25/22 1253     Place sequential compression device  Until discontinued         01/25/22 0111                Discharge Planning   MADDIE:      Code Status: Full Code   Is the patient medically ready for discharge?:     Reason for patient still in hospital (select all that apply): Treatment  Discharge Plan A: Home                  Rehmat JAY Li MD  Department of Hospital Medicine   [address]

## 2022-01-31 NOTE — SUBJECTIVE & OBJECTIVE
Interval History:     ISELA  Feels improved  Continues to have improvement with dobutamine drip along w/ iv lasix  Renal fx improving  Continues to have a lot of swelling  Will give one time dose of metolazone.     Review of Systems   Constitutional: Negative for chills, diaphoresis, fatigue and fever.   HENT: Negative for congestion, hearing loss, nosebleeds, postnasal drip, sore throat, tinnitus and trouble swallowing.    Eyes: Negative for photophobia, pain, discharge, itching and visual disturbance.   Respiratory: Negative for cough, shortness of breath, wheezing and stridor.    Cardiovascular: Positive for leg swelling. Negative for chest pain and palpitations.   Gastrointestinal: Negative for abdominal distention, abdominal pain, anal bleeding, blood in stool, constipation, diarrhea, nausea and vomiting.   Endocrine: Negative for cold intolerance, heat intolerance, polydipsia, polyphagia and polyuria.   Genitourinary: Negative for decreased urine volume, difficulty urinating, dysuria, flank pain, frequency, hematuria and urgency.   Musculoskeletal: Negative for arthralgias, back pain, gait problem, joint swelling, myalgias, neck pain and neck stiffness.   Skin: Negative for color change, pallor and rash.   Allergic/Immunologic: Negative for immunocompromised state.   Neurological: Negative for dizziness, tremors, seizures, syncope, facial asymmetry, speech difficulty, weakness, light-headedness, numbness and headaches.   Hematological: Negative for adenopathy. Does not bruise/bleed easily.     Objective:     Vital Signs (Most Recent):  Temp: 97.3 °F (36.3 °C) (01/31/22 0720)  Pulse: 97 (01/31/22 0720)  Resp: 18 (01/31/22 0720)  BP: 111/67 (01/31/22 0720)  SpO2: 97 % (01/31/22 0720) Vital Signs (24h Range):  Temp:  [97.2 °F (36.2 °C)-98.2 °F (36.8 °C)] 97.3 °F (36.3 °C)  Pulse:  [78-98] 97  Resp:  [18-20] 18  SpO2:  [97 %-100 %] 97 %  BP: ()/(61-76) 111/67     Weight: 99.9 kg (220 lb 3.8 oz)  Body mass  index is 30.72 kg/m².    Intake/Output Summary (Last 24 hours) at 1/31/2022 1055  Last data filed at 1/31/2022 0400  Gross per 24 hour   Intake --   Output 1050 ml   Net -1050 ml      Physical Exam  Vitals reviewed.   Constitutional:       General: He is not in acute distress.     Appearance: Normal appearance.   HENT:      Head: Normocephalic and atraumatic.      Right Ear: External ear normal.      Left Ear: External ear normal.   Eyes:      Extraocular Movements: Extraocular movements intact.      Pupils: Pupils are equal, round, and reactive to light.   Cardiovascular:      Rate and Rhythm: Normal rate and regular rhythm.      Pulses: Normal pulses.      Heart sounds: Normal heart sounds. No murmur heard.      Pulmonary:      Effort: Pulmonary effort is normal. No respiratory distress.      Breath sounds: Normal breath sounds. No wheezing.   Chest:      Chest wall: No tenderness.   Abdominal:      Palpations: Abdomen is soft. There is no mass.      Tenderness: There is no abdominal tenderness. There is no right CVA tenderness or left CVA tenderness.      Comments: Protuberant with ascites.     Musculoskeletal:         General: No swelling or tenderness. Normal range of motion.      Right lower leg: Edema present.      Left lower leg: Edema present.      Comments: +2 pitting edema present BLE, and +1 pitting edema noted in bilateral upper extremity   Skin:     General: Skin is warm and dry.      Capillary Refill: Capillary refill takes less than 2 seconds.   Neurological:      General: No focal deficit present.      Mental Status: He is alert and oriented to person, place, and time. Mental status is at baseline.   Psychiatric:         Mood and Affect: Mood normal.         Thought Content: Thought content normal.         Significant Labs: All pertinent labs within the past 24 hours have been reviewed.    Significant Imaging: I have reviewed all pertinent imaging results/findings within the past 24 hours.

## 2022-01-31 NOTE — ASSESSMENT & PLAN NOTE
HFrEF, nonsichemic cardiomyopathy: EF 15% with moderate-severe TR (12/8/21); NYHA III-IV Stage D  Plan for ICD placement by Dr. Carlson next month  BNP at 15,000 (10,000 at baseline)  GDMT: carvedilol 3.125mg BID, Entresto 97-103mg BID, spironolactone 25mg qd  - holding chlorthalidone after adding spironolactone   - holding farxiga considering hematuria with UTI; restart on discharge   - consider torsemide 20mg BID on discharge   Increased lasix from IV lasix 60mg BID IV lasix 80mg BID  Started a dobutamine infusion to enhance IV diuresis      1/26/22  - continue diuresis with IV lasix and dobutamine as above   - daily weights, strict I&Os  - urine is positive for e.coli, do not recommend restarting SGLT2  (Farxiga) at discharge as this may be cause for recurrent UTI   - continue entresto, coreg, aldactone as above  - recommend torsemide 20mg bid in place of lasix at discharge    1/28/22:  - creatinine has trended up  - decrease IV lasix to 80mg BID (was 120mg BID)  - continue dobutamine gtt  - continue Entresto, Aladactone  - recommend torsemide 20mg BID in place of lasix at discharge  - urine is positive for e.coli, do not recommend restarting SGLT2  (Farxiga) at discharge as this may be cause for recurrent UTI    1/30/22:  - Creatinine stable on IV lasix 80mg bid and IV dobutamine  - MAPs around 60s; holding all GDMT for now  - Remains floridly volume overloaded (Swelling up to thighs now)    1/31/22:  - creatinine continues to improve with diuresis, now 1.24  - continue IV lasix 80mg bid and IV dobutamine  - MAPs around 60s; holding all GDMT for now  - remains fluid volume overloaded but improving (swelling up to thighs)

## 2022-02-01 LAB
ANION GAP SERPL CALCULATED.3IONS-SCNC: 14 MMOL/L (ref 7–16)
BUN SERPL-MCNC: 22 MG/DL (ref 7–18)
BUN/CREAT SERPL: 14 (ref 6–20)
CALCIUM SERPL-MCNC: 8.8 MG/DL (ref 8.5–10.1)
CHLORIDE SERPL-SCNC: 95 MMOL/L (ref 98–107)
CO2 SERPL-SCNC: 33 MMOL/L (ref 21–32)
CREAT SERPL-MCNC: 1.52 MG/DL (ref 0.7–1.3)
GLUCOSE SERPL-MCNC: 107 MG/DL (ref 70–105)
GLUCOSE SERPL-MCNC: 160 MG/DL (ref 70–105)
GLUCOSE SERPL-MCNC: 167 MG/DL (ref 74–106)
GLUCOSE SERPL-MCNC: 174 MG/DL (ref 70–105)
GLUCOSE SERPL-MCNC: 196 MG/DL (ref 70–105)
POTASSIUM SERPL-SCNC: 3.5 MMOL/L (ref 3.5–5.1)
SODIUM SERPL-SCNC: 138 MMOL/L (ref 136–145)

## 2022-02-01 PROCEDURE — 99233 SBSQ HOSP IP/OBS HIGH 50: CPT | Mod: ,,, | Performed by: INTERNAL MEDICINE

## 2022-02-01 PROCEDURE — C9399 UNCLASSIFIED DRUGS OR BIOLOG: HCPCS | Performed by: INTERNAL MEDICINE

## 2022-02-01 PROCEDURE — 99233 PR SUBSEQUENT HOSPITAL CARE,LEVL III: ICD-10-PCS | Mod: ,,, | Performed by: INTERNAL MEDICINE

## 2022-02-01 PROCEDURE — 25000003 PHARM REV CODE 250: Performed by: INTERNAL MEDICINE

## 2022-02-01 PROCEDURE — 97161 PT EVAL LOW COMPLEX 20 MIN: CPT

## 2022-02-01 PROCEDURE — 63600175 PHARM REV CODE 636 W HCPCS: Performed by: INTERNAL MEDICINE

## 2022-02-01 PROCEDURE — 97165 OT EVAL LOW COMPLEX 30 MIN: CPT

## 2022-02-01 PROCEDURE — 25000003 PHARM REV CODE 250: Performed by: NURSE PRACTITIONER

## 2022-02-01 PROCEDURE — 36415 COLL VENOUS BLD VENIPUNCTURE: CPT | Performed by: INTERNAL MEDICINE

## 2022-02-01 PROCEDURE — 82962 GLUCOSE BLOOD TEST: CPT

## 2022-02-01 PROCEDURE — 11000001 HC ACUTE MED/SURG PRIVATE ROOM

## 2022-02-01 PROCEDURE — 80048 BASIC METABOLIC PNL TOTAL CA: CPT | Performed by: INTERNAL MEDICINE

## 2022-02-01 PROCEDURE — 63600175 PHARM REV CODE 636 W HCPCS: Performed by: NURSE PRACTITIONER

## 2022-02-01 RX ORDER — FUROSEMIDE 40 MG/1
40 TABLET ORAL 2 TIMES DAILY
Status: DISCONTINUED | OUTPATIENT
Start: 2022-02-01 | End: 2022-02-03 | Stop reason: HOSPADM

## 2022-02-01 RX ADMIN — TAMSULOSIN HYDROCHLORIDE 0.4 MG: 0.4 CAPSULE ORAL at 08:02

## 2022-02-01 RX ADMIN — PANTOPRAZOLE SODIUM 40 MG: 40 TABLET, DELAYED RELEASE ORAL at 08:02

## 2022-02-01 RX ADMIN — FUROSEMIDE 40 MG: 40 TABLET ORAL at 05:02

## 2022-02-01 RX ADMIN — APIXABAN 5 MG: 5 TABLET, FILM COATED ORAL at 09:02

## 2022-02-01 RX ADMIN — APIXABAN 5 MG: 5 TABLET, FILM COATED ORAL at 08:02

## 2022-02-01 RX ADMIN — CEFTRIAXONE SODIUM 1 G: 1 INJECTION, POWDER, FOR SOLUTION INTRAMUSCULAR; INTRAVENOUS at 09:02

## 2022-02-01 RX ADMIN — FUROSEMIDE 80 MG: 10 INJECTION INTRAMUSCULAR; INTRAVENOUS at 04:02

## 2022-02-01 RX ADMIN — DOBUTAMINE HYDROCHLORIDE 5 MCG/KG/MIN: 400 INJECTION INTRAVENOUS at 01:02

## 2022-02-01 RX ADMIN — ASPIRIN 81 MG: 81 TABLET, COATED ORAL at 08:02

## 2022-02-01 RX ADMIN — INSULIN DETEMIR 10 UNITS: 100 INJECTION, SOLUTION SUBCUTANEOUS at 09:02

## 2022-02-01 RX ADMIN — EZETIMIBE 10 MG: 10 TABLET ORAL at 08:02

## 2022-02-01 NOTE — PLAN OF CARE
Problem: Adult Inpatient Plan of Care  Goal: Plan of Care Review  Outcome: Ongoing, Progressing  Goal: Patient-Specific Goal (Individualized)  Outcome: Ongoing, Progressing  Goal: Absence of Hospital-Acquired Illness or Injury  Outcome: Ongoing, Progressing  Goal: Optimal Comfort and Wellbeing  Outcome: Ongoing, Progressing  Goal: Readiness for Transition of Care  Outcome: Ongoing, Progressing     Problem: Skin Injury Risk Increased  Goal: Skin Health and Integrity  Outcome: Ongoing, Progressing     Problem: Fluid and Electrolyte Imbalance (Acute Kidney Injury/Impairment)  Goal: Fluid and Electrolyte Balance  Outcome: Ongoing, Progressing     Problem: Oral Intake Inadequate (Acute Kidney Injury/Impairment)  Goal: Optimal Nutrition Intake  Outcome: Ongoing, Progressing     Problem: Renal Function Impairment (Acute Kidney Injury/Impairment)  Goal: Effective Renal Function  Outcome: Ongoing, Progressing     Problem: Fall Injury Risk  Goal: Absence of Fall and Fall-Related Injury  Outcome: Ongoing, Progressing     Problem: Diabetes Comorbidity  Goal: Blood Glucose Level Within Targeted Range  Outcome: Ongoing, Progressing     Problem: Infection  Goal: Absence of Infection Signs and Symptoms  Outcome: Ongoing, Progressing   POC continues and has been reviewed

## 2022-02-01 NOTE — PROGRESS NOTES
[address]  Cardiology  Progress Note    Patient Name: Jeanmarie Michelle  MRN: [id number]  Admission Date: 1/24/2022  Hospital Length of Stay: 7 days  Code Status: Full Code   Attending Physician: Geneva Li MD   Primary Care Physician: Regina Sprague MD  Expected Discharge Date:   Principal Problem:Acute on chronic systolic CHF (congestive heart failure)    Subjective:     Interval History: Pt sitting up in chair awake, alert. Denies SOB. No complaints.    Review of Systems   Cardiovascular: Positive for dyspnea on exertion and leg swelling. Negative for chest pain, irregular heartbeat, orthopnea and palpitations.   Respiratory: Negative for shortness of breath.    All other systems reviewed and are negative.    Objective:     Vital Signs (Most Recent):  Temp: 98.1 °F (36.7 °C) (02/01/22 0910)  Pulse: 92 (02/01/22 0910)  Resp: 18 (02/01/22 0910)  BP: 118/74 (02/01/22 0910)  SpO2: 99 % (02/01/22 0910) Vital Signs (24h Range):  Temp:  [97.4 °F (36.3 °C)-98.1 °F (36.7 °C)] 98.1 °F (36.7 °C)  Pulse:  [65-92] 92  Resp:  [18-20] 18  SpO2:  [94 %-100 %] 99 %  BP: ()/(63-88) 118/74     Weight: 97.4 kg (214 lb 11.7 oz)  Body mass index is 29.95 kg/m².     SpO2: 99 %  O2 Device (Oxygen Therapy): room air      Intake/Output Summary (Last 24 hours) at 2/1/2022 1351  Last data filed at 2/1/2022 0600  Gross per 24 hour   Intake 720 ml   Output 2802 ml   Net -2082 ml       Lines/Drains/Airways     Peripheral Intravenous Line                 Peripheral IV - Single Lumen 01/26/22 1014 20 G Distal;Posterior;Right Forearm 6 days         Peripheral IV - Single Lumen 01/29/22 1901 22 G Left;Posterior Hand 2 days                Physical Exam  Constitutional:       General: He is not in acute distress.     Appearance: He is not ill-appearing.   Eyes:      Pupils: Pupils are equal, round, and reactive to light.   Cardiovascular:      Rate and Rhythm: Normal rate. Rhythm irregularly irregular.       Pulses: Normal pulses and intact distal pulses.      Heart sounds: Normal heart sounds.      Comments: Elevated JVP  Pulmonary:      Effort: Pulmonary effort is normal.      Breath sounds: Normal breath sounds.   Musculoskeletal:      Cervical back: Normal range of motion and neck supple.      Right lower le+ Pitting Edema present.      Left lower le+ Pitting Edema present.      Comments: 2+ pitting edema to BLE extending up to his thighs   Skin:     General: Skin is warm and dry.   Neurological:      General: No focal deficit present.      Mental Status: He is alert.         Significant Labs:   All pertinent lab results from the last 24 hours have been reviewed. and   Recent Lab Results       22  1118   22  1012   22  0529   22  0031   22  1706        Anion Gap 14               BUN 22               BUN/CREAT RATIO 14               Calcium 8.8               Chloride 95               CO2 33               Creatinine 1.52               eGFR if non  47               Glucose 167               POC Glucose   174   107   160   242       Potassium 3.5               Sodium 138                                    Significant Imaging: Echocardiogram:   Transthoracic echo (TTE) complete (Cupid Only):   Results for orders placed or performed during the hospital encounter of 21   Echo   Result Value Ref Range    IVC diameter 2.24 cm    Left Ventricular Outflow Tract Mean Gradient 2.00 mmHg    AORTIC VALVE CUSP SEPERATION 15.152128110187779 cm    LVIDd 6.23 (A) 3.5 - 6.0 cm    IVS 0.92 0.6 - 1.1 cm    Posterior Wall 0.92 0.6 - 1.1 cm    LVIDs 5.77 (A) 2.1 - 4.0 cm    FS 7 28 - 44 %    LV mass 236.68 g    LA size 4.63 cm    RVDD 3.77 cm    Left Ventricle Relative Wall Thickness 0.30 cm    AV mean gradient 4 mmHg    AV valve area 2.31 cm2    AV index (prosthetic) 0.70     E wave deceleration time 148 msec    LVOT diameter 2.05 cm    LVOT area 3.3 cm2    LVOT peak VTI 13.89  cm    Ao VTI 19.82 cm    LVOT stroke volume 45.82 cm3    MV Peak E Arash 0.76 m/s    TR Max Arash 3.42 m/s    LV Systolic Volume 164.59 mL    LV Diastolic Volume 196.13 mL    Echo EF Estimated 16 %    Triscuspid Valve Regurgitation Peak Gradient 47 mmHg    EF 15 %    Narrative    · Atrial fibrillation with frequent PVC's, rate 85 and occasional runs of   wide complex tachycardia.  · The left ventricle is moderately enlarged with  · Atrial fibrillation observed.  · Mild right ventricular enlargement.  · Moderate mitral regurgitation.  · Moderate to severe tricuspid regurgitation.  · There is pulmonary hypertension.  · Moderate left atrial enlargement.  · Mild right atrial enlargement.        Assessment and Plan:     Brief HPI: Mr. Jeanmarie Michelle is a 80y/o M with persistent atrial fibrillation, nonischemic dilated cardiomyopathy- HFrEF with last EF 15% (12/8/21) scheduled to undergo ICD placement with Dr. Carlson in February 2022, stage III CKD, HTN and T2DM presenting with SOB with increased edema. He was recently seen at his cardiologist's office (1/21/22) and his Lasix was increased from 80mg to 120mg PO qd with plans for an ICD next month. On day of admission he had seen his PCP who noted his weight had increased approximately 20 lbs within the past few weeks and was advised to go to the ED. In the ER he describes he has been SOB for 2-3 weeks with increasing lower extremity and abdominal edema. He sleeps on 2 pillows a night and occasionally sleeps in an upright chair. He reports having CELESTIN, orthopnea and nocturnal dyspnea. He also reports having hematuria starting yesterday. In the ED his VS were stable with HR 69, BP 98/69 with pertinent labs including a troponin trending from 596, 536, 514 to 546. His troponins have been consistently elevated in the 500s, 3 weeks ago. His EKG revealed atrial fibrillation with frequent PVCs with HR 89. His U/A revealed TNTC RBC with 20-50 WBC, culture pending.     Currently the  patient is in no acute distress but is hypervolemic with significant edema of his lower extremeties and abdomen. He denies chest pain and is able to urinate. He has received IV lasix 40mg once in the ED with 900 cc of urine output for the past 24 hours. Current weight is 104.3kg. Plan is to IV diurese with dobutamine and lasix. Eliquis is being held due to his hematuria. Will continue to monitor.         * Acute on chronic systolic CHF (congestive heart failure)  HFrEF, nonsichemic cardiomyopathy: EF 15% with moderate-severe TR (12/8/21); NYHA III-IV Stage D  Plan for ICD placement by Dr. Carlson next month  BNP at 15,000 (10,000 at baseline)  GDMT: carvedilol 3.125mg BID, Entresto 97-103mg BID, spironolactone 25mg qd  - holding chlorthalidone after adding spironolactone   - holding farxiga considering hematuria with UTI; restart on discharge   - consider torsemide 20mg BID on discharge   Increased lasix from IV lasix 60mg BID IV lasix 80mg BID  Started a dobutamine infusion to enhance IV diuresis      1/26/22  - continue diuresis with IV lasix and dobutamine as above   - daily weights, strict I&Os  - urine is positive for e.coli, do not recommend restarting SGLT2  (Farxiga) at discharge as this may be cause for recurrent UTI   - continue entresto, coreg, aldactone as above  - recommend torsemide 20mg bid in place of lasix at discharge    1/28/22:  - creatinine has trended up  - decrease IV lasix to 80mg BID (was 120mg BID)  - continue dobutamine gtt  - continue Entresto, Aladactone  - recommend torsemide 20mg BID in place of lasix at discharge  - urine is positive for e.coli, do not recommend restarting SGLT2  (Farxiga) at discharge as this may be cause for recurrent UTI    1/30/22:  - Creatinine stable on IV lasix 80mg bid and IV dobutamine  - MAPs around 60s; holding all GDMT for now  - Remains floridly volume overloaded (Swelling up to thighs now)    1/31/22:  - creatinine continues to improve with diuresis,  now 1.24  - continue IV lasix 80mg bid and IV dobutamine  - MAPs around 60s; holding all GDMT for now  - remains fluid volume overloaded but improving (swelling up to thighs)    2/1/22 :  - creatinine has bumped to 1.52  - decrease dobutamine gtt to 2.5mcg/kg/min  - change IV lasix to PO 40mg BID  - remains fluid volume overloaded but improving        UTI (urinary tract infection)  - ECOli UTI  - S/P cystoscopy  - Hold SGLT2i    Elevated troponin level  - Pt has a history of chronically elevated troponins in the 500s  - EKG reveals atrial fibrillation with PVCs  - Elevated troponins most likely 2/2 myocardial injury due to his heart failure exacerbation    Hematuria  Currently receiving rocephin for UTI  Primary following    Chronic kidney disease  Pt has CKD Stage 3  Serum Cr 2.01 which is at his baseline  Continue to monitor with IV diuresis    1/28/22:  - creatinine is 1.63 (was 1.58 yesterday)  - decreased IV lasix dose  - continue dobutamine gtt  - bmp in AM    1/31/22:  - creatinine continues to improve with diuresis, 1.24 today  - continue IV lasix and dobutamine  - bmp in AM    2/1/22  - creatinine bumped to 1.52 from 1.24  - decrease dobutamine gtt to 2.5mcg/kg/min  - change IV lasix to PO lasix 40mg BID  - BMP in AM    Chronic a-fib  - Persistent, longstanding atrial fibrillation  - Currently in rate controlled atrial fibrillation with frequent multifocal PVCs  - [id number] of 4  - Currently on eliquis 5mg BID     Diabetes mellitus without complication  Holding farxiga, currently on SSI    Hypertension  - MAPs around 60s; holding all GDMT for now            VTE Risk Mitigation (From admission, onward)         Ordered     apixaban tablet 5 mg  2 times daily         01/25/22 1253     Place sequential compression device  Until discontinued         01/25/22 0111              PT seen and examined, chart reviewed, essentially agree with findings as documented,    CC. Shortness of breath  HPI: Pt reports  shortness of breath has improved.  PE: agree with findings as documented    Labs, Xrays, EKgs, LHC reviewed    IMP: Plan:    1. Acute on chronic decompensated CHF, improving on inotropic tx with dobutamine, increased diuresis.  2. Elevated troponin, most consistent with type 2 MI, perfusion demand mismatch.  Candida Rai, ROLY  Cardiology  [facility] - [address] [place] [address]

## 2022-02-01 NOTE — ASSESSMENT & PLAN NOTE
HFrEF, nonsichemic cardiomyopathy: EF 15% with moderate-severe TR (12/8/21); NYHA III-IV Stage D  Plan for ICD placement by Dr. Carlson next month  BNP at 15,000 (10,000 at baseline)  GDMT: carvedilol 3.125mg BID, Entresto 97-103mg BID, spironolactone 25mg qd  - holding chlorthalidone after adding spironolactone   - holding farxiga considering hematuria with UTI; restart on discharge   - consider torsemide 20mg BID on discharge   Increased lasix from IV lasix 60mg BID IV lasix 80mg BID  Started a dobutamine infusion to enhance IV diuresis      1/26/22  - continue diuresis with IV lasix and dobutamine as above   - daily weights, strict I&Os  - urine is positive for e.coli, do not recommend restarting SGLT2  (Farxiga) at discharge as this may be cause for recurrent UTI   - continue entresto, coreg, aldactone as above  - recommend torsemide 20mg bid in place of lasix at discharge    1/28/22:  - creatinine has trended up  - decrease IV lasix to 80mg BID (was 120mg BID)  - continue dobutamine gtt  - continue Entresto, Aladactone  - recommend torsemide 20mg BID in place of lasix at discharge  - urine is positive for e.coli, do not recommend restarting SGLT2  (Farxiga) at discharge as this may be cause for recurrent UTI    1/30/22:  - Creatinine stable on IV lasix 80mg bid and IV dobutamine  - MAPs around 60s; holding all GDMT for now  - Remains floridly volume overloaded (Swelling up to thighs now)    1/31/22:  - creatinine continues to improve with diuresis, now 1.24  - continue IV lasix 80mg bid and IV dobutamine  - MAPs around 60s; holding all GDMT for now  - remains fluid volume overloaded but improving (swelling up to thighs)    2/1/22 :  - creatinine has bumped to 1.52  - decrease dobutamine gtt to 2.5mcg/kg/min  - change IV lasix to PO 40mg BID  - remains fluid volume overloaded but improving

## 2022-02-01 NOTE — SUBJECTIVE & OBJECTIVE
Interval History: Pt sitting up in chair awake, alert. Denies SOB. No complaints.    Review of Systems   Cardiovascular: Positive for dyspnea on exertion and leg swelling. Negative for chest pain, irregular heartbeat, orthopnea and palpitations.   Respiratory: Negative for shortness of breath.    All other systems reviewed and are negative.    Objective:     Vital Signs (Most Recent):  Temp: 98.1 °F (36.7 °C) (2210)  Pulse: 92 (2210)  Resp: 18 (22)  BP: 118/74 (22)  SpO2: 99 % (22) Vital Signs (24h Range):  Temp:  [97.4 °F (36.3 °C)-98.1 °F (36.7 °C)] 98.1 °F (36.7 °C)  Pulse:  [65-92] 92  Resp:  [18-20] 18  SpO2:  [94 %-100 %] 99 %  BP: ()/(63-88) 118/74     Weight: 97.4 kg (214 lb 11.7 oz)  Body mass index is 29.95 kg/m².     SpO2: 99 %  O2 Device (Oxygen Therapy): room air      Intake/Output Summary (Last 24 hours) at 2022 1351  Last data filed at 2022 0600  Gross per 24 hour   Intake 720 ml   Output 2802 ml   Net -2082 ml       Lines/Drains/Airways     Peripheral Intravenous Line                 Peripheral IV - Single Lumen 22 1014 20 G Distal;Posterior;Right Forearm 6 days         Peripheral IV - Single Lumen 22 1901 22 G Left;Posterior Hand 2 days                Physical Exam  Constitutional:       General: He is not in acute distress.     Appearance: He is not ill-appearing.   Eyes:      Pupils: Pupils are equal, round, and reactive to light.   Cardiovascular:      Rate and Rhythm: Normal rate. Rhythm irregularly irregular.      Pulses: Normal pulses and intact distal pulses.      Heart sounds: Normal heart sounds.      Comments: Elevated JVP  Pulmonary:      Effort: Pulmonary effort is normal.      Breath sounds: Normal breath sounds.   Musculoskeletal:      Cervical back: Normal range of motion and neck supple.      Right lower le+ Pitting Edema present.      Left lower le+ Pitting Edema present.      Comments: 2+ pitting  edema to BLE extending up to his thighs   Skin:     General: Skin is warm and dry.   Neurological:      General: No focal deficit present.      Mental Status: He is alert.         Significant Labs:   All pertinent lab results from the last 24 hours have been reviewed. and   Recent Lab Results       02/01/22  1118   02/01/22  1012   02/01/22  0529   02/01/22  0031   01/31/22  1706        Anion Gap 14               BUN 22               BUN/CREAT RATIO 14               Calcium 8.8               Chloride 95               CO2 33               Creatinine 1.52               eGFR if non  47               Glucose 167               POC Glucose   174   107   160   242       Potassium 3.5               Sodium 138                                    Significant Imaging: Echocardiogram:   Transthoracic echo (TTE) complete (Cupid Only):   Results for orders placed or performed during the hospital encounter of 12/08/21   Echo   Result Value Ref Range    IVC diameter 2.24 cm    Left Ventricular Outflow Tract Mean Gradient 2.00 mmHg    AORTIC VALVE CUSP SEPERATION 15.808340098200906 cm    LVIDd 6.23 (A) 3.5 - 6.0 cm    IVS 0.92 0.6 - 1.1 cm    Posterior Wall 0.92 0.6 - 1.1 cm    LVIDs 5.77 (A) 2.1 - 4.0 cm    FS 7 28 - 44 %    LV mass 236.68 g    LA size 4.63 cm    RVDD 3.77 cm    Left Ventricle Relative Wall Thickness 0.30 cm    AV mean gradient 4 mmHg    AV valve area 2.31 cm2    AV index (prosthetic) 0.70     E wave deceleration time 148 msec    LVOT diameter 2.05 cm    LVOT area 3.3 cm2    LVOT peak VTI 13.89 cm    Ao VTI 19.82 cm    LVOT stroke volume 45.82 cm3    MV Peak E Arash 0.76 m/s    TR Max Arash 3.42 m/s    LV Systolic Volume 164.59 mL    LV Diastolic Volume 196.13 mL    Echo EF Estimated 16 %    Triscuspid Valve Regurgitation Peak Gradient 47 mmHg    EF 15 %    Narrative    · Atrial fibrillation with frequent PVC's, rate 85 and occasional runs of   wide complex tachycardia.  · The left ventricle is  moderately enlarged with  · Atrial fibrillation observed.  · Mild right ventricular enlargement.  · Moderate mitral regurgitation.  · Moderate to severe tricuspid regurgitation.  · There is pulmonary hypertension.  · Moderate left atrial enlargement.  · Mild right atrial enlargement.

## 2022-02-01 NOTE — PROGRESS NOTES
[address] Medicine  Progress Note    Patient Name: Jeanmarie Michelle  MRN: [id number]  Patient Class: IP- Inpatient   Admission Date: 1/24/2022  Length of Stay: 7 days  Attending Physician: Geneva Li MD  Primary Care Provider: Regina Sprague MD        Subjective:     Principal Problem:Acute on chronic systolic CHF (congestive heart failure)        HPI:  Patient is an 81-year-old male with a history of persistent atrial fibrillation on Eliquis, end-stage dilated cardiomyopathy the with last known EF of 15% scheduled to undergo ICD per Dr. Leigh in February, history of stage III CKD, type 2 diabetes and hypertension who was initially seen at Dr. Leigh office on Friday for shortness of breath with increased peripheral edema. Lasix was increased from  mg and pt felt better overall.  Patient was seen at his PCPs office earlier today for a 3 month follow-up and patient's PCP felt that patient was volume overloaded and after discussion was Dr. Leigh patient was instructed to come to the ED for evaluation.  In ED, patient was found to have lost weight from the time of Dr. Leigh visit by 14 lb.  Chest x-ray showed mild pulmonary edema which appeared chronic from previous chest x-ray 3 weeks ago.  [place] [address] [place] [address]. Troponin was elevated, but pt has chronically elevated troponin and EKG showed no ST-T abnormalities. Pt had hematuria in ED and when asked when this was started, pt stated that this started at late morning today, but failed to mention it to his PCP. Subsequent UA demonstrated a presence of UTI. Source of hematuria is likely due to UTI. Pt will be admitted for further evaluation and intervention.      Overview/Hospital Course:  No notes on file    Interval History:     NAEO  Feels improved  Slight bump in Cr, dobutamine decreaed, and lasix converted to oral.      Review of Systems   Constitutional: Negative for  chills, diaphoresis, fatigue and fever.   HENT: Negative for congestion, hearing loss, nosebleeds, postnasal drip, sore throat, tinnitus and trouble swallowing.    Eyes: Negative for photophobia, pain, discharge, itching and visual disturbance.   Respiratory: Negative for cough, shortness of breath, wheezing and stridor.    Cardiovascular: Positive for leg swelling. Negative for chest pain and palpitations.   Gastrointestinal: Negative for abdominal distention, abdominal pain, anal bleeding, blood in stool, constipation, diarrhea, nausea and vomiting.   Endocrine: Negative for cold intolerance, heat intolerance, polydipsia, polyphagia and polyuria.   Genitourinary: Negative for decreased urine volume, difficulty urinating, dysuria, flank pain, frequency, hematuria and urgency.   Musculoskeletal: Negative for arthralgias, back pain, gait problem, joint swelling, myalgias, neck pain and neck stiffness.   Skin: Negative for color change, pallor and rash.   Allergic/Immunologic: Negative for immunocompromised state.   Neurological: Negative for dizziness, tremors, seizures, syncope, facial asymmetry, speech difficulty, weakness, light-headedness, numbness and headaches.   Hematological: Negative for adenopathy. Does not bruise/bleed easily.     Objective:     Vital Signs (Most Recent):  Temp: 98.1 °F (36.7 °C) (02/01/22 0910)  Pulse: 92 (02/01/22 0910)  Resp: 18 (02/01/22 0910)  BP: 118/74 (02/01/22 0910)  SpO2: 99 % (02/01/22 0910) Vital Signs (24h Range):  Temp:  [97.4 °F (36.3 °C)-98.1 °F (36.7 °C)] 98.1 °F (36.7 °C)  Pulse:  [65-92] 92  Resp:  [18-20] 18  SpO2:  [94 %-100 %] 99 %  BP: ()/(63-88) 118/74     Weight: 97.4 kg (214 lb 11.7 oz)  Body mass index is 29.95 kg/m².    Intake/Output Summary (Last 24 hours) at 2/1/2022 1519  Last data filed at 2/1/2022 0600  Gross per 24 hour   Intake 720 ml   Output 2802 ml   Net -2082 ml      Physical Exam  Vitals reviewed.   Constitutional:       General: He is not in  acute distress.     Appearance: Normal appearance.   HENT:      Head: Normocephalic and atraumatic.      Right Ear: External ear normal.      Left Ear: External ear normal.   Eyes:      Extraocular Movements: Extraocular movements intact.      Pupils: Pupils are equal, round, and reactive to light.   Cardiovascular:      Rate and Rhythm: Normal rate and regular rhythm.      Pulses: Normal pulses.      Heart sounds: Normal heart sounds. No murmur heard.      Pulmonary:      Effort: Pulmonary effort is normal. No respiratory distress.      Breath sounds: Normal breath sounds. No wheezing.   Chest:      Chest wall: No tenderness.   Abdominal:      Palpations: Abdomen is soft. There is no mass.      Tenderness: There is no abdominal tenderness. There is no right CVA tenderness or left CVA tenderness.      Comments: Protuberant with ascites.     Musculoskeletal:         General: No swelling or tenderness. Normal range of motion.      Right lower leg: Edema present.      Left lower leg: Edema present.      Comments: +2 pitting edema present BLE, and +1 pitting edema noted in bilateral upper extremity   Skin:     General: Skin is warm and dry.      Capillary Refill: Capillary refill takes less than 2 seconds.   Neurological:      General: No focal deficit present.      Mental Status: He is alert and oriented to person, place, and time. Mental status is at baseline.   Psychiatric:         Mood and Affect: Mood normal.         Thought Content: Thought content normal.         Significant Labs: All pertinent labs within the past 24 hours have been reviewed.    Significant Imaging: I have reviewed all pertinent imaging results/findings within the past 24 hours.      Assessment/Plan:      * Acute on chronic systolic CHF (congestive heart failure)    Cardiology consulted  Cont dobutamine drip and iv diuretics.       UTI (urinary tract infection)  uti 2/2 e coli  Cont rocephen      Elevated troponin level  Patient has a history of  chronically elevated troponin level in 500s and today patient's troponin level was 596.  There was no ST-T abnormalities accompanying elevated troponin level.  No recent ischemia evaluation on record. Will consult Cardiology in a.m. for recommendations    1/25-cardiology seen pt    Hematuria  This is likely secondary to urinary tract infection.  Will empirically start patient on Rocephin.  As stated above, will elect to continue patient on Eliquis ( for Afib )      Chronic kidney disease  Patient has CKD stage 3.  Serum creatinine is at his baseline.  Will continue to monitor serum creatinine and urine output.  Patient likely has cardiorenal syndrome      Chronic a-fib  Patient with Long standing persistent (>12 months) atrial fibrillation which is controlled currently with Beta Blocker. Patient is currently in atrial fibrillation.[id number] Score: 3. Anticoagulation indicated. Anticoagulation done with Eliquis.  Will elect to continue patient on Eliquis.           Diabetes mellitus without complication  Hold oral hypoglycemics and start patient on sliding scale insulin to maintain CBG in the range of 130-180s      Hypertension  Continue present management        VTE Risk Mitigation (From admission, onward)         Ordered     apixaban tablet 5 mg  2 times daily         01/25/22 1253     Place sequential compression device  Until discontinued         01/25/22 0111                Discharge Planning   MADDIE:      Code Status: Full Code   Is the patient medically ready for discharge?:     Reason for patient still in hospital (select all that apply): Treatment  Discharge Plan A: Home                  Rehmat JAY Li MD  Department of Hospital Medicine   [address]

## 2022-02-01 NOTE — PT/OT/SLP EVAL
Physical Therapy Evaluation and Discharge Note    Patient Name:  Jeanmarie Michelle   MRN:  [id number]    Recommendations:     Discharge Recommendations:  home,home with home health   Discharge Equipment Recommendations: none   Barriers to discharge: None    Assessment:     Jeanmarie Michelle is a 81 y.o. male admitted with a medical diagnosis of Acute on chronic systolic CHF (congestive heart failure). Pt demonstrates good functional strength and is able to ambulate without assistance.  At this time, patient is functioning at their prior level of function and does not require further acute PT services.     Recent Surgery: * No surgery found *      Plan:     During this hospitalization, patient does not require further acute PT services.  Please re-consult if situation changes.      Subjective     Chief Complaint: CHF, edema  Patient/Family Comments/goals: Pt states he has been up to restroom already without assistance. He also reports that his LE edema has greatly improved.  Pain/Comfort:  · Pain Rating 1: 0/10  · Pain Rating Post-Intervention 1: 0/10    Patients cultural, spiritual, Voodoo conflicts given the current situation: no    Living Environment:  Pt lives at home alone and has 3 steps to enter  Prior to admission, patients level of function was independent.  Equipment used at home: cane, straight.  DME owned (not currently used): none.  Upon discharge, patient will have assistance from family.    Objective:     Communicated with CHRISTA Roy RN prior to session.  Patient found supine with peripheral IV,telemetry upon PT entry to room.    General Precautions: Standard, fall   Orthopedic Precautions:N/A   Braces: N/A   Respiratory Status: Room air    Exams:  · Cognitive Exam:  Patient is oriented to Person, Place, Time and Situation  · Gross Motor Coordination:  WFL  · Skin Integrity/Edema:      · -       Edema: Mild BLE  · RLE ROM: WFL  · RLE Strength: WFL  · LLE ROM: WFL  · LLE Strength: WFL    Functional Mobility:  · Bed  Mobility:     · Scooting: modified independence  · Supine to Sit: modified independence  · Transfers:     · Sit to Stand:  modified independence with straight cane  · Gait: 80 ft with supervision using Cane, slow dayanara, reciprocal pattern, slight bilateral knee flexion  · Balance: good    AM-PAC 6 CLICK MOBILITY  Total Score:23       Therapeutic Activities and Exercises:   Pt ambulated within room  · Pt educated on PT role/POC.   · Importance of OOB activity with staff assistance.  · Importance of sitting up in the chair throughout the day as tolerated, especially for meals   · Safety during functional t/f and mobility with use of cane   · Multiple self-care tasks/functional mobility completed- assistance level noted above   · All questions/concerns answered within PT scope of practice      AM-PAC 6 CLICK MOBILITY  Total Score:23     Patient left supine with all lines intact and call button in reach.    GOALS:   Multidisciplinary Problems     Physical Therapy Goals     Not on file                History:     Past Medical History:   Diagnosis Date    Atrial fibrillation     CHF (congestive heart failure)     Chronic kidney disease     CVA (cerebral vascular accident)     Dilated cardiomyopathy     DM type 2 (diabetes mellitus, type 2)     GERD (gastroesophageal reflux disease)     Hyperlipidemia     Hypertension     Hypertensive heart disease     Hypothyroidism     Left bundle branch block     Prostate disorder        Past Surgical History:   Procedure Laterality Date    CARDIAC CATHETERIZATION Left 11/2018    PROSTATECTOMY         Time Tracking:     PT Received On: 02/01/22  PT Start Time: 0904     PT Stop Time: 0912  PT Total Time (min): 8 min     Billable Minutes: Evaluation low complexity      02/01/2022

## 2022-02-01 NOTE — SUBJECTIVE & OBJECTIVE
Interval History:     ISELA  Feels improved  Slight bump in Cr, dobutamine decreaed, and lasix converted to oral.      Review of Systems   Constitutional: Negative for chills, diaphoresis, fatigue and fever.   HENT: Negative for congestion, hearing loss, nosebleeds, postnasal drip, sore throat, tinnitus and trouble swallowing.    Eyes: Negative for photophobia, pain, discharge, itching and visual disturbance.   Respiratory: Negative for cough, shortness of breath, wheezing and stridor.    Cardiovascular: Positive for leg swelling. Negative for chest pain and palpitations.   Gastrointestinal: Negative for abdominal distention, abdominal pain, anal bleeding, blood in stool, constipation, diarrhea, nausea and vomiting.   Endocrine: Negative for cold intolerance, heat intolerance, polydipsia, polyphagia and polyuria.   Genitourinary: Negative for decreased urine volume, difficulty urinating, dysuria, flank pain, frequency, hematuria and urgency.   Musculoskeletal: Negative for arthralgias, back pain, gait problem, joint swelling, myalgias, neck pain and neck stiffness.   Skin: Negative for color change, pallor and rash.   Allergic/Immunologic: Negative for immunocompromised state.   Neurological: Negative for dizziness, tremors, seizures, syncope, facial asymmetry, speech difficulty, weakness, light-headedness, numbness and headaches.   Hematological: Negative for adenopathy. Does not bruise/bleed easily.     Objective:     Vital Signs (Most Recent):  Temp: 98.1 °F (36.7 °C) (02/01/22 0910)  Pulse: 92 (02/01/22 0910)  Resp: 18 (02/01/22 0910)  BP: 118/74 (02/01/22 0910)  SpO2: 99 % (02/01/22 0910) Vital Signs (24h Range):  Temp:  [97.4 °F (36.3 °C)-98.1 °F (36.7 °C)] 98.1 °F (36.7 °C)  Pulse:  [65-92] 92  Resp:  [18-20] 18  SpO2:  [94 %-100 %] 99 %  BP: ()/(63-88) 118/74     Weight: 97.4 kg (214 lb 11.7 oz)  Body mass index is 29.95 kg/m².    Intake/Output Summary (Last 24 hours) at 2/1/2022 8184  Last data filed  at 2/1/2022 0600  Gross per 24 hour   Intake 720 ml   Output 2802 ml   Net -2082 ml      Physical Exam  Vitals reviewed.   Constitutional:       General: He is not in acute distress.     Appearance: Normal appearance.   HENT:      Head: Normocephalic and atraumatic.      Right Ear: External ear normal.      Left Ear: External ear normal.   Eyes:      Extraocular Movements: Extraocular movements intact.      Pupils: Pupils are equal, round, and reactive to light.   Cardiovascular:      Rate and Rhythm: Normal rate and regular rhythm.      Pulses: Normal pulses.      Heart sounds: Normal heart sounds. No murmur heard.      Pulmonary:      Effort: Pulmonary effort is normal. No respiratory distress.      Breath sounds: Normal breath sounds. No wheezing.   Chest:      Chest wall: No tenderness.   Abdominal:      Palpations: Abdomen is soft. There is no mass.      Tenderness: There is no abdominal tenderness. There is no right CVA tenderness or left CVA tenderness.      Comments: Protuberant with ascites.     Musculoskeletal:         General: No swelling or tenderness. Normal range of motion.      Right lower leg: Edema present.      Left lower leg: Edema present.      Comments: +2 pitting edema present BLE, and +1 pitting edema noted in bilateral upper extremity   Skin:     General: Skin is warm and dry.      Capillary Refill: Capillary refill takes less than 2 seconds.   Neurological:      General: No focal deficit present.      Mental Status: He is alert and oriented to person, place, and time. Mental status is at baseline.   Psychiatric:         Mood and Affect: Mood normal.         Thought Content: Thought content normal.         Significant Labs: All pertinent labs within the past 24 hours have been reviewed.    Significant Imaging: I have reviewed all pertinent imaging results/findings within the past 24 hours.

## 2022-02-01 NOTE — PT/OT/SLP EVAL
"Occupational Therapy   Evaluation and Discharge Note    Name: Jeanmarie Michelle  MRN: [id number]  Admitting Diagnosis:  Acute on chronic systolic CHF (congestive heart failure)   Recent Surgery: * No surgery found *      Recommendations:     Discharge Recommendations: home with home health  Discharge Equipment Recommendations:  none  Barriers to discharge:  None    Assessment:     Jeanmarie Michelle is a 81 y.o. male with a medical diagnosis of Acute on chronic systolic CHF (congestive heart failure). At this time, patient is functioning at their prior level of function and does not require further acute OT services.     Plan:     During this hospitalization, patient does not require further acute OT services.  Please re-consult if situation changes.    · Plan of Care Reviewed with: patient    Subjective     Chief Complaint: CHF  Patient/Family Comments/goals: pt agreeable to participate in OT eval;pt reports "I feel a lot better and have been getting up and going to the bathroom on my own."    Occupational Profile:  Living Environment: pt lives alone in one story home with 3 steps to enter with handrail  Previous level of function: independent with all ADL tasks, ADL t/f, and utilized cane for functional mobility   Roles and Routines: perform self care  Equipment Used at home:  cane, straight  Assistance upon Discharge: home with home health    Pain/Comfort:  · Pain Rating 1: 0/10    Patients cultural, spiritual, Taoist conflicts given the current situation: no    Objective:     Communicated with: HARRY Ruiz prior to session.  Patient found supine with peripheral IV,telemetry upon OT entry to room.    General Precautions: Standard,     Orthopedic Precautions:N/A   Braces: N/A  Respiratory Status: Room air     Occupational Performance:    Bed Mobility:    · Patient completed Supine to Sit with modified independence    Functional Mobility/Transfers:  · Patient completed Sit <> Stand Transfer with modified independence  with  " straight cane   · Functional Mobility: pt performed functional mobility in room with Sky with cane    Activities of Daily Living:  · Upper Body Dressing: modified independence to elian gown  · Lower Body Dressing: modified independence to elian slippers    Cognitive/Visual Perceptual:  Cognitive/Psychosocial Skills:  -       Oriented to: Person, Place, Time and Situation   -       Follows Commands/attention:Follows multistep  commands  -       Mood/Affect/Coping skills/emotional control: Cooperative    Physical Exam:  Balance:    -       WFL  Upper Extremity Range of Motion:     -       Right Upper Extremity: WFL  -       Left Upper Extremity: WFL  Upper Extremity Strength:    -       Right Upper Extremity: WFL  -       Left Upper Extremity: WFL  Gross motor coordination: WFL    AMPAC 6 Click ADL:  AMPAC Total Score:      Treatment & Education:  NA  Education:    Patient left sitting EOB with all lines intact, call button in reach and HARRY Ruiz notified    GOALS:   Multidisciplinary Problems     Occupational Therapy Goals     Not on file                History:     Past Medical History:   Diagnosis Date    Atrial fibrillation     CHF (congestive heart failure)     Chronic kidney disease     CVA (cerebral vascular accident)     Dilated cardiomyopathy     DM type 2 (diabetes mellitus, type 2)     GERD (gastroesophageal reflux disease)     Hyperlipidemia     Hypertension     Hypertensive heart disease     Hypothyroidism     Left bundle branch block     Prostate disorder        Past Surgical History:   Procedure Laterality Date    CARDIAC CATHETERIZATION Left 11/2018    PROSTATECTOMY         Time Tracking:     OT Date of Treatment: 02/01/22  OT Start Time: 0903  OT Stop Time: 0912  OT Total Time (min): 9 min    Billable Minutes:Evaluation OT min complexity eval    2/1/2022

## 2022-02-01 NOTE — ASSESSMENT & PLAN NOTE
- Persistent, longstanding atrial fibrillation  - Currently in rate controlled atrial fibrillation with frequent multifocal PVCs  - [id number] of 4  - Currently on eliquis 5mg BID

## 2022-02-01 NOTE — ASSESSMENT & PLAN NOTE
Pt has CKD Stage 3  Serum Cr 2.01 which is at his baseline  Continue to monitor with IV diuresis    1/28/22:  - creatinine is 1.63 (was 1.58 yesterday)  - decreased IV lasix dose  - continue dobutamine gtt  - bmp in AM    1/31/22:  - creatinine continues to improve with diuresis, 1.24 today  - continue IV lasix and dobutamine  - bmp in AM    2/1/22  - creatinine bumped to 1.52 from 1.24  - decrease dobutamine gtt to 2.5mcg/kg/min  - change IV lasix to PO lasix 40mg BID  - BMP in AM

## 2022-02-02 LAB
ANION GAP SERPL CALCULATED.3IONS-SCNC: 12 MMOL/L (ref 7–16)
BUN SERPL-MCNC: 25 MG/DL (ref 7–18)
BUN/CREAT SERPL: 19 (ref 6–20)
CALCIUM SERPL-MCNC: 8.9 MG/DL (ref 8.5–10.1)
CHLORIDE SERPL-SCNC: 97 MMOL/L (ref 98–107)
CO2 SERPL-SCNC: 32 MMOL/L (ref 21–32)
CREAT SERPL-MCNC: 1.3 MG/DL (ref 0.7–1.3)
GLUCOSE SERPL-MCNC: 148 MG/DL (ref 74–106)
GLUCOSE SERPL-MCNC: 149 MG/DL (ref 70–105)
GLUCOSE SERPL-MCNC: 151 MG/DL (ref 70–105)
GLUCOSE SERPL-MCNC: 190 MG/DL (ref 70–105)
GLUCOSE SERPL-MCNC: 237 MG/DL (ref 70–105)
POTASSIUM SERPL-SCNC: 4 MMOL/L (ref 3.5–5.1)
SODIUM SERPL-SCNC: 137 MMOL/L (ref 136–145)

## 2022-02-02 PROCEDURE — 11000001 HC ACUTE MED/SURG PRIVATE ROOM

## 2022-02-02 PROCEDURE — 36415 COLL VENOUS BLD VENIPUNCTURE: CPT | Performed by: INTERNAL MEDICINE

## 2022-02-02 PROCEDURE — 63600175 PHARM REV CODE 636 W HCPCS: Performed by: INTERNAL MEDICINE

## 2022-02-02 PROCEDURE — 82962 GLUCOSE BLOOD TEST: CPT

## 2022-02-02 PROCEDURE — 80048 BASIC METABOLIC PNL TOTAL CA: CPT | Performed by: INTERNAL MEDICINE

## 2022-02-02 PROCEDURE — 99233 SBSQ HOSP IP/OBS HIGH 50: CPT | Mod: ,,, | Performed by: INTERNAL MEDICINE

## 2022-02-02 PROCEDURE — 99233 PR SUBSEQUENT HOSPITAL CARE,LEVL III: ICD-10-PCS | Mod: ,,, | Performed by: INTERNAL MEDICINE

## 2022-02-02 PROCEDURE — 99232 PR SUBSEQUENT HOSPITAL CARE,LEVL II: ICD-10-PCS | Mod: ,,, | Performed by: INTERNAL MEDICINE

## 2022-02-02 PROCEDURE — 25000003 PHARM REV CODE 250: Performed by: NURSE PRACTITIONER

## 2022-02-02 PROCEDURE — 82310 ASSAY OF CALCIUM: CPT | Performed by: INTERNAL MEDICINE

## 2022-02-02 PROCEDURE — 25000003 PHARM REV CODE 250: Performed by: INTERNAL MEDICINE

## 2022-02-02 PROCEDURE — C9399 UNCLASSIFIED DRUGS OR BIOLOG: HCPCS | Performed by: INTERNAL MEDICINE

## 2022-02-02 PROCEDURE — 99232 SBSQ HOSP IP/OBS MODERATE 35: CPT | Mod: ,,, | Performed by: INTERNAL MEDICINE

## 2022-02-02 RX ADMIN — FUROSEMIDE 40 MG: 40 TABLET ORAL at 08:02

## 2022-02-02 RX ADMIN — APIXABAN 5 MG: 5 TABLET, FILM COATED ORAL at 08:02

## 2022-02-02 RX ADMIN — FUROSEMIDE 40 MG: 40 TABLET ORAL at 04:02

## 2022-02-02 RX ADMIN — TAMSULOSIN HYDROCHLORIDE 0.4 MG: 0.4 CAPSULE ORAL at 08:02

## 2022-02-02 RX ADMIN — ASPIRIN 81 MG: 81 TABLET, COATED ORAL at 08:02

## 2022-02-02 RX ADMIN — CEFTRIAXONE SODIUM 1 G: 1 INJECTION, POWDER, FOR SOLUTION INTRAMUSCULAR; INTRAVENOUS at 08:02

## 2022-02-02 RX ADMIN — INSULIN DETEMIR 10 UNITS: 100 INJECTION, SOLUTION SUBCUTANEOUS at 09:02

## 2022-02-02 RX ADMIN — EZETIMIBE 10 MG: 10 TABLET ORAL at 08:02

## 2022-02-02 RX ADMIN — PANTOPRAZOLE SODIUM 40 MG: 40 TABLET, DELAYED RELEASE ORAL at 08:02

## 2022-02-02 NOTE — ASSESSMENT & PLAN NOTE
Pt has CKD Stage 3  Serum Cr 2.01 which is at his baseline  Continue to monitor with IV diuresis    1/28/22:  - creatinine is 1.63 (was 1.58 yesterday)  - decreased IV lasix dose  - continue dobutamine gtt  - bmp in AM    1/31/22:  - creatinine continues to improve with diuresis, 1.24 today  - continue IV lasix and dobutamine  - bmp in AM    2/1/22  - creatinine bumped to 1.52 from 1.24  - decrease dobutamine gtt to 2.5mcg/kg/min  - change IV lasix to PO lasix 40mg BID  - BMP in AM    2/2/22  - creatinine 1.3  - stop dobutamine  - continue PO lasix 40mg BID  - BMP in AM

## 2022-02-02 NOTE — ASSESSMENT & PLAN NOTE
- Persistent, longstanding atrial fibrillation  - Currently in rate controlled atrial fibrillation with frequent multifocal PVCs  - [id number] of 4  - Currently on eliquis 5mg BID

## 2022-02-02 NOTE — PROGRESS NOTES
[address] Medicine  Progress Note    Patient Name: Jeanmarie Michelle  MRN: [id number]  Patient Class: IP- Inpatient   Admission Date: 1/24/2022  Length of Stay: 8 days  Attending Physician: Geneva Li MD  Primary Care Provider: Regian Sprague MD        Subjective:     Principal Problem:Acute on chronic systolic CHF (congestive heart failure)        HPI:  Patient is an 81-year-old male with a history of persistent atrial fibrillation on Eliquis, end-stage dilated cardiomyopathy the with last known EF of 15% scheduled to undergo ICD per Dr. Leigh in February, history of stage III CKD, type 2 diabetes and hypertension who was initially seen at Dr. Leigh office on Friday for shortness of breath with increased peripheral edema. Lasix was increased from  mg and pt felt better overall.  Patient was seen at his PCPs office earlier today for a 3 month follow-up and patient's PCP felt that patient was volume overloaded and after discussion was Dr. Leigh patient was instructed to come to the ED for evaluation.  In ED, patient was found to have lost weight from the time of Dr. Leigh visit by 14 lb.  Chest x-ray showed mild pulmonary edema which appeared chronic from previous chest x-ray 3 weeks ago.  [place] elevated at [address] from [place] [address]. Troponin was elevated, but pt has chronically elevated troponin and EKG showed no ST-T abnormalities. Pt had hematuria in ED and when asked when this was started, pt stated that this started at late morning today, but failed to mention it to his PCP. Subsequent UA demonstrated a presence of UTI. Source of hematuria is likely due to UTI. Pt will be admitted for further evaluation and intervention.      Overview/Hospital Course:  No notes on file    Interval History:     ISELA  Feels improved  Transitioned to oral lasix by cardiology, stopped dobutamien, cardiology wants to keep another day or so to see how his bp  is w/o dobutamine.     Review of Systems   Constitutional: Negative for chills, diaphoresis, fatigue and fever.   HENT: Negative for congestion, hearing loss, nosebleeds, postnasal drip, sore throat, tinnitus and trouble swallowing.    Eyes: Negative for photophobia, pain, discharge, itching and visual disturbance.   Respiratory: Negative for cough, shortness of breath, wheezing and stridor.    Cardiovascular: Positive for leg swelling. Negative for chest pain and palpitations.   Gastrointestinal: Negative for abdominal distention, abdominal pain, anal bleeding, blood in stool, constipation, diarrhea, nausea and vomiting.   Endocrine: Negative for cold intolerance, heat intolerance, polydipsia, polyphagia and polyuria.   Genitourinary: Negative for decreased urine volume, difficulty urinating, dysuria, flank pain, frequency, hematuria and urgency.   Musculoskeletal: Negative for arthralgias, back pain, gait problem, joint swelling, myalgias, neck pain and neck stiffness.   Skin: Negative for color change, pallor and rash.   Allergic/Immunologic: Negative for immunocompromised state.   Neurological: Negative for dizziness, tremors, seizures, syncope, facial asymmetry, speech difficulty, weakness, light-headedness, numbness and headaches.   Hematological: Negative for adenopathy. Does not bruise/bleed easily.     Objective:     Vital Signs (Most Recent):  Temp: 97.5 °F (36.4 °C) (02/02/22 1140)  Pulse: 75 (02/02/22 1140)  Resp: 19 (02/02/22 1140)  BP: 97/69 (02/02/22 1140)  SpO2: 95 % (02/02/22 1140) Vital Signs (24h Range):  Temp:  [97.5 °F (36.4 °C)-98.1 °F (36.7 °C)] 97.5 °F (36.4 °C)  Pulse:  [] 75  Resp:  [18-20] 19  SpO2:  [94 %-99 %] 95 %  BP: ()/(54-78) 97/69     Weight: 95.1 kg (209 lb 10.5 oz)  Body mass index is 29.24 kg/m².    Intake/Output Summary (Last 24 hours) at 2/2/2022 8132  Last data filed at 2/2/2022 1140  Gross per 24 hour   Intake 480 ml   Output 2150 ml   Net -1670 ml      Physical  Exam  Vitals reviewed.   Constitutional:       General: He is not in acute distress.     Appearance: Normal appearance.   HENT:      Head: Normocephalic and atraumatic.      Right Ear: External ear normal.      Left Ear: External ear normal.   Eyes:      Extraocular Movements: Extraocular movements intact.      Pupils: Pupils are equal, round, and reactive to light.   Cardiovascular:      Rate and Rhythm: Normal rate and regular rhythm.      Pulses: Normal pulses.      Heart sounds: Normal heart sounds. No murmur heard.      Pulmonary:      Effort: Pulmonary effort is normal. No respiratory distress.      Breath sounds: Normal breath sounds. No wheezing.   Chest:      Chest wall: No tenderness.   Abdominal:      Palpations: Abdomen is soft. There is no mass.      Tenderness: There is no abdominal tenderness. There is no right CVA tenderness or left CVA tenderness.      Comments: Protuberant with ascites.     Musculoskeletal:         General: No swelling or tenderness. Normal range of motion.      Right lower leg: Edema present.      Left lower leg: Edema present.      Comments: +2 pitting edema present BLE, and +1 pitting edema noted in bilateral upper extremity   Skin:     General: Skin is warm and dry.      Capillary Refill: Capillary refill takes less than 2 seconds.   Neurological:      General: No focal deficit present.      Mental Status: He is alert and oriented to person, place, and time. Mental status is at baseline.   Psychiatric:         Mood and Affect: Mood normal.         Thought Content: Thought content normal.         Significant Labs: All pertinent labs within the past 24 hours have been reviewed.    Significant Imaging: I have reviewed all pertinent imaging results/findings within the past 24 hours.      Assessment/Plan:      * Acute on chronic systolic CHF (congestive heart failure)    Cardiology consulted  Cont dobutamine drip and iv diuretics.       UTI (urinary tract infection)  uti 2/2 e  coli  Cont rocephen      Elevated troponin level  Patient has a history of chronically elevated troponin level in 500s and today patient's troponin level was 596.  There was no ST-T abnormalities accompanying elevated troponin level.  No recent ischemia evaluation on record. Will consult Cardiology in a.m. for recommendations    1/25-cardiology seen pt    Hematuria  This is likely secondary to urinary tract infection.  Will empirically start patient on Rocephin.  As stated above, will elect to continue patient on Eliquis ( for Afib )      Chronic kidney disease  Patient has CKD stage 3.  Serum creatinine is at his baseline.  Will continue to monitor serum creatinine and urine output.  Patient likely has cardiorenal syndrome      Chronic a-fib  Patient with Long standing persistent (>12 months) atrial fibrillation which is controlled currently with Beta Blocker. Patient is currently in atrial fibrillation.[id number] Score: 3. Anticoagulation indicated. Anticoagulation done with Eliquis.  Will elect to continue patient on Eliquis.           Diabetes mellitus without complication  Hold oral hypoglycemics and start patient on sliding scale insulin to maintain CBG in the range of 130-180s      Hypertension  Continue present management        VTE Risk Mitigation (From admission, onward)         Ordered     apixaban tablet 5 mg  2 times daily         01/25/22 1253     Place sequential compression device  Until discontinued         01/25/22 0111                Discharge Planning   MADDIE:      Code Status: Full Code   Is the patient medically ready for discharge?:     Reason for patient still in hospital (select all that apply): Treatment  Discharge Plan A: Home                  Rehmat JAY Li MD  Department of Hospital Medicine   [address]

## 2022-02-02 NOTE — PROGRESS NOTES
[address]  Cardiology  Progress Note    Patient Name: Jeanmarie Michelle  MRN: [id number]  Admission Date: 1/24/2022  Hospital Length of Stay: 8 days  Code Status: Full Code   Attending Physician: Geneva Li MD   Primary Care Physician: Regina Sprague MD  Expected Discharge Date:   Principal Problem:Acute on chronic systolic CHF (congestive heart failure)    Subjective:     Interval History: Pt sitting on side of bed awake, alert. No complaints.     Review of Systems   Cardiovascular: Positive for dyspnea on exertion and leg swelling. Negative for chest pain, irregular heartbeat, orthopnea and palpitations.   Respiratory: Negative for shortness of breath.    All other systems reviewed and are negative.    Objective:     Vital Signs (Most Recent):  Temp: 97.5 °F (36.4 °C) (02/02/22 1140)  Pulse: 75 (02/02/22 1140)  Resp: 19 (02/02/22 1140)  BP: 97/69 (02/02/22 1140)  SpO2: 95 % (02/02/22 1140) Vital Signs (24h Range):  Temp:  [97.5 °F (36.4 °C)-98.1 °F (36.7 °C)] 97.5 °F (36.4 °C)  Pulse:  [] 75  Resp:  [18-20] 19  SpO2:  [94 %-99 %] 95 %  BP: ()/(54-78) 97/69     Weight: 95.1 kg (209 lb 10.5 oz)  Body mass index is 29.24 kg/m².     SpO2: 95 %  O2 Device (Oxygen Therapy): room air      Intake/Output Summary (Last 24 hours) at 2/2/2022 1533  Last data filed at 2/2/2022 1445  Gross per 24 hour   Intake 480 ml   Output 2575 ml   Net -2095 ml       Lines/Drains/Airways     Peripheral Intravenous Line                 Peripheral IV - Single Lumen 01/26/22 1014 20 G Distal;Posterior;Right Forearm 7 days                Physical Exam  Constitutional:       General: He is not in acute distress.     Appearance: He is not ill-appearing.   Eyes:      Pupils: Pupils are equal, round, and reactive to light.   Cardiovascular:      Rate and Rhythm: Normal rate. Rhythm irregularly irregular.      Pulses: Normal pulses and intact distal pulses.      Heart sounds: Normal heart sounds.    Pulmonary:      Effort: Pulmonary effort is normal.      Breath sounds: Normal breath sounds.   Musculoskeletal:      Cervical back: Normal range of motion and neck supple.      Right lower le+ Pitting Edema present.      Left lower le+ Pitting Edema present.   Skin:     General: Skin is warm and dry.   Neurological:      General: No focal deficit present.      Mental Status: He is alert.         Significant Labs:   All pertinent lab results from the last 24 hours have been reviewed. and   Recent Lab Results       22  1105   22  0458   22  0418   22  0047   22  1656        Anion Gap   12             BUN   25             BUN/CREAT RATIO   19             Calcium   8.9             Chloride   97             CO2   32             Creatinine   1.30             eGFR if non    56             Glucose   148             POC Glucose 149     151   190   196       Potassium   4.0             Sodium   137                                  Significant Imaging: Echocardiogram:   Transthoracic echo (TTE) complete (Cupid Only):   Results for orders placed or performed during the hospital encounter of 21   Echo   Result Value Ref Range    IVC diameter 2.24 cm    Left Ventricular Outflow Tract Mean Gradient 2.00 mmHg    AORTIC VALVE CUSP SEPERATION [id number] cm    LVIDd 6.23 (A) 3.5 - 6.0 cm    IVS 0.92 0.6 - 1.1 cm    Posterior Wall 0.92 0.6 - 1.1 cm    LVIDs 5.77 (A) 2.1 - 4.0 cm    FS 7 28 - 44 %    LV mass 236.68 g    LA size 4.63 cm    RVDD 3.77 cm    Left Ventricle Relative Wall Thickness 0.30 cm    AV mean gradient 4 mmHg    AV valve area 2.31 cm2    AV index (prosthetic) 0.70     E wave deceleration time 148 msec    LVOT diameter 2.05 cm    LVOT area 3.3 cm2    LVOT peak VTI 13.89 cm    Ao VTI 19.82 cm    LVOT stroke volume 45.82 cm3    MV Peak E Arash 0.76 m/s    TR Max Arash 3.42 m/s    LV Systolic Volume 164.59 mL    LV Diastolic Volume 196.13 mL    Echo EF  Estimated 16 %    Triscuspid Valve Regurgitation Peak Gradient 47 mmHg    EF 15 %    Narrative    · Atrial fibrillation with frequent PVC's, rate 85 and occasional runs of   wide complex tachycardia.  · The left ventricle is moderately enlarged with  · Atrial fibrillation observed.  · Mild right ventricular enlargement.  · Moderate mitral regurgitation.  · Moderate to severe tricuspid regurgitation.  · There is pulmonary hypertension.  · Moderate left atrial enlargement.  · Mild right atrial enlargement.        Assessment and Plan:     Brief HPI: Mr. Jeanmarie Michelle is a 80y/o M with persistent atrial fibrillation, nonischemic dilated cardiomyopathy- HFrEF with last EF 15% (12/8/21) scheduled to undergo ICD placement with Dr. Carlson in February 2022, stage III CKD, HTN and T2DM presenting with SOB with increased edema. He was recently seen at his cardiologist's office (1/21/22) and his Lasix was increased from 80mg to 120mg PO qd with plans for an ICD next month. On day of admission he had seen his PCP who noted his weight had increased approximately 20 lbs within the past few weeks and was advised to go to the ED. In the ER he describes he has been SOB for 2-3 weeks with increasing lower extremity and abdominal edema. He sleeps on 2 pillows a night and occasionally sleeps in an upright chair. He reports having CELESTIN, orthopnea and nocturnal dyspnea. He also reports having hematuria starting yesterday. In the ED his VS were stable with HR 69, BP 98/69 with pertinent labs including a troponin trending from 596, 536, 514 to 546. His troponins have been consistently elevated in the 500s, 3 weeks ago. His EKG revealed atrial fibrillation with frequent PVCs with HR 89. His U/A revealed TNTC RBC with 20-50 WBC, culture pending.     Currently the patient is in no acute distress but is hypervolemic with significant edema of his lower extremeties and abdomen. He denies chest pain and is able to urinate. He has received IV lasix  40mg once in the ED with 900 cc of urine output for the past 24 hours. Current weight is 104.3kg. Plan is to IV diurese with dobutamine and lasix. Eliquis is being held due to his hematuria. Will continue to monitor.         * Acute on chronic systolic CHF (congestive heart failure)  HFrEF, nonsichemic cardiomyopathy: EF 15% with moderate-severe TR (12/8/21); NYHA III-IV Stage D  Plan for ICD placement by Dr. Carlson next month  BNP at 15,000 (10,000 at baseline)  GDMT: carvedilol 3.125mg BID, Entresto 97-103mg BID, spironolactone 25mg qd  - holding chlorthalidone after adding spironolactone   - holding farxiga considering hematuria with UTI; restart on discharge   - consider torsemide 20mg BID on discharge   Increased lasix from IV lasix 60mg BID IV lasix 80mg BID  Started a dobutamine infusion to enhance IV diuresis      1/26/22  - continue diuresis with IV lasix and dobutamine as above   - daily weights, strict I&Os  - urine is positive for e.coli, do not recommend restarting SGLT2  (Farxiga) at discharge as this may be cause for recurrent UTI   - continue entresto, coreg, aldactone as above  - recommend torsemide 20mg bid in place of lasix at discharge    1/28/22:  - creatinine has trended up  - decrease IV lasix to 80mg BID (was 120mg BID)  - continue dobutamine gtt  - continue Entresto, Aladactone  - recommend torsemide 20mg BID in place of lasix at discharge  - urine is positive for e.coli, do not recommend restarting SGLT2  (Farxiga) at discharge as this may be cause for recurrent UTI    1/30/22:  - Creatinine stable on IV lasix 80mg bid and IV dobutamine  - MAPs around 60s; holding all GDMT for now  - Remains floridly volume overloaded (Swelling up to thighs now)    1/31/22:  - creatinine continues to improve with diuresis, now 1.24  - continue IV lasix 80mg bid and IV dobutamine  - MAPs around 60s; holding all GDMT for now  - remains fluid volume overloaded but improving (swelling up to thighs)    2/1/22  :  - creatinine has bumped to 1.52  - decrease dobutamine gtt to 2.5mcg/kg/min  - change IV lasix to PO 40mg BID  - remains fluid volume overloaded but improving    2/2/22:  - creatinine 1.3  - stop dobutamine  - continue PO lasix 40mg BID  - BLE still with 2+ pitting edema  - GDMT still on hold due to low BP  - will continue to monitor        UTI (urinary tract infection)  - ECOli UTI  - S/P cystoscopy  - Hold SGLT2i    Elevated troponin level  - Pt has a history of chronically elevated troponins in the 500s  - EKG reveals atrial fibrillation with PVCs  - Elevated troponins most likely 2/2 myocardial injury due to his heart failure exacerbation    Hematuria  Currently receiving rocephin for UTI  Primary following    Chronic kidney disease  Pt has CKD Stage 3  Serum Cr 2.01 which is at his baseline  Continue to monitor with IV diuresis    1/28/22:  - creatinine is 1.63 (was 1.58 yesterday)  - decreased IV lasix dose  - continue dobutamine gtt  - bmp in AM    1/31/22:  - creatinine continues to improve with diuresis, 1.24 today  - continue IV lasix and dobutamine  - bmp in AM    2/1/22  - creatinine bumped to 1.52 from 1.24  - decrease dobutamine gtt to 2.5mcg/kg/min  - change IV lasix to PO lasix 40mg BID  - BMP in AM    2/2/22  - creatinine 1.3  - stop dobutamine  - continue PO lasix 40mg BID  - BMP in AM    Chronic a-fib  - Persistent, longstanding atrial fibrillation  - Currently in rate controlled atrial fibrillation with frequent multifocal PVCs  - [id number] of 4  - Currently on eliquis 5mg BID     Diabetes mellitus without complication  Holding farxiga, currently on SSI    Hypertension  - MAPs around 60s; holding all GDMT for now            VTE Risk Mitigation (From admission, onward)         Ordered     apixaban tablet 5 mg  2 times daily         01/25/22 1253     Place sequential compression device  Until discontinued         01/25/22 0111              Late entry: 2/2/2022    Pt seen and examined, chart  reviewed, essentially agree with findings as documented    CC: shortness of breath, lower extremity edema.   HPI: Pt reports shortness of breath has improved, less lower extremity edema  PE: agree with findings as documeted  Labs, Xrays, EKgs reviewed    IMP/Plan:  1. Acute on chronic systolic heart failure secondary to ischemic cardiomyopathy, has continued to improve, will DC dobutuamine, change Lasix to PO, continue to monitor renal function.  2. Lower extremity edema, continues to improve.   3. Afib with controlled ventricular response on current meds.   Candida Rai, SHIRLEYP  Cardiology  [facility] - [facility]

## 2022-02-02 NOTE — ASSESSMENT & PLAN NOTE
HFrEF, nonsichemic cardiomyopathy: EF 15% with moderate-severe TR (12/8/21); NYHA III-IV Stage D  Plan for ICD placement by Dr. Carlson next month  BNP at 15,000 (10,000 at baseline)  GDMT: carvedilol 3.125mg BID, Entresto 97-103mg BID, spironolactone 25mg qd  - holding chlorthalidone after adding spironolactone   - holding farxiga considering hematuria with UTI; restart on discharge   - consider torsemide 20mg BID on discharge   Increased lasix from IV lasix 60mg BID IV lasix 80mg BID  Started a dobutamine infusion to enhance IV diuresis      1/26/22  - continue diuresis with IV lasix and dobutamine as above   - daily weights, strict I&Os  - urine is positive for e.coli, do not recommend restarting SGLT2  (Farxiga) at discharge as this may be cause for recurrent UTI   - continue entresto, coreg, aldactone as above  - recommend torsemide 20mg bid in place of lasix at discharge    1/28/22:  - creatinine has trended up  - decrease IV lasix to 80mg BID (was 120mg BID)  - continue dobutamine gtt  - continue Entresto, Aladactone  - recommend torsemide 20mg BID in place of lasix at discharge  - urine is positive for e.coli, do not recommend restarting SGLT2  (Farxiga) at discharge as this may be cause for recurrent UTI    1/30/22:  - Creatinine stable on IV lasix 80mg bid and IV dobutamine  - MAPs around 60s; holding all GDMT for now  - Remains floridly volume overloaded (Swelling up to thighs now)    1/31/22:  - creatinine continues to improve with diuresis, now 1.24  - continue IV lasix 80mg bid and IV dobutamine  - MAPs around 60s; holding all GDMT for now  - remains fluid volume overloaded but improving (swelling up to thighs)    2/1/22 :  - creatinine has bumped to 1.52  - decrease dobutamine gtt to 2.5mcg/kg/min  - change IV lasix to PO 40mg BID  - remains fluid volume overloaded but improving    2/2/22:  - creatinine 1.3  - stop dobutamine  - continue PO lasix 40mg BID  - BLE still with 2+ pitting edema  - GDMT  still on hold due to low BP  - will continue to monitor

## 2022-02-02 NOTE — SUBJECTIVE & OBJECTIVE
Interval History: Pt sitting on side of bed awake, alert. No complaints.     Review of Systems   Cardiovascular: Positive for dyspnea on exertion and leg swelling. Negative for chest pain, irregular heartbeat, orthopnea and palpitations.   Respiratory: Negative for shortness of breath.    All other systems reviewed and are negative.    Objective:     Vital Signs (Most Recent):  Temp: 97.5 °F (36.4 °C) (22 1140)  Pulse: 75 (22 1140)  Resp: 19 (22 1140)  BP: 97/69 (22 1140)  SpO2: 95 % (22 1140) Vital Signs (24h Range):  Temp:  [97.5 °F (36.4 °C)-98.1 °F (36.7 °C)] 97.5 °F (36.4 °C)  Pulse:  [] 75  Resp:  [18-20] 19  SpO2:  [94 %-99 %] 95 %  BP: ()/(54-78) 97/69     Weight: 95.1 kg (209 lb 10.5 oz)  Body mass index is 29.24 kg/m².     SpO2: 95 %  O2 Device (Oxygen Therapy): room air      Intake/Output Summary (Last 24 hours) at 2022 1533  Last data filed at 2022 1445  Gross per 24 hour   Intake 480 ml   Output 2575 ml   Net -2095 ml       Lines/Drains/Airways     Peripheral Intravenous Line                 Peripheral IV - Single Lumen 22 1014 20 G Distal;Posterior;Right Forearm 7 days                Physical Exam  Constitutional:       General: He is not in acute distress.     Appearance: He is not ill-appearing.   Eyes:      Pupils: Pupils are equal, round, and reactive to light.   Cardiovascular:      Rate and Rhythm: Normal rate. Rhythm irregularly irregular.      Pulses: Normal pulses and intact distal pulses.      Heart sounds: Normal heart sounds.   Pulmonary:      Effort: Pulmonary effort is normal.      Breath sounds: Normal breath sounds.   Musculoskeletal:      Cervical back: Normal range of motion and neck supple.      Right lower le+ Pitting Edema present.      Left lower le+ Pitting Edema present.   Skin:     General: Skin is warm and dry.   Neurological:      General: No focal deficit present.      Mental Status: He is alert.          Significant Labs:   All pertinent lab results from the last 24 hours have been reviewed. and   Recent Lab Results       02/02/22  1105   02/02/22  0458   02/02/22  0418   02/02/22  0047   02/01/22  1656        Anion Gap   12             BUN   25             BUN/CREAT RATIO   19             Calcium   8.9             Chloride   97             CO2   32             Creatinine   1.30             eGFR if non    56             Glucose   148             POC Glucose 149     151   190   196       Potassium   4.0             Sodium   137                                  Significant Imaging: Echocardiogram:   Transthoracic echo (TTE) complete (Cupid Only):   Results for orders placed or performed during the hospital encounter of 12/08/21   Echo   Result Value Ref Range    IVC diameter 2.24 cm    Left Ventricular Outflow Tract Mean Gradient 2.00 mmHg    AORTIC VALVE CUSP SEPERATION 15.778293790100465 cm    LVIDd 6.23 (A) 3.5 - 6.0 cm    IVS 0.92 0.6 - 1.1 cm    Posterior Wall 0.92 0.6 - 1.1 cm    LVIDs 5.77 (A) 2.1 - 4.0 cm    FS 7 28 - 44 %    LV mass 236.68 g    LA size 4.63 cm    RVDD 3.77 cm    Left Ventricle Relative Wall Thickness 0.30 cm    AV mean gradient 4 mmHg    AV valve area 2.31 cm2    AV index (prosthetic) 0.70     E wave deceleration time 148 msec    LVOT diameter 2.05 cm    LVOT area 3.3 cm2    LVOT peak VTI 13.89 cm    Ao VTI 19.82 cm    LVOT stroke volume 45.82 cm3    MV Peak E Arash 0.76 m/s    TR Max Arash 3.42 m/s    LV Systolic Volume 164.59 mL    LV Diastolic Volume 196.13 mL    Echo EF Estimated 16 %    Triscuspid Valve Regurgitation Peak Gradient 47 mmHg    EF 15 %    Narrative    · Atrial fibrillation with frequent PVC's, rate 85 and occasional runs of   wide complex tachycardia.  · The left ventricle is moderately enlarged with  · Atrial fibrillation observed.  · Mild right ventricular enlargement.  · Moderate mitral regurgitation.  · Moderate to severe tricuspid regurgitation.  ·  There is pulmonary hypertension.  · Moderate left atrial enlargement.  · Mild right atrial enlargement.

## 2022-02-02 NOTE — SUBJECTIVE & OBJECTIVE
Interval History:     ISELA  Feels improved  Transitioned to oral lasix by cardiology, stopped dobutamien, cardiology wants to keep another day or so to see how his bp is w/o dobutamine.     Review of Systems   Constitutional: Negative for chills, diaphoresis, fatigue and fever.   HENT: Negative for congestion, hearing loss, nosebleeds, postnasal drip, sore throat, tinnitus and trouble swallowing.    Eyes: Negative for photophobia, pain, discharge, itching and visual disturbance.   Respiratory: Negative for cough, shortness of breath, wheezing and stridor.    Cardiovascular: Positive for leg swelling. Negative for chest pain and palpitations.   Gastrointestinal: Negative for abdominal distention, abdominal pain, anal bleeding, blood in stool, constipation, diarrhea, nausea and vomiting.   Endocrine: Negative for cold intolerance, heat intolerance, polydipsia, polyphagia and polyuria.   Genitourinary: Negative for decreased urine volume, difficulty urinating, dysuria, flank pain, frequency, hematuria and urgency.   Musculoskeletal: Negative for arthralgias, back pain, gait problem, joint swelling, myalgias, neck pain and neck stiffness.   Skin: Negative for color change, pallor and rash.   Allergic/Immunologic: Negative for immunocompromised state.   Neurological: Negative for dizziness, tremors, seizures, syncope, facial asymmetry, speech difficulty, weakness, light-headedness, numbness and headaches.   Hematological: Negative for adenopathy. Does not bruise/bleed easily.     Objective:     Vital Signs (Most Recent):  Temp: 97.5 °F (36.4 °C) (02/02/22 1140)  Pulse: 75 (02/02/22 1140)  Resp: 19 (02/02/22 1140)  BP: 97/69 (02/02/22 1140)  SpO2: 95 % (02/02/22 1140) Vital Signs (24h Range):  Temp:  [97.5 °F (36.4 °C)-98.1 °F (36.7 °C)] 97.5 °F (36.4 °C)  Pulse:  [] 75  Resp:  [18-20] 19  SpO2:  [94 %-99 %] 95 %  BP: ()/(54-78) 97/69     Weight: 95.1 kg (209 lb 10.5 oz)  Body mass index is 29.24  kg/m².    Intake/Output Summary (Last 24 hours) at 2/2/2022 1455  Last data filed at 2/2/2022 1140  Gross per 24 hour   Intake 480 ml   Output 2150 ml   Net -1670 ml      Physical Exam  Vitals reviewed.   Constitutional:       General: He is not in acute distress.     Appearance: Normal appearance.   HENT:      Head: Normocephalic and atraumatic.      Right Ear: External ear normal.      Left Ear: External ear normal.   Eyes:      Extraocular Movements: Extraocular movements intact.      Pupils: Pupils are equal, round, and reactive to light.   Cardiovascular:      Rate and Rhythm: Normal rate and regular rhythm.      Pulses: Normal pulses.      Heart sounds: Normal heart sounds. No murmur heard.      Pulmonary:      Effort: Pulmonary effort is normal. No respiratory distress.      Breath sounds: Normal breath sounds. No wheezing.   Chest:      Chest wall: No tenderness.   Abdominal:      Palpations: Abdomen is soft. There is no mass.      Tenderness: There is no abdominal tenderness. There is no right CVA tenderness or left CVA tenderness.      Comments: Protuberant with ascites.     Musculoskeletal:         General: No swelling or tenderness. Normal range of motion.      Right lower leg: Edema present.      Left lower leg: Edema present.      Comments: +2 pitting edema present BLE, and +1 pitting edema noted in bilateral upper extremity   Skin:     General: Skin is warm and dry.      Capillary Refill: Capillary refill takes less than 2 seconds.   Neurological:      General: No focal deficit present.      Mental Status: He is alert and oriented to person, place, and time. Mental status is at baseline.   Psychiatric:         Mood and Affect: Mood normal.         Thought Content: Thought content normal.         Significant Labs: All pertinent labs within the past 24 hours have been reviewed.    Significant Imaging: I have reviewed all pertinent imaging results/findings within the past 24 hours.

## 2022-02-03 VITALS
TEMPERATURE: 99 F | WEIGHT: 209.69 LBS | DIASTOLIC BLOOD PRESSURE: 80 MMHG | RESPIRATION RATE: 19 BRPM | HEART RATE: 91 BPM | BODY MASS INDEX: 29.35 KG/M2 | HEIGHT: 71 IN | OXYGEN SATURATION: 99 % | SYSTOLIC BLOOD PRESSURE: 104 MMHG

## 2022-02-03 LAB
ANION GAP SERPL CALCULATED.3IONS-SCNC: 14 MMOL/L (ref 7–16)
BUN SERPL-MCNC: 27 MG/DL (ref 7–18)
BUN/CREAT SERPL: 19 (ref 6–20)
CALCIUM SERPL-MCNC: 8.9 MG/DL (ref 8.5–10.1)
CHLORIDE SERPL-SCNC: 96 MMOL/L (ref 98–107)
CO2 SERPL-SCNC: 30 MMOL/L (ref 21–32)
CREAT SERPL-MCNC: 1.39 MG/DL (ref 0.7–1.3)
GLUCOSE SERPL-MCNC: 128 MG/DL (ref 70–105)
GLUCOSE SERPL-MCNC: 130 MG/DL (ref 74–106)
GLUCOSE SERPL-MCNC: 160 MG/DL (ref 70–105)
GLUCOSE SERPL-MCNC: 247 MG/DL (ref 70–105)
POTASSIUM SERPL-SCNC: 3.6 MMOL/L (ref 3.5–5.1)
SODIUM SERPL-SCNC: 136 MMOL/L (ref 136–145)

## 2022-02-03 PROCEDURE — 99239 PR HOSPITAL DISCHARGE DAY,>30 MIN: ICD-10-PCS | Mod: ,,, | Performed by: INTERNAL MEDICINE

## 2022-02-03 PROCEDURE — 25000003 PHARM REV CODE 250: Performed by: INTERNAL MEDICINE

## 2022-02-03 PROCEDURE — 99233 PR SUBSEQUENT HOSPITAL CARE,LEVL III: ICD-10-PCS | Mod: ,,, | Performed by: INTERNAL MEDICINE

## 2022-02-03 PROCEDURE — 36415 COLL VENOUS BLD VENIPUNCTURE: CPT | Performed by: INTERNAL MEDICINE

## 2022-02-03 PROCEDURE — 82962 GLUCOSE BLOOD TEST: CPT

## 2022-02-03 PROCEDURE — 99239 HOSP IP/OBS DSCHRG MGMT >30: CPT | Mod: ,,, | Performed by: INTERNAL MEDICINE

## 2022-02-03 PROCEDURE — 80048 BASIC METABOLIC PNL TOTAL CA: CPT | Performed by: INTERNAL MEDICINE

## 2022-02-03 PROCEDURE — 25000003 PHARM REV CODE 250: Performed by: NURSE PRACTITIONER

## 2022-02-03 PROCEDURE — 99233 SBSQ HOSP IP/OBS HIGH 50: CPT | Mod: ,,, | Performed by: INTERNAL MEDICINE

## 2022-02-03 RX ORDER — POTASSIUM CHLORIDE 20 MEQ/1
20 TABLET, EXTENDED RELEASE ORAL DAILY
Qty: 10 TABLET | Refills: 0 | Status: SHIPPED | OUTPATIENT
Start: 2022-02-03 | End: 2022-02-13

## 2022-02-03 RX ORDER — SACUBITRIL AND VALSARTAN 97; 103 MG/1; MG/1
1 TABLET, FILM COATED ORAL 2 TIMES DAILY
Qty: 180 TABLET | Refills: 1
Start: 2022-02-03 | End: 2022-02-10

## 2022-02-03 RX ORDER — FUROSEMIDE 40 MG/1
40 TABLET ORAL 2 TIMES DAILY
Qty: 30 TABLET | Refills: 0 | Status: CANCELLED | OUTPATIENT
Start: 2022-02-03 | End: 2023-02-03

## 2022-02-03 RX ORDER — CARVEDILOL 6.25 MG/1
6.25 TABLET ORAL 2 TIMES DAILY
Status: DISCONTINUED | OUTPATIENT
Start: 2022-02-03 | End: 2022-02-03 | Stop reason: HOSPADM

## 2022-02-03 RX ORDER — FUROSEMIDE 80 MG/1
80 TABLET ORAL 2 TIMES DAILY
Qty: 20 TABLET | Refills: 0 | Status: SHIPPED | OUTPATIENT
Start: 2022-02-03 | End: 2022-02-03 | Stop reason: SDUPTHER

## 2022-02-03 RX ORDER — FUROSEMIDE 80 MG/1
80 TABLET ORAL DAILY
Qty: 10 TABLET | Refills: 0 | Status: ON HOLD
Start: 2022-02-03 | End: 2022-03-04 | Stop reason: HOSPADM

## 2022-02-03 RX ORDER — CHLORTHALIDONE 25 MG/1
12.5 TABLET ORAL DAILY
Qty: 45 TABLET | Refills: 1
Start: 2022-02-03 | End: 2022-02-10

## 2022-02-03 RX ADMIN — CARVEDILOL 6.25 MG: 6.25 TABLET, FILM COATED ORAL at 11:02

## 2022-02-03 RX ADMIN — ASPIRIN 81 MG: 81 TABLET, COATED ORAL at 09:02

## 2022-02-03 RX ADMIN — TAMSULOSIN HYDROCHLORIDE 0.4 MG: 0.4 CAPSULE ORAL at 09:02

## 2022-02-03 RX ADMIN — EZETIMIBE 10 MG: 10 TABLET ORAL at 09:02

## 2022-02-03 RX ADMIN — APIXABAN 5 MG: 5 TABLET, FILM COATED ORAL at 09:02

## 2022-02-03 RX ADMIN — PANTOPRAZOLE SODIUM 40 MG: 40 TABLET, DELAYED RELEASE ORAL at 09:02

## 2022-02-03 RX ADMIN — FUROSEMIDE 40 MG: 40 TABLET ORAL at 09:02

## 2022-02-03 NOTE — ASSESSMENT & PLAN NOTE
This is likely secondary to urinary tract infection.  Will empirically start patient on Rocephin.  As stated above, will elect to continue patient on Eliquis ( for Afib )    02/03--Treated with 9 days of IV Rocephin

## 2022-02-03 NOTE — ASSESSMENT & PLAN NOTE
Patient has a history of chronically elevated troponin level in 500s and today patient's troponin level was 596.  There was no ST-T abnormalities accompanying elevated troponin level.  No recent ischemia evaluation on record. Will consult Cardiology in a.m. for recommendations    1/25-cardiology seen pt    02/03--follow up with cardiology outpatient

## 2022-02-03 NOTE — ASSESSMENT & PLAN NOTE
Patient with Long standing persistent (>12 months) atrial fibrillation which is controlled currently with Beta Blocker. Patient is currently in atrial fibrillation.[id number] Score: 3. Anticoagulation indicated. Anticoagulation done with Eliquis.  Will elect to continue patient on Eliquis.

## 2022-02-03 NOTE — ASSESSMENT & PLAN NOTE
HFrEF, nonsichemic cardiomyopathy: EF 15% with moderate-severe TR (12/8/21); NYHA III-IV Stage D  Plan for ICD placement by Dr. Carlson next month  BNP at 15,000 (10,000 at baseline)  GDMT: carvedilol 3.125mg BID, Entresto 97-103mg BID, spironolactone 25mg qd  - holding chlorthalidone after adding spironolactone   - holding farxiga considering hematuria with UTI; restart on discharge   - consider torsemide 20mg BID on discharge   Increased lasix from IV lasix 60mg BID IV lasix 80mg BID  Started a dobutamine infusion to enhance IV diuresis      1/26/22  - continue diuresis with IV lasix and dobutamine as above   - daily weights, strict I&Os  - urine is positive for e.coli, do not recommend restarting SGLT2  (Farxiga) at discharge as this may be cause for recurrent UTI   - continue entresto, coreg, aldactone as above  - recommend torsemide 20mg bid in place of lasix at discharge    1/28/22:  - creatinine has trended up  - decrease IV lasix to 80mg BID (was 120mg BID)  - continue dobutamine gtt  - continue Entresto, Aladactone  - recommend torsemide 20mg BID in place of lasix at discharge  - urine is positive for e.coli, do not recommend restarting SGLT2  (Farxiga) at discharge as this may be cause for recurrent UTI    1/30/22:  - Creatinine stable on IV lasix 80mg bid and IV dobutamine  - MAPs around 60s; holding all GDMT for now  - Remains floridly volume overloaded (Swelling up to thighs now)    1/31/22:  - creatinine continues to improve with diuresis, now 1.24  - continue IV lasix 80mg bid and IV dobutamine  - MAPs around 60s; holding all GDMT for now  - remains fluid volume overloaded but improving (swelling up to thighs)    2/1/22 :  - creatinine has bumped to 1.52  - decrease dobutamine gtt to 2.5mcg/kg/min  - change IV lasix to PO 40mg BID  - remains fluid volume overloaded but improving    2/2/22:  - creatinine 1.3  - stop dobutamine  - continue PO lasix 40mg BID  - BLE still with 2+ pitting edema  - GDMT  still on hold due to low BP  - will continue to monitor    2/3/22:  - creatinine stable at 1.39  - continue PO lasix 80mg daily  - hold entresto and aldactone  - f/u with TENISHA Moreno in one week for BP check and can add entresto and aldactone if BP can tolerate  - pt is scheduled for AICD with Dr. Carlson this month

## 2022-02-03 NOTE — ASSESSMENT & PLAN NOTE
Pt has CKD Stage 3  Serum Cr 2.01 which is at his baseline  Continue to monitor with IV diuresis    1/28/22:  - creatinine is 1.63 (was 1.58 yesterday)  - decreased IV lasix dose  - continue dobutamine gtt  - bmp in AM    1/31/22:  - creatinine continues to improve with diuresis, 1.24 today  - continue IV lasix and dobutamine  - bmp in AM    2/1/22  - creatinine bumped to 1.52 from 1.24  - decrease dobutamine gtt to 2.5mcg/kg/min  - change IV lasix to PO lasix 40mg BID  - BMP in AM    2/2/22  - creatinine 1.3  - stop dobutamine  - continue PO lasix 40mg BID  - BMP in AM    2/3/22  - creatinine stable at 1.39

## 2022-02-03 NOTE — DISCHARGE SUMMARY
[address] Medicine  Discharge Summary      Patient Name: Jeanmarie Michelle  MRN: [id number]  Patient Class: IP- Inpatient  Admission Date: 1/24/2022  Hospital Length of Stay: 9 days  Discharge Date and Time:  02/03/2022 3:55 PM  Attending Physician: Geneva Li MD   Discharging Provider: ROLY Amador  Primary Care Provider: Regina Sprague MD      HPI:   Patient is an 81-year-old male with a history of persistent atrial fibrillation on Eliquis, end-stage dilated cardiomyopathy the with last known EF of 15% scheduled to undergo ICD per Dr. Leigh in February, history of stage III CKD, type 2 diabetes and hypertension who was initially seen at Dr. Leigh office on Friday for shortness of breath with increased peripheral edema. Lasix was increased from  mg and pt felt better overall.  Patient was seen at his PCPs office earlier today for a 3 month follow-up and patient's PCP felt that patient was volume overloaded and after discussion was Dr. Leigh patient was instructed to come to the ED for evaluation.  In ED, patient was found to have lost weight from the time of Dr. Leigh visit by 14 lb.  Chest x-ray showed mild pulmonary edema which appeared chronic from previous chest x-ray 3 weeks ago.  [place] [address] from [place] [address]. Troponin was elevated, but pt has chronically elevated troponin and EKG showed no ST-T abnormalities. Pt had hematuria in ED and when asked when this was started, pt stated that this started at late morning today, but failed to mention it to his PCP. Subsequent UA demonstrated a presence of UTI. Source of hematuria is likely due to UTI. Pt will be admitted for further evaluation and intervention.      * No surgery found *      Hospital Course:   02/03--Patient admitted for c/o hematuria, sob, and increased peripheral edema; has history of persistent A-fib and on home eliquis, was found to have a  complicated UTI and treated with IV Rocephin x's 9 days.  Also has history of End stage dilated cardiomyopathy with last known EF of 15%, was followed by Cardiology during this hospitalization and diuresed with IV Dobutamine and Lasix; will follow Cardiology Recs at discharge, see recs below in A&P documentation.   Blood glucose readings elevated, will restart home Glucotrol but will not restart Farxiga due to recurrent UTIs, pt to maintain a log of readings and discuss with pcp at hospital follow up visit.  Pt has met max benefit from this hospitalization and is stable for discharge.    Update: discussed home Lasix with Cardiology further, advised pt to take Lasix 80mg daily not twice a day.  Pt states has home blood pressure monitor, instructed to monitor blood pressure, maintain a log and take readings with him to his follow-up appointments.              Goals of Care Treatment Preferences:  Code Status: Full Code      Consults:   Consults (From admission, onward)        Status Ordering Provider     Inpatient consult to Urology  Once        Provider:  Austin Nath Jr., MD    Acknowledged LOKESH, REHMAT U     Inpatient consult to Cardiology  Once        Provider:  (Not yet assigned)    JESUS MANUEL Caceres S          * Acute on chronic systolic CHF (congestive heart failure)    Cardiology consulted  Cont dobutamine drip and iv diuretics.     02/03--follow cardiology recs, see above      UTI (urinary tract infection)  uti 2/2 e coli  Cont rocephen    02/03--Treated with 9 days of IV ABT      Elevated troponin level  Patient has a history of chronically elevated troponin level in 500s and today patient's troponin level was 596.  There was no ST-T abnormalities accompanying elevated troponin level.  No recent ischemia evaluation on record. Will consult Cardiology in a.m. for recommendations    1/25-cardiology seen pt    02/03--follow up with cardiology outpatient    Hematuria  This is likely secondary to urinary tract  "infection.  Will empirically start patient on Rocephin.  As stated above, will elect to continue patient on Eliquis ( for Afib )    02/03--Treated with 9 days of IV Rocephin    Chronic kidney disease  Patient has CKD stage 3.  Serum creatinine is at his baseline.  Will continue to monitor serum creatinine and urine output.  Patient likely has cardiorenal syndrome    02/03--sCr 1.39 today, follow up outpatient    Chronic a-fib  Patient with Long standing persistent (>12 months) atrial fibrillation which is controlled currently with Beta Blocker. Patient is currently in atrial fibrillation.[id number] Score: 3. Anticoagulation indicated. Anticoagulation done with Eliquis.  Will elect to continue patient on Eliquis.           Diabetes mellitus without complication  Hold oral hypoglycemics and start patient on sliding scale insulin to maintain CBG in the range of 130-180s    02/04--Resume home meds, will not restart Farxiga due to recurrent UTI's, pt to monitor glucose, maintain a log of readings and discuss with pcp at hosp follow up visit.      Hypertension  Continue present management    02/03--Stable Will follow Cardiology recs at discharge: "Resume home Coreg, hold entresto and aldacto, resume home dose of lasix, follow up with Gali mcfarland in 1-2 weeks, Held meds will be re-evaluated at that time".        Final Active Diagnoses:    Diagnosis Date Noted POA    PRINCIPAL PROBLEM:  Acute on chronic systolic CHF (congestive heart failure) [I50.23] 01/25/2022 Yes    UTI (urinary tract infection) [N39.0] 01/26/2022 Yes    Hematuria [R31.9] 01/24/2022 Yes    Elevated troponin level [R77.8] 01/24/2022 Yes    Chronic kidney disease [N18.9] 01/21/2022 Yes     Chronic    Chronic a-fib [I48.20] 10/20/2021 Yes    Hypertension [I10] 10/20/2021 Yes    Diabetes mellitus without complication [E11.9] 10/20/2021 Yes      Problems Resolved During this Admission:    Diagnosis Date Noted Date Resolved POA    Acute cystitis " with hematuria [N30.01] 01/24/2022 01/25/2022 Yes    Chronic systolic congestive heart failure [I50.22] 01/21/2022 01/25/2022 Yes     Chronic       Discharged Condition: stable    Disposition: Home or Self Care    Follow Up:   Follow-up Information     TENISHA May. Go on 2/10/2022.    Specialty: Cardiology  Why: 2/10/22 at 1:00pm, hospital f/u for decompensated systolic heart failure, BP check and med optimization  Contact information:  [address] [facility]  [place] [address]  [phone]             Regina Sprague MD In 1 week.    Specialty: Family Medicine  Why: Hospital follow up with repeat BMP  Contact information:  [address]  [facility] [place] [address]  [phone]                       Patient Instructions:      Diet Cardiac     Diet diabetic     Notify your health care provider if you experience any of the following:  temperature >100.4     Notify your health care provider if you experience any of the following:  persistent nausea and vomiting or diarrhea     Notify your health care provider if you experience any of the following:  severe uncontrolled pain     Notify your health care provider if you experience any of the following:  redness, tenderness, or signs of infection (pain, swelling, redness, odor or green/yellow discharge around incision site)     Notify your health care provider if you experience any of the following:  difficulty breathing or increased cough     Notify your health care provider if you experience any of the following:  severe persistent headache     Notify your health care provider if you experience any of the following:  worsening rash     Notify your health care provider if you experience any of the following:  persistent dizziness, light-headedness, or visual disturbances     Notify your health care provider if you experience any of the following:  increased confusion or weakness     Notify your  health care provider if you experience any of the following:  temperature >100.4     Notify your health care provider if you experience any of the following:  persistent nausea and vomiting or diarrhea     Notify your health care provider if you experience any of the following:  severe uncontrolled pain     Notify your health care provider if you experience any of the following:  redness, tenderness, or signs of infection (pain, swelling, redness, odor or green/yellow discharge around incision site)     Notify your health care provider if you experience any of the following:  difficulty breathing or increased cough     Notify your health care provider if you experience any of the following:  severe persistent headache     Notify your health care provider if you experience any of the following:  worsening rash     Notify your health care provider if you experience any of the following:  persistent dizziness, light-headedness, or visual disturbances     Notify your health care provider if you experience any of the following:  increased confusion or weakness     Activity as tolerated       Significant Diagnostic Studies: Labs:   BMP:   Recent Labs   Lab 02/02/22  0458 02/03/22  0510   * 130*    136   K 4.0 3.6   CL 97* 96*   CO2 32 30   BUN 25* 27*   CREATININE 1.30 1.39*   CALCIUM 8.9 8.9    and CBC No results for input(s): WBC, HGB, HCT, PLT in the last 48 hours.      Pending Diagnostic Studies:     Procedure Component Value Units Date/Time    EXTRA TUBES [[id number]] Collected: 01/24/22 1856    Order Status: Sent Lab Status: In process Updated: 01/24/22 1902    Specimen: Blood, Venous     Narrative:      The following orders were created for panel order EXTRA TUBES.  Procedure                               Abnormality         Status                     ---------                               -----------         ------                     Lavender Top Hold[[id number]]                                In  process                 Gold Top Hold[[id number]]                                    In process                 Pink Top Hold[[id number]]                                    In process                   Please view results for these tests on the individual orders.         Medications:  Reconciled Home Medications:      Medication List      START taking these medications    potassium chloride SA 20 MEQ tablet  Commonly known as: K-DUR,KLOR-CON  Take 1 tablet (20 mEq total) by mouth once daily. for 10 days        CHANGE how you take these medications    chlorthalidone 25 MG Tab  Commonly known as: HYGROTEN  Take 0.5 tablets (12.5 mg total) by mouth once daily. Cont to hold pending hospital follow up visit with Kristen  1/2 tab po daily  What changed: additional instructions     ENTRESTO  mg per tablet  Generic drug: sacubitriL-valsartan  Take 1 tablet by mouth 2 (two) times daily. Cont to hold pending follow up appt with ANKUSH Barker  What changed: additional instructions        CONTINUE taking these medications    apixaban 5 mg Tab  Commonly known as: ELIQUIS  Take 1 tablet (5 mg total) by mouth 2 (two) times daily.     aspirin 81 MG EC tablet  Commonly known as: ECOTRIN  Take 81 mg by mouth once daily.     carvediloL 6.25 MG tablet  Commonly known as: COREG  Take 1 tablet (6.25 mg total) by mouth 2 (two) times daily with meals.     dorzolamide-timolol 2-0.5% 22.3-6.8 mg/mL ophthalmic solution  Commonly known as: COSOPT  Place 1 drop into both eyes 2 (two) times a day.     ezetimibe 10 mg tablet  Commonly known as: ZETIA  Take 1 tablet (10 mg total) by mouth once daily.     furosemide 80 MG tablet  Commonly known as: LASIX  Take 1 tablet (80 mg total) by mouth daily.  Told pt to increase to BID instead daily for 10 days     glipiZIDE 10 MG tablet  Commonly known as: GLUCOTROL  Take 1 tablet (10 mg total) by mouth 2 (two) times daily before meals.     latanoprost 0.005 % ophthalmic solution  Place 1 drop into both  eyes Daily.     omeprazole 40 MG capsule  Commonly known as: PRILOSEC  Take 1 capsule (40 mg total) by mouth once daily.     tamsulosin 0.4 mg Cap  Commonly known as: FLOMAX  Take 1 capsule (0.4 mg total) by mouth once daily.        STOP taking these medications    FARXIGA 5 mg Tab tablet  Generic drug: dapagliflozin            Indwelling Lines/Drains at time of discharge:   Lines/Drains/Airways     None                 Time spent on the discharge of patient: >30 minutes         ROLY Amador  Department of Hospital Medicine  [address]

## 2022-02-03 NOTE — ASSESSMENT & PLAN NOTE
Patient has CKD stage 3.  Serum creatinine is at his baseline.  Will continue to monitor serum creatinine and urine output.  Patient likely has cardiorenal syndrome    02/03--sCr 1.39 today, follow up outpatient

## 2022-02-03 NOTE — ASSESSMENT & PLAN NOTE
- MAPs around 60s; holding all GDMT for now    2/3/22:  - MAPs around 80s  - add coreg  - continue to hold entresto and aldactone  - pt will f/u with TENISHA Moreno in 1 week for BP check and can restart entresto and aldactone if BP can tolerate

## 2022-02-03 NOTE — SUBJECTIVE & OBJECTIVE
Interval History: No complaints.     Review of Systems   Cardiovascular: Positive for leg swelling. Negative for chest pain, dyspnea on exertion, orthopnea, palpitations and syncope.   Respiratory: Negative for shortness of breath.    All other systems reviewed and are negative.    Objective:     Vital Signs (Most Recent):  Temp: 98.8 °F (37.1 °C) (22 1135)  Pulse: 91 (22 1135)  Resp: 19 (22 1135)  BP: 104/80 (22 1135)  SpO2: 99 % (22 1135) Vital Signs (24h Range):  Temp:  [97.3 °F (36.3 °C)-98.8 °F (37.1 °C)] 98.8 °F (37.1 °C)  Pulse:  [61-91] 91  Resp:  [18-19] 19  SpO2:  [95 %-100 %] 99 %  BP: ()/(50-80) 104/80     Weight: 95.1 kg (209 lb 10.5 oz)  Body mass index is 29.24 kg/m².     SpO2: 99 %  O2 Device (Oxygen Therapy): room air      Intake/Output Summary (Last 24 hours) at 2/3/2022 1400  Last data filed at 2/3/2022 0700  Gross per 24 hour   Intake --   Output 1725 ml   Net -1725 ml       Lines/Drains/Airways     Peripheral Intravenous Line                 Peripheral IV - Single Lumen 22 1014 20 G Distal;Posterior;Right Forearm 8 days                Physical Exam  Constitutional:       General: He is not in acute distress.     Appearance: He is not ill-appearing.   Eyes:      Pupils: Pupils are equal, round, and reactive to light.   Cardiovascular:      Rate and Rhythm: Normal rate. Rhythm irregularly irregular.      Pulses: Normal pulses and intact distal pulses.      Heart sounds: Normal heart sounds.   Pulmonary:      Effort: Pulmonary effort is normal.      Breath sounds: Normal breath sounds.   Musculoskeletal:      Cervical back: Normal range of motion and neck supple.      Right lower le+ Pitting Edema present.      Left lower le+ Pitting Edema present.   Skin:     General: Skin is warm and dry.   Neurological:      General: No focal deficit present.      Mental Status: He is alert.         Significant Labs:   All pertinent lab results from the last 24  hours have been reviewed. and   Recent Lab Results       02/03/22  1058   02/03/22  0510   02/03/22  0457   02/03/22  0128   02/02/22  1547        Anion Gap   14             BUN   27             BUN/CREAT RATIO   19             Calcium   8.9             Chloride   96             CO2   30             Creatinine   1.39             eGFR if non    52             Glucose   130             POC Glucose 247     128   160   237       Potassium   3.6             Sodium   136                                  Significant Imaging: Echocardiogram:   Transthoracic echo (TTE) complete (Cupid Only):   Results for orders placed or performed during the hospital encounter of 12/08/21   Echo   Result Value Ref Range    IVC diameter 2.24 cm    Left Ventricular Outflow Tract Mean Gradient 2.00 mmHg    AORTIC VALVE CUSP SEPERATION [id number] cm    LVIDd 6.23 (A) 3.5 - 6.0 cm    IVS 0.92 0.6 - 1.1 cm    Posterior Wall 0.92 0.6 - 1.1 cm    LVIDs 5.77 (A) 2.1 - 4.0 cm    FS 7 28 - 44 %    LV mass 236.68 g    LA size 4.63 cm    RVDD 3.77 cm    Left Ventricle Relative Wall Thickness 0.30 cm    AV mean gradient 4 mmHg    AV valve area 2.31 cm2    AV index (prosthetic) 0.70     E wave deceleration time 148 msec    LVOT diameter 2.05 cm    LVOT area 3.3 cm2    LVOT peak VTI 13.89 cm    Ao VTI 19.82 cm    LVOT stroke volume 45.82 cm3    MV Peak E Arash 0.76 m/s    TR Max Arash 3.42 m/s    LV Systolic Volume 164.59 mL    LV Diastolic Volume 196.13 mL    Echo EF Estimated 16 %    Triscuspid Valve Regurgitation Peak Gradient 47 mmHg    EF 15 %    Narrative    · Atrial fibrillation with frequent PVC's, rate 85 and occasional runs of   wide complex tachycardia.  · The left ventricle is moderately enlarged with  · Atrial fibrillation observed.  · Mild right ventricular enlargement.  · Moderate mitral regurgitation.  · Moderate to severe tricuspid regurgitation.  · There is pulmonary hypertension.  · Moderate left atrial  enlargement.  · Mild right atrial enlargement.

## 2022-02-03 NOTE — HOSPITAL COURSE
02/03--Patient admitted for c/o hematuria, sob, and increased peripheral edema; has history of persistent A-fib and on home eliquis, was found to have a complicated UTI and treated with IV Rocephin x's 9 days.  Also has history of End stage dilated cardiomyopathy with last known EF of 15%, was followed by Cardiology during this hospitalization and diuresed with IV Dobutamine and Lasix; will follow Cardiology Recs at discharge, see recs below in A&P documentation.   Blood glucose readings elevated, will restart home Glucotrol but will not restart Farxiga due to recurrent UTIs, pt to maintain a log of readings and discuss with pcp at hospital follow up visit.  Pt has met max benefit from this hospitalization and is stable for discharge.

## 2022-02-03 NOTE — ASSESSMENT & PLAN NOTE
- ECOli UTI  - S/P cystoscopy  - Hold SGLT2i    2/3/22:  - no farxiga at discharge due to recurrent UTIs

## 2022-02-03 NOTE — PLAN OF CARE
Problem: Adult Inpatient Plan of Care  Goal: Plan of Care Review  Outcome: Met  Goal: Patient-Specific Goal (Individualized)  Outcome: Met  Goal: Absence of Hospital-Acquired Illness or Injury  Outcome: Met  Goal: Optimal Comfort and Wellbeing  Outcome: Met  Goal: Readiness for Transition of Care  Outcome: Met     Problem: Skin Injury Risk Increased  Goal: Skin Health and Integrity  Outcome: Met     Problem: Fluid and Electrolyte Imbalance (Acute Kidney Injury/Impairment)  Goal: Fluid and Electrolyte Balance  Outcome: Met     Problem: Oral Intake Inadequate (Acute Kidney Injury/Impairment)  Goal: Optimal Nutrition Intake  Outcome: Met     Problem: Renal Function Impairment (Acute Kidney Injury/Impairment)  Goal: Effective Renal Function  Outcome: Met     Problem: Fall Injury Risk  Goal: Absence of Fall and Fall-Related Injury  Outcome: Met     Problem: Diabetes Comorbidity  Goal: Blood Glucose Level Within Targeted Range  Outcome: Met     Problem: Infection  Goal: Absence of Infection Signs and Symptoms  Outcome: Met

## 2022-02-03 NOTE — ASSESSMENT & PLAN NOTE
- Persistent, longstanding atrial fibrillation  - Currently in rate controlled atrial fibrillation with frequent multifocal PVCs  - [id number] of 4  - Currently on eliquis 5mg BID     2/3/22:  - rate controlled  - restart coreg today  - continue Eliquis

## 2022-02-03 NOTE — ASSESSMENT & PLAN NOTE
Hold oral hypoglycemics and start patient on sliding scale insulin to maintain CBG in the range of 130-180s    02/04--Resume home meds, will not restart Farxiga due to recurrent UTI's, pt to monitor glucose, maintain a log of readings and discuss with pcp at hosp follow up visit.

## 2022-02-03 NOTE — ASSESSMENT & PLAN NOTE
"Continue present management    02/03--Stable Will follow Cardiology recs at discharge: "Resume home Coreg, hold entresto and aldacto, resume home dose of lasix, follow up with Gali mcfarland in 1-2 weeks, Held meds will be re-evaluated at that time".    "

## 2022-02-03 NOTE — PROGRESS NOTES
[address]  Cardiology  Progress Note    Patient Name: Jeanmarie Michelle  MRN: [id number]  Admission Date: 1/24/2022  Hospital Length of Stay: 9 days  Code Status: Full Code   Attending Physician: Geneva Li MD   Primary Care Physician: Regina Sprague MD  Expected Discharge Date: 2/3/2022  Principal Problem:Acute on chronic systolic CHF (congestive heart failure)    Subjective:     Hospital Course:   No notes on file    Interval History: No complaints.     Review of Systems   Cardiovascular: Positive for leg swelling. Negative for chest pain, dyspnea on exertion, orthopnea, palpitations and syncope.   Respiratory: Negative for shortness of breath.    All other systems reviewed and are negative.    Objective:     Vital Signs (Most Recent):  Temp: 98.8 °F (37.1 °C) (02/03/22 1135)  Pulse: 91 (02/03/22 1135)  Resp: 19 (02/03/22 1135)  BP: 104/80 (02/03/22 1135)  SpO2: 99 % (02/03/22 1135) Vital Signs (24h Range):  Temp:  [97.3 °F (36.3 °C)-98.8 °F (37.1 °C)] 98.8 °F (37.1 °C)  Pulse:  [61-91] 91  Resp:  [18-19] 19  SpO2:  [95 %-100 %] 99 %  BP: ()/(50-80) 104/80     Weight: 95.1 kg (209 lb 10.5 oz)  Body mass index is 29.24 kg/m².     SpO2: 99 %  O2 Device (Oxygen Therapy): room air      Intake/Output Summary (Last 24 hours) at 2/3/2022 1400  Last data filed at 2/3/2022 0700  Gross per 24 hour   Intake --   Output 1725 ml   Net -1725 ml       Lines/Drains/Airways     Peripheral Intravenous Line                 Peripheral IV - Single Lumen 01/26/22 1014 20 G Distal;Posterior;Right Forearm 8 days                Physical Exam  Constitutional:       General: He is not in acute distress.     Appearance: He is not ill-appearing.   Eyes:      Pupils: Pupils are equal, round, and reactive to light.   Cardiovascular:      Rate and Rhythm: Normal rate. Rhythm irregularly irregular.      Pulses: Normal pulses and intact distal pulses.      Heart sounds: Normal heart sounds.   Pulmonary:       Effort: Pulmonary effort is normal.      Breath sounds: Normal breath sounds.   Musculoskeletal:      Cervical back: Normal range of motion and neck supple.      Right lower le+ Pitting Edema present.      Left lower le+ Pitting Edema present.   Skin:     General: Skin is warm and dry.   Neurological:      General: No focal deficit present.      Mental Status: He is alert.         Significant Labs:   All pertinent lab results from the last 24 hours have been reviewed. and   Recent Lab Results       22  1058   22  0510   22  0457   22  0128   22  1547        Anion Gap   14             BUN   27             BUN/CREAT RATIO   19             Calcium   8.9             Chloride   96             CO2   30             Creatinine   1.39             eGFR if non    52             Glucose   130             POC Glucose 247     128   160   237       Potassium   3.6             Sodium   136                                  Significant Imaging: Echocardiogram:   Transthoracic echo (TTE) complete (Cupid Only):   Results for orders placed or performed during the hospital encounter of 21   Echo   Result Value Ref Range    IVC diameter 2.24 cm    Left Ventricular Outflow Tract Mean Gradient 2.00 mmHg    AORTIC VALVE CUSP SEPERATION [id number] cm    LVIDd 6.23 (A) 3.5 - 6.0 cm    IVS 0.92 0.6 - 1.1 cm    Posterior Wall 0.92 0.6 - 1.1 cm    LVIDs 5.77 (A) 2.1 - 4.0 cm    FS 7 28 - 44 %    LV mass 236.68 g    LA size 4.63 cm    RVDD 3.77 cm    Left Ventricle Relative Wall Thickness 0.30 cm    AV mean gradient 4 mmHg    AV valve area 2.31 cm2    AV index (prosthetic) 0.70     E wave deceleration time 148 msec    LVOT diameter 2.05 cm    LVOT area 3.3 cm2    LVOT peak VTI 13.89 cm    Ao VTI 19.82 cm    LVOT stroke volume 45.82 cm3    MV Peak E Arash 0.76 m/s    TR Max Arash 3.42 m/s    LV Systolic Volume 164.59 mL    LV Diastolic Volume 196.13 mL    Echo EF Estimated 16 %     Triscuspid Valve Regurgitation Peak Gradient 47 mmHg    EF 15 %    Narrative    · Atrial fibrillation with frequent PVC's, rate 85 and occasional runs of   wide complex tachycardia.  · The left ventricle is moderately enlarged with  · Atrial fibrillation observed.  · Mild right ventricular enlargement.  · Moderate mitral regurgitation.  · Moderate to severe tricuspid regurgitation.  · There is pulmonary hypertension.  · Moderate left atrial enlargement.  · Mild right atrial enlargement.        Assessment and Plan:     Brief HPI: Mr. Jeanmarie Michelle is a 82y/o M with persistent atrial fibrillation, nonischemic dilated cardiomyopathy- HFrEF with last EF 15% (12/8/21) scheduled to undergo ICD placement with Dr. Carlson in February 2022, stage III CKD, HTN and T2DM presenting with SOB with increased edema. He was recently seen at his cardiologist's office (1/21/22) and his Lasix was increased from 80mg to 120mg PO qd with plans for an ICD next month. On day of admission he had seen his PCP who noted his weight had increased approximately 20 lbs within the past few weeks and was advised to go to the ED. In the ER he describes he has been SOB for 2-3 weeks with increasing lower extremity and abdominal edema. He sleeps on 2 pillows a night and occasionally sleeps in an upright chair. He reports having CELESTIN, orthopnea and nocturnal dyspnea. He also reports having hematuria starting yesterday. In the ED his VS were stable with HR 69, BP 98/69 with pertinent labs including a troponin trending from 596, 536, 514 to 546. His troponins have been consistently elevated in the 500s, 3 weeks ago. His EKG revealed atrial fibrillation with frequent PVCs with HR 89. His U/A revealed TNTC RBC with 20-50 WBC, culture pending.     Currently the patient is in no acute distress but is hypervolemic with significant edema of his lower extremeties and abdomen. He denies chest pain and is able to urinate. He has received IV lasix 40mg once in the ED  with 900 cc of urine output for the past 24 hours. Current weight is 104.3kg. Plan is to IV diurese with dobutamine and lasix. Eliquis is being held due to his hematuria. Will continue to monitor.         * Acute on chronic systolic CHF (congestive heart failure)  HFrEF, nonsichemic cardiomyopathy: EF 15% with moderate-severe TR (12/8/21); NYHA III-IV Stage D  Plan for ICD placement by Dr. Carlson next month  BNP at 15,000 (10,000 at baseline)  GDMT: carvedilol 3.125mg BID, Entresto 97-103mg BID, spironolactone 25mg qd  - holding chlorthalidone after adding spironolactone   - holding farxiga considering hematuria with UTI; restart on discharge   - consider torsemide 20mg BID on discharge   Increased lasix from IV lasix 60mg BID IV lasix 80mg BID  Started a dobutamine infusion to enhance IV diuresis      1/26/22  - continue diuresis with IV lasix and dobutamine as above   - daily weights, strict I&Os  - urine is positive for e.coli, do not recommend restarting SGLT2  (Farxiga) at discharge as this may be cause for recurrent UTI   - continue entresto, coreg, aldactone as above  - recommend torsemide 20mg bid in place of lasix at discharge    1/28/22:  - creatinine has trended up  - decrease IV lasix to 80mg BID (was 120mg BID)  - continue dobutamine gtt  - continue Entresto, Aladactone  - recommend torsemide 20mg BID in place of lasix at discharge  - urine is positive for e.coli, do not recommend restarting SGLT2  (Farxiga) at discharge as this may be cause for recurrent UTI    1/30/22:  - Creatinine stable on IV lasix 80mg bid and IV dobutamine  - MAPs around 60s; holding all GDMT for now  - Remains floridly volume overloaded (Swelling up to thighs now)    1/31/22:  - creatinine continues to improve with diuresis, now 1.24  - continue IV lasix 80mg bid and IV dobutamine  - MAPs around 60s; holding all GDMT for now  - remains fluid volume overloaded but improving (swelling up to thighs)    2/1/22 :  - creatinine has  bumped to 1.52  - decrease dobutamine gtt to 2.5mcg/kg/min  - change IV lasix to PO 40mg BID  - remains fluid volume overloaded but improving    2/2/22:  - creatinine 1.3  - stop dobutamine  - continue PO lasix 40mg BID  - BLE still with 2+ pitting edema  - GDMT still on hold due to low BP  - will continue to monitor    2/3/22:  - creatinine stable at 1.39  - continue PO lasix 80mg daily  - hold entresto and aldactone  - f/u with TENISHA Moreno in one week for BP check and can add entresto and aldactone if BP can tolerate  - pt is scheduled for AICD with Dr. Carlson this month        UTI (urinary tract infection)  - ECOli UTI  - S/P cystoscopy  - Hold SGLT2i    2/3/22:  - no farxiga at discharge due to recurrent UTIs    Elevated troponin level  - Pt has a history of chronically elevated troponins in the 500s  - EKG reveals atrial fibrillation with PVCs  - Elevated troponins most likely 2/2 myocardial injury due to his heart failure exacerbation    Hematuria  Currently receiving rocephin for UTI  Primary following    2/3/22  - resolved    Chronic kidney disease  Pt has CKD Stage 3  Serum Cr 2.01 which is at his baseline  Continue to monitor with IV diuresis    1/28/22:  - creatinine is 1.63 (was 1.58 yesterday)  - decreased IV lasix dose  - continue dobutamine gtt  - bmp in AM    1/31/22:  - creatinine continues to improve with diuresis, 1.24 today  - continue IV lasix and dobutamine  - bmp in AM    2/1/22  - creatinine bumped to 1.52 from 1.24  - decrease dobutamine gtt to 2.5mcg/kg/min  - change IV lasix to PO lasix 40mg BID  - BMP in AM    2/2/22  - creatinine 1.3  - stop dobutamine  - continue PO lasix 40mg BID  - BMP in AM    2/3/22  - creatinine stable at 1.39    Chronic a-fib  - Persistent, longstanding atrial fibrillation  - Currently in rate controlled atrial fibrillation with frequent multifocal PVCs  - [id number] of 4  - Currently on eliquis 5mg BID     2/3/22:  - rate controlled  - restart coreg  today  - continue Eliquis    Diabetes mellitus without complication  Holding farxiga, currently on SSI    2/3/22:  - no farxiga at discharge due to recurrent UTIs    Hypertension  - MAPs around 60s; holding all GDMT for now    2/3/22:  - MAPs around 80s  - add coreg  - continue to hold entresto and aldactone  - pt will f/u with TENISHA Moreno in 1 week for BP check and can restart entresto and aldactone if BP can tolerate          VTE Risk Mitigation (From admission, onward)         Ordered     apixaban tablet 5 mg  2 times daily         01/25/22 1253     Place sequential compression device  Until discontinued         01/25/22 0111            Late entry: 2/3/2022    Pt seen and examined, chart reviewed, essentially agree with findings as documented    CC: shortness of breath, lower extremity edema.   HPI: Pt reports less lower extremity edema, has been ambulatory in room without shortness of breath.  PE: agree with findings as documeted  Labs, Xrays, EKgs reviewed    IMP/Plan:  1. Acute on chronic systolic heart failure secondary to ischemic cardiomyopathy, has continued to improve dobutuamine, on  Lasix to PO,  renal function stable, will resume Coreg, monitor blood pressure.  2. Lower extremity edema, continues to improve.   3. Afib with controlled ventricular response on current meds    ROLY Moran  Cardiology  [facility] - [address] [place] [address]

## 2022-02-03 NOTE — ASSESSMENT & PLAN NOTE
Cardiology consulted  Cont dobutamine drip and iv diuretics.     02/03--follow cardiology recs, see above

## 2022-02-04 ENCOUNTER — TELEPHONE (OUTPATIENT)
Dept: FAMILY MEDICINE | Facility: CLINIC | Age: 82
End: 2022-02-04
Payer: COMMERCIAL

## 2022-02-04 NOTE — PLAN OF CARE
[facility] - [facility] Telemetry  Discharge Final Note    Primary Care Provider: Regina Sprague MD    Expected Discharge Date: 2/3/2022    Final Discharge Note (most recent)     Final Note - 02/04/22 0846        Final Note    Assessment Type Final Discharge Note     Anticipated Discharge Disposition Home or Self Care        Post-Acute Status    Discharge Delays None known at this time                 Important Message from Medicare  Important Message from Medicare regarding Discharge Appeal Rights: Given to patient/caregiver,Explained to patient/caregiver,Signed/date by patient/caregiver     Date IMM was signed: 01/26/22  Time IMM was signed: 1133    Contact Info     TENISHA May   Specialty: Cardiology    [address] [facility]  [place] [address]   Phone: [phone]       Next Steps: Go on 2/10/2022    Instructions: 2/10/22 at 1:00pm, hospital f/u for decompensated systolic heart failure, BP check and med optimization    Regina Sprague MD   Specialty: Family Medicine   Relationship: PCP - General    [address] [place] [address] [address]  [facility] - [place] [address]   Phone: [phone]       Next Steps: Follow up in 1 week(s)    Instructions: Hospital follow up with repeat BMP      pt d/c home 2/3/22. No further needs.

## 2022-02-04 NOTE — TELEPHONE ENCOUNTER
2/4/[id number]-tforu with pt this afternoon. Reports he is feeling alright. Has just been watching tv. Denies chest pain or shortness of breath. Reports last bp was 110/69 and accu check 97. States he is eating and drinking without problems. Ambulating with a cane. Reviewed and discussed all discharge medications. States his nephew will be picking up the K+. Discussed he is to hold the entresto and  Chlorthalidone until he sees cardiology.vu. states he has continued all current meds as directed. Informed of f/u appts with pcp and cardiology. Instructed to bring all meds to follow up visit and to call office for questions/concerns. Also to seek medical attention for any new or worsening sx. Shahzad. TParkmCarlos Alberto

## 2022-02-08 ENCOUNTER — OFFICE VISIT (OUTPATIENT)
Dept: FAMILY MEDICINE | Facility: CLINIC | Age: 82
End: 2022-02-08
Payer: COMMERCIAL

## 2022-02-08 VITALS
WEIGHT: 206.38 LBS | RESPIRATION RATE: 20 BRPM | SYSTOLIC BLOOD PRESSURE: 118 MMHG | HEIGHT: 71 IN | DIASTOLIC BLOOD PRESSURE: 76 MMHG | OXYGEN SATURATION: 99 % | BODY MASS INDEX: 28.89 KG/M2 | HEART RATE: 51 BPM | TEMPERATURE: 97 F

## 2022-02-08 DIAGNOSIS — R31.9 HEMATURIA, UNSPECIFIED TYPE: ICD-10-CM

## 2022-02-08 DIAGNOSIS — N39.0 URINARY TRACT INFECTION WITH HEMATURIA, SITE UNSPECIFIED: ICD-10-CM

## 2022-02-08 DIAGNOSIS — E03.8 OTHER SPECIFIED HYPOTHYROIDISM: ICD-10-CM

## 2022-02-08 DIAGNOSIS — I50.23 ACUTE ON CHRONIC SYSTOLIC CHF (CONGESTIVE HEART FAILURE): ICD-10-CM

## 2022-02-08 DIAGNOSIS — I10 ESSENTIAL HYPERTENSION: ICD-10-CM

## 2022-02-08 DIAGNOSIS — R31.9 URINARY TRACT INFECTION WITH HEMATURIA, SITE UNSPECIFIED: ICD-10-CM

## 2022-02-08 DIAGNOSIS — E78.49 OTHER HYPERLIPIDEMIA: ICD-10-CM

## 2022-02-08 DIAGNOSIS — E11.42 TYPE 2 DIABETES MELLITUS WITH DIABETIC POLYNEUROPATHY, WITHOUT LONG-TERM CURRENT USE OF INSULIN: ICD-10-CM

## 2022-02-08 DIAGNOSIS — Z09 HOSPITAL DISCHARGE FOLLOW-UP: Primary | ICD-10-CM

## 2022-02-08 LAB
ALBUMIN SERPL BCP-MCNC: 3.3 G/DL (ref 3.5–5)
ALBUMIN/GLOB SERPL: 0.8 {RATIO}
ALP SERPL-CCNC: 159 U/L (ref 45–115)
ALT SERPL W P-5'-P-CCNC: 42 U/L (ref 16–61)
ANION GAP SERPL CALCULATED.3IONS-SCNC: 12 MMOL/L (ref 7–16)
AST SERPL W P-5'-P-CCNC: 37 U/L (ref 15–37)
BACTERIA #/AREA URNS HPF: ABNORMAL /HPF
BASOPHILS # BLD AUTO: 0.03 K/UL (ref 0–0.2)
BASOPHILS NFR BLD AUTO: 0.5 % (ref 0–1)
BILIRUB SERPL-MCNC: 0.7 MG/DL (ref 0–1.2)
BUN SERPL-MCNC: 38 MG/DL (ref 7–18)
BUN/CREAT SERPL: 18 (ref 6–20)
CALCIUM SERPL-MCNC: 9 MG/DL (ref 8.5–10.1)
CHLORIDE SERPL-SCNC: 98 MMOL/L (ref 98–107)
CHOLEST SERPL-MCNC: 164 MG/DL (ref 0–200)
CHOLEST/HDLC SERPL: 3.2 {RATIO}
CO2 SERPL-SCNC: 27 MMOL/L (ref 21–32)
CREAT SERPL-MCNC: 2.11 MG/DL (ref 0.7–1.3)
DIFFERENTIAL METHOD BLD: ABNORMAL
EOSINOPHIL # BLD AUTO: 0.04 K/UL (ref 0–0.5)
EOSINOPHIL NFR BLD AUTO: 0.6 % (ref 1–4)
ERYTHROCYTE [DISTWIDTH] IN BLOOD BY AUTOMATED COUNT: 17.6 % (ref 11.5–14.5)
EST. AVERAGE GLUCOSE BLD GHB EST-MCNC: 250 MG/DL
GLOBULIN SER-MCNC: 4.2 G/DL (ref 2–4)
GLUCOSE SERPL-MCNC: 414 MG/DL (ref 74–106)
HBA1C MFR BLD HPLC: 10.1 % (ref 4.5–6.6)
HCT VFR BLD AUTO: 42.1 % (ref 40–54)
HDLC SERPL-MCNC: 51 MG/DL (ref 40–60)
HGB BLD-MCNC: 12.8 G/DL (ref 13.5–18)
IMM GRANULOCYTES # BLD AUTO: 0.05 K/UL (ref 0–0.04)
IMM GRANULOCYTES NFR BLD: 0.8 % (ref 0–0.4)
LDLC SERPL CALC-MCNC: 89 MG/DL
LDLC/HDLC SERPL: 1.7 {RATIO}
LYMPHOCYTES # BLD AUTO: 0.67 K/UL (ref 1–4.8)
LYMPHOCYTES NFR BLD AUTO: 10.6 % (ref 27–41)
MCH RBC QN AUTO: 26.7 PG (ref 27–31)
MCHC RBC AUTO-ENTMCNC: 30.4 G/DL (ref 32–36)
MCV RBC AUTO: 87.9 FL (ref 80–96)
MONOCYTES # BLD AUTO: 0.42 K/UL (ref 0–0.8)
MONOCYTES NFR BLD AUTO: 6.7 % (ref 2–6)
MPC BLD CALC-MCNC: 12.7 FL (ref 9.4–12.4)
NEUTROPHILS # BLD AUTO: 5.1 K/UL (ref 1.8–7.7)
NEUTROPHILS NFR BLD AUTO: 80.8 % (ref 53–65)
NONHDLC SERPL-MCNC: 113 MG/DL
NRBC # BLD AUTO: 0 X10E3/UL
NRBC, AUTO (.00): 0 %
PLATELET # BLD AUTO: 297 K/UL (ref 150–400)
POTASSIUM SERPL-SCNC: 4.4 MMOL/L (ref 3.5–5.1)
PROT SERPL-MCNC: 7.5 G/DL (ref 6.4–8.2)
RBC # BLD AUTO: 4.79 M/UL (ref 4.6–6.2)
RBC #/AREA URNS HPF: ABNORMAL /HPF
SODIUM SERPL-SCNC: 133 MMOL/L (ref 136–145)
SQUAMOUS #/AREA URNS LPF: ABNORMAL /LPF
TRIGL SERPL-MCNC: 119 MG/DL (ref 35–150)
TSH SERPL DL<=0.005 MIU/L-ACNC: 76.6 UIU/ML (ref 0.36–3.74)
VLDLC SERPL-MCNC: 24 MG/DL
WBC # BLD AUTO: 6.31 K/UL (ref 4.5–11)
WBC #/AREA URNS HPF: ABNORMAL /HPF

## 2022-02-08 PROCEDURE — 3078F DIAST BP <80 MM HG: CPT | Mod: ,,, | Performed by: FAMILY MEDICINE

## 2022-02-08 PROCEDURE — 3078F PR MOST RECENT DIASTOLIC BLOOD PRESSURE < 80 MM HG: ICD-10-PCS | Mod: ,,, | Performed by: FAMILY MEDICINE

## 2022-02-08 PROCEDURE — 1126F AMNT PAIN NOTED NONE PRSNT: CPT | Mod: ,,, | Performed by: FAMILY MEDICINE

## 2022-02-08 PROCEDURE — 80061 LIPID PANEL: ICD-10-PCS | Mod: ,,, | Performed by: CLINICAL MEDICAL LABORATORY

## 2022-02-08 PROCEDURE — 85025 CBC WITH DIFFERENTIAL: ICD-10-PCS | Mod: ,,, | Performed by: CLINICAL MEDICAL LABORATORY

## 2022-02-08 PROCEDURE — 84443 ASSAY THYROID STIM HORMONE: CPT | Mod: ,,, | Performed by: CLINICAL MEDICAL LABORATORY

## 2022-02-08 PROCEDURE — 1159F MED LIST DOCD IN RCRD: CPT | Mod: ,,, | Performed by: FAMILY MEDICINE

## 2022-02-08 PROCEDURE — 83036 HEMOGLOBIN A1C: ICD-10-PCS | Mod: ,,, | Performed by: CLINICAL MEDICAL LABORATORY

## 2022-02-08 PROCEDURE — 3288F FALL RISK ASSESSMENT DOCD: CPT | Mod: ,,, | Performed by: FAMILY MEDICINE

## 2022-02-08 PROCEDURE — 1160F PR REVIEW ALL MEDS BY PRESCRIBER/CLIN PHARMACIST DOCUMENTED: ICD-10-PCS | Mod: ,,, | Performed by: FAMILY MEDICINE

## 2022-02-08 PROCEDURE — 81001 URINALYSIS AUTO W/SCOPE: CPT | Mod: ,,, | Performed by: CLINICAL MEDICAL LABORATORY

## 2022-02-08 PROCEDURE — 3288F PR FALLS RISK ASSESSMENT DOCUMENTED: ICD-10-PCS | Mod: ,,, | Performed by: FAMILY MEDICINE

## 2022-02-08 PROCEDURE — 80061 LIPID PANEL: CPT | Mod: ,,, | Performed by: CLINICAL MEDICAL LABORATORY

## 2022-02-08 PROCEDURE — 85025 COMPLETE CBC W/AUTO DIFF WBC: CPT | Mod: ,,, | Performed by: CLINICAL MEDICAL LABORATORY

## 2022-02-08 PROCEDURE — 99495 TRANSJ CARE MGMT MOD F2F 14D: CPT | Mod: ,,, | Performed by: FAMILY MEDICINE

## 2022-02-08 PROCEDURE — 1160F RVW MEDS BY RX/DR IN RCRD: CPT | Mod: ,,, | Performed by: FAMILY MEDICINE

## 2022-02-08 PROCEDURE — 84443 TSH: ICD-10-PCS | Mod: ,,, | Performed by: CLINICAL MEDICAL LABORATORY

## 2022-02-08 PROCEDURE — 1101F PR PT FALLS ASSESS DOC 0-1 FALLS W/OUT INJ PAST YR: ICD-10-PCS | Mod: ,,, | Performed by: FAMILY MEDICINE

## 2022-02-08 PROCEDURE — 3074F SYST BP LT 130 MM HG: CPT | Mod: ,,, | Performed by: FAMILY MEDICINE

## 2022-02-08 PROCEDURE — 1126F PR PAIN SEVERITY QUANTIFIED, NO PAIN PRESENT: ICD-10-PCS | Mod: ,,, | Performed by: FAMILY MEDICINE

## 2022-02-08 PROCEDURE — 87086 CULTURE, URINE: ICD-10-PCS | Mod: ,,, | Performed by: CLINICAL MEDICAL LABORATORY

## 2022-02-08 PROCEDURE — 3074F PR MOST RECENT SYSTOLIC BLOOD PRESSURE < 130 MM HG: ICD-10-PCS | Mod: ,,, | Performed by: FAMILY MEDICINE

## 2022-02-08 PROCEDURE — 81001 URINALYSIS, MICROSCOPIC: ICD-10-PCS | Mod: ,,, | Performed by: CLINICAL MEDICAL LABORATORY

## 2022-02-08 PROCEDURE — 83036 HEMOGLOBIN GLYCOSYLATED A1C: CPT | Mod: ,,, | Performed by: CLINICAL MEDICAL LABORATORY

## 2022-02-08 PROCEDURE — 1101F PT FALLS ASSESS-DOCD LE1/YR: CPT | Mod: ,,, | Performed by: FAMILY MEDICINE

## 2022-02-08 PROCEDURE — 80053 COMPREHENSIVE METABOLIC PANEL: ICD-10-PCS | Mod: ,,, | Performed by: CLINICAL MEDICAL LABORATORY

## 2022-02-08 PROCEDURE — 1159F PR MEDICATION LIST DOCUMENTED IN MEDICAL RECORD: ICD-10-PCS | Mod: ,,, | Performed by: FAMILY MEDICINE

## 2022-02-08 PROCEDURE — 80053 COMPREHEN METABOLIC PANEL: CPT | Mod: ,,, | Performed by: CLINICAL MEDICAL LABORATORY

## 2022-02-08 PROCEDURE — 99495 TCM SERVICES (MODERATE COMPLEXITY): ICD-10-PCS | Mod: ,,, | Performed by: FAMILY MEDICINE

## 2022-02-08 PROCEDURE — 87086 URINE CULTURE/COLONY COUNT: CPT | Mod: ,,, | Performed by: CLINICAL MEDICAL LABORATORY

## 2022-02-08 RX ORDER — DAPAGLIFLOZIN 5 MG/1
TABLET, FILM COATED ORAL
Status: ON HOLD | COMMUNITY
Start: 2022-02-07 | End: 2022-03-04 | Stop reason: HOSPADM

## 2022-02-09 NOTE — PROGRESS NOTES
Clinic Note    Patient Name: Jeanmarie Michelle  : 1940  MRN: [id number]    HPI:    Chief Complaint   Patient presents with    Follow-up     Hospital visit      Mr. Jeanmarie Michelle is a 81 y.o. male who present to clinic today with CC of TCC visit for recent hospitalization at [facility] from 22 - 22 for CHF exacerbation and UTI.   Patient was diuresed with lasix and dobutamine. He also received IV rocephin for UTI. Patient responded well to treatment and was believed to have met the maximum benefit of hospitalization and was discharged on 2/3/22.  Farxiga was discontinued during hospitalization due to recurrent UTIs. He has been monitoring his blood glucose levels at home and reports normal readings. States that blood glucose has been well controlled without farxiga.   Lasix dose was decreased back to 80 mg PO Daily from increase that had been made just prior to hospitalization. Entresto and aldactone were held pending Cardiology follow up appt outpatient.  Patient is aware of medication changes.  He is scheduled for a follow up with Dr. Carlson/Gali Barker on 2/10/22.  He is scheduled for ICD placement on 22 for his history of dilated end stage cardiomyopathy with an EF of 15%.  Patient reports, overall, he is feeling much improved from recent hospitalization.  Patient is, otherwise, without complaints.     Medications:    Current Outpatient Medications on File Prior to Visit   Medication Sig Dispense Refill    aspirin (ECOTRIN) 81 MG EC tablet Take 81 mg by mouth once daily.      carvediloL (COREG) 6.25 MG tablet Take 1 tablet (6.25 mg total) by mouth 2 (two) times daily with meals. 180 tablet 3    dorzolamide-timolol 2-0.5% (COSOPT) 22.3-6.8 mg/mL ophthalmic solution Place 1 drop into both eyes 2 (two) times a day.      ezetimibe (ZETIA) 10 mg tablet Take 1 tablet (10 mg total) by mouth once daily. 90 tablet 1    furosemide (LASIX) 80 MG tablet Take 1 tablet (80 mg total) by mouth once daily.   Told pt to increase to BID instead daily for 10 days 10 tablet 0    glipiZIDE (GLUCOTROL) 10 MG tablet Take 1 tablet (10 mg total) by mouth 2 (two) times daily before meals. 180 tablet 1    latanoprost 0.005 % ophthalmic solution Place 1 drop into both eyes Daily.      omeprazole (PRILOSEC) 40 MG capsule Take 1 capsule (40 mg total) by mouth once daily. 90 capsule 1    sacubitriL-valsartan (ENTRESTO)  mg per tablet Take 1 tablet by mouth 2 (two) times daily. Cont to hold pending follow up appt with ANKUSH Barker 180 tablet 1    tamsulosin (FLOMAX) 0.4 mg Cap Take 1 capsule (0.4 mg total) by mouth once daily. 90 capsule 1    apixaban (ELIQUIS) 5 mg Tab Take 1 tablet (5 mg total) by mouth 2 (two) times daily. (Patient not taking: Reported on 2/8/2022) 180 tablet 1    chlorthalidone (HYGROTEN) 25 MG Tab Take 0.5 tablets (12.5 mg total) by mouth once daily. Cont to hold pending hospital follow up visit with Kristen  1/2 tab po daily (Patient not taking: No sig reported) 45 tablet 1    FARXIGA 5 mg Tab tablet       potassium chloride SA (K-DUR,KLOR-CON) 20 MEQ tablet Take 1 tablet (20 mEq total) by mouth once daily. for 10 days (Patient not taking: No sig reported) 10 tablet 0     No current facility-administered medications on file prior to visit.       Allergies: Patient has no known allergies.      Past Medical History:    Past Medical History:   Diagnosis Date    Atrial fibrillation     CHF (congestive heart failure)     Chronic kidney disease     CVA (cerebral vascular accident)     Dilated cardiomyopathy     DM type 2 (diabetes mellitus, type 2)     GERD (gastroesophageal reflux disease)     Hyperlipidemia     Hypertension     Hypertensive heart disease     Hypothyroidism     Left bundle branch block     Prostate disorder        Past Surgical History:    Past Surgical History:   Procedure Laterality Date    CARDIAC CATHETERIZATION Left 11/2018    PROSTATECTOMY           Social  "History:    Social History     Tobacco Use   Smoking Status Former Smoker    Packs/day: 1.00    Types: Cigarettes, Cigars   Smokeless Tobacco Current User    Types: Chew   Tobacco Comment    QUIT 2017     Social History     Substance and Sexual Activity   Alcohol Use Not Currently     Social History     Substance and Sexual Activity   Drug Use Never         Family History:    Family History   Problem Relation Age of Onset    Hypertension Mother     Pacemaker/defibrilator Mother     Heart disease Father        Review of Systems:    Review of Systems   Constitutional: Positive for fatigue. Negative for appetite change, chills, fever and unexpected weight change.   Eyes: Negative for visual disturbance.   Respiratory: Positive for shortness of breath. Negative for cough.         Reports chronic SOB but states that this is much improved from recent hospitalization.   Cardiovascular: Positive for leg swelling. Negative for chest pain.        Edema in BLE is much improved    Gastrointestinal: Negative for abdominal pain, change in bowel habit, constipation, diarrhea, nausea, vomiting and change in bowel habit.   Musculoskeletal: Negative for arthralgias.   Integumentary:  Negative for rash.   Neurological: Negative for dizziness and headaches.   Psychiatric/Behavioral: The patient is not nervous/anxious.         Vitals:    /76 (BP Location: Left arm, Patient Position: Sitting, BP Method: Large (Manual))   Pulse (!) 51   Temp 97.4 °F (36.3 °C) (Temporal)   Resp 20   Ht 5' 11" (1.803 m)   Wt 93.6 kg (206 lb 6.4 oz)   SpO2 99%   BMI 28.79 kg/m²        Physical Exam:    Physical Exam  Constitutional:       General: He is not in acute distress.     Comments: Appears chronically ill but stable and in no acute distress   HENT:      Nose: Nose normal.      Mouth/Throat:      Mouth: Mucous membranes are moist.      Pharynx: Oropharynx is clear.   Eyes:      Conjunctiva/sclera: Conjunctivae normal. "   Cardiovascular:      Rate and Rhythm: Normal rate.      Heart sounds: No murmur heard.      Pulmonary:      Effort: Pulmonary effort is normal. No respiratory distress.      Breath sounds: Normal breath sounds. No wheezing, rhonchi or rales.   Abdominal:      General: Bowel sounds are normal.      Palpations: Abdomen is soft.      Tenderness: There is no abdominal tenderness.   Musculoskeletal:      Cervical back: Neck supple.      Right lower leg: Edema present.      Left lower leg: Edema present.      Comments: Trace BLE edema (much improved)   Skin:     Findings: No rash.   Neurological:      General: No focal deficit present.      Mental Status: He is alert. Mental status is at baseline.   Psychiatric:         Mood and Affect: Mood normal.         Assessment/Plan:   Hospital discharge follow-up for UTI and CHF exacerbation       -  Patient recently hospitalized for UTI and CHF exacerbation         - Patient is currently much improved.       - Medication list reconciled       - Repeat UA today to ensure resolution of hematuria/infection       - Follow up with Cardiology as scheduled    Type 2 diabetes mellitus with diabetic polyneuropathy, without long-term current use of insulin  -     CBC Auto Differential  -     Comprehensive Metabolic Panel  -     Lipid Panel  -     Hemoglobin A1C    Essential hypertension  -     CBC Auto Differential  -     Comprehensive Metabolic Panel  -     Lipid Panel    Other hyperlipidemia  -     CBC Auto Differential  -     Comprehensive Metabolic Panel  -     Lipid Panel    Other specified hypothyroidism  -     TSH    Hematuria, unspecified type  -     Urinalysis, Microscopic; Future; Expected date: 02/08/2022  -     Urine culture; Future; Expected date: 02/08/2022    Urinary tract infection with hematuria, site unspecified  -     Urinalysis, Microscopic; Future; Expected date: 02/08/2022  -     Urine culture; Future; Expected date: 02/08/2022    RTC in 3 months for follow up on  chronic disease processes.  RTC sooner if needed.   Patient voiced understanding and is agreeable to plan.      Mariajose Sprague MD    Family Medicine

## 2022-02-10 ENCOUNTER — OFFICE VISIT (OUTPATIENT)
Dept: CARDIOLOGY | Facility: CLINIC | Age: 82
End: 2022-02-10
Payer: COMMERCIAL

## 2022-02-10 VITALS
HEIGHT: 71 IN | HEART RATE: 83 BPM | DIASTOLIC BLOOD PRESSURE: 70 MMHG | BODY MASS INDEX: 28.56 KG/M2 | WEIGHT: 204 LBS | SYSTOLIC BLOOD PRESSURE: 94 MMHG

## 2022-02-10 DIAGNOSIS — I48.91 ATRIAL FIBRILLATION, UNSPECIFIED TYPE: Primary | ICD-10-CM

## 2022-02-10 LAB — UA COMPLETE W REFLEX CULTURE PNL UR: NORMAL

## 2022-02-10 PROCEDURE — 3078F DIAST BP <80 MM HG: CPT | Mod: CPTII,,, | Performed by: NURSE PRACTITIONER

## 2022-02-10 PROCEDURE — 99214 PR OFFICE/OUTPT VISIT, EST, LEVL IV, 30-39 MIN: ICD-10-PCS | Mod: S$PBB,,, | Performed by: NURSE PRACTITIONER

## 2022-02-10 PROCEDURE — 1111F DSCHRG MED/CURRENT MED MERGE: CPT | Mod: CPTII,,, | Performed by: NURSE PRACTITIONER

## 2022-02-10 PROCEDURE — 1159F PR MEDICATION LIST DOCUMENTED IN MEDICAL RECORD: ICD-10-PCS | Mod: CPTII,,, | Performed by: NURSE PRACTITIONER

## 2022-02-10 PROCEDURE — 99214 OFFICE O/P EST MOD 30 MIN: CPT | Mod: PBBFAC | Performed by: NURSE PRACTITIONER

## 2022-02-10 PROCEDURE — 3078F PR MOST RECENT DIASTOLIC BLOOD PRESSURE < 80 MM HG: ICD-10-PCS | Mod: CPTII,,, | Performed by: NURSE PRACTITIONER

## 2022-02-10 PROCEDURE — 3074F PR MOST RECENT SYSTOLIC BLOOD PRESSURE < 130 MM HG: ICD-10-PCS | Mod: CPTII,,, | Performed by: NURSE PRACTITIONER

## 2022-02-10 PROCEDURE — 3074F SYST BP LT 130 MM HG: CPT | Mod: CPTII,,, | Performed by: NURSE PRACTITIONER

## 2022-02-10 PROCEDURE — 93005 ELECTROCARDIOGRAM TRACING: CPT | Mod: PBBFAC | Performed by: INTERNAL MEDICINE

## 2022-02-10 PROCEDURE — 1111F PR DISCHARGE MEDS RECONCILED W/ CURRENT OUTPATIENT MED LIST: ICD-10-PCS | Mod: CPTII,,, | Performed by: NURSE PRACTITIONER

## 2022-02-10 PROCEDURE — 93010 ELECTROCARDIOGRAM REPORT: CPT | Mod: S$PBB,,, | Performed by: INTERNAL MEDICINE

## 2022-02-10 PROCEDURE — 1159F MED LIST DOCD IN RCRD: CPT | Mod: CPTII,,, | Performed by: NURSE PRACTITIONER

## 2022-02-10 PROCEDURE — 93010 EKG 12-LEAD: ICD-10-PCS | Mod: S$PBB,,, | Performed by: INTERNAL MEDICINE

## 2022-02-10 PROCEDURE — 99214 OFFICE O/P EST MOD 30 MIN: CPT | Mod: S$PBB,,, | Performed by: NURSE PRACTITIONER

## 2022-02-10 NOTE — PROGRESS NOTES
[facility] note        DATE OF SERVICE: 02/10/2022       PCP: Regina Sprague MD      CHIEF COMPLAINT:   Chief Complaint   Patient presents with    Hospital Follow Up    Shortness of Breath     sometimes    Edema     Is much better        HISTORY OF PRESENT ILLNESS:  Jeanmarie Michelle is a 81 y.o. male with a PMH of   Past Medical History:   Diagnosis Date    Atrial fibrillation     CHF (congestive heart failure)     Chronic kidney disease     CVA (cerebral vascular accident)     Dilated cardiomyopathy     DM type 2 (diabetes mellitus, type 2)     GERD (gastroesophageal reflux disease)     Hyperlipidemia     Hypertension     Hypertensive heart disease     Hypothyroidism     Left bundle branch block     Prostate disorder      who presents for HD follow for admission for acute on chronic CHF. The patient states he feel much better and his legs are back to their normal size. He denies orthopnea or pnd.   Chief Complaint   Patient presents with    Hospital Follow Up    Shortness of Breath     sometimes    Edema     Is much better            PAST MEDICAL HISTORY:  Past Medical History:   Diagnosis Date    Atrial fibrillation     CHF (congestive heart failure)     Chronic kidney disease     CVA (cerebral vascular accident)     Dilated cardiomyopathy     DM type 2 (diabetes mellitus, type 2)     GERD (gastroesophageal reflux disease)     Hyperlipidemia     Hypertension     Hypertensive heart disease     Hypothyroidism     Left bundle branch block     Prostate disorder        PAST SURGICAL HISTORY:  Past Surgical History:   Procedure Laterality Date    CARDIAC CATHETERIZATION Left 11/2018    PROSTATECTOMY         SOCIAL HISTORY:  Social History     Socioeconomic History    Marital status: Unknown   Tobacco Use    Smoking status: Former Smoker     Packs/day: 1.00     Types: Cigarettes, Cigars    Smokeless tobacco: Current User     Types: Chew    Tobacco comment: QUIT 2017  "  Substance and Sexual Activity    Alcohol use: Not Currently    Drug use: Never    Sexual activity: Not Currently       FAMILY HISTORY:  Family History   Problem Relation Age of Onset    Hypertension Mother     Pacemaker/defibrilator Mother     Heart disease Father          ALLERGIES:  Review of patient's allergies indicates:  No Known Allergies     MEDICATIONS:    Current Outpatient Medications:     apixaban (ELIQUIS) 5 mg Tab, Take 1 tablet (5 mg total) by mouth 2 (two) times daily., Disp: 180 tablet, Rfl: 1    carvediloL (COREG) 6.25 MG tablet, Take 1 tablet (6.25 mg total) by mouth 2 (two) times daily with meals., Disp: 180 tablet, Rfl: 3    ezetimibe (ZETIA) 10 mg tablet, Take 1 tablet (10 mg total) by mouth once daily., Disp: 90 tablet, Rfl: 1    FARXIGA 5 mg Tab tablet, , Disp: , Rfl:     furosemide (LASIX) 80 MG tablet, Take 1 tablet (80 mg total) by mouth once daily.  Told pt to increase to BID instead daily for 10 days, Disp: 10 tablet, Rfl: 0    omeprazole (PRILOSEC) 40 MG capsule, Take 1 capsule (40 mg total) by mouth once daily., Disp: 90 capsule, Rfl: 1    potassium chloride SA (K-DUR,KLOR-CON) 20 MEQ tablet, Take 1 tablet (20 mEq total) by mouth once daily. for 10 days, Disp: 10 tablet, Rfl: 0    tamsulosin (FLOMAX) 0.4 mg Cap, Take 1 capsule (0.4 mg total) by mouth once daily., Disp: 90 capsule, Rfl: 1    aspirin (ECOTRIN) 81 MG EC tablet, Take 81 mg by mouth once daily., Disp: , Rfl:     dorzolamide-timolol 2-0.5% (COSOPT) 22.3-6.8 mg/mL ophthalmic solution, Place 1 drop into both eyes 2 (two) times a day., Disp: , Rfl:     latanoprost 0.005 % ophthalmic solution, Place 1 drop into both eyes Daily., Disp: , Rfl:   Medications have been reviewed and reconciled.     PHYSICAL EXAM:  BP 94/70   Pulse 83   Ht 5' 11" (1.803 m)   Wt 92.5 kg (204 lb)   BMI 28.45 kg/m²   Wt Readings from Last 3 Encounters:   02/10/22 92.5 kg (204 lb)   02/08/22 93.6 kg (206 lb 6.4 oz)   02/02/22 95.1 " kg (209 lb 10.5 oz)      Body mass index is 28.45 kg/m².    Physical Exam  Vitals and nursing note reviewed.   Constitutional:       Appearance: Normal appearance.      Comments: In a wheelchair   HENT:      Head: Normocephalic and atraumatic.   Eyes:      Pupils: Pupils are equal, round, and reactive to light.   Neck:      Vascular: No carotid bruit.   Cardiovascular:      Rate and Rhythm: Normal rate. Rhythm irregular.      Pulses: Normal pulses.      Heart sounds: Normal heart sounds.   Pulmonary:      Effort: Pulmonary effort is normal.      Breath sounds: Normal breath sounds.   Abdominal:      General: Bowel sounds are normal.      Palpations: Abdomen is soft.   Musculoskeletal:      Cervical back: Neck supple.      Right lower leg: Edema present.      Left lower leg: Edema present.      Comments: Trace bilateral lower ext edema   Skin:     General: Skin is warm and dry.      Capillary Refill: Capillary refill takes less than 2 seconds.   Neurological:      General: No focal deficit present.      Mental Status: He is alert and oriented to person, place, and time.   Psychiatric:         Mood and Affect: Mood normal.         Behavior: Behavior normal.         LABS REVIEWED:  Lab Results   Component Value Date    WBC 6.31 02/08/2022    RBC 4.79 02/08/2022    HGB 12.8 (L) 02/08/2022    HCT 42.1 02/08/2022    MCV 87.9 02/08/2022    MCH 26.7 (L) 02/08/2022    MCHC 30.4 (L) 02/08/2022    RDW 17.6 (H) 02/08/2022     02/08/2022    MPV 12.7 (H) 02/08/2022    NRBC 0.0 02/08/2022    INR 1.33 (H) 01/24/2022     Lab Results   Component Value Date     (L) 02/08/2022    K 4.4 02/08/2022    CL 98 02/08/2022    CO2 27 02/08/2022    BUN 38 (H) 02/08/2022    MG 2.5 (H) 01/25/2022     Lab Results   Component Value Date    AST 37 02/08/2022    ALT 42 02/08/2022     Lab Results   Component Value Date     (H) 02/08/2022    HGBA1C 10.1 (H) 02/08/2022     Lab Results   Component Value Date    CHOL 164 02/08/2022     HDL 51 02/08/2022    TRIG 119 02/08/2022    CHOLHDL 3.2 02/08/2022       CARDIAC STUDIES REVIEWED:EKG: atrial fibrillation with ventricular rate of 83 bpm; old inferior q waves and poor r wave progression is unchanged from EKG done 1/1/2022     echocardiogram  Results for orders placed during the hospital encounter of 12/08/21    Echo    Interpretation Summary  · Atrial fibrillation with frequent PVC's, rate 85 and occasional runs of wide complex tachycardia.  · The left ventricle is moderately enlarged with eccentric hypertrophy and  · Atrial fibrillation observed.  · Mild right ventricular enlargement.  · Moderate mitral regurgitation.  · Moderate to severe tricuspid regurgitation.  · There is pulmonary hypertension.  · Moderate left atrial enlargement.  · Mild right atrial enlargement.  · The estimated ejection fraction is 15%.     Heart cath  No results found for this or any previous visit.           ASSESSMENT:   Patient Active Problem List   Diagnosis    Hypertension    Diabetes mellitus without complication    Gastroesophageal reflux disease    Hyperlipidemia    Benign prostatic hyperplasia    Chronic a-fib    Heart failure    Alcoholism in remission    CELESTIN (dyspnea on exertion)    Dilated cardiomyopathy    Chronic kidney disease    Pericardial effusion    Hematuria    Elevated troponin level    Acute on chronic congestive heart failure    Hx of long term use of blood thinners    Hematuria, gross    Acute on chronic systolic CHF (congestive heart failure)    UTI (urinary tract infection)            Problem List Items Addressed This Visit    None     Visit Diagnoses     Atrial fibrillation, unspecified type    -  Primary    Relevant Orders    EKG 12-lead (Completed)           PLAN: d/w Dr. Carlson in light of elevated Cr on cmp done by PCP 2 days ago we will not restart Entresto or aldactone at this time. Also his TSH was 76.6 2 days ago. I will defer to the PCP for thyroid hormone  replacement therapy.   Orders Placed This Encounter   Procedures    EKG 12-lead     Standing Status:   Future     Number of Occurrences:   1     Standing Expiration Date:   2/10/2023     Order Specific Question:   Diagnosis     Answer:   Michael [[id number]]      RTC: as scheduled for ICD implant 2/24/2022

## 2022-02-24 ENCOUNTER — HOSPITAL ENCOUNTER (INPATIENT)
Facility: HOSPITAL | Age: 82
LOS: 8 days | Discharge: HOME HEALTH | DRG: 291 | End: 2022-03-04
Attending: INTERNAL MEDICINE | Admitting: INTERNAL MEDICINE
Payer: COMMERCIAL

## 2022-02-24 DIAGNOSIS — E03.9 HYPOTHYROIDISM, UNSPECIFIED TYPE: Chronic | ICD-10-CM

## 2022-02-24 DIAGNOSIS — K21.9 GASTROESOPHAGEAL REFLUX DISEASE, UNSPECIFIED WHETHER ESOPHAGITIS PRESENT: ICD-10-CM

## 2022-02-24 DIAGNOSIS — I42.0 DILATED CARDIOMYOPATHY: ICD-10-CM

## 2022-02-24 DIAGNOSIS — R07.9 CHEST PAIN: ICD-10-CM

## 2022-02-24 DIAGNOSIS — R06.09 DOE (DYSPNEA ON EXERTION): ICD-10-CM

## 2022-02-24 DIAGNOSIS — R31.0 HEMATURIA, GROSS: ICD-10-CM

## 2022-02-24 DIAGNOSIS — I50.23 ACUTE ON CHRONIC SYSTOLIC CHF (CONGESTIVE HEART FAILURE): Primary | ICD-10-CM

## 2022-02-24 DIAGNOSIS — E87.5 HYPERKALEMIA: ICD-10-CM

## 2022-02-24 DIAGNOSIS — N40.0 BENIGN PROSTATIC HYPERPLASIA, UNSPECIFIED WHETHER LOWER URINARY TRACT SYMPTOMS PRESENT: ICD-10-CM

## 2022-02-24 DIAGNOSIS — N18.32 ACUTE RENAL FAILURE SUPERIMPOSED ON STAGE 3B CHRONIC KIDNEY DISEASE, UNSPECIFIED ACUTE RENAL FAILURE TYPE: ICD-10-CM

## 2022-02-24 DIAGNOSIS — N17.9 ACUTE RENAL FAILURE SUPERIMPOSED ON STAGE 3B CHRONIC KIDNEY DISEASE, UNSPECIFIED ACUTE RENAL FAILURE TYPE: ICD-10-CM

## 2022-02-24 DIAGNOSIS — I31.39 PERICARDIAL EFFUSION: ICD-10-CM

## 2022-02-24 DIAGNOSIS — R79.89 ELEVATED TROPONIN LEVEL: ICD-10-CM

## 2022-02-24 DIAGNOSIS — I50.9 LOW CARDIAC OUTPUT SYNDROME: ICD-10-CM

## 2022-02-24 DIAGNOSIS — I48.20 CHRONIC A-FIB: ICD-10-CM

## 2022-02-24 DIAGNOSIS — I50.22 CHRONIC SYSTOLIC CONGESTIVE HEART FAILURE: Chronic | ICD-10-CM

## 2022-02-24 DIAGNOSIS — I10 HYPERTENSION, UNSPECIFIED TYPE: ICD-10-CM

## 2022-02-24 DIAGNOSIS — I50.22 CHRONIC SYSTOLIC CONGESTIVE HEART FAILURE: ICD-10-CM

## 2022-02-24 DIAGNOSIS — I42.0 DILATED CARDIOMYOPATHY: Chronic | ICD-10-CM

## 2022-02-24 DIAGNOSIS — I50.9 HEART FAILURE, UNSPECIFIED HF CHRONICITY, UNSPECIFIED HEART FAILURE TYPE: ICD-10-CM

## 2022-02-24 DIAGNOSIS — E78.5 HYPERLIPIDEMIA, UNSPECIFIED HYPERLIPIDEMIA TYPE: ICD-10-CM

## 2022-02-24 DIAGNOSIS — Z92.29 HX OF LONG TERM USE OF BLOOD THINNERS: ICD-10-CM

## 2022-02-24 DIAGNOSIS — Z01.810 PRE-OPERATIVE CARDIOVASCULAR EXAMINATION: ICD-10-CM

## 2022-02-24 DIAGNOSIS — N18.32 STAGE 3B CHRONIC KIDNEY DISEASE: Chronic | ICD-10-CM

## 2022-02-24 DIAGNOSIS — F10.21 ALCOHOLISM IN REMISSION: Chronic | ICD-10-CM

## 2022-02-24 DIAGNOSIS — E11.9 DIABETES MELLITUS WITHOUT COMPLICATION: ICD-10-CM

## 2022-02-24 PROBLEM — N18.9 ACUTE ON CHRONIC KIDNEY FAILURE: Status: ACTIVE | Noted: 2022-01-21

## 2022-02-24 LAB
ALBUMIN SERPL BCP-MCNC: 4 G/DL (ref 3.5–5)
ALBUMIN/GLOB SERPL: 1.1 {RATIO}
ALP SERPL-CCNC: 276 U/L (ref 45–115)
ALT SERPL W P-5'-P-CCNC: 226 U/L (ref 16–61)
AMORPH PHOS CRY #/AREA URNS LPF: ABNORMAL /LPF
ANION GAP SERPL CALCULATED.3IONS-SCNC: 21 MMOL/L (ref 7–16)
AST SERPL W P-5'-P-CCNC: 220 U/L (ref 15–37)
BACTERIA #/AREA URNS HPF: ABNORMAL /HPF
BASOPHILS # BLD AUTO: 0.01 K/UL (ref 0–0.2)
BASOPHILS NFR BLD AUTO: 0.1 % (ref 0–1)
BILIRUB SERPL-MCNC: 1.8 MG/DL (ref 0–1.2)
BILIRUB UR QL STRIP: NEGATIVE
BUN SERPL-MCNC: 48 MG/DL (ref 7–18)
BUN/CREAT SERPL: 18 (ref 6–20)
CALCIUM SERPL-MCNC: 10.1 MG/DL (ref 8.5–10.1)
CHLORIDE SERPL-SCNC: 100 MMOL/L (ref 98–107)
CLARITY UR: CLEAR
CO2 SERPL-SCNC: 24 MMOL/L (ref 21–32)
COLOR UR: ABNORMAL
CREAT SERPL-MCNC: 2.7 MG/DL (ref 0.7–1.3)
DIFFERENTIAL METHOD BLD: ABNORMAL
EOSINOPHIL # BLD AUTO: 0 K/UL (ref 0–0.5)
EOSINOPHIL NFR BLD AUTO: 0 % (ref 1–4)
ERYTHROCYTE [DISTWIDTH] IN BLOOD BY AUTOMATED COUNT: 19.9 % (ref 11.5–14.5)
GLOBULIN SER-MCNC: 3.5 G/DL (ref 2–4)
GLUCOSE SERPL-MCNC: 130 MG/DL (ref 70–105)
GLUCOSE SERPL-MCNC: 230 MG/DL (ref 70–105)
GLUCOSE SERPL-MCNC: 326 MG/DL (ref 70–105)
GLUCOSE SERPL-MCNC: 377 MG/DL (ref 70–105)
GLUCOSE SERPL-MCNC: 386 MG/DL (ref 74–106)
GLUCOSE SERPL-MCNC: 407 MG/DL (ref 70–105)
GLUCOSE UR STRIP-MCNC: NEGATIVE MG/DL
HCT VFR BLD AUTO: 49.7 % (ref 40–54)
HGB BLD-MCNC: 15.7 G/DL (ref 13.5–18)
HYALINE CASTS #/AREA URNS LPF: ABNORMAL /LPF
IMM GRANULOCYTES # BLD AUTO: 0.11 K/UL (ref 0–0.04)
IMM GRANULOCYTES NFR BLD: 1.3 % (ref 0–0.4)
KETONES UR STRIP-SCNC: NEGATIVE MG/DL
LACTATE SERPL-SCNC: 4.1 MMOL/L (ref 0.4–2)
LACTATE SERPL-SCNC: 4.5 MMOL/L (ref 0.4–2)
LEUKOCYTE ESTERASE UR QL STRIP: ABNORMAL
LYMPHOCYTES # BLD AUTO: 0.61 K/UL (ref 1–4.8)
LYMPHOCYTES NFR BLD AUTO: 7.1 % (ref 27–41)
LYMPHOCYTES NFR BLD MANUAL: 15 % (ref 27–41)
MCH RBC QN AUTO: 27.1 PG (ref 27–31)
MCHC RBC AUTO-ENTMCNC: 31.6 G/DL (ref 32–36)
MCV RBC AUTO: 85.7 FL (ref 80–96)
MONOCYTES # BLD AUTO: 0.7 K/UL (ref 0–0.8)
MONOCYTES NFR BLD AUTO: 8.2 % (ref 2–6)
MONOCYTES NFR BLD MANUAL: 3 % (ref 2–6)
MPC BLD CALC-MCNC: 12.5 FL (ref 9.4–12.4)
NEUTROPHILS # BLD AUTO: 7.11 K/UL (ref 1.8–7.7)
NEUTROPHILS NFR BLD AUTO: 83.3 % (ref 53–65)
NEUTS BAND NFR BLD MANUAL: 1 % (ref 1–5)
NEUTS SEG NFR BLD MANUAL: 81 % (ref 50–62)
NITRITE UR QL STRIP: NEGATIVE
NRBC # BLD AUTO: 0.06 X10E3/UL
NRBC, AUTO (.00): 0.7 %
NT-PROBNP SERPL-MCNC: ABNORMAL PG/ML (ref 1–450)
PH UR STRIP: 5.5 PH UNITS
PLATELET # BLD AUTO: 240 K/UL (ref 150–400)
PLATELET MORPHOLOGY: ABNORMAL
POTASSIUM SERPL-SCNC: 4.9 MMOL/L (ref 3.5–5.1)
PROT SERPL-MCNC: 7.5 G/DL (ref 6.4–8.2)
PROT UR QL STRIP: NEGATIVE
RBC # BLD AUTO: 5.8 M/UL (ref 4.6–6.2)
RBC # UR STRIP: NEGATIVE /UL
RBC #/AREA URNS HPF: ABNORMAL /HPF
RBC MORPH BLD: NORMAL
SARS-COV-2 RDRP RESP QL NAA+PROBE: NEGATIVE
SODIUM SERPL-SCNC: 140 MMOL/L (ref 136–145)
SP GR UR STRIP: 1.01
SQUAMOUS #/AREA URNS LPF: ABNORMAL /LPF
T4 FREE SERPL-MCNC: 0.46 NG/DL (ref 0.76–1.46)
TSH SERPL DL<=0.005 MIU/L-ACNC: 71.5 UIU/ML (ref 0.36–3.74)
UROBILINOGEN UR STRIP-ACNC: 0.2 MG/DL
WBC # BLD AUTO: 8.54 K/UL (ref 4.5–11)
WBC #/AREA URNS HPF: ABNORMAL /HPF
YEAST #/AREA URNS HPF: ABNORMAL /HPF

## 2022-02-24 PROCEDURE — 63600175 PHARM REV CODE 636 W HCPCS: Performed by: INTERNAL MEDICINE

## 2022-02-24 PROCEDURE — 83605 ASSAY OF LACTIC ACID: CPT | Performed by: NURSE PRACTITIONER

## 2022-02-24 PROCEDURE — 84443 ASSAY THYROID STIM HORMONE: CPT | Performed by: NURSE PRACTITIONER

## 2022-02-24 PROCEDURE — 87635 SARS-COV-2 COVID-19 AMP PRB: CPT | Performed by: INTERNAL MEDICINE

## 2022-02-24 PROCEDURE — 81001 URINALYSIS AUTO W/SCOPE: CPT | Performed by: NURSE PRACTITIONER

## 2022-02-24 PROCEDURE — 99223 PR INITIAL HOSPITAL CARE,LEVL III: ICD-10-PCS | Mod: ,,, | Performed by: INTERNAL MEDICINE

## 2022-02-24 PROCEDURE — 99223 1ST HOSP IP/OBS HIGH 75: CPT | Mod: ,,, | Performed by: STUDENT IN AN ORGANIZED HEALTH CARE EDUCATION/TRAINING PROGRAM

## 2022-02-24 PROCEDURE — 97161 PT EVAL LOW COMPLEX 20 MIN: CPT

## 2022-02-24 PROCEDURE — 25000003 PHARM REV CODE 250: Performed by: INTERNAL MEDICINE

## 2022-02-24 PROCEDURE — 85025 COMPLETE CBC W/AUTO DIFF WBC: CPT | Performed by: NURSE PRACTITIONER

## 2022-02-24 PROCEDURE — 25000003 PHARM REV CODE 250: Performed by: NURSE PRACTITIONER

## 2022-02-24 PROCEDURE — 97165 OT EVAL LOW COMPLEX 30 MIN: CPT

## 2022-02-24 PROCEDURE — 11000001 HC ACUTE MED/SURG PRIVATE ROOM

## 2022-02-24 PROCEDURE — 84439 ASSAY OF FREE THYROXINE: CPT | Performed by: NURSE PRACTITIONER

## 2022-02-24 PROCEDURE — 80053 COMPREHEN METABOLIC PANEL: CPT | Performed by: NURSE PRACTITIONER

## 2022-02-24 PROCEDURE — 93010 ELECTROCARDIOGRAM REPORT: CPT | Mod: ,,, | Performed by: INTERNAL MEDICINE

## 2022-02-24 PROCEDURE — 83880 ASSAY OF NATRIURETIC PEPTIDE: CPT | Performed by: NURSE PRACTITIONER

## 2022-02-24 PROCEDURE — 93005 ELECTROCARDIOGRAM TRACING: CPT

## 2022-02-24 PROCEDURE — C9399 UNCLASSIFIED DRUGS OR BIOLOG: HCPCS | Performed by: INTERNAL MEDICINE

## 2022-02-24 PROCEDURE — 87040 BLOOD CULTURE FOR BACTERIA: CPT | Performed by: NURSE PRACTITIONER

## 2022-02-24 PROCEDURE — 94761 N-INVAS EAR/PLS OXIMETRY MLT: CPT

## 2022-02-24 PROCEDURE — 87149 DNA/RNA DIRECT PROBE: CPT | Performed by: NURSE PRACTITIONER

## 2022-02-24 PROCEDURE — 93010 EKG 12-LEAD: ICD-10-PCS | Mod: ,,, | Performed by: INTERNAL MEDICINE

## 2022-02-24 PROCEDURE — 99223 PR INITIAL HOSPITAL CARE,LEVL III: ICD-10-PCS | Mod: ,,, | Performed by: STUDENT IN AN ORGANIZED HEALTH CARE EDUCATION/TRAINING PROGRAM

## 2022-02-24 PROCEDURE — 63600175 PHARM REV CODE 636 W HCPCS: Performed by: NURSE PRACTITIONER

## 2022-02-24 PROCEDURE — 82962 GLUCOSE BLOOD TEST: CPT

## 2022-02-24 PROCEDURE — 36415 COLL VENOUS BLD VENIPUNCTURE: CPT | Performed by: NURSE PRACTITIONER

## 2022-02-24 PROCEDURE — 99223 1ST HOSP IP/OBS HIGH 75: CPT | Mod: ,,, | Performed by: INTERNAL MEDICINE

## 2022-02-24 RX ORDER — INSULIN ASPART 100 [IU]/ML
0-26 INJECTION, SOLUTION INTRAVENOUS; SUBCUTANEOUS
Status: DISCONTINUED | OUTPATIENT
Start: 2022-02-24 | End: 2022-02-27

## 2022-02-24 RX ORDER — LATANOPROST 50 UG/ML
1 SOLUTION/ DROPS OPHTHALMIC DAILY
Status: DISCONTINUED | OUTPATIENT
Start: 2022-02-24 | End: 2022-03-04 | Stop reason: HOSPADM

## 2022-02-24 RX ORDER — INSULIN ASPART 100 [IU]/ML
1-10 INJECTION, SOLUTION INTRAVENOUS; SUBCUTANEOUS
Status: DISCONTINUED | OUTPATIENT
Start: 2022-02-24 | End: 2022-02-24

## 2022-02-24 RX ORDER — PANTOPRAZOLE SODIUM 40 MG/1
40 TABLET, DELAYED RELEASE ORAL DAILY
Refills: 1 | Status: DISCONTINUED | OUTPATIENT
Start: 2022-02-24 | End: 2022-03-04 | Stop reason: HOSPADM

## 2022-02-24 RX ORDER — TALC
8 POWDER (GRAM) TOPICAL NIGHTLY PRN
Status: DISCONTINUED | OUTPATIENT
Start: 2022-02-24 | End: 2022-03-04 | Stop reason: HOSPADM

## 2022-02-24 RX ORDER — LANOLIN ALCOHOL/MO/W.PET/CERES
800 CREAM (GRAM) TOPICAL
Status: DISCONTINUED | OUTPATIENT
Start: 2022-02-24 | End: 2022-03-04 | Stop reason: HOSPADM

## 2022-02-24 RX ORDER — FUROSEMIDE 10 MG/ML
80 INJECTION INTRAMUSCULAR; INTRAVENOUS 2 TIMES DAILY WITH MEALS
Status: DISCONTINUED | OUTPATIENT
Start: 2022-02-24 | End: 2022-03-03

## 2022-02-24 RX ORDER — ASPIRIN 81 MG/1
81 TABLET ORAL DAILY
Status: DISCONTINUED | OUTPATIENT
Start: 2022-02-24 | End: 2022-03-04 | Stop reason: HOSPADM

## 2022-02-24 RX ORDER — SODIUM CHLORIDE 450 MG/100ML
INJECTION, SOLUTION INTRAVENOUS CONTINUOUS
Status: DISCONTINUED | OUTPATIENT
Start: 2022-02-24 | End: 2022-02-26

## 2022-02-24 RX ORDER — DIPHENHYDRAMINE HCL 25 MG
50 CAPSULE ORAL
Status: DISCONTINUED | OUTPATIENT
Start: 2022-02-24 | End: 2022-03-04 | Stop reason: HOSPADM

## 2022-02-24 RX ORDER — NALOXONE HCL 0.4 MG/ML
0.02 VIAL (ML) INJECTION
Status: DISCONTINUED | OUTPATIENT
Start: 2022-02-24 | End: 2022-03-04 | Stop reason: HOSPADM

## 2022-02-24 RX ORDER — GLUCAGON 1 MG
1 KIT INJECTION
Status: DISCONTINUED | OUTPATIENT
Start: 2022-02-24 | End: 2022-02-27

## 2022-02-24 RX ORDER — DOBUTAMINE HYDROCHLORIDE 400 MG/100ML
5 INJECTION INTRAVENOUS CONTINUOUS
Status: DISCONTINUED | OUTPATIENT
Start: 2022-02-24 | End: 2022-02-27

## 2022-02-24 RX ORDER — TAMSULOSIN HYDROCHLORIDE 0.4 MG/1
0.4 CAPSULE ORAL DAILY
Status: DISCONTINUED | OUTPATIENT
Start: 2022-02-24 | End: 2022-03-04 | Stop reason: HOSPADM

## 2022-02-24 RX ORDER — FUROSEMIDE 10 MG/ML
60 INJECTION INTRAMUSCULAR; INTRAVENOUS 2 TIMES DAILY WITH MEALS
Status: DISCONTINUED | OUTPATIENT
Start: 2022-02-24 | End: 2022-02-24

## 2022-02-24 RX ORDER — EZETIMIBE 10 MG/1
10 TABLET ORAL DAILY
Status: DISCONTINUED | OUTPATIENT
Start: 2022-02-24 | End: 2022-02-24

## 2022-02-24 RX ORDER — DIAZEPAM 5 MG/1
5 TABLET ORAL
Status: DISCONTINUED | OUTPATIENT
Start: 2022-02-24 | End: 2022-03-04 | Stop reason: HOSPADM

## 2022-02-24 RX ORDER — GLIPIZIDE 10 MG/1
10 TABLET ORAL
COMMUNITY

## 2022-02-24 RX ORDER — ACETAMINOPHEN 500 MG
1000 TABLET ORAL EVERY 6 HOURS PRN
Status: DISCONTINUED | OUTPATIENT
Start: 2022-02-24 | End: 2022-03-04 | Stop reason: HOSPADM

## 2022-02-24 RX ORDER — ISOSORBIDE DINITRATE 20 MG/1
20 TABLET ORAL 3 TIMES DAILY
Status: DISCONTINUED | OUTPATIENT
Start: 2022-02-24 | End: 2022-02-27

## 2022-02-24 RX ORDER — AMOXICILLIN 250 MG
1 CAPSULE ORAL 2 TIMES DAILY
Status: DISCONTINUED | OUTPATIENT
Start: 2022-02-24 | End: 2022-03-04 | Stop reason: HOSPADM

## 2022-02-24 RX ORDER — SODIUM CHLORIDE 0.9 % (FLUSH) 0.9 %
10 SYRINGE (ML) INJECTION EVERY 12 HOURS PRN
Status: DISCONTINUED | OUTPATIENT
Start: 2022-02-24 | End: 2022-03-04 | Stop reason: HOSPADM

## 2022-02-24 RX ORDER — LEVOTHYROXINE SODIUM 50 UG/1
100 TABLET ORAL
Status: DISCONTINUED | OUTPATIENT
Start: 2022-02-25 | End: 2022-02-24

## 2022-02-24 RX ORDER — HYDRALAZINE HYDROCHLORIDE 25 MG/1
25 TABLET, FILM COATED ORAL EVERY 8 HOURS
Status: DISCONTINUED | OUTPATIENT
Start: 2022-02-24 | End: 2022-02-27

## 2022-02-24 RX ADMIN — HYDRALAZINE HYDROCHLORIDE 25 MG: 25 TABLET ORAL at 01:02

## 2022-02-24 RX ADMIN — INSULIN ASPART 10 UNITS: 100 INJECTION, SOLUTION INTRAVENOUS; SUBCUTANEOUS at 12:02

## 2022-02-24 RX ADMIN — DOBUTAMINE HYDROCHLORIDE 2.5 MCG/KG/MIN: 400 INJECTION INTRAVENOUS at 11:02

## 2022-02-24 RX ADMIN — ISOSORBIDE DINITRATE 20 MG: 20 TABLET ORAL at 03:02

## 2022-02-24 RX ADMIN — SENNOSIDES AND DOCUSATE SODIUM 1 TABLET: 50; 8.6 TABLET ORAL at 01:02

## 2022-02-24 RX ADMIN — INSULIN DETEMIR 10 UNITS: 100 INJECTION, SOLUTION SUBCUTANEOUS at 08:02

## 2022-02-24 RX ADMIN — FUROSEMIDE 60 MG: 10 INJECTION, SOLUTION INTRAMUSCULAR; INTRAVENOUS at 11:02

## 2022-02-24 RX ADMIN — APIXABAN 5 MG: 5 TABLET, FILM COATED ORAL at 11:02

## 2022-02-24 RX ADMIN — EZETIMIBE 10 MG: 10 TABLET ORAL at 11:02

## 2022-02-24 RX ADMIN — APIXABAN 5 MG: 5 TABLET, FILM COATED ORAL at 08:02

## 2022-02-24 RX ADMIN — SENNOSIDES AND DOCUSATE SODIUM 1 TABLET: 50; 8.6 TABLET ORAL at 08:02

## 2022-02-24 RX ADMIN — PANTOPRAZOLE SODIUM 40 MG: 40 TABLET, DELAYED RELEASE ORAL at 11:02

## 2022-02-24 RX ADMIN — INSULIN ASPART 14 UNITS: 100 INJECTION, SOLUTION INTRAVENOUS; SUBCUTANEOUS at 04:02

## 2022-02-24 RX ADMIN — FUROSEMIDE 80 MG: 10 INJECTION, SOLUTION INTRAMUSCULAR; INTRAVENOUS at 04:02

## 2022-02-24 RX ADMIN — ASPIRIN 81 MG: 81 TABLET, COATED ORAL at 11:02

## 2022-02-24 RX ADMIN — ISOSORBIDE DINITRATE 20 MG: 20 TABLET ORAL at 08:02

## 2022-02-24 RX ADMIN — TAMSULOSIN HYDROCHLORIDE 0.4 MG: 0.4 CAPSULE ORAL at 11:02

## 2022-02-24 RX ADMIN — LATANOPROST 1 DROP: 50 SOLUTION/ DROPS OPHTHALMIC at 04:02

## 2022-02-24 NOTE — SUBJECTIVE & OBJECTIVE
Past Medical History:   Diagnosis Date    Anticoagulant long-term use     Arthritis     Atrial fibrillation     CHF (congestive heart failure)     Chronic kidney disease     Coronary artery disease     CVA (cerebral vascular accident)     Dilated cardiomyopathy     DM type 2 (diabetes mellitus, type 2)     Encounter for blood transfusion     GERD (gastroesophageal reflux disease)     Hyperlipidemia     Hypertension     Hypertensive heart disease     Hypothyroidism     Left bundle branch block     Prostate disorder        Past Surgical History:   Procedure Laterality Date    CARDIAC CATHETERIZATION Left 11/2018    PROSTATECTOMY         Review of patient's allergies indicates:  No Known Allergies    No current facility-administered medications on file prior to encounter.     Current Outpatient Medications on File Prior to Encounter   Medication Sig    apixaban (ELIQUIS) 5 mg Tab Take 1 tablet (5 mg total) by mouth 2 (two) times daily.    aspirin (ECOTRIN) 81 MG EC tablet Take 81 mg by mouth once daily.    carvediloL (COREG) 6.25 MG tablet Take 1 tablet (6.25 mg total) by mouth 2 (two) times daily with meals.    ezetimibe (ZETIA) 10 mg tablet Take 1 tablet (10 mg total) by mouth once daily.    glipiZIDE (GLUCOTROL) 10 MG tablet Take 10 mg by mouth 2 (two) times daily before meals.    omeprazole (PRILOSEC) 40 MG capsule Take 1 capsule (40 mg total) by mouth once daily.    tamsulosin (FLOMAX) 0.4 mg Cap Take 1 capsule (0.4 mg total) by mouth once daily.     Family History       Problem Relation (Age of Onset)    Heart disease Father    Hypertension Mother    Pacemaker/defibrilator Mother          Tobacco Use    Smoking status: Former Smoker     Packs/day: 1.00     Types: Cigarettes, Cigars    Smokeless tobacco: Former User     Types: Chew    Tobacco comment: QUIT 2017   Substance and Sexual Activity    Alcohol use: Not Currently     Comment: quit 3 years ago    Drug use: Never    Sexual activity: Not Currently     Review  of Systems   Constitutional: Positive for malaise/fatigue. Negative for chills and fever.   HENT:  Negative for congestion and sore throat.    Eyes:  Negative for visual disturbance.   Cardiovascular:  Positive for dyspnea on exertion, leg swelling, orthopnea and paroxysmal nocturnal dyspnea. Negative for chest pain.   Respiratory:  Positive for shortness of breath, sleep disturbances due to breathing and wheezing.    Hematologic/Lymphatic: Negative for bleeding problem.   Gastrointestinal:  Positive for bloating. Negative for abdominal pain, nausea and vomiting.   Neurological:  Positive for weakness.   Psychiatric/Behavioral:          Mild confusion reported by family at bedside   Objective:     Vital Signs (Most Recent):  Temp: 97.9 °F (36.6 °C) (02/24/22 1243)  Pulse: 104 (02/24/22 1243)  Resp: 20 (02/24/22 1243)  BP: (!) 116/59 (02/24/22 1301)  SpO2: 96 % (02/24/22 1243)   Vital Signs (24h Range):  Temp:  [97.6 °F (36.4 °C)-98 °F (36.7 °C)] 97.9 °F (36.6 °C)  Pulse:  [] 104  Resp:  [20-22] 20  SpO2:  [93 %-96 %] 96 %  BP: (113-116)/(59-86) 116/59     Weight: 96.2 kg (212 lb 1.6 oz)  Body mass index is 29.58 kg/m².    SpO2: 96 %  O2 Device (Oxygen Therapy): nasal cannula      Intake/Output Summary (Last 24 hours) at 2/24/2022 1430  Last data filed at 2/24/2022 1159  Gross per 24 hour   Intake 100 ml   Output --   Net 100 ml       Lines/Drains/Airways       Peripheral Intravenous Line  Duration                  Peripheral IV - Single Lumen 02/24/22 1055 20 G Right Antecubital <1 day                    Physical Exam  Vitals reviewed.   Constitutional:       General: He is not in acute distress.  HENT:      Nose: Nose normal.      Mouth/Throat:      Mouth: Mucous membranes are moist.      Pharynx: Oropharynx is clear.   Eyes:      General: No scleral icterus.     Pupils: Pupils are equal, round, and reactive to light.   Neck:      Vascular: JVD present.   Cardiovascular:      Rate and Rhythm: Normal rate and  regular rhythm.   Pulmonary:      Effort: Tachypnea present.      Breath sounds: Decreased air movement present. Rales present.   Musculoskeletal:      Cervical back: Neck supple.      Right lower leg: Edema present.      Left lower leg: Edema present.   Skin:     General: Skin is cool.      Capillary Refill: Capillary refill takes more than 3 seconds.      Coloration: Skin is not jaundiced.   Neurological:      General: No focal deficit present.      Mental Status: He is alert.       Significant Labs: ABG: No results for input(s): PH, PCO2, HCO3, POCSATURATED, BE in the last 48 hours., Blood Culture: No results for input(s): LABBLOO in the last 48 hours., BMP:   Recent Labs   Lab 02/24/22  1214   *      K 4.9      CO2 24   BUN 48*   CREATININE 2.70*   CALCIUM 10.1   , CMP   Recent Labs   Lab 02/24/22  1214      K 4.9      CO2 24   *   BUN 48*   CREATININE 2.70*   CALCIUM 10.1   PROT 7.5   ALBUMIN 4.0   BILITOT 1.8*   ALKPHOS 276*   *   *   ANIONGAP 21*   EGFRNONAA 24*   , CBC   Recent Labs   Lab 02/24/22  1213   WBC 8.54   HGB 15.7   HCT 49.7      , Lipid Panel No results for input(s): CHOL, HDL, LDLCALC, TRIG, CHOLHDL in the last 48 hours., and Troponin No results for input(s): TROPONINI in the last 48 hours.    Significant Imaging: Echocardiogram: Transthoracic echo (TTE) complete (Cupid Only):   Results for orders placed or performed during the hospital encounter of 12/08/21   Echo   Result Value Ref Range    IVC diameter 2.24 cm    Left Ventricular Outflow Tract Mean Gradient 2.00 mmHg    AORTIC VALVE CUSP SEPERATION [id number] cm    LVIDd 6.23 (A) 3.5 - 6.0 cm    IVS 0.92 0.6 - 1.1 cm    Posterior Wall 0.92 0.6 - 1.1 cm    LVIDs 5.77 (A) 2.1 - 4.0 cm    FS 7 28 - 44 %    LV mass 236.68 g    LA size 4.63 cm    RVDD 3.77 cm    Left Ventricle Relative Wall Thickness 0.30 cm    AV mean gradient 4 mmHg    AV valve area 2.31 cm2    AV index  (prosthetic) 0.70     E wave deceleration time 148 msec    LVOT diameter 2.05 cm    LVOT area 3.3 cm2    LVOT peak VTI 13.89 cm    Ao VTI 19.82 cm    LVOT stroke volume 45.82 cm3    MV Peak E Arash 0.76 m/s    TR Max Arash 3.42 m/s    LV Systolic Volume 164.59 mL    LV Diastolic Volume 196.13 mL    Echo EF Estimated 16 %    Triscuspid Valve Regurgitation Peak Gradient 47 mmHg    EF 15 %    Narrative    · Atrial fibrillation with frequent PVC's, rate 85 and occasional runs of   wide complex tachycardia.  · The left ventricle is moderately enlarged with  · Atrial fibrillation observed.  · Mild right ventricular enlargement.  · Moderate mitral regurgitation.  · Moderate to severe tricuspid regurgitation.  · There is pulmonary hypertension.  · Moderate left atrial enlargement.  · Mild right atrial enlargement.      , EKG: reviewed by Dr. Robb, and X-Ray: CXR: X-Ray Chest 1 View (CXR): No results found for this visit on 02/24/22.

## 2022-02-24 NOTE — PT/OT/SLP EVAL
Physical Therapy Evaluation and Discharge Note    Patient Name:  Jeanmarie Michelle   MRN:  [id number]    Recommendations:     Discharge Recommendations:  home   Discharge Equipment Recommendations: none   Barriers to discharge: None    Assessment:     Jeanmarie Michelle is a 81 y.o. male admitted with a medical diagnosis of dilated cardiomyopathy. Pt demonstrates good functional mobility with mild shortness of breath secondary to volume overload. He is able ambulate without assistance but noted to have incontinence of bladder following administration of lasix.  At this time, patient is functioning at their prior level of function and does not require further acute PT services.     Recent Surgery: Procedure(s) (LRB):  INSERTION, ICD GENERATOR, SINGLE CHAMBER (Left) Day of Surgery    Plan:     During this hospitalization, patient does not require further acute PT services.  Please re-consult if situation changes.      Subjective     Chief Complaint: volume overload  Patient/Family Comments/goals: Pt states he feels tired.  Pain/Comfort:  · Pain Rating 1: 0/10  · Pain Rating Post-Intervention 1: 0/10    Patients cultural, spiritual, Orthodoxy conflicts given the current situation: no    Living Environment:  Pt lives at home alone  Prior to admission, patients level of function was independent.  Equipment used at home: cane, straight.  DME owned (not currently used): none.  Upon discharge, patient will have assistance from family.    Objective:     Communicated with DION Mackenzie RN prior to session.  Patient found supine with peripheral IV upon PT entry to room.    General Precautions: Standard, fall   Orthopedic Precautions:N/A   Braces: N/A   Respiratory Status: Room air    Exams:  · Cognitive Exam:  Patient is oriented to Person, Place, Time and Situation  · Gross Motor Coordination:  WFL  · Skin Integrity/Edema:      · -       Edema: Moderate BLE  · RLE ROM: WFL  · RLE Strength: WFL  · LLE ROM: WFL  · LLE Strength: WFL    Functional  Mobility:  · Bed Mobility:     · Scooting: modified independence  · Supine to Sit: modified independence  · Transfers:     · Sit to Stand:  modified independence with straight cane  · Gait: 50 ft SBA with cane, assistance to manage IV pole  · Balance: good, stood on one leg to attempt dressing    AM-PAC 6 CLICK MOBILITY  Total Score:23       Therapeutic Activities and Exercises:   assisted to restroom    AM-[facility] 6 CLICK MOBILITY  Total Score:23     Patient left sitting edge of bed with all lines intact, call button in reach and RN present.    GOALS:   Multidisciplinary Problems     Physical Therapy Goals     Not on file                History:     Past Medical History:   Diagnosis Date    Anticoagulant long-term use     Arthritis     Atrial fibrillation     CHF (congestive heart failure)     Chronic kidney disease     Coronary artery disease     CVA (cerebral vascular accident)     Dilated cardiomyopathy     DM type 2 (diabetes mellitus, type 2)     Encounter for blood transfusion     GERD (gastroesophageal reflux disease)     Hyperlipidemia     Hypertension     Hypertensive heart disease     Hypothyroidism     Left bundle branch block     Prostate disorder        Past Surgical History:   Procedure Laterality Date    CARDIAC CATHETERIZATION Left 11/2018    PROSTATECTOMY         Time Tracking:     PT Received On: 02/24/22  PT Start Time: 1326     PT Stop Time: 1337  PT Total Time (min): 11 min     Billable Minutes: Evaluation low complexity      02/24/2022

## 2022-02-24 NOTE — PLAN OF CARE
Problem: Adult Inpatient Plan of Care  Goal: Plan of Care Review  Outcome: Ongoing, Progressing  Goal: Patient-Specific Goal (Individualized)  Outcome: Ongoing, Progressing  Goal: Absence of Hospital-Acquired Illness or Injury  Outcome: Ongoing, Progressing  Goal: Optimal Comfort and Wellbeing  Outcome: Ongoing, Progressing  Goal: Readiness for Transition of Care  Outcome: Ongoing, Progressing     Problem: Diabetes Comorbidity  Goal: Blood Glucose Level Within Targeted Range  Outcome: Ongoing, Progressing     Problem: Fall Injury Risk  Goal: Absence of Fall and Fall-Related Injury  Outcome: Ongoing, Progressing     Problem: Adjustment to Device (Cardiac Rhythm Management Device)  Goal: Optimal Adjustment to Device  Outcome: Ongoing, Progressing     Problem: Bleeding (Cardiac Rhythm Management Device)  Goal: Absence of Bleeding  Outcome: Ongoing, Progressing     Problem: Device-Related Complication Risk (Cardiac Rhythm Management Device)  Goal: Effective Device Function  Outcome: Ongoing, Progressing     Problem: Infection (Cardiac Rhythm Management Device)  Goal: Absence of Infection Signs and Symptoms  Outcome: Ongoing, Progressing     Problem: Pain (Cardiac Rhythm Management Device)  Goal: Acceptable Pain Level  Outcome: Ongoing, Progressing     Problem: Respiratory Compromise (Cardiac Rhythm Management Device)  Goal: Effective Oxygenation and Ventilation  Outcome: Ongoing, Progressing

## 2022-02-24 NOTE — PLAN OF CARE
Problem: Occupational Therapy Goal  Goal: Occupational Therapy Goal  Description: OT Initial Evaluation complete- please see for details.  Outcome: Met

## 2022-02-24 NOTE — SUBJECTIVE & OBJECTIVE
Past Medical History:   Diagnosis Date    Anticoagulant long-term use     Arthritis     Atrial fibrillation     CHF (congestive heart failure)     Chronic kidney disease     Coronary artery disease     CVA (cerebral vascular accident)     Dilated cardiomyopathy     DM type 2 (diabetes mellitus, type 2)     Encounter for blood transfusion     GERD (gastroesophageal reflux disease)     Hyperlipidemia     Hypertension     Hypertensive heart disease     Hypothyroidism     Left bundle branch block     Prostate disorder        Past Surgical History:   Procedure Laterality Date    CARDIAC CATHETERIZATION Left 11/2018    PROSTATECTOMY         Review of patient's allergies indicates:  No Known Allergies    No current facility-administered medications on file prior to encounter.     Current Outpatient Medications on File Prior to Encounter   Medication Sig    apixaban (ELIQUIS) 5 mg Tab Take 1 tablet (5 mg total) by mouth 2 (two) times daily.    aspirin (ECOTRIN) 81 MG EC tablet Take 81 mg by mouth once daily.    carvediloL (COREG) 6.25 MG tablet Take 1 tablet (6.25 mg total) by mouth 2 (two) times daily with meals.    ezetimibe (ZETIA) 10 mg tablet Take 1 tablet (10 mg total) by mouth once daily.    glipiZIDE (GLUCOTROL) 10 MG tablet Take 10 mg by mouth 2 (two) times daily before meals.    omeprazole (PRILOSEC) 40 MG capsule Take 1 capsule (40 mg total) by mouth once daily.    tamsulosin (FLOMAX) 0.4 mg Cap Take 1 capsule (0.4 mg total) by mouth once daily.     Family History       Problem Relation (Age of Onset)    Heart disease Father    Hypertension Mother    Pacemaker/defibrilator Mother          Tobacco Use    Smoking status: Former Smoker     Packs/day: 1.00     Types: Cigarettes, Cigars    Smokeless tobacco: Former User     Types: Chew    Tobacco comment: QUIT 2017   Substance and Sexual Activity    Alcohol use: Not Currently     Comment: quit 3 years ago    Drug use: Never    Sexual activity: Not Currently     Review  of Systems   Constitutional:  Positive for fatigue. Negative for fever.   HENT: Negative.     Eyes: Negative.    Respiratory:  Positive for shortness of breath.    Cardiovascular:  Positive for leg swelling. Negative for chest pain and palpitations.   Gastrointestinal: Negative.    Endocrine: Negative.    Genitourinary: Negative.  Negative for hematuria.   Musculoskeletal: Negative.    Skin: Negative.    Allergic/Immunologic: Negative.    Neurological: Negative.    Hematological: Negative.    Psychiatric/Behavioral: Negative.     Objective:     Vital Signs (Most Recent):  Temp: 98 °F (36.7 °C) (02/24/22 1124)  Pulse: 92 (02/24/22 1124)  Resp: 20 (02/24/22 1124)  BP: (!) 116/59 (02/24/22 1301)  SpO2: 95 % (02/24/22 1140)   Vital Signs (24h Range):  Temp:  [97.6 °F (36.4 °C)-98 °F (36.7 °C)] 98 °F (36.7 °C)  Pulse:  [79-92] 92  Resp:  [20-22] 20  SpO2:  [93 %-95 %] 95 %  BP: (113-116)/(59-86) 116/59     Weight: 96.2 kg (212 lb 1.6 oz)  Body mass index is 29.58 kg/m².    Physical Exam  Vitals reviewed.   Constitutional:       General: He is not in acute distress.     Appearance: Normal appearance.   HENT:      Head: Normocephalic and atraumatic.      Right Ear: External ear normal.      Left Ear: External ear normal.   Eyes:      General: No scleral icterus.     Extraocular Movements: Extraocular movements intact.      Pupils: Pupils are equal, round, and reactive to light.   Cardiovascular:      Rate and Rhythm: Normal rate and regular rhythm.      Pulses: Normal pulses.      Heart sounds: Murmur heard.   Pulmonary:      Effort: Pulmonary effort is normal. No respiratory distress.      Breath sounds: Normal breath sounds. No wheezing.   Chest:      Chest wall: No tenderness.   Abdominal:      Palpations: Abdomen is soft. There is no mass.      Tenderness: There is no abdominal tenderness. There is no right CVA tenderness or left CVA tenderness.      Comments: Protuberant with ascites.     Musculoskeletal:          General: No swelling or tenderness. Normal range of motion.      Cervical back: No rigidity.      Right lower leg: Edema present.      Left lower leg: Edema present.      Comments: +3 pitting edema present BLE, and +1 pitting edema noted in bilateral upper extremity   Skin:     General: Skin is warm and dry.      Capillary Refill: Capillary refill takes less than 2 seconds.   Neurological:      General: No focal deficit present.      Mental Status: He is alert and oriented to person, place, and time. Mental status is at baseline.   Psychiatric:         Mood and Affect: Mood normal.         Thought Content: Thought content normal.         CRANIAL NERVES     CN III, IV, VI   Pupils are equal, round, and reactive to light.     Significant Labs: All pertinent labs within the past 24 hours have been reviewed.    Significant Imaging: I have reviewed all pertinent imaging results/findings within the past 24 hours.

## 2022-02-24 NOTE — HPI
80 y/o male with PMH of persistent atrial fibrillation, nonischemic ([facility] 2018) dilated cardiomyopathy (Stage D HF, EF 15%), CKD, HTN, DM, and newly diagnosed hypothyroidism who was scheduled for outpatient ICD placement by Dr. Carlson today but presented with acute on chronic decompensated heart failure with hyperglycemia and recent elevated TSH of 76.0 (not yet treated). He was admitted by hospital medicine and cardiology consulted for heart failure exacerbation.     Patient was seen in cardiology clinic 2/10/22 at which time his aldactone and Entresto were not continued due to CKD, creatinine 2.1 (baseline 1.3-1.5). He presents today with increased sob, orthopnea, mild confusion (reported by family), worsening lower extremity edema and abdominal distention, and decreased oxygen saturation on RA (93%). On assessment he has significant lower extremity edema expanding to abdomen, JVD extending to mandible, all extremities are cool to touch with decreased breath sound bilaterally. Labs revealed Lactate 4.1, BNP 34,000 (was 15,000 last month), CONSUELO with creatinine 2.7 (baseline 1.3-1.5), and elevated liver enzymes.

## 2022-02-24 NOTE — PT/OT/SLP EVAL
Occupational Therapy   Evaluation and Discharge Note    Name: Jeanmarie Michelle  MRN: [id number]  Admitting Diagnosis:  <principal problem not specified>   Recent Surgery: Procedure(s) (LRB):  INSERTION, ICD GENERATOR, SINGLE CHAMBER (Left) Day of Surgery    Recommendations:     Discharge Recommendations:    Discharge Equipment Recommendations:     Barriers to discharge:  None    Assessment:     Jeanmarie Michelle is a 81 y.o. male with a medical diagnosis of <principal problem not specified>. At this time, patient is functioning at their prior level of function and does not require further acute OT services.     Plan:     During this hospitalization, patient does not require further acute OT services.  Please re-consult if situation changes.    · Plan of Care Reviewed with: patient    Subjective     Chief Complaint: no complaints  Patient/Family Comments/goals: to go home    Occupational Profile:  Living Environment: patient lives at home alone  Previous level of function: Independent with ADls  Roles and Routines: Homemaker, retired  Equipment Used at home:     Assistance upon Discharge: D/C to home with HH    Pain/Comfort:  · Pain Rating 1: 0/10  · Pain Rating Post-Intervention 1: 0/10    Patients cultural, spiritual, Cheondoism conflicts given the current situation: no    Objective:     Communicated with: Nurse prior to session.  Patient found supine with peripheral IV, blood pressure cuff upon OT entry to room.    General Precautions: Standard,     Orthopedic Precautions:N/A   Braces: N/A  Respiratory Status: Room air     Occupational Performance:    Bed Mobility:    · Patient completed Rolling/Turning to Left with  independence  · Patient completed Rolling/Turning to Right with independence  · Patient completed Scooting/Bridging with independence  · Patient completed Supine to Sit with independence  · Patient completed Sit to Supine with independence    Functional Mobility/Transfers:  · Patient completed Sit <> Stand Transfer  with modified independence  with  quad cane   · Patient completed Toilet Transfer Step Transfer technique with modified independence with  quad cane  · Functional Mobility: Patient ambulated within room with no difficulty    Activities of Daily Living:  · Feeding:  independence to perform self feeding  · Grooming: independence to perform hygiene  · Upper Body Dressing: independence to elian shirt  · Lower Body Dressing: independence to elian socks  · Toileting: modified independence to perform toielt hygiene    Cognitive/Visual Perceptual:  Cognitive/Psychosocial Skills:     -       Oriented to: Person, Place, Time and Situation   -       Follows Commands/attention:Follows multistep  commands  -       Communication: clear/fluent    Physical Exam:  Balance:    -       good  Motor Planning:    -       WNLs  Dominant hand:    -       right  Upper Extremity Range of Motion:     -       Right Upper Extremity: WNL  -       Left Upper Extremity: WNL  Upper Extremity Strength:    -       Right Upper Extremity: 5/5 grossly  -       Left Upper Extremity: 5/5 grossly   Strength:    -       Right Upper Extremity: WNL  -       Left Upper Extremity: WNL  Fine Motor Coordination:    -       Intact  Gross motor coordination:   WFL    AMPAC 6 Click ADL:  AMPAC Total Score: 24    Treatment & Education:  · Pt educated on OT role/POC.   · Importance of OOB activity with staff assistance.  · Importance of sitting up in the chair throughout the day as tolerated, especially for meals   · Safety during functional t/f and mobility  · Importance of assisting with self-care activities     Education:    Patient left HOB elevated with all lines intact and call button in reach    GOALS:   Multidisciplinary Problems     Occupational Therapy Goals     Not on file          Multidisciplinary Problems (Resolved)        Problem: Occupational Therapy Goal    Goal Priority Disciplines Outcome Interventions   Occupational Therapy Goal   (Resolved)      OT, PT/OT Met    Description: OT Initial Evaluation complete- please see for details.                   History:     Past Medical History:   Diagnosis Date    Anticoagulant long-term use     Arthritis     Atrial fibrillation     CHF (congestive heart failure)     Chronic kidney disease     Coronary artery disease     CVA (cerebral vascular accident)     Dilated cardiomyopathy     DM type 2 (diabetes mellitus, type 2)     Encounter for blood transfusion     GERD (gastroesophageal reflux disease)     Hyperlipidemia     Hypertension     Hypertensive heart disease     Hypothyroidism     Left bundle branch block     Prostate disorder        Past Surgical History:   Procedure Laterality Date    CARDIAC CATHETERIZATION Left 11/2018    PROSTATECTOMY         Time Tracking:     OT Date of Treatment:    OT Start Time: 1515  OT Stop Time: 1530  OT Total Time (min): 15 min    Billable Minutes:Evaluation Low complexity    2/24/2022

## 2022-02-24 NOTE — ASSESSMENT & PLAN NOTE
tsh pta was >70  Repeat tsh and ft4  Will start on 137 mcg synthroid for now  Will need outpatient follow up with endocrine

## 2022-02-24 NOTE — ASSESSMENT & PLAN NOTE
- Creatinine 2.7, baseline (1.3-1.5)  - Likely related to low cardiac output/decompensated heart failure

## 2022-02-24 NOTE — HPI
Pt is a 81-year-old male with a history of persistent atrial fibrillation on Eliquis, end-stage dilated cardiomyopathy, history of stage III CKD, type 2 diabetes and hypertension who was supposed to get his ICD placement with Dr Carlson today however pt seemed to be volumed overload and in decompensated heart failure, along with elevated sugars therefore medicine team requested for inpatient admission. Pt states that he has been having dyspnea on exertion, no CP and LE swelling, states he was not sure what medicine to stop his medications prior to procedure. He was seen prior to his ICD implantion by cardiology team and was found to be in florid HF with elevated JVD and massive LE swelling. Besides dyspnea on exertion he has no other symptoms. Of note his TSH >70 recently and has not been on any synthroid.

## 2022-02-24 NOTE — CONSULTS
[facility] - Short Stay Unit  Cardiology  Consult Note    Patient Name: Jeanmarie Michelle  MRN: [id number]  Admission Date: 2/24/2022  Hospital Length of Stay: 0 days  Code Status: Full Code   Attending Provider: Geneva Li MD   Consulting Provider: ROLY Lemons  Primary Care Physician: Regina Sprague MD  Principal Problem:<principal problem not specified>    Patient information was obtained from patient, past medical records and ER records.     Inpatient consult to Cardiology  Consult performed by: ROLY Lemons  Consult ordered by: Geneva Li MD  Reason for consult: decompensated heart failure        Subjective:     Chief Complaint:  Worsening sob and swelling     HPI:   82 y/o male with PMH of persistent atrial fibrillation, nonischemic ([facility] 2018) dilated cardiomyopathy (Stage D HF, EF 15%), CKD, HTN, DM, and newly diagnosed hypothyroidism who was scheduled for outpatient ICD placement by Dr. Carlson today but presented with acute on chronic decompensated heart failure with hyperglycemia and recent elevated TSH of 76.0 (not yet treated). He was admitted by hospital medicine and cardiology consulted for heart failure exacerbation.     Patient was seen in cardiology clinic 2/10/22 at which time his aldactone and Entresto were not continued due to CKD, creatinine 2.1 (baseline 1.3-1.5). He presents today with increased sob, orthopnea, mild confusion (reported by family), worsening lower extremity edema and abdominal distention, and decreased oxygen saturation on RA (93%). On assessment he has significant lower extremity edema expanding to abdomen, JVD extending to mandible, all extremities are cool to touch with decreased breath sound bilaterally. Labs revealed Lactate 4.1, BNP 34,000 (was 15,000 last month), CONSUELO with creatinine 2.7 (baseline 1.3-1.5), and elevated liver enzymes.        Past Medical History:   Diagnosis Date    Anticoagulant long-term use     Arthritis     Atrial  fibrillation     CHF (congestive heart failure)     Chronic kidney disease     Coronary artery disease     CVA (cerebral vascular accident)     Dilated cardiomyopathy     DM type 2 (diabetes mellitus, type 2)     Encounter for blood transfusion     GERD (gastroesophageal reflux disease)     Hyperlipidemia     Hypertension     Hypertensive heart disease     Hypothyroidism     Left bundle branch block     Prostate disorder        Past Surgical History:   Procedure Laterality Date    CARDIAC CATHETERIZATION Left 11/2018    PROSTATECTOMY         Review of patient's allergies indicates:  No Known Allergies    No current facility-administered medications on file prior to encounter.     Current Outpatient Medications on File Prior to Encounter   Medication Sig    apixaban (ELIQUIS) 5 mg Tab Take 1 tablet (5 mg total) by mouth 2 (two) times daily.    aspirin (ECOTRIN) 81 MG EC tablet Take 81 mg by mouth once daily.    carvediloL (COREG) 6.25 MG tablet Take 1 tablet (6.25 mg total) by mouth 2 (two) times daily with meals.    ezetimibe (ZETIA) 10 mg tablet Take 1 tablet (10 mg total) by mouth once daily.    glipiZIDE (GLUCOTROL) 10 MG tablet Take 10 mg by mouth 2 (two) times daily before meals.    omeprazole (PRILOSEC) 40 MG capsule Take 1 capsule (40 mg total) by mouth once daily.    tamsulosin (FLOMAX) 0.4 mg Cap Take 1 capsule (0.4 mg total) by mouth once daily.     Family History       Problem Relation (Age of Onset)    Heart disease Father    Hypertension Mother    Pacemaker/defibrilator Mother          Tobacco Use    Smoking status: Former Smoker     Packs/day: 1.00     Types: Cigarettes, Cigars    Smokeless tobacco: Former User     Types: Chew    Tobacco comment: QUIT 2017   Substance and Sexual Activity    Alcohol use: Not Currently     Comment: quit 3 years ago    Drug use: Never    Sexual activity: Not Currently     Review of Systems   Constitutional: Positive for malaise/fatigue.  Negative for chills and fever.   HENT:  Negative for congestion and sore throat.    Eyes:  Negative for visual disturbance.   Cardiovascular:  Positive for dyspnea on exertion, leg swelling, orthopnea and paroxysmal nocturnal dyspnea. Negative for chest pain.   Respiratory:  Positive for shortness of breath, sleep disturbances due to breathing and wheezing.    Hematologic/Lymphatic: Negative for bleeding problem.   Gastrointestinal:  Positive for bloating. Negative for abdominal pain, nausea and vomiting.   Neurological:  Positive for weakness.   Psychiatric/Behavioral:          Mild confusion reported by family at bedside   Objective:     Vital Signs (Most Recent):  Temp: 97.9 °F (36.6 °C) (02/24/22 1243)  Pulse: 104 (02/24/22 1243)  Resp: 20 (02/24/22 1243)  BP: (!) 116/59 (02/24/22 1301)  SpO2: 96 % (02/24/22 1243)   Vital Signs (24h Range):  Temp:  [97.6 °F (36.4 °C)-98 °F (36.7 °C)] 97.9 °F (36.6 °C)  Pulse:  [] 104  Resp:  [20-22] 20  SpO2:  [93 %-96 %] 96 %  BP: (113-116)/(59-86) 116/59     Weight: 96.2 kg (212 lb 1.6 oz)  Body mass index is 29.58 kg/m².    SpO2: 96 %  O2 Device (Oxygen Therapy): nasal cannula      Intake/Output Summary (Last 24 hours) at 2/24/2022 1430  Last data filed at 2/24/2022 1159  Gross per 24 hour   Intake 100 ml   Output --   Net 100 ml       Lines/Drains/Airways       Peripheral Intravenous Line  Duration                  Peripheral IV - Single Lumen 02/24/22 1055 20 G Right Antecubital <1 day                    Physical Exam  Vitals reviewed.   Constitutional:       General: He is not in acute distress.  HENT:      Nose: Nose normal.      Mouth/Throat:      Mouth: Mucous membranes are moist.      Pharynx: Oropharynx is clear.   Eyes:      General: No scleral icterus.     Pupils: Pupils are equal, round, and reactive to light.   Neck:      Vascular: JVD present.   Cardiovascular:      Rate and Rhythm: Normal rate and regular rhythm.   Pulmonary:      Effort: Tachypnea  present.      Breath sounds: Decreased air movement present. Rales present.   Musculoskeletal:      Cervical back: Neck supple.      Right lower leg: Edema present.      Left lower leg: Edema present.   Skin:     General: Skin is cool.      Capillary Refill: Capillary refill takes more than 3 seconds.      Coloration: Skin is not jaundiced.   Neurological:      General: No focal deficit present.      Mental Status: He is alert.       Significant Labs: ABG: No results for input(s): PH, PCO2, HCO3, POCSATURATED, BE in the last 48 hours., Blood Culture: No results for input(s): LABBLOO in the last 48 hours., BMP:   Recent Labs   Lab 02/24/22  1214   *      K 4.9      CO2 24   BUN 48*   CREATININE 2.70*   CALCIUM 10.1   , CMP   Recent Labs   Lab 02/24/22  1214      K 4.9      CO2 24   *   BUN 48*   CREATININE 2.70*   CALCIUM 10.1   PROT 7.5   ALBUMIN 4.0   BILITOT 1.8*   ALKPHOS 276*   *   *   ANIONGAP 21*   EGFRNONAA 24*   , CBC   Recent Labs   Lab 02/24/22  1213   WBC 8.54   HGB 15.7   HCT 49.7      , Lipid Panel No results for input(s): CHOL, HDL, LDLCALC, TRIG, CHOLHDL in the last 48 hours., and Troponin No results for input(s): TROPONINI in the last 48 hours.    Significant Imaging: Echocardiogram: Transthoracic echo (TTE) complete (Cupid Only):   Results for orders placed or performed during the hospital encounter of 12/08/21   Echo   Result Value Ref Range    IVC diameter 2.24 cm    Left Ventricular Outflow Tract Mean Gradient 2.00 mmHg    AORTIC VALVE CUSP SEPERATION 15.648054420338267 cm    LVIDd 6.23 (A) 3.5 - 6.0 cm    IVS 0.92 0.6 - 1.1 cm    Posterior Wall 0.92 0.6 - 1.1 cm    LVIDs 5.77 (A) 2.1 - 4.0 cm    FS 7 28 - 44 %    LV mass 236.68 g    LA size 4.63 cm    RVDD 3.77 cm    Left Ventricle Relative Wall Thickness 0.30 cm    AV mean gradient 4 mmHg    AV valve area 2.31 cm2    AV index (prosthetic) 0.70     E wave deceleration time 148 msec     LVOT diameter 2.05 cm    LVOT area 3.3 cm2    LVOT peak VTI 13.89 cm    Ao VTI 19.82 cm    LVOT stroke volume 45.82 cm3    MV Peak E Arash 0.76 m/s    TR Max Arash 3.42 m/s    LV Systolic Volume 164.59 mL    LV Diastolic Volume 196.13 mL    Echo EF Estimated 16 %    Triscuspid Valve Regurgitation Peak Gradient 47 mmHg    EF 15 %    Narrative    · Atrial fibrillation with frequent PVC's, rate 85 and occasional runs of   wide complex tachycardia.  · The left ventricle is moderately enlarged with  · Atrial fibrillation observed.  · Mild right ventricular enlargement.  · Moderate mitral regurgitation.  · Moderate to severe tricuspid regurgitation.  · There is pulmonary hypertension.  · Moderate left atrial enlargement.  · Mild right atrial enlargement.      , EKG: reviewed by Dr. Robb, and X-Ray: CXR: X-Ray Chest 1 View (CXR): No results found for this visit on 02/24/22.    Assessment and Plan:     Hypothyroid  - TSH 76 2/8/2022  - Repeat TSH, add FreeT4  - Being followed by primary medical team    Low cardiac output syndrome  - Patient seen and evaluated by Dr. Robb  - Lactate 4.1, creatinine up to 2.7, with elevated liver enzymes, BNP 34,000 (doubled since last month)  - Holding home BB, start dobutamine 2.5 mcg/kg  - IV diuresis, 80mg BID  - Start hydralazine 25mg TID and Isosorbide 20mg TID  - Strict I & O and daily weights  - Monitor on telemetry  - BMP and lactate in am    Acute on chronic kidney failure  - Creatinine 2.7, baseline (1.3-1.5)  - Likely related to low cardiac output/decompensated heart failure      Chronic a-fib  - Rate currently controlled  - On Eliquis for primary stroke prevention    Diabetes mellitus without complication  - Being followed by primary medical team        VTE Risk Mitigation (From admission, onward)         Ordered     apixaban tablet 5 mg  2 times daily         02/24/22 1101                Thank you for your consult. I will follow-up with patient. Please contact us if you  have any additional questions.    Leonela Powell, SHIRLEYP  Cardiology   [facility] - Short Stay Unit

## 2022-02-24 NOTE — ASSESSMENT & PLAN NOTE
- Patient seen and evaluated by Dr. Robb  - Lactate 4.1, creatinine up to 2.7, with elevated liver enzymes, BNP 34,000 (doubled since last month)  - Holding home BB, start dobutamine 2.5 mcg/kg  - IV diuresis, 80mg BID  - Start hydralazine 25mg TID and Isosorbide 20mg TID  - Strict I & O and daily weights  - Monitor on telemetry  - BMP and lactate in am

## 2022-02-24 NOTE — H&P
[facility] Stay [facility] Medicine  History & Physical    Patient Name: Jeanmarie Michelle  MRN: [id number]  Patient Class: IP- Inpatient  Admission Date: 2/24/2022  Attending Physician: Geneva Li MD   Primary Care Provider: Regina Sprague MD         Patient information was obtained from patient.     Subjective:     Principal Problem:<principal problem not specified>    Chief Complaint: No chief complaint on file.       HPI: Pt is a 81-year-old male with a history of persistent atrial fibrillation on Eliquis, end-stage dilated cardiomyopathy, history of stage III CKD, type 2 diabetes and hypertension who was supposed to get his ICD placement with Dr Carlson today however pt seemed to be volumed overload and in decompensated heart failure, along with elevated sugars therefore medicine team requested for inpatient admission. Pt states that he has been having dyspnea on exertion, no CP and LE swelling, states he was not sure what medicine to stop his medications prior to procedure. He was seen prior to his ICD implantion by cardiology team and was found to be in florid HF with elevated JVD and massive LE swelling. Besides dyspnea on exertion he has no other symptoms. Of note his TSH >70 recently and has not been on any synthroid.       Past Medical History:   Diagnosis Date    Anticoagulant long-term use     Arthritis     Atrial fibrillation     CHF (congestive heart failure)     Chronic kidney disease     Coronary artery disease     CVA (cerebral vascular accident)     Dilated cardiomyopathy     DM type 2 (diabetes mellitus, type 2)     Encounter for blood transfusion     GERD (gastroesophageal reflux disease)     Hyperlipidemia     Hypertension     Hypertensive heart disease     Hypothyroidism     Left bundle branch block     Prostate disorder        Past Surgical History:   Procedure Laterality Date    CARDIAC CATHETERIZATION Left 11/2018    PROSTATECTOMY         Review of  patient's allergies indicates:  No Known Allergies    No current facility-administered medications on file prior to encounter.     Current Outpatient Medications on File Prior to Encounter   Medication Sig    apixaban (ELIQUIS) 5 mg Tab Take 1 tablet (5 mg total) by mouth 2 (two) times daily.    aspirin (ECOTRIN) 81 MG EC tablet Take 81 mg by mouth once daily.    carvediloL (COREG) 6.25 MG tablet Take 1 tablet (6.25 mg total) by mouth 2 (two) times daily with meals.    ezetimibe (ZETIA) 10 mg tablet Take 1 tablet (10 mg total) by mouth once daily.    glipiZIDE (GLUCOTROL) 10 MG tablet Take 10 mg by mouth 2 (two) times daily before meals.    omeprazole (PRILOSEC) 40 MG capsule Take 1 capsule (40 mg total) by mouth once daily.    tamsulosin (FLOMAX) 0.4 mg Cap Take 1 capsule (0.4 mg total) by mouth once daily.     Family History       Problem Relation (Age of Onset)    Heart disease Father    Hypertension Mother    Pacemaker/defibrilator Mother          Tobacco Use    Smoking status: Former Smoker     Packs/day: 1.00     Types: Cigarettes, Cigars    Smokeless tobacco: Former User     Types: Chew    Tobacco comment: QUIT 2017   Substance and Sexual Activity    Alcohol use: Not Currently     Comment: quit 3 years ago    Drug use: Never    Sexual activity: Not Currently     Review of Systems   Constitutional:  Positive for fatigue. Negative for fever.   HENT: Negative.     Eyes: Negative.    Respiratory:  Positive for shortness of breath.    Cardiovascular:  Positive for leg swelling. Negative for chest pain and palpitations.   Gastrointestinal: Negative.    Endocrine: Negative.    Genitourinary: Negative.  Negative for hematuria.   Musculoskeletal: Negative.    Skin: Negative.    Allergic/Immunologic: Negative.    Neurological: Negative.    Hematological: Negative.    Psychiatric/Behavioral: Negative.     Objective:     Vital Signs (Most Recent):  Temp: 98 °F (36.7 °C) (02/24/22 1124)  Pulse: 92 (02/24/22  1124)  Resp: 20 (02/24/22 1124)  BP: (!) 116/59 (02/24/22 1301)  SpO2: 95 % (02/24/22 1140)   Vital Signs (24h Range):  Temp:  [97.6 °F (36.4 °C)-98 °F (36.7 °C)] 98 °F (36.7 °C)  Pulse:  [79-92] 92  Resp:  [20-22] 20  SpO2:  [93 %-95 %] 95 %  BP: (113-116)/(59-86) 116/59     Weight: 96.2 kg (212 lb 1.6 oz)  Body mass index is 29.58 kg/m².    Physical Exam  Vitals reviewed.   Constitutional:       General: He is not in acute distress.     Appearance: Normal appearance.   HENT:      Head: Normocephalic and atraumatic.      Right Ear: External ear normal.      Left Ear: External ear normal.   Eyes:      General: No scleral icterus.     Extraocular Movements: Extraocular movements intact.      Pupils: Pupils are equal, round, and reactive to light.   Cardiovascular:      Rate and Rhythm: Normal rate and regular rhythm.      Pulses: Normal pulses.      Heart sounds: Murmur heard.   Pulmonary:      Effort: Pulmonary effort is normal. No respiratory distress.      Breath sounds: Normal breath sounds. No wheezing.   Chest:      Chest wall: No tenderness.   Abdominal:      Palpations: Abdomen is soft. There is no mass.      Tenderness: There is no abdominal tenderness. There is no right CVA tenderness or left CVA tenderness.      Comments: Protuberant with ascites.     Musculoskeletal:         General: No swelling or tenderness. Normal range of motion.      Cervical back: No rigidity.      Right lower leg: Edema present.      Left lower leg: Edema present.      Comments: +3 pitting edema present BLE, and +1 pitting edema noted in bilateral upper extremity   Skin:     General: Skin is warm and dry.      Capillary Refill: Capillary refill takes less than 2 seconds.   Neurological:      General: No focal deficit present.      Mental Status: He is alert and oriented to person, place, and time. Mental status is at baseline.   Psychiatric:         Mood and Affect: Mood normal.         Thought Content: Thought content normal.          CRANIAL NERVES     CN III, IV, VI   Pupils are equal, round, and reactive to light.     Significant Labs: All pertinent labs within the past 24 hours have been reviewed.    Significant Imaging: I have reviewed all pertinent imaging results/findings within the past 24 hours.    Assessment/Plan:     Hypothyroid  tsh pta was >70  Repeat tsh and ft4  Will start on 137 mcg synthroid for now  Will need outpatient follow up with endocrine      Chronic kidney disease  ctm , likely 2/2 htn, dm and heart failure      Dilated cardiomyopathy  HFrEF, nonsichemic cardiomyopathy: EF 15% with moderate-severe TR (12/8/21); NYHA III-IV Stage D  Plan for ICD placement by Dr. Carlson today but now postpned d/t CHF exacerbation   Started on dobutamine and iv lasix.   Cardiology on board  Tele monitoring.   Cardiac deit strick I/o       Heart failure    Cardiology consulted  Will need ICD-placement  Cont Dobutamin guided diuiresis      Chronic a-fib  Rate controlled  Cont AC.      Benign prostatic hyperplasia    Tamsulosin, no LUTS at this time.       Hyperlipidemia    Cont statin      Gastroesophageal reflux disease    Cont PPI      Diabetes mellitus without complication    SSI sliding scale and long acting, adjust as needed      Hypertension    Cont current medications.      VTE Risk Mitigation (From admission, onward)         Ordered     apixaban tablet 5 mg  2 times daily         02/24/22 1101                   Rehmat JAY Li MD  Department of Hospital Medicine   [facility] - Short Stay Unit

## 2022-02-24 NOTE — ASSESSMENT & PLAN NOTE
HFrEF, nonsichemic cardiomyopathy: EF 15% with moderate-severe TR (12/8/21); NYHA III-IV Stage D  Plan for ICD placement by Dr. Carlson today but now postpned d/t CHF exacerbation   Started on dobutamine and iv lasix.   Cardiology on board  Tele monitoring.   Cardiac deit strick I/o

## 2022-02-25 LAB
ALBUMIN SERPL BCP-MCNC: 3 G/DL (ref 3.5–5)
ALBUMIN/GLOB SERPL: 1 {RATIO}
ALP SERPL-CCNC: 185 U/L (ref 45–115)
ALT SERPL W P-5'-P-CCNC: 141 U/L (ref 16–61)
ANION GAP SERPL CALCULATED.3IONS-SCNC: 13 MMOL/L (ref 7–16)
ANISOCYTOSIS BLD QL SMEAR: ABNORMAL
AST SERPL W P-5'-P-CCNC: 125 U/L (ref 15–37)
BASOPHILS # BLD AUTO: 0.02 K/UL (ref 0–0.2)
BASOPHILS NFR BLD AUTO: 0.3 % (ref 0–1)
BILIRUB SERPL-MCNC: 1.1 MG/DL (ref 0–1.2)
BUN SERPL-MCNC: 43 MG/DL (ref 7–18)
BUN/CREAT SERPL: 19 (ref 6–20)
CALCIUM SERPL-MCNC: 8.9 MG/DL (ref 8.5–10.1)
CHLORIDE SERPL-SCNC: 99 MMOL/L (ref 98–107)
CO2 SERPL-SCNC: 29 MMOL/L (ref 21–32)
CREAT SERPL-MCNC: 2.28 MG/DL (ref 0.7–1.3)
DIFFERENTIAL METHOD BLD: ABNORMAL
EOSINOPHIL # BLD AUTO: 0.06 K/UL (ref 0–0.5)
EOSINOPHIL NFR BLD AUTO: 0.9 % (ref 1–4)
ERYTHROCYTE [DISTWIDTH] IN BLOOD BY AUTOMATED COUNT: 19.1 % (ref 11.5–14.5)
GLOBULIN SER-MCNC: 3.1 G/DL (ref 2–4)
GLUCOSE SERPL-MCNC: 110 MG/DL (ref 74–106)
GLUCOSE SERPL-MCNC: 170 MG/DL (ref 70–105)
GLUCOSE SERPL-MCNC: 188 MG/DL (ref 70–105)
GLUCOSE SERPL-MCNC: 64 MG/DL (ref 70–105)
GLUCOSE SERPL-MCNC: 87 MG/DL (ref 70–105)
HCT VFR BLD AUTO: 42 % (ref 40–54)
HGB BLD-MCNC: 13.4 G/DL (ref 13.5–18)
HYPOCHROMIA BLD QL SMEAR: ABNORMAL
IMM GRANULOCYTES # BLD AUTO: 0.09 K/UL (ref 0–0.04)
IMM GRANULOCYTES NFR BLD: 1.4 % (ref 0–0.4)
LACTATE SERPL-SCNC: 1.9 MMOL/L (ref 0.4–2)
LYMPHOCYTES # BLD AUTO: 0.65 K/UL (ref 1–4.8)
LYMPHOCYTES NFR BLD AUTO: 9.9 % (ref 27–41)
LYMPHOCYTES NFR BLD MANUAL: 8 % (ref 27–41)
MCH RBC QN AUTO: 27 PG (ref 27–31)
MCHC RBC AUTO-ENTMCNC: 31.9 G/DL (ref 32–36)
MCV RBC AUTO: 84.7 FL (ref 80–96)
MONOCYTES # BLD AUTO: 0.5 K/UL (ref 0–0.8)
MONOCYTES NFR BLD AUTO: 7.6 % (ref 2–6)
MONOCYTES NFR BLD MANUAL: 8 % (ref 2–6)
MPC BLD CALC-MCNC: 12 FL (ref 9.4–12.4)
MYELOCYTES NFR BLD MANUAL: 1 %
NEUTROPHILS # BLD AUTO: 5.25 K/UL (ref 1.8–7.7)
NEUTROPHILS NFR BLD AUTO: 79.9 % (ref 53–65)
NEUTS SEG NFR BLD MANUAL: 83 % (ref 50–62)
NRBC # BLD AUTO: 0.02 X10E3/UL
NRBC, AUTO (.00): 0.3 %
OVALOCYTES BLD QL SMEAR: ABNORMAL
PLATELET # BLD AUTO: 231 K/UL (ref 150–400)
PLATELET MORPHOLOGY: ABNORMAL
POLYCHROMASIA BLD QL SMEAR: ABNORMAL
POTASSIUM SERPL-SCNC: 4.1 MMOL/L (ref 3.5–5.1)
PROT SERPL-MCNC: 6.1 G/DL (ref 6.4–8.2)
RBC # BLD AUTO: 4.96 M/UL (ref 4.6–6.2)
SODIUM SERPL-SCNC: 137 MMOL/L (ref 136–145)
WBC # BLD AUTO: 6.57 K/UL (ref 4.5–11)

## 2022-02-25 PROCEDURE — 25000003 PHARM REV CODE 250: Performed by: INTERNAL MEDICINE

## 2022-02-25 PROCEDURE — 27000221 HC OXYGEN, UP TO 24 HOURS

## 2022-02-25 PROCEDURE — 80053 COMPREHEN METABOLIC PANEL: CPT | Performed by: INTERNAL MEDICINE

## 2022-02-25 PROCEDURE — 99233 PR SUBSEQUENT HOSPITAL CARE,LEVL III: ICD-10-PCS | Mod: ,,, | Performed by: STUDENT IN AN ORGANIZED HEALTH CARE EDUCATION/TRAINING PROGRAM

## 2022-02-25 PROCEDURE — 85025 COMPLETE CBC W/AUTO DIFF WBC: CPT | Performed by: INTERNAL MEDICINE

## 2022-02-25 PROCEDURE — 36415 COLL VENOUS BLD VENIPUNCTURE: CPT | Performed by: STUDENT IN AN ORGANIZED HEALTH CARE EDUCATION/TRAINING PROGRAM

## 2022-02-25 PROCEDURE — C9399 UNCLASSIFIED DRUGS OR BIOLOG: HCPCS | Performed by: INTERNAL MEDICINE

## 2022-02-25 PROCEDURE — 99233 SBSQ HOSP IP/OBS HIGH 50: CPT | Mod: ,,, | Performed by: INTERNAL MEDICINE

## 2022-02-25 PROCEDURE — 11000001 HC ACUTE MED/SURG PRIVATE ROOM

## 2022-02-25 PROCEDURE — 82962 GLUCOSE BLOOD TEST: CPT

## 2022-02-25 PROCEDURE — 99233 SBSQ HOSP IP/OBS HIGH 50: CPT | Mod: ,,, | Performed by: STUDENT IN AN ORGANIZED HEALTH CARE EDUCATION/TRAINING PROGRAM

## 2022-02-25 PROCEDURE — 99233 PR SUBSEQUENT HOSPITAL CARE,LEVL III: ICD-10-PCS | Mod: ,,, | Performed by: INTERNAL MEDICINE

## 2022-02-25 PROCEDURE — 94761 N-INVAS EAR/PLS OXIMETRY MLT: CPT

## 2022-02-25 PROCEDURE — 63600175 PHARM REV CODE 636 W HCPCS: Performed by: INTERNAL MEDICINE

## 2022-02-25 PROCEDURE — 63600175 PHARM REV CODE 636 W HCPCS: Performed by: NURSE PRACTITIONER

## 2022-02-25 PROCEDURE — 83605 ASSAY OF LACTIC ACID: CPT | Performed by: STUDENT IN AN ORGANIZED HEALTH CARE EDUCATION/TRAINING PROGRAM

## 2022-02-25 PROCEDURE — 25000003 PHARM REV CODE 250: Performed by: NURSE PRACTITIONER

## 2022-02-25 RX ADMIN — FUROSEMIDE 80 MG: 10 INJECTION, SOLUTION INTRAMUSCULAR; INTRAVENOUS at 09:02

## 2022-02-25 RX ADMIN — ISOSORBIDE DINITRATE 20 MG: 20 TABLET ORAL at 08:02

## 2022-02-25 RX ADMIN — HYDRALAZINE HYDROCHLORIDE 25 MG: 25 TABLET ORAL at 10:02

## 2022-02-25 RX ADMIN — INSULIN ASPART 10 UNITS: 100 INJECTION, SOLUTION INTRAVENOUS; SUBCUTANEOUS at 06:02

## 2022-02-25 RX ADMIN — APIXABAN 5 MG: 5 TABLET, FILM COATED ORAL at 09:02

## 2022-02-25 RX ADMIN — LEVOTHYROXINE SODIUM 137 MCG: 25 TABLET ORAL at 05:02

## 2022-02-25 RX ADMIN — ASPIRIN 81 MG: 81 TABLET, COATED ORAL at 09:02

## 2022-02-25 RX ADMIN — INSULIN ASPART 10 UNITS: 100 INJECTION, SOLUTION INTRAVENOUS; SUBCUTANEOUS at 12:02

## 2022-02-25 RX ADMIN — SENNOSIDES AND DOCUSATE SODIUM 1 TABLET: 50; 8.6 TABLET ORAL at 08:02

## 2022-02-25 RX ADMIN — APIXABAN 5 MG: 5 TABLET, FILM COATED ORAL at 08:02

## 2022-02-25 RX ADMIN — INSULIN DETEMIR 10 UNITS: 100 INJECTION, SOLUTION SUBCUTANEOUS at 08:02

## 2022-02-25 RX ADMIN — SENNOSIDES AND DOCUSATE SODIUM 1 TABLET: 50; 8.6 TABLET ORAL at 09:02

## 2022-02-25 RX ADMIN — ISOSORBIDE DINITRATE 20 MG: 20 TABLET ORAL at 09:02

## 2022-02-25 RX ADMIN — PANTOPRAZOLE SODIUM 40 MG: 40 TABLET, DELAYED RELEASE ORAL at 09:02

## 2022-02-25 RX ADMIN — TAMSULOSIN HYDROCHLORIDE 0.4 MG: 0.4 CAPSULE ORAL at 09:02

## 2022-02-25 NOTE — ASSESSMENT & PLAN NOTE
- Creatinine 2.7, baseline (1.3-1.5)  - Likely related to low cardiac output/decompensated heart failure    2/25/22:  - creatinine has improved to 2.28  - will continue to monitor with diuresis

## 2022-02-25 NOTE — ASSESSMENT & PLAN NOTE
- Patient seen and evaluated by Dr. Robb  - Lactate 4.1, creatinine up to 2.7, with elevated liver enzymes, BNP 34,000 (doubled since last month)  - Holding home BB, start dobutamine 2.5 mcg/kg  - IV diuresis, 80mg BID  - Start hydralazine 25mg TID and Isosorbide 20mg TID  - Strict I & O and daily weights  - Monitor on telemetry  - BMP and lactate in am    2/25/22:  - lactate has normalized  - creatinine improving  - continue diuresis with dobutamine and IV lasix 80mg bid  - continue hydralazine and isosorbide  - daily weights, strict I&Os  - BMP in AM

## 2022-02-25 NOTE — SUBJECTIVE & OBJECTIVE
Interval History:     ISELA  Continues to be on dobutamine drip, no labs, requested RN to them ASAP  He feels okay otherwise, no worsening sob or CP.     Review of Systems   Constitutional:  Positive for fatigue. Negative for fever.   HENT: Negative.     Eyes: Negative.    Respiratory:  Positive for shortness of breath.    Cardiovascular:  Positive for leg swelling. Negative for chest pain and palpitations.   Gastrointestinal: Negative.    Endocrine: Negative.    Genitourinary: Negative.  Negative for hematuria.   Musculoskeletal: Negative.    Skin: Negative.    Allergic/Immunologic: Negative.    Neurological: Negative.    Hematological: Negative.    Psychiatric/Behavioral: Negative.     Objective:     Vital Signs (Most Recent):  Temp: 97.9 °F (36.6 °C) (02/25/22 0738)  Pulse: 68 (02/25/22 0738)  Resp: 17 (02/25/22 0738)  BP: 103/62 (02/25/22 0738)  SpO2: 99 % (02/25/22 0738) Vital Signs (24h Range):  Temp:  [97.6 °F (36.4 °C)-98.6 °F (37 °C)] 97.9 °F (36.6 °C)  Pulse:  [] 68  Resp:  [17-22] 17  SpO2:  [93 %-99 %] 99 %  BP: (103-119)/(54-86) 103/62     Weight: 96.2 kg (212 lb 1.6 oz)  Body mass index is 29.58 kg/m².    Intake/Output Summary (Last 24 hours) at 2/25/2022 0823  Last data filed at 2/25/2022 0500  Gross per 24 hour   Intake 700 ml   Output 700 ml   Net 0 ml      Physical Exam  Vitals reviewed.   Constitutional:       General: He is not in acute distress.     Appearance: Normal appearance.   HENT:      Head: Normocephalic and atraumatic.      Right Ear: External ear normal.      Left Ear: External ear normal.   Eyes:      General: No scleral icterus.     Extraocular Movements: Extraocular movements intact.      Pupils: Pupils are equal, round, and reactive to light.   Cardiovascular:      Rate and Rhythm: Normal rate and regular rhythm.      Pulses: Normal pulses.      Heart sounds: Murmur heard.   Pulmonary:      Effort: Pulmonary effort is normal. No respiratory distress.      Breath sounds: Normal  breath sounds. No wheezing.   Chest:      Chest wall: No tenderness.   Abdominal:      Palpations: Abdomen is soft. There is no mass.      Tenderness: There is no abdominal tenderness. There is no right CVA tenderness or left CVA tenderness.      Comments: Protuberant with ascites.     Musculoskeletal:         General: No swelling or tenderness. Normal range of motion.      Cervical back: No rigidity.      Right lower leg: Edema present.      Left lower leg: Edema present.      Comments: +3 pitting edema present BLE, and +1 pitting edema noted in bilateral upper extremity   Skin:     General: Skin is warm and dry.      Capillary Refill: Capillary refill takes less than 2 seconds.   Neurological:      General: No focal deficit present.      Mental Status: He is alert and oriented to person, place, and time. Mental status is at baseline.   Psychiatric:         Mood and Affect: Mood normal.         Thought Content: Thought content normal.       Significant Labs: All pertinent labs within the past 24 hours have been reviewed.    Significant Imaging: I have reviewed all pertinent imaging results/findings within the past 24 hours.

## 2022-02-25 NOTE — PROGRESS NOTES
[address] Medicine  Progress Note    Patient Name: Jeanmarie Michelle  MRN: [id number]  Patient Class: IP- Inpatient   Admission Date: 2/24/2022  Length of Stay: 1 days  Attending Physician: Geneva Li MD  Primary Care Provider: Regina Sprague MD        Subjective:     Principal Problem:<principal problem not specified>        HPI:  Pt is a 81-year-old male with a history of persistent atrial fibrillation on Eliquis, end-stage dilated cardiomyopathy, history of stage III CKD, type 2 diabetes and hypertension who was supposed to get his ICD placement with Dr Carlson today however pt seemed to be volumed overload and in decompensated heart failure, along with elevated sugars therefore medicine team requested for inpatient admission. Pt states that he has been having dyspnea on exertion, no CP and LE swelling, states he was not sure what medicine to stop his medications prior to procedure. He was seen prior to his ICD implantion by cardiology team and was found to be in florid HF with elevated JVD and massive LE swelling. Besides dyspnea on exertion he has no other symptoms. Of note his TSH >70 recently and has not been on any synthroid.       Overview/Hospital Course:  No notes on file    Interval History:     NAEO  Continues to be on dobutamine drip, no labs, requested RN to them ASAP  He feels okay otherwise, no worsening sob or CP.     Review of Systems   Constitutional:  Positive for fatigue. Negative for fever.   HENT: Negative.     Eyes: Negative.    Respiratory:  Positive for shortness of breath.    Cardiovascular:  Positive for leg swelling. Negative for chest pain and palpitations.   Gastrointestinal: Negative.    Endocrine: Negative.    Genitourinary: Negative.  Negative for hematuria.   Musculoskeletal: Negative.    Skin: Negative.    Allergic/Immunologic: Negative.    Neurological: Negative.    Hematological: Negative.    Psychiatric/Behavioral: Negative.      Objective:     Vital Signs (Most Recent):  Temp: 97.9 °F (36.6 °C) (02/25/22 0738)  Pulse: 68 (02/25/22 0738)  Resp: 17 (02/25/22 0738)  BP: 103/62 (02/25/22 0738)  SpO2: 99 % (02/25/22 0738) Vital Signs (24h Range):  Temp:  [97.6 °F (36.4 °C)-98.6 °F (37 °C)] 97.9 °F (36.6 °C)  Pulse:  [] 68  Resp:  [17-22] 17  SpO2:  [93 %-99 %] 99 %  BP: (103-119)/(54-86) 103/62     Weight: 96.2 kg (212 lb 1.6 oz)  Body mass index is 29.58 kg/m².    Intake/Output Summary (Last 24 hours) at 2/25/2022 0823  Last data filed at 2/25/2022 0500  Gross per 24 hour   Intake 700 ml   Output 700 ml   Net 0 ml      Physical Exam  Vitals reviewed.   Constitutional:       General: He is not in acute distress.     Appearance: Normal appearance.   HENT:      Head: Normocephalic and atraumatic.      Right Ear: External ear normal.      Left Ear: External ear normal.   Eyes:      General: No scleral icterus.     Extraocular Movements: Extraocular movements intact.      Pupils: Pupils are equal, round, and reactive to light.   Cardiovascular:      Rate and Rhythm: Normal rate and regular rhythm.      Pulses: Normal pulses.      Heart sounds: Murmur heard.   Pulmonary:      Effort: Pulmonary effort is normal. No respiratory distress.      Breath sounds: Normal breath sounds. No wheezing.   Chest:      Chest wall: No tenderness.   Abdominal:      Palpations: Abdomen is soft. There is no mass.      Tenderness: There is no abdominal tenderness. There is no right CVA tenderness or left CVA tenderness.      Comments: Protuberant with ascites.     Musculoskeletal:         General: No swelling or tenderness. Normal range of motion.      Cervical back: No rigidity.      Right lower leg: Edema present.      Left lower leg: Edema present.      Comments: +3 pitting edema present BLE, and +1 pitting edema noted in bilateral upper extremity   Skin:     General: Skin is warm and dry.      Capillary Refill: Capillary refill takes less than 2 seconds.    Neurological:      General: No focal deficit present.      Mental Status: He is alert and oriented to person, place, and time. Mental status is at baseline.   Psychiatric:         Mood and Affect: Mood normal.         Thought Content: Thought content normal.       Significant Labs: All pertinent labs within the past 24 hours have been reviewed.    Significant Imaging: I have reviewed all pertinent imaging results/findings within the past 24 hours.      Assessment/Plan:      Hypothyroid  tsh pta was >70  Repeat tsh and ft4  Will start on 137 mcg synthroid for now  Will need outpatient follow up with endocrine      Acute on chronic kidney failure  ctm , likely 2/2 htn, dm and heart failure      Dilated cardiomyopathy  HFrEF, nonsichemic cardiomyopathy: EF 15% with moderate-severe TR (12/8/21); NYHA III-IV Stage D  Plan for ICD placement by Dr. Carlson today but now postpned d/t CHF exacerbation   Started on dobutamine and iv lasix.   Cardiology on board  Tele monitoring.   Cardiac deit strick I/o       Heart failure    Cardiology consulted  Will need ICD-placement  Cont Dobutamin guided diuiresis      Chronic a-fib  Rate controlled  Cont AC.      Benign prostatic hyperplasia    Tamsulosin, no LUTS at this time.       Hyperlipidemia    Cont statin      Gastroesophageal reflux disease    Cont PPI      Diabetes mellitus without complication    SSI sliding scale and long acting, adjust as needed      Hypertension    Cont current medications.        VTE Risk Mitigation (From admission, onward)         Ordered     apixaban tablet 5 mg  2 times daily         02/24/22 1101                Discharge Planning   MADDIE:      Code Status: Full Code   Is the patient medically ready for discharge?:     Reason for patient still in hospital (select all that apply): Treatment                     Rehmat JAY Li MD  Department of Hospital Medicine   [address]

## 2022-02-25 NOTE — PLAN OF CARE
[facility] - [address] [place] [address] Telemetry  Initial Discharge Assessment       Primary Care Provider: Regina Sprague MD    Admission Diagnosis: Dilated cardiomyopathy [I42.0]  Chronic systolic congestive heart failure [I50.22]    Admission Date: 2/24/2022  Expected Discharge Date:     Discharge Barriers Identified: None    Payor: [employer] / Plan: [employer] MEDICARE HMO / Product Type: Medicare Advantage /     Extended Emergency Contact Information  Primary Emergency Contact: main real  Mobile Phone: [phone]  Relation: Relative   needed? No    Discharge Plan A: Home  Discharge Plan B: Home      [facility] [address] [place] [address] - [address] [facility]  [address] [facility] [address]  Phone: [phone] Fax: [phone]      Initial Assessment (most recent)     Adult Discharge Assessment - 02/25/22 1128        Discharge Assessment    Assessment Type Discharge Planning Assessment     Source of Information patient     Lives With alone     Current cognitive status: Alert/Oriented     Equipment Currently Used at Home cane, straight     Do you currently have service(s) that help you manage your care at home? No     Discharge Plan A Home     Discharge Plan B Home     DME Needed Upon Discharge  none     Discharge Plan discussed with: Patient     Discharge Barriers Identified None               SS spoke with pt who states he lives at home alone. Not current with HH. Uses a cane. Plan at d/c is to return home. IM obtained. SS following for d/c needs prn.

## 2022-02-25 NOTE — SUBJECTIVE & OBJECTIVE
Interval History: Pt states he is feeling better today. Diuresing well.    Review of Systems   Constitutional: Positive for malaise/fatigue. Negative for chills and fever.   HENT:  Negative for congestion and sore throat.    Eyes:  Negative for visual disturbance.   Cardiovascular:  Positive for dyspnea on exertion, leg swelling, orthopnea and paroxysmal nocturnal dyspnea. Negative for chest pain.   Respiratory:  Positive for sleep disturbances due to breathing. Negative for shortness of breath and wheezing.    Hematologic/Lymphatic: Negative for bleeding problem.   Gastrointestinal:  Positive for bloating. Negative for abdominal pain, nausea and vomiting.   Neurological:  Positive for weakness.   Psychiatric/Behavioral:          Mild confusion reported by family at bedside   Objective:     Vital Signs (Most Recent):  Temp: 97.9 °F (36.6 °C) (02/25/22 0738)  Pulse: 68 (02/25/22 0738)  Resp: 17 (02/25/22 0738)  BP: 103/62 (02/25/22 0738)  SpO2: 99 % (02/25/22 0738) Vital Signs (24h Range):  Temp:  [97.9 °F (36.6 °C)-98.6 °F (37 °C)] 97.9 °F (36.6 °C)  Pulse:  [] 68  Resp:  [17-20] 17  SpO2:  [94 %-99 %] 99 %  BP: (103-119)/(54-71) 103/62     Weight: 96.2 kg (212 lb 1.6 oz)  Body mass index is 29.58 kg/m².     SpO2: 99 %  O2 Device (Oxygen Therapy): nasal cannula      Intake/Output Summary (Last 24 hours) at 2/25/2022 1140  Last data filed at 2/25/2022 0500  Gross per 24 hour   Intake 700 ml   Output 700 ml   Net 0 ml       Lines/Drains/Airways       Peripheral Intravenous Line  Duration                  Peripheral IV - Single Lumen 02/24/22 1055 20 G Right Antecubital 1 day                    Physical Exam  Vitals reviewed.   Constitutional:       General: He is not in acute distress.  Eyes:      General: No scleral icterus.     Pupils: Pupils are equal, round, and reactive to light.   Neck:      Vascular: JVD present.   Cardiovascular:      Rate and Rhythm: Normal rate and regular rhythm.   Pulmonary:       Effort: Pulmonary effort is normal.   Musculoskeletal:      Cervical back: Neck supple.      Right lower leg: Edema present.      Left lower leg: Edema present.   Skin:     General: Skin is cool.      Coloration: Skin is not jaundiced.   Neurological:      General: No focal deficit present.      Mental Status: He is alert.       Significant Labs: All pertinent lab results from the last 24 hours have been reviewed. and   Recent Lab Results         02/25/22  0912   02/25/22  0607   02/25/22  0530   02/24/22  2318   02/24/22 2022        Albumin/Globulin Ratio 1.0               Albumin 3.0               Alkaline Phosphatase 185                              Amorphous, UA       Few         Anion Gap 13               Aniso 1+               Appearance, UA       Clear                        Bacteria, UA       Rare         Bands               Baso # 0.02               Basophil % 0.3               Bilirubin (UA)       Negative         BILIRUBIN TOTAL 1.1               BUN 43               BUN/CREAT RATIO 19               Calcium 8.9               Chloride 99               CO2 29               Color, UA       Straw         Creatinine 2.28               Differential Type Manual               eGFR if non  29               Eos # 0.06               Eosinophil % 0.9               Free T4               Globulin, Total 3.1               Glucose 110               Glucose, UA       Negative         Hematocrit 42.0               Hemoglobin 13.4               Hyaline Casts, UA       0-2         Hypo Few               Immature Grans (Abs) 0.09               Immature Granulocytes 1.4               Ketones, UA       Negative         Lactate, Arie 1.9               Leukocytes, UA       Large         Lymph # 0.65               Lymph % 9.9                8               MCH 27.0               MCHC 31.9               MCV 84.7               Mono # 0.50               Mono % 7.6                8               MPV  12.0               Myelocytes 1               Neutrophils, Abs 5.25               Neutrophils Relative 79.9               NITRITE UA       Negative         nRBC 0.3               NT-proBNP               NUCLEATED RBC ABSOLUTE 0.02               Occult Blood UA       Negative         Ovalocytes Few               pH, UA       5.5         PLATELET MORPHOLOGY Large & Giant Platelets               Platelets 231               POC Glucose   87   64     130       Poly Few               Potassium 4.1               PROTEIN TOTAL 6.1               Protein, UA       Negative         RBC 4.96               RBC Morphology               RBC, UA       0-3         RDW 19.1               Segmented Neutrophils, Man % 83               Sodium 137               Specific [place], UA       1.010         Squam Epithel, UA       Rare         TSH               UROBILINOGEN UA       0.2         WBC, UA       0-5         WBC 6.57               Yeast, UA       Moderate                            02/24/22  1641   02/24/22  1607   02/24/22  1444   02/24/22  1235   02/24/22  1214        Albumin/Globulin Ratio         1.1       Albumin         4.0       Alkaline Phosphatase         276       ALT         226       Amorphous, UA               Anion Gap         21       Aniso               Appearance, UA               AST         220       Bacteria, UA               Bands               Baso #               Basophil %               Bilirubin (UA)               BILIRUBIN TOTAL         1.8       BUN         48       BUN/CREAT RATIO         18       Calcium         10.1       Chloride         100       CO2         24       Color, UA               Creatinine         2.70       Differential Type               eGFR if non          24       Eos #               Eosinophil %               Free T4               Globulin, Total         3.5       Glucose         386       Glucose, UA               Hematocrit               Hemoglobin                Hyaline Casts, UA               Hypo               Immature Grans (Abs)               Immature Granulocytes               Ketones, UA               Lactate, Arie   4.5             Leukocytes, UA               Lymph #               Lymph %               MCH               MCHC               MCV               Mono #               Mono %               MPV               Myelocytes               Neutrophils, Abs               Neutrophils Relative               NITRITE UA               nRBC               NT-proBNP         33,962       NUCLEATED RBC ABSOLUTE               Occult Blood UA               Ovalocytes               pH, UA               PLATELET MORPHOLOGY               Platelets               POC Glucose 230     326   377         Poly               Potassium         4.9       PROTEIN TOTAL         7.5       Protein, UA               RBC               RBC Morphology               RBC, UA               RDW               Segmented Neutrophils, Man %               Sodium         140       Specific [place], UA               Squam Epithel, UA               TSH               UROBILINOGEN UA               WBC, UA               WBC               Yeast, UA                                  02/24/22  1213        Albumin/Globulin Ratio       Albumin       Alkaline Phosphatase       ALT       Amorphous, UA       Anion Gap       Aniso       Appearance, UA       AST       Bacteria, UA       Bands 1       Baso # 0.01       Basophil % 0.1       Bilirubin (UA)       BILIRUBIN TOTAL       BUN       BUN/CREAT RATIO       Calcium       Chloride       CO2       Color, UA       Creatinine       Differential Type Manual       eGFR if non        Eos # 0.00       Eosinophil % 0.0       Free T4 0.46       Globulin, Total       Glucose       Glucose, UA       Hematocrit 49.7       Hemoglobin 15.7       Hyaline Casts, UA       Hypo       Immature Grans (Abs) 0.11       Immature Granulocytes 1.3       Ketones, UA       Lactate,  Arie 4.1  Comment: A repeat order for Lactic Acid has been placed for collection in 2 hours.       Leukocytes, UA       Lymph # 0.61       Lymph % 7.1        15       MCH 27.1       MCHC 31.6       MCV 85.7       Mono # 0.70       Mono % 8.2        3       MPV 12.5       Myelocytes       Neutrophils, Abs 7.11       Neutrophils Relative 83.3       NITRITE UA       nRBC 0.7       NT-proBNP       NUCLEATED RBC ABSOLUTE 0.06       Occult Blood UA       Ovalocytes       pH, UA       PLATELET MORPHOLOGY Large & Giant Platelets  Comment: Minimum plt clumping       Platelets 240       POC Glucose       Poly       Potassium       PROTEIN TOTAL       Protein, UA       RBC 5.80       RBC Morphology Normal       RBC, UA       RDW 19.9       Segmented Neutrophils, Man % 81       Sodium       Specific Gravity, UA       Squam Epithel, UA       TSH 71.500       UROBILINOGEN UA       WBC, UA       WBC 8.54       Yeast, UA               Significant Imaging: Echocardiogram: Transthoracic echo (TTE) complete (Cupid Only):   Results for orders placed or performed during the hospital encounter of 12/08/21   Echo   Result Value Ref Range    IVC diameter 2.24 cm    Left Ventricular Outflow Tract Mean Gradient 2.00 mmHg    AORTIC VALVE CUSP SEPERATION 15.464803244373324 cm    LVIDd 6.23 (A) 3.5 - 6.0 cm    IVS 0.92 0.6 - 1.1 cm    Posterior Wall 0.92 0.6 - 1.1 cm    LVIDs 5.77 (A) 2.1 - 4.0 cm    FS 7 28 - 44 %    LV mass 236.68 g    LA size 4.63 cm    RVDD 3.77 cm    Left Ventricle Relative Wall Thickness 0.30 cm    AV mean gradient 4 mmHg    AV valve area 2.31 cm2    AV index (prosthetic) 0.70     E wave deceleration time 148 msec    LVOT diameter 2.05 cm    LVOT area 3.3 cm2    LVOT peak VTI 13.89 cm    Ao VTI 19.82 cm    LVOT stroke volume 45.82 cm3    MV Peak E Arash 0.76 m/s    TR Max Arash 3.42 m/s    LV Systolic Volume 164.59 mL    LV Diastolic Volume 196.13 mL    Echo EF Estimated 16 %    Triscuspid Valve Regurgitation Peak Gradient 47  mmHg    EF 15 %    Narrative    · Atrial fibrillation with frequent PVC's, rate 85 and occasional runs of   wide complex tachycardia.  · The left ventricle is moderately enlarged with  · Atrial fibrillation observed.  · Mild right ventricular enlargement.  · Moderate mitral regurgitation.  · Moderate to severe tricuspid regurgitation.  · There is pulmonary hypertension.  · Moderate left atrial enlargement.  · Mild right atrial enlargement.

## 2022-02-25 NOTE — PROGRESS NOTES
[address]  Cardiology  Progress Note    Patient Name: Jeanmarie Michelle  MRN: [id number]  Admission Date: 2/24/2022  Hospital Length of Stay: 1 days  Code Status: Full Code   Attending Physician: Geneva Li MD   Primary Care Physician: Regina Sprague MD  Expected Discharge Date:   Principal Problem:<principal problem not specified>    Subjective:     Interval History: Pt states he is feeling better today. Diuresing well.    Review of Systems   Constitutional: Positive for malaise/fatigue. Negative for chills and fever.   HENT:  Negative for congestion and sore throat.    Eyes:  Negative for visual disturbance.   Cardiovascular:  Positive for dyspnea on exertion, leg swelling, orthopnea and paroxysmal nocturnal dyspnea. Negative for chest pain.   Respiratory:  Positive for sleep disturbances due to breathing. Negative for shortness of breath and wheezing.    Hematologic/Lymphatic: Negative for bleeding problem.   Gastrointestinal:  Positive for bloating. Negative for abdominal pain, nausea and vomiting.   Neurological:  Positive for weakness.   Psychiatric/Behavioral:          Mild confusion reported by family at bedside   Objective:     Vital Signs (Most Recent):  Temp: 97.9 °F (36.6 °C) (02/25/22 0738)  Pulse: 68 (02/25/22 0738)  Resp: 17 (02/25/22 0738)  BP: 103/62 (02/25/22 0738)  SpO2: 99 % (02/25/22 0738) Vital Signs (24h Range):  Temp:  [97.9 °F (36.6 °C)-98.6 °F (37 °C)] 97.9 °F (36.6 °C)  Pulse:  [] 68  Resp:  [17-20] 17  SpO2:  [94 %-99 %] 99 %  BP: (103-119)/(54-71) 103/62     Weight: 96.2 kg (212 lb 1.6 oz)  Body mass index is 29.58 kg/m².     SpO2: 99 %  O2 Device (Oxygen Therapy): nasal cannula      Intake/Output Summary (Last 24 hours) at 2/25/2022 1140  Last data filed at 2/25/2022 0500  Gross per 24 hour   Intake 700 ml   Output 700 ml   Net 0 ml       Lines/Drains/Airways       Peripheral Intravenous Line  Duration                  Peripheral IV - Single  Lumen 02/24/22 1055 20 G Right Antecubital 1 day                    Physical Exam  Vitals reviewed.   Constitutional:       General: He is not in acute distress.  Eyes:      General: No scleral icterus.     Pupils: Pupils are equal, round, and reactive to light.   Neck:      Vascular: JVD present.   Cardiovascular:      Rate and Rhythm: Normal rate and regular rhythm.   Pulmonary:      Effort: Pulmonary effort is normal.   Musculoskeletal:      Cervical back: Neck supple.      Right lower leg: Edema present.      Left lower leg: Edema present.   Skin:     General: Skin is cool.      Coloration: Skin is not jaundiced.   Neurological:      General: No focal deficit present.      Mental Status: He is alert.       Significant Labs: All pertinent lab results from the last 24 hours have been reviewed. and   Recent Lab Results         02/25/22  0912   02/25/22  0607   02/25/22  0530   02/24/22  2318   02/24/22 2022        Albumin/Globulin Ratio 1.0               Albumin 3.0               Alkaline Phosphatase 185                              Amorphous, UA       Few         Anion Gap 13               Aniso 1+               Appearance, UA       Clear                        Bacteria, UA       Rare         Bands               Baso # 0.02               Basophil % 0.3               Bilirubin (UA)       Negative         BILIRUBIN TOTAL 1.1               BUN 43               BUN/CREAT RATIO 19               Calcium 8.9               Chloride 99               CO2 29               Color, UA       Straw         Creatinine 2.28               Differential Type Manual               eGFR if non  29               Eos # 0.06               Eosinophil % 0.9               Free T4               Globulin, Total 3.1               Glucose 110               Glucose, UA       Negative         Hematocrit 42.0               Hemoglobin 13.4               Hyaline Casts, UA       0-2         Hypo Few               Immature  Grans (Abs) 0.09               Immature Granulocytes 1.4               Ketones, UA       Negative         Lactate, Arie 1.9               Leukocytes, UA       Large         Lymph # 0.65               Lymph % 9.9                8               MCH 27.0               MCHC 31.9               MCV 84.7               Mono # 0.50               Mono % 7.6                8               MPV 12.0               Myelocytes 1               Neutrophils, Abs 5.25               Neutrophils Relative 79.9               NITRITE UA       Negative         nRBC 0.3               NT-proBNP               NUCLEATED RBC ABSOLUTE 0.02               Occult Blood UA       Negative         Ovalocytes Few               pH, UA       5.5         PLATELET MORPHOLOGY Large & Giant Platelets               Platelets 231               POC Glucose   87   64     130       Poly Few               Potassium 4.1               PROTEIN TOTAL 6.1               Protein, UA       Negative         RBC 4.96               RBC Morphology               RBC, UA       0-3         RDW 19.1               Segmented Neutrophils, Man % 83               Sodium 137               Specific [place], UA       1.010         Squam Epithel, UA       Rare         TSH               UROBILINOGEN UA       0.2         WBC, UA       0-5         WBC 6.57               Yeast, UA       Moderate                            02/24/22  1641   02/24/22  1607   02/24/22  1444   02/24/22  1235   02/24/22  1214        Albumin/Globulin Ratio         1.1       Albumin         4.0       Alkaline Phosphatase         276       ALT         226       Amorphous, UA               Anion Gap         21       Aniso               Appearance, UA               AST         220       Bacteria, UA               Bands               Baso #               Basophil %               Bilirubin (UA)               BILIRUBIN TOTAL         1.8       BUN         48       BUN/CREAT RATIO         18       Calcium         10.1        Chloride         100       CO2         24       Color, UA               Creatinine         2.70       Differential Type               eGFR if non          24       Eos #               Eosinophil %               Free T4               Globulin, Total         3.5       Glucose         386       Glucose, UA               Hematocrit               Hemoglobin               Hyaline Casts, UA               Hypo               Immature Grans (Abs)               Immature Granulocytes               Ketones, UA               Lactate, Arie   4.5             Leukocytes, UA               Lymph #               Lymph %               MCH               MCHC               MCV               Mono #               Mono %               MPV               Myelocytes               Neutrophils, Abs               Neutrophils Relative               NITRITE UA               nRBC               NT-proBNP         33,962       NUCLEATED RBC ABSOLUTE               Occult Blood UA               Ovalocytes               pH, UA               PLATELET MORPHOLOGY               Platelets               POC Glucose 230     326   377         Poly               Potassium         4.9       PROTEIN TOTAL         7.5       Protein, UA               RBC               RBC Morphology               RBC, UA               RDW               Segmented Neutrophils, Man %               Sodium         140       Specific [place], UA               Squam Epithel, UA               TSH               UROBILINOGEN UA               WBC, UA               WBC               Yeast, UA                                  02/24/22  1213        Albumin/Globulin Ratio       Albumin       Alkaline Phosphatase       ALT       Amorphous, UA       Anion Gap       Aniso       Appearance, UA       AST       Bacteria, UA       Bands 1       Baso # 0.01       Basophil % 0.1       Bilirubin (UA)       BILIRUBIN TOTAL       BUN       BUN/CREAT RATIO       Calcium       Chloride       CO2        Color, UA       Creatinine       Differential Type Manual       eGFR if non        Eos # 0.00       Eosinophil % 0.0       Free T4 0.46       Globulin, Total       Glucose       Glucose, UA       Hematocrit 49.7       Hemoglobin 15.7       Hyaline Casts, UA       Hypo       Immature Grans (Abs) 0.11       Immature Granulocytes 1.3       Ketones, UA       Lactate, Arie 4.1  Comment: A repeat order for Lactic Acid has been placed for collection in 2 hours.       Leukocytes, UA       Lymph # 0.61       Lymph % 7.1        15       MCH 27.1       MCHC 31.6       MCV 85.7       Mono # 0.70       Mono % 8.2        3       MPV 12.5       Myelocytes       Neutrophils, Abs 7.11       Neutrophils Relative 83.3       NITRITE UA       nRBC 0.7       NT-proBNP       NUCLEATED RBC ABSOLUTE 0.06       Occult Blood UA       Ovalocytes       pH, UA       PLATELET MORPHOLOGY Large & Giant Platelets  Comment: Minimum plt clumping       Platelets 240       POC Glucose       Poly       Potassium       PROTEIN TOTAL       Protein, UA       RBC 5.80       RBC Morphology Normal       RBC, UA       RDW 19.9       Segmented Neutrophils, Man % 81       Sodium       Specific Gravity, UA       Squam Epithel, UA       TSH 71.500       UROBILINOGEN UA       WBC, UA       WBC 8.54       Yeast, UA               Significant Imaging: Echocardiogram: Transthoracic echo (TTE) complete (Cupid Only):   Results for orders placed or performed during the hospital encounter of 12/08/21   Echo   Result Value Ref Range    IVC diameter 2.24 cm    Left Ventricular Outflow Tract Mean Gradient 2.00 mmHg    AORTIC VALVE CUSP SEPERATION 15.939960959435538 cm    LVIDd 6.23 (A) 3.5 - 6.0 cm    IVS 0.92 0.6 - 1.1 cm    Posterior Wall 0.92 0.6 - 1.1 cm    LVIDs 5.77 (A) 2.1 - 4.0 cm    FS 7 28 - 44 %    LV mass 236.68 g    LA size 4.63 cm    RVDD 3.77 cm    Left Ventricle Relative Wall Thickness 0.30 cm    AV mean gradient 4 mmHg    AV valve area 2.31  cm2    AV index (prosthetic) 0.70     E wave deceleration time 148 msec    LVOT diameter 2.05 cm    LVOT area 3.3 cm2    LVOT peak VTI 13.89 cm    Ao VTI 19.82 cm    LVOT stroke volume 45.82 cm3    MV Peak E Arash 0.76 m/s    TR Max Arash 3.42 m/s    LV Systolic Volume 164.59 mL    LV Diastolic Volume 196.13 mL    Echo EF Estimated 16 %    Triscuspid Valve Regurgitation Peak Gradient 47 mmHg    EF 15 %    Narrative    · Atrial fibrillation with frequent PVC's, rate 85 and occasional runs of   wide complex tachycardia.  · The left ventricle is moderately enlarged with  · Atrial fibrillation observed.  · Mild right ventricular enlargement.  · Moderate mitral regurgitation.  · Moderate to severe tricuspid regurgitation.  · There is pulmonary hypertension.  · Moderate left atrial enlargement.  · Mild right atrial enlargement.        Assessment and Plan:     Brief HPI: 80 y/o male with PMH of persistent atrial fibrillation, nonischemic ([facility] 2018) dilated cardiomyopathy (Stage D HF, EF 15%), CKD, HTN, DM, and newly diagnosed hypothyroidism who was scheduled for outpatient ICD placement by Dr. Carlson today but presented with acute on chronic decompensated heart failure with hyperglycemia and recent elevated TSH of 76.0 (not yet treated). He was admitted by hospital medicine and cardiology consulted for heart failure exacerbation.     Patient was seen in cardiology clinic 2/10/22 at which time his aldactone and Entresto were not continued due to CKD, creatinine 2.1 (baseline 1.3-1.5). He presents today with increased sob, orthopnea, mild confusion (reported by family), worsening lower extremity edema and abdominal distention, and decreased oxygen saturation on RA (93%). On assessment he has significant lower extremity edema expanding to abdomen, JVD extending to mandible, all extremities are cool to touch with decreased breath sound bilaterally. Labs revealed Lactate 4.1, BNP 34,000 (was 15,000 last month), CONSUELO with  creatinine 2.7 (baseline 1.3-1.5), and elevated liver enzymes.          Hypothyroid  - TSH 76 2/8/2022  - Repeat TSH, add FreeT4  - Being followed by primary medical team    Low cardiac output syndrome  - Patient seen and evaluated by Dr. Robb  - Lactate 4.1, creatinine up to 2.7, with elevated liver enzymes, BNP 34,000 (doubled since last month)  - Holding home BB, start dobutamine 2.5 mcg/kg  - IV diuresis, 80mg BID  - Start hydralazine 25mg TID and Isosorbide 20mg TID  - Strict I & O and daily weights  - Monitor on telemetry  - BMP and lactate in am    2/25/22:  - lactate has normalized  - creatinine improving  - continue diuresis with dobutamine and IV lasix 80mg bid  - continue hydralazine and isosorbide  - daily weights, strict I&Os  - BMP in AM    Acute on chronic kidney failure  - Creatinine 2.7, baseline (1.3-1.5)  - Likely related to low cardiac output/decompensated heart failure    2/25/22:  - creatinine has improved to 2.28  - will continue to monitor with diuresis      Chronic a-fib  - Rate currently controlled  - On Eliquis for primary stroke prevention    Diabetes mellitus without complication  - Being followed by primary medical team        VTE Risk Mitigation (From admission, onward)         Ordered     apixaban tablet 5 mg  2 times daily         02/24/22 1101                ROLY Moran  Cardiology  [facility]

## 2022-02-26 LAB
ALBUMIN SERPL BCP-MCNC: 3.2 G/DL (ref 3.5–5)
ALBUMIN/GLOB SERPL: 1.1 {RATIO}
ALP SERPL-CCNC: 182 U/L (ref 45–115)
ALT SERPL W P-5'-P-CCNC: 123 U/L (ref 16–61)
ANION GAP SERPL CALCULATED.3IONS-SCNC: 14 MMOL/L (ref 7–16)
ANISOCYTOSIS BLD QL SMEAR: ABNORMAL
AST SERPL W P-5'-P-CCNC: 89 U/L (ref 15–37)
BASOPHILS # BLD AUTO: 0.01 K/UL (ref 0–0.2)
BASOPHILS NFR BLD AUTO: 0.2 % (ref 0–1)
BILIRUB SERPL-MCNC: 1.1 MG/DL (ref 0–1.2)
BUN SERPL-MCNC: 38 MG/DL (ref 7–18)
BUN/CREAT SERPL: 21 (ref 6–20)
CALCIUM SERPL-MCNC: 9 MG/DL (ref 8.5–10.1)
CHLORIDE SERPL-SCNC: 95 MMOL/L (ref 98–107)
CO2 SERPL-SCNC: 27 MMOL/L (ref 21–32)
CREAT SERPL-MCNC: 1.83 MG/DL (ref 0.7–1.3)
DACRYOCYTES BLD QL SMEAR: ABNORMAL
DIFFERENTIAL METHOD BLD: ABNORMAL
EOSINOPHIL # BLD AUTO: 0.03 K/UL (ref 0–0.5)
EOSINOPHIL NFR BLD AUTO: 0.6 % (ref 1–4)
EOSINOPHIL NFR BLD MANUAL: 2 % (ref 1–4)
ERYTHROCYTE [DISTWIDTH] IN BLOOD BY AUTOMATED COUNT: 19.1 % (ref 11.5–14.5)
GLOBULIN SER-MCNC: 3 G/DL (ref 2–4)
GLUCOSE SERPL-MCNC: 100 MG/DL (ref 70–105)
GLUCOSE SERPL-MCNC: 154 MG/DL (ref 70–105)
GLUCOSE SERPL-MCNC: 176 MG/DL (ref 74–106)
GLUCOSE SERPL-MCNC: 196 MG/DL (ref 70–105)
GLUCOSE SERPL-MCNC: 214 MG/DL (ref 70–105)
GLUCOSE SERPL-MCNC: 40 MG/DL (ref 70–105)
HCT VFR BLD AUTO: 41.5 % (ref 40–54)
HGB BLD-MCNC: 13.4 G/DL (ref 13.5–18)
IMM GRANULOCYTES # BLD AUTO: 0.08 K/UL (ref 0–0.04)
IMM GRANULOCYTES NFR BLD: 1.6 % (ref 0–0.4)
LACTATE SERPL-SCNC: 2.7 MMOL/L (ref 0.4–2)
LYMPHOCYTES # BLD AUTO: 0.46 K/UL (ref 1–4.8)
LYMPHOCYTES NFR BLD AUTO: 9.1 % (ref 27–41)
LYMPHOCYTES NFR BLD MANUAL: 7 % (ref 27–41)
MAGNESIUM SERPL-MCNC: 2.2 MG/DL (ref 1.7–2.3)
MCH RBC QN AUTO: 27 PG (ref 27–31)
MCHC RBC AUTO-ENTMCNC: 32.3 G/DL (ref 32–36)
MCV RBC AUTO: 83.5 FL (ref 80–96)
MONOCYTES # BLD AUTO: 0.39 K/UL (ref 0–0.8)
MONOCYTES NFR BLD AUTO: 7.7 % (ref 2–6)
MONOCYTES NFR BLD MANUAL: 7 % (ref 2–6)
MPC BLD CALC-MCNC: 11.1 FL (ref 9.4–12.4)
NEUTROPHILS # BLD AUTO: 4.09 K/UL (ref 1.8–7.7)
NEUTROPHILS NFR BLD AUTO: 80.8 % (ref 53–65)
NEUTS SEG NFR BLD MANUAL: 84 % (ref 50–62)
NRBC # BLD AUTO: 0 X10E3/UL
NRBC, AUTO (.00): 0 %
OVALOCYTES BLD QL SMEAR: ABNORMAL
PHOSPHATE SERPL-MCNC: 3.4 MG/DL (ref 2.5–4.5)
PLATELET # BLD AUTO: 257 K/UL (ref 150–400)
PLATELET MORPHOLOGY: ABNORMAL
POLYCHROMASIA BLD QL SMEAR: ABNORMAL
POTASSIUM SERPL-SCNC: 3.6 MMOL/L (ref 3.5–5.1)
PROT SERPL-MCNC: 6.2 G/DL (ref 6.4–8.2)
RBC # BLD AUTO: 4.97 M/UL (ref 4.6–6.2)
SODIUM SERPL-SCNC: 132 MMOL/L (ref 136–145)
TARGETS BLD QL SMEAR: ABNORMAL
VERIGENE RESULT: NEGATIVE
WBC # BLD AUTO: 5.06 K/UL (ref 4.5–11)

## 2022-02-26 PROCEDURE — 80053 COMPREHEN METABOLIC PANEL: CPT | Performed by: INTERNAL MEDICINE

## 2022-02-26 PROCEDURE — 85025 COMPLETE CBC W/AUTO DIFF WBC: CPT | Performed by: INTERNAL MEDICINE

## 2022-02-26 PROCEDURE — 99232 SBSQ HOSP IP/OBS MODERATE 35: CPT | Mod: ,,, | Performed by: INTERNAL MEDICINE

## 2022-02-26 PROCEDURE — 25000003 PHARM REV CODE 250: Performed by: INTERNAL MEDICINE

## 2022-02-26 PROCEDURE — 94761 N-INVAS EAR/PLS OXIMETRY MLT: CPT

## 2022-02-26 PROCEDURE — 25000003 PHARM REV CODE 250: Performed by: NURSE PRACTITIONER

## 2022-02-26 PROCEDURE — 63600175 PHARM REV CODE 636 W HCPCS: Performed by: INTERNAL MEDICINE

## 2022-02-26 PROCEDURE — 99232 PR SUBSEQUENT HOSPITAL CARE,LEVL II: ICD-10-PCS | Mod: ,,, | Performed by: INTERNAL MEDICINE

## 2022-02-26 PROCEDURE — 36415 COLL VENOUS BLD VENIPUNCTURE: CPT | Performed by: STUDENT IN AN ORGANIZED HEALTH CARE EDUCATION/TRAINING PROGRAM

## 2022-02-26 PROCEDURE — 84100 ASSAY OF PHOSPHORUS: CPT | Performed by: INTERNAL MEDICINE

## 2022-02-26 PROCEDURE — 82962 GLUCOSE BLOOD TEST: CPT

## 2022-02-26 PROCEDURE — 63600175 PHARM REV CODE 636 W HCPCS: Performed by: NURSE PRACTITIONER

## 2022-02-26 PROCEDURE — 83735 ASSAY OF MAGNESIUM: CPT | Performed by: INTERNAL MEDICINE

## 2022-02-26 PROCEDURE — 83605 ASSAY OF LACTIC ACID: CPT | Performed by: STUDENT IN AN ORGANIZED HEALTH CARE EDUCATION/TRAINING PROGRAM

## 2022-02-26 PROCEDURE — C9399 UNCLASSIFIED DRUGS OR BIOLOG: HCPCS | Performed by: INTERNAL MEDICINE

## 2022-02-26 PROCEDURE — 11000001 HC ACUTE MED/SURG PRIVATE ROOM

## 2022-02-26 RX ADMIN — FUROSEMIDE 80 MG: 10 INJECTION, SOLUTION INTRAMUSCULAR; INTRAVENOUS at 05:02

## 2022-02-26 RX ADMIN — ISOSORBIDE DINITRATE 20 MG: 20 TABLET ORAL at 02:02

## 2022-02-26 RX ADMIN — PANTOPRAZOLE SODIUM 40 MG: 40 TABLET, DELAYED RELEASE ORAL at 08:02

## 2022-02-26 RX ADMIN — SACUBITRIL AND VALSARTAN 1 TABLET: 24; 26 TABLET, FILM COATED ORAL at 09:02

## 2022-02-26 RX ADMIN — ISOSORBIDE DINITRATE 20 MG: 20 TABLET ORAL at 08:02

## 2022-02-26 RX ADMIN — SENNOSIDES AND DOCUSATE SODIUM 1 TABLET: 50; 8.6 TABLET ORAL at 09:02

## 2022-02-26 RX ADMIN — HYDRALAZINE HYDROCHLORIDE 25 MG: 25 TABLET ORAL at 09:02

## 2022-02-26 RX ADMIN — DOBUTAMINE HYDROCHLORIDE 5 MCG/KG/MIN: 400 INJECTION INTRAVENOUS at 09:02

## 2022-02-26 RX ADMIN — APIXABAN 5 MG: 5 TABLET, FILM COATED ORAL at 08:02

## 2022-02-26 RX ADMIN — FUROSEMIDE 80 MG: 10 INJECTION, SOLUTION INTRAMUSCULAR; INTRAVENOUS at 08:02

## 2022-02-26 RX ADMIN — APIXABAN 5 MG: 5 TABLET, FILM COATED ORAL at 09:02

## 2022-02-26 RX ADMIN — HYDRALAZINE HYDROCHLORIDE 25 MG: 25 TABLET ORAL at 02:02

## 2022-02-26 RX ADMIN — INSULIN DETEMIR 10 UNITS: 100 INJECTION, SOLUTION SUBCUTANEOUS at 09:02

## 2022-02-26 RX ADMIN — ISOSORBIDE DINITRATE 20 MG: 20 TABLET ORAL at 09:02

## 2022-02-26 RX ADMIN — INSULIN ASPART 14 UNITS: 100 INJECTION, SOLUTION INTRAVENOUS; SUBCUTANEOUS at 05:02

## 2022-02-26 RX ADMIN — SENNOSIDES AND DOCUSATE SODIUM 1 TABLET: 50; 8.6 TABLET ORAL at 08:02

## 2022-02-26 RX ADMIN — DEXTROSE MONOHYDRATE 250 ML: 100 INJECTION, SOLUTION INTRAVENOUS at 04:02

## 2022-02-26 RX ADMIN — ASPIRIN 81 MG: 81 TABLET, COATED ORAL at 08:02

## 2022-02-26 RX ADMIN — LEVOTHYROXINE SODIUM 137 MCG: 25 TABLET ORAL at 06:02

## 2022-02-26 RX ADMIN — TAMSULOSIN HYDROCHLORIDE 0.4 MG: 0.4 CAPSULE ORAL at 08:02

## 2022-02-26 NOTE — SUBJECTIVE & OBJECTIVE
Interval History:     ISELA  Says he feels improved, per nursing he was feeling cold at that time. However this am he said he is not feeling cold anymore. Will start entresto today per cardiology recs.     Review of Systems   Constitutional:  Positive for fatigue. Negative for fever.   HENT: Negative.     Eyes: Negative.    Respiratory:  Positive for shortness of breath.    Cardiovascular:  Positive for leg swelling. Negative for chest pain and palpitations.   Gastrointestinal: Negative.    Endocrine: Negative.    Genitourinary: Negative.  Negative for hematuria.   Musculoskeletal: Negative.    Skin: Negative.    Allergic/Immunologic: Negative.    Neurological: Negative.    Hematological: Negative.    Psychiatric/Behavioral: Negative.     Objective:     Vital Signs (Most Recent):  Temp: 97.1 °F (36.2 °C) (02/26/22 0720)  Pulse: 71 (02/26/22 0720)  Resp: 20 (02/26/22 0720)  BP: 128/79 (02/26/22 0720)  SpO2: 96 % (02/26/22 0720) Vital Signs (24h Range):  Temp:  [96.3 °F (35.7 °C)-97.7 °F (36.5 °C)] 97.1 °F (36.2 °C)  Pulse:  [57-79] 71  Resp:  [16-20] 20  SpO2:  [95 %-98 %] 96 %  BP: ()/() 128/79     Weight: 96.2 kg (212 lb 1.6 oz)  Body mass index is 29.58 kg/m².    Intake/Output Summary (Last 24 hours) at 2/26/2022 1220  Last data filed at 2/25/2022 2037  Gross per 24 hour   Intake --   Output 2050 ml   Net -2050 ml      Physical Exam  Vitals reviewed.   Constitutional:       Appearance: Normal appearance.   HENT:      Head: Normocephalic and atraumatic.      Nose: Nose normal. No congestion.      Mouth/Throat:      Pharynx: Oropharynx is clear.   Eyes:      Extraocular Movements: Extraocular movements intact.      Conjunctiva/sclera: Conjunctivae normal.      Pupils: Pupils are equal, round, and reactive to light.   Cardiovascular:      Rate and Rhythm: Normal rate and regular rhythm.      Pulses: Normal pulses.      Heart sounds: Normal heart sounds.   Pulmonary:      Effort: Pulmonary effort is normal.  No respiratory distress.      Breath sounds: Normal breath sounds. No wheezing.   Abdominal:      General: Bowel sounds are normal. There is no distension.      Palpations: Abdomen is soft.      Tenderness: There is no abdominal tenderness.   Musculoskeletal:         General: No swelling or tenderness. Normal range of motion.      Right lower leg: Edema present.      Left lower leg: Edema present.   Lymphadenopathy:      Cervical: No cervical adenopathy.   Skin:     General: Skin is warm.   Neurological:      General: No focal deficit present.      Mental Status: He is alert.   Psychiatric:         Mood and Affect: Mood normal.       Significant Labs: All pertinent labs within the past 24 hours have been reviewed.    Significant Imaging: I have reviewed all pertinent imaging results/findings within the past 24 hours.

## 2022-02-26 NOTE — PROGRESS NOTES
[address] Medicine  Progress Note    Patient Name: Jeanmarie Michelle  MRN: [id number]  Patient Class: IP- Inpatient   Admission Date: 2/24/2022  Length of Stay: 2 days  Attending Physician: Geneva Li MD  Primary Care Provider: Regina Sprague MD        Subjective:     Principal Problem:Dilated cardiomyopathy        HPI:  Pt is a 81-year-old male with a history of persistent atrial fibrillation on Eliquis, end-stage dilated cardiomyopathy, history of stage III CKD, type 2 diabetes and hypertension who was supposed to get his ICD placement with Dr Carlson today however pt seemed to be volumed overload and in decompensated heart failure, along with elevated sugars therefore medicine team requested for inpatient admission. Pt states that he has been having dyspnea on exertion, no CP and LE swelling, states he was not sure what medicine to stop his medications prior to procedure. He was seen prior to his ICD implantion by cardiology team and was found to be in florid HF with elevated JVD and massive LE swelling. Besides dyspnea on exertion he has no other symptoms. Of note his TSH >70 recently and has not been on any synthroid.       Overview/Hospital Course:  No notes on file    Interval History:     NAEO  Says he feels improved, per nursing he was feeling cold at that time. However this am he said he is not feeling cold anymore. Will start entresto today per cardiology recs.     Review of Systems   Constitutional:  Positive for fatigue. Negative for fever.   HENT: Negative.     Eyes: Negative.    Respiratory:  Positive for shortness of breath.    Cardiovascular:  Positive for leg swelling. Negative for chest pain and palpitations.   Gastrointestinal: Negative.    Endocrine: Negative.    Genitourinary: Negative.  Negative for hematuria.   Musculoskeletal: Negative.    Skin: Negative.    Allergic/Immunologic: Negative.    Neurological: Negative.    Hematological:  Negative.    Psychiatric/Behavioral: Negative.     Objective:     Vital Signs (Most Recent):  Temp: 97.1 °F (36.2 °C) (02/26/22 0720)  Pulse: 71 (02/26/22 0720)  Resp: 20 (02/26/22 0720)  BP: 128/79 (02/26/22 0720)  SpO2: 96 % (02/26/22 0720) Vital Signs (24h Range):  Temp:  [96.3 °F (35.7 °C)-97.7 °F (36.5 °C)] 97.1 °F (36.2 °C)  Pulse:  [57-79] 71  Resp:  [16-20] 20  SpO2:  [95 %-98 %] 96 %  BP: ()/() 128/79     Weight: 96.2 kg (212 lb 1.6 oz)  Body mass index is 29.58 kg/m².    Intake/Output Summary (Last 24 hours) at 2/26/2022 1220  Last data filed at 2/25/2022 2037  Gross per 24 hour   Intake --   Output 2050 ml   Net -2050 ml      Physical Exam  Vitals reviewed.   Constitutional:       Appearance: Normal appearance.   HENT:      Head: Normocephalic and atraumatic.      Nose: Nose normal. No congestion.      Mouth/Throat:      Pharynx: Oropharynx is clear.   Eyes:      Extraocular Movements: Extraocular movements intact.      Conjunctiva/sclera: Conjunctivae normal.      Pupils: Pupils are equal, round, and reactive to light.   Cardiovascular:      Rate and Rhythm: Normal rate and regular rhythm.      Pulses: Normal pulses.      Heart sounds: Normal heart sounds.   Pulmonary:      Effort: Pulmonary effort is normal. No respiratory distress.      Breath sounds: Normal breath sounds. No wheezing.   Abdominal:      General: Bowel sounds are normal. There is no distension.      Palpations: Abdomen is soft.      Tenderness: There is no abdominal tenderness.   Musculoskeletal:         General: No swelling or tenderness. Normal range of motion.      Right lower leg: Edema present.      Left lower leg: Edema present.   Lymphadenopathy:      Cervical: No cervical adenopathy.   Skin:     General: Skin is warm.   Neurological:      General: No focal deficit present.      Mental Status: He is alert.   Psychiatric:         Mood and Affect: Mood normal.       Significant Labs: All pertinent labs within the past  24 hours have been reviewed.    Significant Imaging: I have reviewed all pertinent imaging results/findings within the past 24 hours.      Assessment/Plan:      * Dilated cardiomyopathy  HFrEF, nonsichemic cardiomyopathy: EF 15% with moderate-severe TR (12/8/21); NYHA III-IV Stage D  Plan for ICD placement by Dr. Carlson today but now postpned d/t CHF exacerbation   Started on dobutamine and iv lasix.   Cardiology on board  Tele monitoring.   Cardiac deit strick I/o       Hypothyroid  tsh pta was >70  Repeat tsh and ft4  Will start on 137 mcg synthroid for now  Will need outpatient follow up with endocrine      Acute on chronic kidney failure  ctm , likely 2/2 htn, dm and heart failure      Heart failure    Cardiology consulted  Will need ICD-placement  Cont Dobutamin guided diuiresis      Chronic a-fib  Rate controlled  Cont AC.      Benign prostatic hyperplasia    Tamsulosin, no LUTS at this time.       Hyperlipidemia    Cont statin      Gastroesophageal reflux disease    Cont PPI      Diabetes mellitus without complication    SSI sliding scale and long acting, adjust as needed      Hypertension    Cont current medications.        VTE Risk Mitigation (From admission, onward)         Ordered     apixaban tablet 5 mg  2 times daily         02/24/22 1101                Discharge Planning   MADDIE:      Code Status: Full Code   Is the patient medically ready for discharge?:     Reason for patient still in hospital (select all that apply): Treatment  Discharge Plan A: Home                  Rehmat JAY Li MD  Department of Hospital Medicine   [address]

## 2022-02-26 NOTE — PLAN OF CARE
Problem: Adult Inpatient Plan of Care  Goal: Plan of Care Review  Outcome: Ongoing, Progressing  Goal: Patient-Specific Goal (Individualized)  Outcome: Ongoing, Progressing  Goal: Absence of Hospital-Acquired Illness or Injury  Outcome: Ongoing, Progressing  Goal: Optimal Comfort and Wellbeing  Outcome: Ongoing, Progressing  Goal: Readiness for Transition of Care  Outcome: Ongoing, Progressing     Problem: Diabetes Comorbidity  Goal: Blood Glucose Level Within Targeted Range  Outcome: Ongoing, Progressing     Problem: Fall Injury Risk  Goal: Absence of Fall and Fall-Related Injury  Outcome: Ongoing, Progressing     Problem: Adjustment to Device (Cardiac Rhythm Management Device)  Goal: Optimal Adjustment to Device  Outcome: Ongoing, Progressing     Problem: Bleeding (Cardiac Rhythm Management Device)  Goal: Absence of Bleeding  Outcome: Ongoing, Progressing     Problem: Device-Related Complication Risk (Cardiac Rhythm Management Device)  Goal: Effective Device Function  Outcome: Ongoing, Progressing     Problem: Infection (Cardiac Rhythm Management Device)  Goal: Absence of Infection Signs and Symptoms  Outcome: Ongoing, Progressing     Problem: Pain (Cardiac Rhythm Management Device)  Goal: Acceptable Pain Level  Outcome: Ongoing, Progressing     Problem: Respiratory Compromise (Cardiac Rhythm Management Device)  Goal: Effective Oxygenation and Ventilation  Outcome: Ongoing, Progressing     Problem: Fluid and Electrolyte Imbalance (Acute Kidney Injury/Impairment)  Goal: Fluid and Electrolyte Balance  Outcome: Ongoing, Progressing     Problem: Oral Intake Inadequate (Acute Kidney Injury/Impairment)  Goal: Optimal Nutrition Intake  Outcome: Ongoing, Progressing     Problem: Renal Function Impairment (Acute Kidney Injury/Impairment)  Goal: Effective Renal Function  Outcome: Ongoing, Progressing    Poc discussed with patient and family

## 2022-02-26 NOTE — NURSING
Pt. Lying in bed awake. Resp even and unlabored. No complaints voiced. No distress noted. Safety measures ongoing.

## 2022-02-26 NOTE — PLAN OF CARE
Problem: Respiratory Compromise (Cardiac Rhythm Management Device)  Goal: Effective Oxygenation and Ventilation  Outcome: Ongoing, Progressing

## 2022-02-27 LAB
ALBUMIN SERPL BCP-MCNC: 3.1 G/DL (ref 3.5–5)
ALBUMIN/GLOB SERPL: 1.1 {RATIO}
ALP SERPL-CCNC: 158 U/L (ref 45–115)
ALT SERPL W P-5'-P-CCNC: 100 U/L (ref 16–61)
ANION GAP SERPL CALCULATED.3IONS-SCNC: 12 MMOL/L (ref 7–16)
ANISOCYTOSIS BLD QL SMEAR: ABNORMAL
AST SERPL W P-5'-P-CCNC: 64 U/L (ref 15–37)
BASOPHILS # BLD AUTO: 0 K/UL (ref 0–0.2)
BASOPHILS NFR BLD AUTO: 0 % (ref 0–1)
BILIRUB SERPL-MCNC: 1 MG/DL (ref 0–1.2)
BUN SERPL-MCNC: 33 MG/DL (ref 7–18)
BUN/CREAT SERPL: 21 (ref 6–20)
CALCIUM SERPL-MCNC: 8.6 MG/DL (ref 8.5–10.1)
CHLORIDE SERPL-SCNC: 97 MMOL/L (ref 98–107)
CO2 SERPL-SCNC: 31 MMOL/L (ref 21–32)
CREAT SERPL-MCNC: 1.6 MG/DL (ref 0.7–1.3)
DIFFERENTIAL METHOD BLD: ABNORMAL
EOSINOPHIL # BLD AUTO: 0.05 K/UL (ref 0–0.5)
EOSINOPHIL NFR BLD AUTO: 0.9 % (ref 1–4)
EOSINOPHIL NFR BLD MANUAL: 1 % (ref 1–4)
ERYTHROCYTE [DISTWIDTH] IN BLOOD BY AUTOMATED COUNT: 18.8 % (ref 11.5–14.5)
GLOBULIN SER-MCNC: 2.7 G/DL (ref 2–4)
GLUCOSE SERPL-MCNC: 130 MG/DL (ref 70–105)
GLUCOSE SERPL-MCNC: 215 MG/DL (ref 70–105)
GLUCOSE SERPL-MCNC: 227 MG/DL (ref 70–105)
GLUCOSE SERPL-MCNC: 36 MG/DL (ref 74–106)
GLUCOSE SERPL-MCNC: 43 MG/DL (ref 70–105)
GLUCOSE SERPL-MCNC: 58 MG/DL (ref 70–105)
HCT VFR BLD AUTO: 38.6 % (ref 40–54)
HGB BLD-MCNC: 12.5 G/DL (ref 13.5–18)
HYPOCHROMIA BLD QL SMEAR: ABNORMAL
IMM GRANULOCYTES # BLD AUTO: 0.05 K/UL (ref 0–0.04)
IMM GRANULOCYTES NFR BLD: 0.9 % (ref 0–0.4)
LACTATE SERPL-SCNC: 2 MMOL/L (ref 0.4–2)
LYMPHOCYTES # BLD AUTO: 0.53 K/UL (ref 1–4.8)
LYMPHOCYTES NFR BLD AUTO: 9.3 % (ref 27–41)
LYMPHOCYTES NFR BLD MANUAL: 7 % (ref 27–41)
MCH RBC QN AUTO: 27.1 PG (ref 27–31)
MCHC RBC AUTO-ENTMCNC: 32.4 G/DL (ref 32–36)
MCV RBC AUTO: 83.7 FL (ref 80–96)
MONOCYTES # BLD AUTO: 0.66 K/UL (ref 0–0.8)
MONOCYTES NFR BLD AUTO: 11.6 % (ref 2–6)
MONOCYTES NFR BLD MANUAL: 7 % (ref 2–6)
MPC BLD CALC-MCNC: 11.5 FL (ref 9.4–12.4)
NEUTROPHILS # BLD AUTO: 4.42 K/UL (ref 1.8–7.7)
NEUTROPHILS NFR BLD AUTO: 77.3 % (ref 53–65)
NEUTS SEG NFR BLD MANUAL: 85 % (ref 50–62)
NRBC # BLD AUTO: 0 X10E3/UL
NRBC, AUTO (.00): 0 %
OVALOCYTES BLD QL SMEAR: ABNORMAL
PLATELET # BLD AUTO: 241 K/UL (ref 150–400)
PLATELET MORPHOLOGY: ABNORMAL
POLYCHROMASIA BLD QL SMEAR: ABNORMAL
POTASSIUM SERPL-SCNC: 3.2 MMOL/L (ref 3.5–5.1)
PROT SERPL-MCNC: 5.8 G/DL (ref 6.4–8.2)
RBC # BLD AUTO: 4.61 M/UL (ref 4.6–6.2)
SODIUM SERPL-SCNC: 137 MMOL/L (ref 136–145)
TARGETS BLD QL SMEAR: ABNORMAL
WBC # BLD AUTO: 5.71 K/UL (ref 4.5–11)

## 2022-02-27 PROCEDURE — 25000003 PHARM REV CODE 250: Performed by: INTERNAL MEDICINE

## 2022-02-27 PROCEDURE — 25000003 PHARM REV CODE 250: Performed by: STUDENT IN AN ORGANIZED HEALTH CARE EDUCATION/TRAINING PROGRAM

## 2022-02-27 PROCEDURE — 63600175 PHARM REV CODE 636 W HCPCS: Performed by: NURSE PRACTITIONER

## 2022-02-27 PROCEDURE — 94761 N-INVAS EAR/PLS OXIMETRY MLT: CPT

## 2022-02-27 PROCEDURE — 99232 SBSQ HOSP IP/OBS MODERATE 35: CPT | Mod: ,,, | Performed by: INTERNAL MEDICINE

## 2022-02-27 PROCEDURE — 25000003 PHARM REV CODE 250: Performed by: NURSE PRACTITIONER

## 2022-02-27 PROCEDURE — 63600175 PHARM REV CODE 636 W HCPCS: Performed by: INTERNAL MEDICINE

## 2022-02-27 PROCEDURE — 99232 PR SUBSEQUENT HOSPITAL CARE,LEVL II: ICD-10-PCS | Mod: ,,, | Performed by: INTERNAL MEDICINE

## 2022-02-27 PROCEDURE — 11000001 HC ACUTE MED/SURG PRIVATE ROOM

## 2022-02-27 PROCEDURE — 83605 ASSAY OF LACTIC ACID: CPT | Performed by: STUDENT IN AN ORGANIZED HEALTH CARE EDUCATION/TRAINING PROGRAM

## 2022-02-27 PROCEDURE — C9399 UNCLASSIFIED DRUGS OR BIOLOG: HCPCS | Performed by: INTERNAL MEDICINE

## 2022-02-27 PROCEDURE — 85025 COMPLETE CBC W/AUTO DIFF WBC: CPT | Performed by: INTERNAL MEDICINE

## 2022-02-27 PROCEDURE — 82962 GLUCOSE BLOOD TEST: CPT

## 2022-02-27 PROCEDURE — 36415 COLL VENOUS BLD VENIPUNCTURE: CPT | Performed by: STUDENT IN AN ORGANIZED HEALTH CARE EDUCATION/TRAINING PROGRAM

## 2022-02-27 PROCEDURE — 80053 COMPREHEN METABOLIC PANEL: CPT | Performed by: INTERNAL MEDICINE

## 2022-02-27 RX ORDER — GLUCAGON 1 MG
1 KIT INJECTION
Status: DISCONTINUED | OUTPATIENT
Start: 2022-02-27 | End: 2022-02-28

## 2022-02-27 RX ORDER — POTASSIUM CHLORIDE 20 MEQ/1
60 TABLET, EXTENDED RELEASE ORAL DAILY
Status: DISCONTINUED | OUTPATIENT
Start: 2022-02-27 | End: 2022-02-28

## 2022-02-27 RX ORDER — POTASSIUM CHLORIDE 20 MEQ/1
40 TABLET, EXTENDED RELEASE ORAL ONCE
Status: COMPLETED | OUTPATIENT
Start: 2022-02-27 | End: 2022-02-27

## 2022-02-27 RX ORDER — METOPROLOL SUCCINATE 25 MG/1
25 TABLET, EXTENDED RELEASE ORAL DAILY
Status: DISCONTINUED | OUTPATIENT
Start: 2022-02-27 | End: 2022-02-28

## 2022-02-27 RX ORDER — HYDRALAZINE HYDROCHLORIDE 10 MG/1
10 TABLET, FILM COATED ORAL EVERY 8 HOURS
Status: DISCONTINUED | OUTPATIENT
Start: 2022-02-27 | End: 2022-02-27

## 2022-02-27 RX ORDER — INSULIN ASPART 100 [IU]/ML
1-10 INJECTION, SOLUTION INTRAVENOUS; SUBCUTANEOUS
Status: DISCONTINUED | OUTPATIENT
Start: 2022-02-27 | End: 2022-02-28

## 2022-02-27 RX ADMIN — ISOSORBIDE DINITRATE 20 MG: 20 TABLET ORAL at 08:02

## 2022-02-27 RX ADMIN — TAMSULOSIN HYDROCHLORIDE 0.4 MG: 0.4 CAPSULE ORAL at 08:02

## 2022-02-27 RX ADMIN — PANTOPRAZOLE SODIUM 40 MG: 40 TABLET, DELAYED RELEASE ORAL at 08:02

## 2022-02-27 RX ADMIN — POTASSIUM CHLORIDE 60 MEQ: 20 TABLET, EXTENDED RELEASE ORAL at 10:02

## 2022-02-27 RX ADMIN — FUROSEMIDE 80 MG: 10 INJECTION, SOLUTION INTRAMUSCULAR; INTRAVENOUS at 04:02

## 2022-02-27 RX ADMIN — APIXABAN 5 MG: 5 TABLET, FILM COATED ORAL at 08:02

## 2022-02-27 RX ADMIN — ASPIRIN 81 MG: 81 TABLET, COATED ORAL at 08:02

## 2022-02-27 RX ADMIN — SENNOSIDES AND DOCUSATE SODIUM 1 TABLET: 50; 8.6 TABLET ORAL at 09:02

## 2022-02-27 RX ADMIN — LEVOTHYROXINE SODIUM 137 MCG: 25 TABLET ORAL at 06:02

## 2022-02-27 RX ADMIN — APIXABAN 5 MG: 5 TABLET, FILM COATED ORAL at 09:02

## 2022-02-27 RX ADMIN — METOPROLOL SUCCINATE 25 MG: 25 TABLET, FILM COATED, EXTENDED RELEASE ORAL at 10:02

## 2022-02-27 RX ADMIN — INSULIN DETEMIR 10 UNITS: 100 INJECTION, SOLUTION SUBCUTANEOUS at 09:02

## 2022-02-27 RX ADMIN — POTASSIUM CHLORIDE 40 MEQ: 20 TABLET, EXTENDED RELEASE ORAL at 08:02

## 2022-02-27 RX ADMIN — FUROSEMIDE 80 MG: 10 INJECTION, SOLUTION INTRAMUSCULAR; INTRAVENOUS at 08:02

## 2022-02-27 RX ADMIN — INSULIN ASPART 4 UNITS: 100 INJECTION, SOLUTION INTRAVENOUS; SUBCUTANEOUS at 05:02

## 2022-02-27 RX ADMIN — INSULIN ASPART 4 UNITS: 100 INJECTION, SOLUTION INTRAVENOUS; SUBCUTANEOUS at 12:02

## 2022-02-27 RX ADMIN — SENNOSIDES AND DOCUSATE SODIUM 1 TABLET: 50; 8.6 TABLET ORAL at 08:02

## 2022-02-27 RX ADMIN — Medication 9 MG: at 09:02

## 2022-02-27 NOTE — PLAN OF CARE
Problem: Adult Inpatient Plan of Care  Goal: Plan of Care Review  Outcome: Ongoing, Progressing  Goal: Patient-Specific Goal (Individualized)  Outcome: Ongoing, Progressing  Flowsheets (Taken 2/27/2022 1721)  Anxieties, Fears or Concerns: none  Individualized Care Needs: none  Patient-Specific Goals (Include Timeframe): none  Goal: Absence of Hospital-Acquired Illness or Injury  Outcome: Ongoing, Progressing  Goal: Optimal Comfort and Wellbeing  Outcome: Ongoing, Progressing     Problem: Diabetes Comorbidity  Goal: Blood Glucose Level Within Targeted Range  Outcome: Ongoing, Progressing     Problem: Fall Injury Risk  Goal: Absence of Fall and Fall-Related Injury  Outcome: Ongoing, Progressing     Problem: Adjustment to Device (Cardiac Rhythm Management Device)  Goal: Optimal Adjustment to Device  Outcome: Ongoing, Progressing     Problem: Bleeding (Cardiac Rhythm Management Device)  Goal: Absence of Bleeding  Outcome: Ongoing, Progressing     Problem: Device-Related Complication Risk (Cardiac Rhythm Management Device)  Goal: Effective Device Function  Outcome: Ongoing, Progressing     Problem: Infection (Cardiac Rhythm Management Device)  Goal: Absence of Infection Signs and Symptoms  Outcome: Ongoing, Progressing     Problem: Pain (Cardiac Rhythm Management Device)  Goal: Acceptable Pain Level  Outcome: Ongoing, Progressing     Problem: Respiratory Compromise (Cardiac Rhythm Management Device)  Goal: Effective Oxygenation and Ventilation  Outcome: Ongoing, Progressing     Problem: Fluid and Electrolyte Imbalance (Acute Kidney Injury/Impairment)  Goal: Fluid and Electrolyte Balance  Outcome: Ongoing, Progressing     Problem: Oral Intake Inadequate (Acute Kidney Injury/Impairment)  Goal: Optimal Nutrition Intake  Outcome: Ongoing, Progressing

## 2022-02-27 NOTE — NURSING
Notified patient glucose in 40s. Patient not symptomatic, alert and oriented. Orange juice with sugar given. Sugar recheck 58, orange juice, cereal, crackers and peanut butter given and glucose recheck 138. Left patient comfortable. cb in reach.

## 2022-02-27 NOTE — PROGRESS NOTES
Brief cardiology note:    Patient is improving. BP slightly low  Stop hydralazine and ISDN  Continue Entresto  Start metop xl 25mg qd  Stop dobutamine     Jude Robb  2/27/2022

## 2022-02-27 NOTE — SUBJECTIVE & OBJECTIVE
Interval History:     ISELA  Says he feels improved, has no new complains, having breakfast, says he cant eat a whole lot. Insulin to be decreased.     Review of Systems   Constitutional:  Positive for fatigue. Negative for fever.   HENT: Negative.     Eyes: Negative.    Respiratory:  Positive for shortness of breath.    Cardiovascular:  Positive for leg swelling. Negative for chest pain and palpitations.   Gastrointestinal: Negative.    Endocrine: Negative.    Genitourinary: Negative.  Negative for hematuria.   Musculoskeletal: Negative.    Skin: Negative.    Allergic/Immunologic: Negative.    Neurological: Negative.    Hematological: Negative.    Psychiatric/Behavioral: Negative.     Objective:     Vital Signs (Most Recent):  Temp: 96.3 °F (35.7 °C) (02/27/22 0441)  Pulse: 92 (02/27/22 0441)  Resp: 19 (02/27/22 0441)  BP: (!) 89/60 (02/27/22 0441)  SpO2: 96 % (02/26/22 2002) Vital Signs (24h Range):  Temp:  [96.3 °F (35.7 °C)-98.5 °F (36.9 °C)] 96.3 °F (35.7 °C)  Pulse:  [] 92  Resp:  [18-20] 19  SpO2:  [96 %-98 %] 96 %  BP: ()/(57-79) 89/60     Weight: 96.2 kg (212 lb 1.6 oz)  Body mass index is 29.58 kg/m².    Intake/Output Summary (Last 24 hours) at 2/27/2022 1032  Last data filed at 2/26/2022 2133  Gross per 24 hour   Intake 1088.56 ml   Output 1950 ml   Net -861.44 ml        Physical Exam  Vitals reviewed.   Constitutional:       Appearance: Normal appearance.   HENT:      Head: Normocephalic and atraumatic.      Nose: Nose normal. No congestion.      Mouth/Throat:      Pharynx: Oropharynx is clear.   Eyes:      Extraocular Movements: Extraocular movements intact.      Conjunctiva/sclera: Conjunctivae normal.      Pupils: Pupils are equal, round, and reactive to light.   Cardiovascular:      Rate and Rhythm: Normal rate and regular rhythm.      Pulses: Normal pulses.      Heart sounds: Normal heart sounds.   Pulmonary:      Effort: Pulmonary effort is normal. No respiratory distress.      Breath  sounds: Normal breath sounds. No wheezing.   Abdominal:      General: Bowel sounds are normal. There is no distension.      Palpations: Abdomen is soft.      Tenderness: There is no abdominal tenderness.   Musculoskeletal:         General: No swelling or tenderness. Normal range of motion.      Right lower leg: Edema present.      Left lower leg: Edema present.   Lymphadenopathy:      Cervical: No cervical adenopathy.   Skin:     General: Skin is warm.   Neurological:      General: No focal deficit present.      Mental Status: He is alert.   Psychiatric:         Mood and Affect: Mood normal.       Significant Labs: All pertinent labs within the past 24 hours have been reviewed.    Significant Imaging: I have reviewed all pertinent imaging results/findings within the past 24 hours.

## 2022-02-27 NOTE — PLAN OF CARE
Problem: Adult Inpatient Plan of Care  Goal: Plan of Care Review  Outcome: Ongoing, Progressing  Goal: Patient-Specific Goal (Individualized)  Outcome: Ongoing, Progressing  Goal: Absence of Hospital-Acquired Illness or Injury  Outcome: Ongoing, Progressing  Goal: Optimal Comfort and Wellbeing  Outcome: Ongoing, Progressing  Goal: Readiness for Transition of Care  Outcome: Ongoing, Progressing     Problem: Diabetes Comorbidity  Goal: Blood Glucose Level Within Targeted Range  Outcome: Ongoing, Progressing     Problem: Fall Injury Risk  Goal: Absence of Fall and Fall-Related Injury  Outcome: Ongoing, Progressing     Problem: Adjustment to Device (Cardiac Rhythm Management Device)  Goal: Optimal Adjustment to Device  Outcome: Ongoing, Progressing

## 2022-02-28 LAB
ALBUMIN SERPL BCP-MCNC: 3.5 G/DL (ref 3.5–5)
ALBUMIN SERPL BCP-MCNC: 3.6 G/DL (ref 3.5–5)
ALBUMIN/GLOB SERPL: 1.1 {RATIO}
ALBUMIN/GLOB SERPL: 1.1 {RATIO}
ALP SERPL-CCNC: 272 U/L (ref 45–115)
ALP SERPL-CCNC: 289 U/L (ref 45–115)
ALT SERPL W P-5'-P-CCNC: 130 U/L (ref 16–61)
ALT SERPL W P-5'-P-CCNC: 136 U/L (ref 16–61)
ANION GAP SERPL CALCULATED.3IONS-SCNC: 17 MMOL/L (ref 7–16)
ANION GAP SERPL CALCULATED.3IONS-SCNC: 17 MMOL/L (ref 7–16)
ANION GAP SERPL CALCULATED.3IONS-SCNC: 20 MMOL/L (ref 7–16)
AST SERPL W P-5'-P-CCNC: 132 U/L (ref 15–37)
AST SERPL W P-5'-P-CCNC: 143 U/L (ref 15–37)
BASOPHILS # BLD AUTO: 0.01 K/UL (ref 0–0.2)
BASOPHILS NFR BLD AUTO: 0.2 % (ref 0–1)
BILIRUB SERPL-MCNC: 1.1 MG/DL (ref 0–1.2)
BILIRUB SERPL-MCNC: 1.3 MG/DL (ref 0–1.2)
BUN SERPL-MCNC: 44 MG/DL (ref 7–18)
BUN SERPL-MCNC: 46 MG/DL (ref 7–18)
BUN SERPL-MCNC: 46 MG/DL (ref 7–18)
BUN/CREAT SERPL: 105 (ref 6–20)
BUN/CREAT SERPL: 20 (ref 6–20)
BUN/CREAT SERPL: 23 (ref 6–20)
CALCIUM SERPL-MCNC: 7.6 MG/DL (ref 8.5–10.1)
CALCIUM SERPL-MCNC: 8.4 MG/DL (ref 8.5–10.1)
CALCIUM SERPL-MCNC: 8.5 MG/DL (ref 8.5–10.1)
CHLORIDE SERPL-SCNC: 94 MMOL/L (ref 98–107)
CHLORIDE SERPL-SCNC: 94 MMOL/L (ref 98–107)
CHLORIDE SERPL-SCNC: 95 MMOL/L (ref 98–107)
CO2 SERPL-SCNC: 19 MMOL/L (ref 21–32)
CO2 SERPL-SCNC: 25 MMOL/L (ref 21–32)
CO2 SERPL-SCNC: 26 MMOL/L (ref 21–32)
CREAT SERPL-MCNC: 0.42 MG/DL (ref 0.7–1.3)
CREAT SERPL-MCNC: 1.96 MG/DL (ref 0.7–1.3)
CREAT SERPL-MCNC: 2.28 MG/DL (ref 0.7–1.3)
DIFFERENTIAL METHOD BLD: ABNORMAL
EOSINOPHIL # BLD AUTO: 0.03 K/UL (ref 0–0.5)
EOSINOPHIL NFR BLD AUTO: 0.6 % (ref 1–4)
ERYTHROCYTE [DISTWIDTH] IN BLOOD BY AUTOMATED COUNT: 19.7 % (ref 11.5–14.5)
GLOBULIN SER-MCNC: 3.3 G/DL (ref 2–4)
GLOBULIN SER-MCNC: 3.3 G/DL (ref 2–4)
GLUCOSE SERPL-MCNC: 101 MG/DL (ref 70–105)
GLUCOSE SERPL-MCNC: 110 MG/DL (ref 74–106)
GLUCOSE SERPL-MCNC: 124 MG/DL (ref 74–106)
GLUCOSE SERPL-MCNC: 130 MG/DL (ref 70–105)
GLUCOSE SERPL-MCNC: 131 MG/DL (ref 74–106)
GLUCOSE SERPL-MCNC: 58 MG/DL (ref 70–105)
GLUCOSE SERPL-MCNC: 58 MG/DL (ref 70–105)
GLUCOSE SERPL-MCNC: 66 MG/DL (ref 70–105)
GLUCOSE SERPL-MCNC: 75 MG/DL (ref 70–105)
GLUCOSE SERPL-MCNC: 98 MG/DL (ref 70–105)
HCT VFR BLD AUTO: 43.9 % (ref 40–54)
HGB BLD-MCNC: 14.3 G/DL (ref 13.5–18)
IMM GRANULOCYTES # BLD AUTO: 0.04 K/UL (ref 0–0.04)
IMM GRANULOCYTES NFR BLD: 0.8 % (ref 0–0.4)
LACTATE SERPL-SCNC: 4.1 MMOL/L (ref 0.4–2)
LYMPHOCYTES # BLD AUTO: 0.72 K/UL (ref 1–4.8)
LYMPHOCYTES NFR BLD AUTO: 13.8 % (ref 27–41)
MCH RBC QN AUTO: 27.6 PG (ref 27–31)
MCHC RBC AUTO-ENTMCNC: 32.6 G/DL (ref 32–36)
MCV RBC AUTO: 84.6 FL (ref 80–96)
MONOCYTES # BLD AUTO: 0.46 K/UL (ref 0–0.8)
MONOCYTES NFR BLD AUTO: 8.8 % (ref 2–6)
MPC BLD CALC-MCNC: 12.4 FL (ref 9.4–12.4)
NEUTROPHILS # BLD AUTO: 3.96 K/UL (ref 1.8–7.7)
NEUTROPHILS NFR BLD AUTO: 75.8 % (ref 53–65)
NRBC # BLD AUTO: 0 X10E3/UL
NRBC, AUTO (.00): 0 %
PLATELET # BLD AUTO: 249 K/UL (ref 150–400)
POTASSIUM SERPL-SCNC: 5.7 MMOL/L (ref 3.5–5.1)
POTASSIUM SERPL-SCNC: 5.9 MMOL/L (ref 3.5–5.1)
POTASSIUM SERPL-SCNC: 6.8 MMOL/L (ref 3.5–5.1)
PROT SERPL-MCNC: 6.8 G/DL (ref 6.4–8.2)
PROT SERPL-MCNC: 6.9 G/DL (ref 6.4–8.2)
RBC # BLD AUTO: 5.19 M/UL (ref 4.6–6.2)
SODIUM SERPL-SCNC: 128 MMOL/L (ref 136–145)
SODIUM SERPL-SCNC: 129 MMOL/L (ref 136–145)
SODIUM SERPL-SCNC: 131 MMOL/L (ref 136–145)
WBC # BLD AUTO: 5.22 K/UL (ref 4.5–11)

## 2022-02-28 PROCEDURE — 83605 ASSAY OF LACTIC ACID: CPT | Performed by: STUDENT IN AN ORGANIZED HEALTH CARE EDUCATION/TRAINING PROGRAM

## 2022-02-28 PROCEDURE — 25000003 PHARM REV CODE 250: Performed by: STUDENT IN AN ORGANIZED HEALTH CARE EDUCATION/TRAINING PROGRAM

## 2022-02-28 PROCEDURE — 80053 COMPREHEN METABOLIC PANEL: CPT | Performed by: NURSE PRACTITIONER

## 2022-02-28 PROCEDURE — 36415 COLL VENOUS BLD VENIPUNCTURE: CPT | Performed by: INTERNAL MEDICINE

## 2022-02-28 PROCEDURE — 25000003 PHARM REV CODE 250: Performed by: NURSE PRACTITIONER

## 2022-02-28 PROCEDURE — 99233 PR SUBSEQUENT HOSPITAL CARE,LEVL III: ICD-10-PCS | Mod: ,,, | Performed by: INTERNAL MEDICINE

## 2022-02-28 PROCEDURE — 27000221 HC OXYGEN, UP TO 24 HOURS

## 2022-02-28 PROCEDURE — 25000003 PHARM REV CODE 250: Performed by: INTERNAL MEDICINE

## 2022-02-28 PROCEDURE — 93005 ELECTROCARDIOGRAM TRACING: CPT

## 2022-02-28 PROCEDURE — 99233 PR SUBSEQUENT HOSPITAL CARE,LEVL III: ICD-10-PCS | Mod: ,,, | Performed by: HOSPITALIST

## 2022-02-28 PROCEDURE — 93010 ELECTROCARDIOGRAM REPORT: CPT | Mod: 76,,, | Performed by: HOSPITALIST

## 2022-02-28 PROCEDURE — 93010 EKG 12-LEAD: ICD-10-PCS | Mod: 76,,, | Performed by: HOSPITALIST

## 2022-02-28 PROCEDURE — 99233 SBSQ HOSP IP/OBS HIGH 50: CPT | Mod: ,,, | Performed by: INTERNAL MEDICINE

## 2022-02-28 PROCEDURE — 80048 BASIC METABOLIC PNL TOTAL CA: CPT | Mod: XB | Performed by: HOSPITALIST

## 2022-02-28 PROCEDURE — 99233 SBSQ HOSP IP/OBS HIGH 50: CPT | Mod: ,,, | Performed by: HOSPITALIST

## 2022-02-28 PROCEDURE — 25000003 PHARM REV CODE 250: Performed by: HOSPITALIST

## 2022-02-28 PROCEDURE — 82962 GLUCOSE BLOOD TEST: CPT

## 2022-02-28 PROCEDURE — 63600175 PHARM REV CODE 636 W HCPCS: Performed by: NURSE PRACTITIONER

## 2022-02-28 PROCEDURE — 85025 COMPLETE CBC W/AUTO DIFF WBC: CPT | Performed by: INTERNAL MEDICINE

## 2022-02-28 PROCEDURE — 94761 N-INVAS EAR/PLS OXIMETRY MLT: CPT

## 2022-02-28 PROCEDURE — 11000001 HC ACUTE MED/SURG PRIVATE ROOM

## 2022-02-28 PROCEDURE — 80053 COMPREHEN METABOLIC PANEL: CPT | Performed by: INTERNAL MEDICINE

## 2022-02-28 RX ORDER — DOBUTAMINE HYDROCHLORIDE 400 MG/100ML
2.5 INJECTION INTRAVENOUS CONTINUOUS
Status: DISCONTINUED | OUTPATIENT
Start: 2022-02-28 | End: 2022-03-02

## 2022-02-28 RX ORDER — DEXTROSE MONOHYDRATE AND SODIUM CHLORIDE 5; .9 G/100ML; G/100ML
INJECTION, SOLUTION INTRAVENOUS CONTINUOUS
Status: DISCONTINUED | OUTPATIENT
Start: 2022-02-28 | End: 2022-02-28

## 2022-02-28 RX ORDER — SIMETHICONE 80 MG
1 TABLET,CHEWABLE ORAL 3 TIMES DAILY PRN
Status: DISCONTINUED | OUTPATIENT
Start: 2022-02-28 | End: 2022-03-04 | Stop reason: HOSPADM

## 2022-02-28 RX ORDER — DIGOXIN 125 MCG
0.12 TABLET ORAL DAILY
Status: DISCONTINUED | OUTPATIENT
Start: 2022-02-28 | End: 2022-03-04 | Stop reason: HOSPADM

## 2022-02-28 RX ORDER — GLUCAGON 1 MG
1 KIT INJECTION
Status: DISCONTINUED | OUTPATIENT
Start: 2022-02-28 | End: 2022-03-04 | Stop reason: HOSPADM

## 2022-02-28 RX ORDER — FAMOTIDINE 20 MG/1
20 TABLET, FILM COATED ORAL EVERY 12 HOURS PRN
Status: DISCONTINUED | OUTPATIENT
Start: 2022-02-28 | End: 2022-03-04 | Stop reason: HOSPADM

## 2022-02-28 RX ORDER — INSULIN ASPART 100 [IU]/ML
0-5 INJECTION, SOLUTION INTRAVENOUS; SUBCUTANEOUS
Status: DISCONTINUED | OUTPATIENT
Start: 2022-02-28 | End: 2022-03-04 | Stop reason: HOSPADM

## 2022-02-28 RX ORDER — POTASSIUM CHLORIDE 20 MEQ/1
40 TABLET, EXTENDED RELEASE ORAL ONCE
Status: COMPLETED | OUTPATIENT
Start: 2022-02-28 | End: 2022-02-28

## 2022-02-28 RX ADMIN — DOBUTAMINE HYDROCHLORIDE 5 MCG/KG/MIN: 400 INJECTION INTRAVENOUS at 03:02

## 2022-02-28 RX ADMIN — SIMETHICONE 80 MG: 80 TABLET, CHEWABLE ORAL at 01:02

## 2022-02-28 RX ADMIN — ASPIRIN 81 MG: 81 TABLET, COATED ORAL at 08:02

## 2022-02-28 RX ADMIN — FUROSEMIDE 80 MG: 10 INJECTION, SOLUTION INTRAMUSCULAR; INTRAVENOUS at 04:02

## 2022-02-28 RX ADMIN — PANTOPRAZOLE SODIUM 40 MG: 40 TABLET, DELAYED RELEASE ORAL at 08:02

## 2022-02-28 RX ADMIN — FUROSEMIDE 80 MG: 10 INJECTION, SOLUTION INTRAMUSCULAR; INTRAVENOUS at 08:02

## 2022-02-28 RX ADMIN — SODIUM ZIRCONIUM CYCLOSILICATE 10 G: 10 POWDER, FOR SUSPENSION ORAL at 08:02

## 2022-02-28 RX ADMIN — POTASSIUM CHLORIDE 60 MEQ: 20 TABLET, EXTENDED RELEASE ORAL at 08:02

## 2022-02-28 RX ADMIN — FAMOTIDINE 20 MG: 20 TABLET, FILM COATED ORAL at 01:02

## 2022-02-28 RX ADMIN — SACUBITRIL AND VALSARTAN 1 TABLET: 24; 26 TABLET, FILM COATED ORAL at 08:02

## 2022-02-28 RX ADMIN — METOPROLOL SUCCINATE 25 MG: 25 TABLET, FILM COATED, EXTENDED RELEASE ORAL at 08:02

## 2022-02-28 RX ADMIN — SENNOSIDES AND DOCUSATE SODIUM 1 TABLET: 50; 8.6 TABLET ORAL at 08:02

## 2022-02-28 RX ADMIN — LEVOTHYROXINE SODIUM 137 MCG: 25 TABLET ORAL at 05:02

## 2022-02-28 RX ADMIN — LATANOPROST 1 DROP: 50 SOLUTION/ DROPS OPHTHALMIC at 04:02

## 2022-02-28 RX ADMIN — POTASSIUM CHLORIDE 40 MEQ: 20 TABLET, EXTENDED RELEASE ORAL at 02:02

## 2022-02-28 RX ADMIN — APIXABAN 5 MG: 5 TABLET, FILM COATED ORAL at 08:02

## 2022-02-28 RX ADMIN — DIGOXIN 0.12 MG: 125 TABLET ORAL at 02:02

## 2022-02-28 RX ADMIN — TAMSULOSIN HYDROCHLORIDE 0.4 MG: 0.4 CAPSULE ORAL at 08:02

## 2022-02-28 NOTE — ASSESSMENT & PLAN NOTE
- Patient seen and evaluated by Dr. Robb  - Lactate 4.1, creatinine up to 2.7, with elevated liver enzymes, BNP 34,000 (doubled since last month)  - Holding home BB, start dobutamine 2.5 mcg/kg  - IV diuresis, 80mg BID  - Start hydralazine 25mg TID and Isosorbide 20mg TID  - Strict I & O and daily weights  - Monitor on telemetry  - BMP and lactate in am    2/25/22:  - lactate has normalized  - creatinine improving  - continue diuresis with dobutamine and IV lasix 80mg bid  - continue hydralazine and isosorbide  - daily weights, strict I&Os  - BMP in AM    2/28/2022:  - Creatinine down to 1.6 yesterday and lactate normal and dobutamine was dc'd. Today lactate back up to 4.1, he is sob requiring supplemental O2 @ 2L with bilateral crackles. He's had 750ml UOP in past 24 hours on IV lasix 80mg BID.  - Restart dobutamine @ 5 mcq/kg and discontinue metoprolol. Will leave Entresto on board for now and monitor BP closely.  - CMP and lactate in am   - Plan to optimize again with inotrope and IV diuresis, once clinically euvolemic, will perform RHC prior to discontinuing dobutamine. If decompensates again without inotrope, we will need to discuss options including hospice care with patient and family

## 2022-02-28 NOTE — PROGRESS NOTES
[address] Medicine  Progress Note    Patient Name: Jeanmarie Michelle  MRN: [id number]  Patient Class: IP- Inpatient   Admission Date: 2/24/2022  Length of Stay: 4 days  Attending Physician: Eliu Brooks MD  Primary Care Provider: Regina Sprague MD        Subjective:     Principal Problem:Dilated cardiomyopathy        HPI:  Pt is a 81-year-old male with a history of persistent atrial fibrillation on Eliquis, end-stage dilated cardiomyopathy, history of stage III CKD, type 2 diabetes and hypertension who was supposed to get his ICD placement with Dr Carlson today however pt seemed to be volumed overload and in decompensated heart failure, along with elevated sugars therefore medicine team requested for inpatient admission. Pt states that he has been having dyspnea on exertion, no CP and LE swelling, states he was not sure what medicine to stop his medications prior to procedure. He was seen prior to his ICD implantion by cardiology team and was found to be in florid HF with elevated JVD and massive LE swelling. Besides dyspnea on exertion he has no other symptoms. Of note his TSH >70 recently and has not been on any synthroid.       Overview/Hospital Course:  2/28: Awake, alert sitting on side of bed. States he does not feel well and requiring supplemental oxygen. He states he does not have an appetite. SOB with exertion; bilateral LE swelling; left greater than right.       Interval History: AAO on side of bed. States SOB on e    Review of Systems   Constitutional:  Positive for fatigue. Negative for fever.   HENT:  Negative for sinus pressure and sneezing.    Eyes:  Negative for pain and redness.   Respiratory:  Positive for shortness of breath. Negative for cough.    Cardiovascular:  Positive for leg swelling. Negative for chest pain.   Gastrointestinal:  Negative for nausea and vomiting.   Endocrine: Negative for cold intolerance and heat intolerance.    Musculoskeletal:  Negative for arthralgias and back pain.   Skin:  Negative for color change and wound.   Neurological:  Positive for weakness. Negative for dizziness.   Hematological:  Negative for adenopathy. Does not bruise/bleed easily.   Psychiatric/Behavioral:  Negative for agitation and confusion.    All other systems reviewed and are negative.  Objective:     Vital Signs (Most Recent):  Temp: 98 °F (36.7 °C) (22 1105)  Pulse: 70 (22 1105)  Resp: 18 (22 1105)  BP: 100/67 (22 1105)  SpO2: 98 % (22 1105)   Vital Signs (24h Range):  Temp:  [97.4 °F (36.3 °C)-98.7 °F (37.1 °C)] 98 °F (36.7 °C)  Pulse:  [51-97] 70  Resp:  [17-26] 18  SpO2:  [91 %-100 %] 98 %  BP: ()/(50-71) 100/67     Weight: 96.8 kg (213 lb 8 oz)  Body mass index is 29.78 kg/m².    Intake/Output Summary (Last 24 hours) at 2022 1322  Last data filed at 2022 1111  Gross per 24 hour   Intake --   Output 1300 ml   Net -1300 ml      Physical Exam  Vitals and nursing note reviewed.   Constitutional:       General: He is not in acute distress.     Interventions: Nasal cannula in place.   HENT:      Head: Normocephalic and atraumatic.      Mouth/Throat:      Pharynx: No oropharyngeal exudate or posterior oropharyngeal erythema.   Eyes:      Conjunctiva/sclera: Conjunctivae normal.      Pupils: Pupils are equal, round, and reactive to light.   Cardiovascular:      Rate and Rhythm: Normal rate.      Heart sounds: No murmur heard.  Pulmonary:      Effort: No respiratory distress.      Breath sounds: No wheezing or rhonchi.   Abdominal:      General: There is no distension.      Tenderness: There is no abdominal tenderness.   Musculoskeletal:         General: Swelling present.      Cervical back: Normal range of motion and neck supple.      Right lower le+ Edema present.      Left lower le+ Edema present.   Skin:     General: Skin is warm.      Capillary Refill: Capillary refill takes 2 to 3 seconds.    Neurological:      General: No focal deficit present.      Mental Status: He is alert and oriented to person, place, and time.   Psychiatric:         Mood and Affect: Mood normal.         Behavior: Behavior normal.       Significant Labs: All pertinent labs within the past 24 hours have been reviewed.  Recent Lab Results         02/28/22  1204   02/28/22  0943   02/28/22  0604   02/28/22  0527   02/28/22  0234        Baso #   0.01             Basophil %   0.2             Differential Type   Auto             Eos #   0.03             Eosinophil %   0.6             Hematocrit   43.9             Hemoglobin   14.3             Immature Grans (Abs)   0.04             Immature Granulocytes   0.8             Lactate, Arie   4.1             Lymph #   0.72             Lymph %   13.8             MCH   27.6             MCHC   32.6             MCV   84.6             Mono #   0.46             Mono %   8.8             MPV   12.4             Neutrophils, Abs   3.96             Neutrophils Relative   75.8             nRBC   0.0             NUCLEATED RBC ABSOLUTE   0.00             Platelets   249             POC Glucose 101     66   58   98       RBC   5.19             RDW   19.7             WBC   5.22                                02/28/22  0114   02/27/22  1617        Baso #         Basophil %         Differential Type         Eos #         Eosinophil %         Hematocrit         Hemoglobin         Immature Grans (Abs)         Immature Granulocytes         Lactate, Arie         Lymph #         Lymph %         MCH         MCHC         MCV         Mono #         Mono %         MPV         Neutrophils, Abs         Neutrophils Relative         nRBC         NUCLEATED RBC ABSOLUTE         Platelets         POC Glucose 58   227       RBC         RDW         WBC                 Significant Imaging: I have reviewed all pertinent imaging results/findings within the past 24 hours.      Assessment/Plan:      * Dilated cardiomyopathy  HFrEF,  nonsichemic cardiomyopathy: EF 15% with moderate-severe TR (12/8/21); NYHA III-IV Stage D  Plan for ICD placement by Dr. Carlson today but now postpned d/t CHF exacerbation   Started on dobutamine and iv lasix.   Cardiology on board  Tele monitoring.   Cardiac deit strick I/o     2/28: Resumed on Dobutamine due to hypotension, elevated lactic of 4.1; IV lasix continued     Stage 3b chronic kidney disease  2/28: BUN/Creat 1.6/44 on yesterday; pending labs for today. Continue monitoring      Hypothyroid  tsh pta was >70  Repeat tsh and ft4  Will start on 137 mcg synthroid for now  Will need outpatient follow up with endocrine      Acute on chronic kidney failure  ctm , likely 2/2 htn, dm and heart failure      Chronic systolic congestive heart failure  2/28: Dobuatmine infusion resumed; IV Lasix BID continued; Pending ICD      Heart failure    Cardiology consulted  Will need ICD-placement  Cont Dobutamin guided diuiresis      Chronic a-fib  Rate controlled  Cont AC.      Benign prostatic hyperplasia    Tamsulosin, no LUTS at this time.       Hyperlipidemia    Cont statin      Gastroesophageal reflux disease    Cont PPI      Diabetes mellitus without complication    SSI sliding scale and long acting, adjust as needed    2/28: Variable glucose trends; 36 on BMP this am. Hypoglycemic trends noted; decrease SSI to mild coverage. Continue to monitor    Hypertension    Cont current medications.    2/28: BP regimens on hold due to hypotensive trends; Entresto continued for HF; continue monitoring BP trend    VTE Risk Mitigation (From admission, onward)         Ordered     apixaban tablet 5 mg  2 times daily         02/24/22 1101                Discharge Planning   MADDIE:      Code Status: Full Code   Is the patient medically ready for discharge?:     Reason for patient still in hospital (select all that apply): Laboratory test, Treatment and Consult recommendations  Discharge Plan A: Home                  Radha Echevarria  FNP  Department of Hospital Medicine   [facility] - [address] [place] [address]

## 2022-02-28 NOTE — HOSPITAL COURSE
2/28: Awake, alert sitting on side of bed. States he does not feel well and requiring supplemental oxygen. He states he does not have an appetite. SOB with exertion; bilateral LE swelling; left greater than right.   3/1: Awake, alert ambulating to [facility]. Denies SOB on exertion. Denies chest pain, weakness, dizziness. Endorses breathing has improved and feels better than on yesterday. Dobutamine infusion continues along with IV Lasix. Hypotensive after receiving Entresto. Discontinued per Cardiology.   3/2: Dobutamine continues. Urine o/p >1500 cc overnight.Follow up with endocrine at NH. Hypothyroidism. Follow up with cardiology at NH.  3/3:3/3: Dobutamine 2.5mcg/kg and discontinuing at 8pm tonight. Sob improved per pt.  03/04--Pt doing well, states feels much better and would like to be discharged today.  Pt admitted to the hospital for acute on chronic decompensated heart failure with hyperglycemia and an elevated TSH of 76, TSH rechecked and was 71.5, Free T4 0.46; Synthroid 137 mcg, will have pt establish care with endocrinologist.  Has been followed by cardiology during this admission and will follow cardiology recs at discharge;med rec completed per cardiology.  Will need Home health for daily monitoring of blood pressure and weight, etc, will follow up with cardiology outpatient in one week as well as PCP with repeat BMP.  Has met maximum benefit from this hospitalization and is stable for discharge.

## 2022-02-28 NOTE — PROGRESS NOTES
[address]  Cardiology  Progress Note    Patient Name: Jeanmarie Michelle  MRN: [id number]  Admission Date: 2/24/2022  Hospital Length of Stay: 4 days  Code Status: Full Code   Attending Physician: Eliu Brooks MD   Primary Care Physician: Regina Sprague MD  Expected Discharge Date:   Principal Problem:Dilated cardiomyopathy    Subjective:     Hospital Course:   No notes on file    Interval History: Patient seen today, reports he is breathing better but still feels sob without oxygen. O2 sat 98% with 2L NC. BP 87/50 this morning. Currently getting 80mg IV lasix BID, dobutamine, hydralazine and Imdur were discontinued yesterday. Entresto 24/26 continued and Toprol XL 25 mg started yesterday. He had 750ml UOP in past 24 hours.    Review of Systems   Constitutional: Negative for chills and fever.   Cardiovascular:  Positive for dyspnea on exertion and leg swelling. Negative for chest pain, orthopnea and palpitations.   Respiratory:  Positive for shortness of breath. Negative for sleep disturbances due to breathing.    Objective:     Vital Signs (Most Recent):  Temp: 98.1 °F (36.7 °C) (02/28/22 0748)  Pulse: 61 (02/28/22 0805)  Resp: 19 (02/28/22 0748)  BP: (!) 87/50 (02/28/22 0748)  SpO2: 97 % (02/28/22 0805)   Vital Signs (24h Range):  Temp:  [97.4 °F (36.3 °C)-98.7 °F (37.1 °C)] 98.1 °F (36.7 °C)  Pulse:  [51-97] 61  Resp:  [17-26] 19  SpO2:  [91 %-100 %] 97 %  BP: (72-99)/(50-71) 87/50     Weight: 96.8 kg (213 lb 8 oz)  Body mass index is 29.78 kg/m².     SpO2: 97 %  O2 Device (Oxygen Therapy): room air      Intake/Output Summary (Last 24 hours) at 2/28/2022 1302  Last data filed at 2/28/2022 1111  Gross per 24 hour   Intake --   Output 1300 ml   Net -1300 ml       Lines/Drains/Airways       Peripheral Intravenous Line  Duration                  Peripheral IV - Single Lumen 02/26/22 2051 20 G Left;Posterior Hand 1 day                    Physical Exam  Vitals reviewed.    Constitutional:       General: He is not in acute distress.  Neck:      Vascular: JVD present.   Cardiovascular:      Rate and Rhythm: Normal rate and regular rhythm.   Pulmonary:      Effort: Pulmonary effort is normal.      Breath sounds: Rales present. No wheezing or rhonchi.   Abdominal:      General: Bowel sounds are normal.      Palpations: Abdomen is soft.   Musculoskeletal:      Cervical back: Neck supple.      Right lower leg: Edema present.      Left lower leg: Edema present.   Skin:     General: Skin is warm and dry.      Coloration: Skin is not jaundiced.   Neurological:      Mental Status: He is alert and oriented to person, place, and time.       Significant Labs: Blood Culture: No results for input(s): LABBLOO in the last 48 hours., BMP:   Recent Labs   Lab 02/27/22  0457   GLU 36*      K 3.2*   CL 97*   CO2 31   BUN 33*   CREATININE 1.60*   CALCIUM 8.6   , CMP   Recent Labs   Lab 02/27/22 0457      K 3.2*   CL 97*   CO2 31   GLU 36*   BUN 33*   CREATININE 1.60*   CALCIUM 8.6   PROT 5.8*   ALBUMIN 3.1*   BILITOT 1.0   ALKPHOS 158*   AST 64*   *   ANIONGAP 12   EGFRNONAA 44*   , CBC   Recent Labs   Lab 02/27/22  0457 02/28/22  0943   WBC 5.71 5.22   HGB 12.5* 14.3   HCT 38.6* 43.9    249   , and Troponin No results for input(s): TROPONINI in the last 48 hours.    Significant Imaging: Echocardiogram: Transthoracic echo (TTE) complete (Cupid Only):   Results for orders placed or performed during the hospital encounter of 12/08/21   Echo   Result Value Ref Range    IVC diameter 2.24 cm    Left Ventricular Outflow Tract Mean Gradient 2.00 mmHg    AORTIC VALVE CUSP SEPERATION [id number] cm    LVIDd 6.23 (A) 3.5 - 6.0 cm    IVS 0.92 0.6 - 1.1 cm    Posterior Wall 0.92 0.6 - 1.1 cm    LVIDs 5.77 (A) 2.1 - 4.0 cm    FS 7 28 - 44 %    LV mass 236.68 g    LA size 4.63 cm    RVDD 3.77 cm    Left Ventricle Relative Wall Thickness 0.30 cm    AV mean gradient 4 mmHg    AV valve  area 2.31 cm2    AV index (prosthetic) 0.70     E wave deceleration time 148 msec    LVOT diameter 2.05 cm    LVOT area 3.3 cm2    LVOT peak VTI 13.89 cm    Ao VTI 19.82 cm    LVOT stroke volume 45.82 cm3    MV Peak E Arash 0.76 m/s    TR Max Arash 3.42 m/s    LV Systolic Volume 164.59 mL    LV Diastolic Volume 196.13 mL    Echo EF Estimated 16 %    Triscuspid Valve Regurgitation Peak Gradient 47 mmHg    EF 15 %    Narrative    · Atrial fibrillation with frequent PVC's, rate 85 and occasional runs of   wide complex tachycardia.  · The left ventricle is moderately enlarged with  · Atrial fibrillation observed.  · Mild right ventricular enlargement.  · Moderate mitral regurgitation.  · Moderate to severe tricuspid regurgitation.  · There is pulmonary hypertension.  · Moderate left atrial enlargement.  · Mild right atrial enlargement.       and X-Ray: CXR: X-Ray Chest 1 View (CXR): No results found for this visit on 02/24/22.    Assessment and Plan:     Brief HPI: 82 y/o male with PMH of persistent atrial fibrillation, nonischemic ([facility] 2018) dilated cardiomyopathy (Stage D HF, EF 15%), CKD, HTN, DM, and newly diagnosed hypothyroidism who was scheduled for outpatient ICD placement by Dr. Carlson today but presented with acute on chronic decompensated heart failure with hyperglycemia and recent elevated TSH of 76.0 (not yet treated). He was admitted by hospital medicine and cardiology consulted for heart failure exacerbation.      Patient was seen in cardiology clinic 2/10/22 at which time his aldactone and Entresto were not continued due to CKD, creatinine 2.1 (baseline 1.3-1.5). He presents today with increased sob, orthopnea, mild confusion (reported by family), worsening lower extremity edema and abdominal distention, and decreased oxygen saturation on RA (93%). On assessment he has significant lower extremity edema expanding to abdomen, JVD extending to mandible, all extremities are cool to touch with decreased breath  sound bilaterally. Labs revealed Lactate 4.1, BNP 34,000 (was 15,000 last month), CONSUELO with creatinine 2.7 (baseline 1.3-1.5), and elevated liver enzymes.    Hypothyroid  - TSH 76 2/8/2022  - Repeat TSH, add FreeT4  - Being followed by primary medical team    2/28/2022:  - TSH 71.5, Free T4 0.46  - Levothyroxine 137mcg initiated 2/25  - Being followed by primary medical team    Low cardiac output syndrome  - Patient seen and evaluated by Dr. Robb  - Lactate 4.1, creatinine up to 2.7, with elevated liver enzymes, BNP 34,000 (doubled since last month)  - Holding home BB, start dobutamine 2.5 mcg/kg  - IV diuresis, 80mg BID  - Start hydralazine 25mg TID and Isosorbide 20mg TID  - Strict I & O and daily weights  - Monitor on telemetry  - BMP and lactate in am    2/25/22:  - lactate has normalized  - creatinine improving  - continue diuresis with dobutamine and IV lasix 80mg bid  - continue hydralazine and isosorbide  - daily weights, strict I&Os  - BMP in AM    2/28/2022:  - Creatinine down to 1.6 yesterday and lactate normal and dobutamine was dc'd. Today lactate back up to 4.1, he is sob requiring supplemental O2 @ 2L with bilateral crackles. He's had 750ml UOP in past 24 hours on IV lasix 80mg BID.  - Restart dobutamine @ 5 mcq/kg and discontinue metoprolol. Will leave Entresto on board for now and monitor BP closely.  - CMP and lactate in am   - Plan to optimize again with inotrope and IV diuresis, once clinically euvolemic, will perform RHC prior to discontinuing dobutamine. If decompensates again without inotrope, we will need to discuss options including hospice care with patient and family    Acute on chronic kidney failure  - Creatinine 2.7, baseline (1.3-1.5)  - Likely related to low cardiac output/decompensated heart failure    2/25/22:  - creatinine has improved to 2.28  - will continue to monitor with diuresis    2/28/2022:  - Creatinine down to 1.6 yesterday  - BMP today pending      Chronic a-fib  -  Rate currently controlled  - On Eliquis for primary stroke prevention      Diabetes mellitus without complication  - Being followed by primary medical team        VTE Risk Mitigation (From admission, onward)         Ordered     apixaban tablet 5 mg  2 times daily         02/24/22 1101              PT seen and examined, chart reviewed, essentially agree with above.     CC: Shortness of breath  HPI: Pt reports shortness of breath and lower extremity edema has improved.    PE: agree with above.    Labs, Xrays, EKGs reviewed    IMP/Plan:    1. Acute exacerbation of CHF, worsening lactic acid off dobutamine, blood pressure remains marginal, will DC beta blocker, resume dobutamine, continue to monitor clincally    Pt is candidate for resynchronization therapy when medically optimized.      Leonela Powell, ROLY  Cardiology  [facility] - [address] [place] [address]

## 2022-02-28 NOTE — SUBJECTIVE & OBJECTIVE
Interval History: AAO on side of bed. States SOB on e    Review of Systems   Constitutional:  Positive for fatigue. Negative for fever.   HENT:  Negative for sinus pressure and sneezing.    Eyes:  Negative for pain and redness.   Respiratory:  Positive for shortness of breath. Negative for cough.    Cardiovascular:  Positive for leg swelling. Negative for chest pain.   Gastrointestinal:  Negative for nausea and vomiting.   Endocrine: Negative for cold intolerance and heat intolerance.   Musculoskeletal:  Negative for arthralgias and back pain.   Skin:  Negative for color change and wound.   Neurological:  Positive for weakness. Negative for dizziness.   Hematological:  Negative for adenopathy. Does not bruise/bleed easily.   Psychiatric/Behavioral:  Negative for agitation and confusion.    All other systems reviewed and are negative.  Objective:     Vital Signs (Most Recent):  Temp: 98 °F (36.7 °C) (02/28/22 1105)  Pulse: 70 (02/28/22 1105)  Resp: 18 (02/28/22 1105)  BP: 100/67 (02/28/22 1105)  SpO2: 98 % (02/28/22 1105)   Vital Signs (24h Range):  Temp:  [97.4 °F (36.3 °C)-98.7 °F (37.1 °C)] 98 °F (36.7 °C)  Pulse:  [51-97] 70  Resp:  [17-26] 18  SpO2:  [91 %-100 %] 98 %  BP: ()/(50-71) 100/67     Weight: 96.8 kg (213 lb 8 oz)  Body mass index is 29.78 kg/m².    Intake/Output Summary (Last 24 hours) at 2/28/2022 1322  Last data filed at 2/28/2022 1111  Gross per 24 hour   Intake --   Output 1300 ml   Net -1300 ml      Physical Exam  Vitals and nursing note reviewed.   Constitutional:       General: He is not in acute distress.     Interventions: Nasal cannula in place.   HENT:      Head: Normocephalic and atraumatic.      Mouth/Throat:      Pharynx: No oropharyngeal exudate or posterior oropharyngeal erythema.   Eyes:      Conjunctiva/sclera: Conjunctivae normal.      Pupils: Pupils are equal, round, and reactive to light.   Cardiovascular:      Rate and Rhythm: Normal rate.      Heart sounds: No murmur  heard.  Pulmonary:      Effort: No respiratory distress.      Breath sounds: No wheezing or rhonchi.   Abdominal:      General: There is no distension.      Tenderness: There is no abdominal tenderness.   Musculoskeletal:         General: Swelling present.      Cervical back: Normal range of motion and neck supple.      Right lower le+ Edema present.      Left lower le+ Edema present.   Skin:     General: Skin is warm.      Capillary Refill: Capillary refill takes 2 to 3 seconds.   Neurological:      General: No focal deficit present.      Mental Status: He is alert and oriented to person, place, and time.   Psychiatric:         Mood and Affect: Mood normal.         Behavior: Behavior normal.       Significant Labs: All pertinent labs within the past 24 hours have been reviewed.  Recent Lab Results         22  1204   22  0943   22  0604   22  0527   22  0234        Baso #   0.01             Basophil %   0.2             Differential Type   Auto             Eos #   0.03             Eosinophil %   0.6             Hematocrit   43.9             Hemoglobin   14.3             Immature Grans (Abs)   0.04             Immature Granulocytes   0.8             Lactate, Arie   4.1             Lymph #   0.72             Lymph %   13.8             MCH   27.6             MCHC   32.6             MCV   84.6             Mono #   0.46             Mono %   8.8             MPV   12.4             Neutrophils, Abs   3.96             Neutrophils Relative   75.8             nRBC   0.0             NUCLEATED RBC ABSOLUTE   0.00             Platelets   249             POC Glucose 101     66   58   98       RBC   5.19             RDW   19.7             WBC   5.22                                22  0114   22  1617        Baso #         Basophil %         Differential Type         Eos #         Eosinophil %         Hematocrit         Hemoglobin         Immature Grans (Abs)         Immature  Granulocytes         Lactate, Arie         Lymph #         Lymph %         MCH         MCHC         MCV         Mono #         Mono %         MPV         Neutrophils, Abs         Neutrophils Relative         nRBC         NUCLEATED RBC ABSOLUTE         Platelets         POC Glucose 58   227       RBC         RDW         WBC                 Significant Imaging: I have reviewed all pertinent imaging results/findings within the past 24 hours.

## 2022-02-28 NOTE — ASSESSMENT & PLAN NOTE
- TSH 76 2/8/2022  - Repeat TSH, add FreeT4  - Being followed by primary medical team    2/28/2022:  - TSH 71.5, Free T4 0.46  - Levothyroxine 137mcg initiated 2/25  - Being followed by primary medical team

## 2022-02-28 NOTE — SUBJECTIVE & OBJECTIVE
Interval History: Patient seen today, reports he is breathing better but still feels sob without oxygen. O2 sat 98% with 2L NC. BP 87/50 this morning. Currently getting 80mg IV lasix BID, dobutamine, hydralazine and Imdur were discontinued yesterday. Entresto 24/26 continued and Toprol XL 25 mg started yesterday. He had 750ml UOP in past 24 hours.    Review of Systems   Constitutional: Negative for chills and fever.   Cardiovascular:  Positive for dyspnea on exertion and leg swelling. Negative for chest pain, orthopnea and palpitations.   Respiratory:  Positive for shortness of breath. Negative for sleep disturbances due to breathing.    Objective:     Vital Signs (Most Recent):  Temp: 98.1 °F (36.7 °C) (02/28/22 0748)  Pulse: 61 (02/28/22 0805)  Resp: 19 (02/28/22 0748)  BP: (!) 87/50 (02/28/22 0748)  SpO2: 97 % (02/28/22 0805)   Vital Signs (24h Range):  Temp:  [97.4 °F (36.3 °C)-98.7 °F (37.1 °C)] 98.1 °F (36.7 °C)  Pulse:  [51-97] 61  Resp:  [17-26] 19  SpO2:  [91 %-100 %] 97 %  BP: (72-99)/(50-71) 87/50     Weight: 96.8 kg (213 lb 8 oz)  Body mass index is 29.78 kg/m².     SpO2: 97 %  O2 Device (Oxygen Therapy): room air      Intake/Output Summary (Last 24 hours) at 2/28/2022 1302  Last data filed at 2/28/2022 1111  Gross per 24 hour   Intake --   Output 1300 ml   Net -1300 ml       Lines/Drains/Airways       Peripheral Intravenous Line  Duration                  Peripheral IV - Single Lumen 02/26/22 2051 20 G Left;Posterior Hand 1 day                    Physical Exam  Vitals reviewed.   Constitutional:       General: He is not in acute distress.  Neck:      Vascular: JVD present.   Cardiovascular:      Rate and Rhythm: Normal rate and regular rhythm.   Pulmonary:      Effort: Pulmonary effort is normal.      Breath sounds: Rales present. No wheezing or rhonchi.   Abdominal:      General: Bowel sounds are normal.      Palpations: Abdomen is soft.   Musculoskeletal:      Cervical back: Neck supple.      Right  lower leg: Edema present.      Left lower leg: Edema present.   Skin:     General: Skin is warm and dry.      Coloration: Skin is not jaundiced.   Neurological:      Mental Status: He is alert and oriented to person, place, and time.       Significant Labs: Blood Culture: No results for input(s): LABBLOO in the last 48 hours., BMP:   Recent Labs   Lab 02/27/22  0457   GLU 36*      K 3.2*   CL 97*   CO2 31   BUN 33*   CREATININE 1.60*   CALCIUM 8.6   , CMP   Recent Labs   Lab 02/27/22  0457      K 3.2*   CL 97*   CO2 31   GLU 36*   BUN 33*   CREATININE 1.60*   CALCIUM 8.6   PROT 5.8*   ALBUMIN 3.1*   BILITOT 1.0   ALKPHOS 158*   AST 64*   *   ANIONGAP 12   EGFRNONAA 44*   , CBC   Recent Labs   Lab 02/27/22 0457 02/28/22  0943   WBC 5.71 5.22   HGB 12.5* 14.3   HCT 38.6* 43.9    249   , and Troponin No results for input(s): TROPONINI in the last 48 hours.    Significant Imaging: Echocardiogram: Transthoracic echo (TTE) complete (Cupid Only):   Results for orders placed or performed during the hospital encounter of 12/08/21   Echo   Result Value Ref Range    IVC diameter 2.24 cm    Left Ventricular Outflow Tract Mean Gradient 2.00 mmHg    AORTIC VALVE CUSP SEPERATION 15.711317640629883 cm    LVIDd 6.23 (A) 3.5 - 6.0 cm    IVS 0.92 0.6 - 1.1 cm    Posterior Wall 0.92 0.6 - 1.1 cm    LVIDs 5.77 (A) 2.1 - 4.0 cm    FS 7 28 - 44 %    LV mass 236.68 g    LA size 4.63 cm    RVDD 3.77 cm    Left Ventricle Relative Wall Thickness 0.30 cm    AV mean gradient 4 mmHg    AV valve area 2.31 cm2    AV index (prosthetic) 0.70     E wave deceleration time 148 msec    LVOT diameter 2.05 cm    LVOT area 3.3 cm2    LVOT peak VTI 13.89 cm    Ao VTI 19.82 cm    LVOT stroke volume 45.82 cm3    MV Peak E Arash 0.76 m/s    TR Max Arash 3.42 m/s    LV Systolic Volume 164.59 mL    LV Diastolic Volume 196.13 mL    Echo EF Estimated 16 %    Triscuspid Valve Regurgitation Peak Gradient 47 mmHg    EF 15 %    Narrative    ·  Atrial fibrillation with frequent PVC's, rate 85 and occasional runs of   wide complex tachycardia.  · The left ventricle is moderately enlarged with  · Atrial fibrillation observed.  · Mild right ventricular enlargement.  · Moderate mitral regurgitation.  · Moderate to severe tricuspid regurgitation.  · There is pulmonary hypertension.  · Moderate left atrial enlargement.  · Mild right atrial enlargement.       and X-Ray: CXR: X-Ray Chest 1 View (CXR): No results found for this visit on 02/24/22.

## 2022-02-28 NOTE — PLAN OF CARE
Problem: Adult Inpatient Plan of Care  Goal: Plan of Care Review  Outcome: Ongoing, Progressing  Goal: Patient-Specific Goal (Individualized)  Outcome: Ongoing, Progressing  Goal: Absence of Hospital-Acquired Illness or Injury  Outcome: Ongoing, Progressing  Goal: Optimal Comfort and Wellbeing  Outcome: Ongoing, Progressing  Goal: Readiness for Transition of Care  Outcome: Ongoing, Progressing     Problem: Diabetes Comorbidity  Goal: Blood Glucose Level Within Targeted Range  Outcome: Ongoing, Progressing     Problem: Fall Injury Risk  Goal: Absence of Fall and Fall-Related Injury  Outcome: Ongoing, Progressing     Problem: Adjustment to Device (Cardiac Rhythm Management Device)  Goal: Optimal Adjustment to Device  Outcome: Ongoing, Progressing     Problem: Bleeding (Cardiac Rhythm Management Device)  Goal: Absence of Bleeding  Outcome: Ongoing, Progressing     Problem: Device-Related Complication Risk (Cardiac Rhythm Management Device)  Goal: Effective Device Function  Outcome: Ongoing, Progressing     Problem: Infection (Cardiac Rhythm Management Device)  Goal: Absence of Infection Signs and Symptoms  Outcome: Ongoing, Progressing     Problem: Pain (Cardiac Rhythm Management Device)  Goal: Acceptable Pain Level  Outcome: Ongoing, Progressing     Problem: Respiratory Compromise (Cardiac Rhythm Management Device)  Goal: Effective Oxygenation and Ventilation  Outcome: Ongoing, Progressing     Problem: Fluid and Electrolyte Imbalance (Acute Kidney Injury/Impairment)  Goal: Fluid and Electrolyte Balance  Outcome: Ongoing, Progressing     Problem: Oral Intake Inadequate (Acute Kidney Injury/Impairment)  Goal: Optimal Nutrition Intake  Outcome: Ongoing, Progressing     Problem: Renal Function Impairment (Acute Kidney Injury/Impairment)  Goal: Effective Renal Function  Outcome: Ongoing, Progressing

## 2022-02-28 NOTE — ASSESSMENT & PLAN NOTE
HFrEF, nonsichemic cardiomyopathy: EF 15% with moderate-severe TR (12/8/21); NYHA III-IV Stage D  Plan for ICD placement by Dr. Carlson today but now postpned d/t CHF exacerbation   Started on dobutamine and iv lasix.   Cardiology on board  Tele monitoring.   Cardiac deit strick I/o     2/28: Resumed on Dobutamine due to hypotension, elevated lactic of 4.1; IV lasix continued

## 2022-02-28 NOTE — ASSESSMENT & PLAN NOTE
- Creatinine 2.7, baseline (1.3-1.5)  - Likely related to low cardiac output/decompensated heart failure    2/25/22:  - creatinine has improved to 2.28  - will continue to monitor with diuresis    2/28/2022:  - Creatinine down to 1.6 yesterday  - BMP today pending     Male

## 2022-02-28 NOTE — ASSESSMENT & PLAN NOTE
SSI sliding scale and long acting, adjust as needed    2/28: Variable glucose trends; 36 on BMP this am. Hypoglycemic trends noted; decrease SSI to mild coverage. Continue to monitor

## 2022-02-28 NOTE — ASSESSMENT & PLAN NOTE
Cont current medications.    2/28: BP regimens on hold due to hypotensive trends; Entresto continued for HF; continue monitoring BP trend

## 2022-03-01 LAB
ALBUMIN SERPL BCP-MCNC: 3.4 G/DL (ref 3.5–5)
ALBUMIN/GLOB SERPL: 1.1 {RATIO}
ALP SERPL-CCNC: 233 U/L (ref 45–115)
ALT SERPL W P-5'-P-CCNC: 112 U/L (ref 16–61)
ANION GAP SERPL CALCULATED.3IONS-SCNC: 16 MMOL/L (ref 7–16)
ANISOCYTOSIS BLD QL SMEAR: ABNORMAL
AST SERPL W P-5'-P-CCNC: 90 U/L (ref 15–37)
BACTERIA BLD CULT: ABNORMAL
BASOPHILS # BLD AUTO: 0.01 K/UL (ref 0–0.2)
BASOPHILS NFR BLD AUTO: 0.2 % (ref 0–1)
BILIRUB SERPL-MCNC: 1.4 MG/DL (ref 0–1.2)
BUN SERPL-MCNC: 43 MG/DL (ref 7–18)
BUN/CREAT SERPL: 20 (ref 6–20)
CALCIUM SERPL-MCNC: 8.6 MG/DL (ref 8.5–10.1)
CHLORIDE SERPL-SCNC: 95 MMOL/L (ref 98–107)
CO2 SERPL-SCNC: 27 MMOL/L (ref 21–32)
CREAT SERPL-MCNC: 2.1 MG/DL (ref 0.7–1.3)
DIFFERENTIAL METHOD BLD: ABNORMAL
DIGOXIN SERPL-MCNC: 0.4 NG/ML (ref 0.8–2)
EOSINOPHIL # BLD AUTO: 0.01 K/UL (ref 0–0.5)
EOSINOPHIL NFR BLD AUTO: 0.2 % (ref 1–4)
EOSINOPHIL NFR BLD MANUAL: 1 % (ref 1–4)
ERYTHROCYTE [DISTWIDTH] IN BLOOD BY AUTOMATED COUNT: 19.6 % (ref 11.5–14.5)
GLOBULIN SER-MCNC: 3.2 G/DL (ref 2–4)
GLUCOSE SERPL-MCNC: 109 MG/DL (ref 74–106)
GLUCOSE SERPL-MCNC: 206 MG/DL (ref 70–105)
GLUCOSE SERPL-MCNC: 226 MG/DL (ref 70–105)
GLUCOSE SERPL-MCNC: 243 MG/DL (ref 70–105)
GLUCOSE SERPL-MCNC: 99 MG/DL (ref 70–105)
GRAM STN SPEC: ABNORMAL
HCT VFR BLD AUTO: 41.6 % (ref 40–54)
HGB BLD-MCNC: 12.7 G/DL (ref 13.5–18)
HYPOCHROMIA BLD QL SMEAR: ABNORMAL
IMM GRANULOCYTES # BLD AUTO: 0.03 K/UL (ref 0–0.04)
IMM GRANULOCYTES NFR BLD: 0.6 % (ref 0–0.4)
LACTATE SERPL-SCNC: 1.8 MMOL/L (ref 0.4–2)
LYMPHOCYTES # BLD AUTO: 0.61 K/UL (ref 1–4.8)
LYMPHOCYTES NFR BLD AUTO: 12.5 % (ref 27–41)
LYMPHOCYTES NFR BLD MANUAL: 9 % (ref 27–41)
MCH RBC QN AUTO: 27 PG (ref 27–31)
MCHC RBC AUTO-ENTMCNC: 30.5 G/DL (ref 32–36)
MCV RBC AUTO: 88.3 FL (ref 80–96)
MONOCYTES # BLD AUTO: 0.48 K/UL (ref 0–0.8)
MONOCYTES NFR BLD AUTO: 9.8 % (ref 2–6)
MONOCYTES NFR BLD MANUAL: 8 % (ref 2–6)
MPC BLD CALC-MCNC: 11.4 FL (ref 9.4–12.4)
NEUTROPHILS # BLD AUTO: 3.75 K/UL (ref 1.8–7.7)
NEUTROPHILS NFR BLD AUTO: 76.7 % (ref 53–65)
NEUTS SEG NFR BLD MANUAL: 82 % (ref 50–62)
NRBC # BLD AUTO: 0 X10E3/UL
NRBC, AUTO (.00): 0 %
OVALOCYTES BLD QL SMEAR: ABNORMAL
PLATELET # BLD AUTO: 230 K/UL (ref 150–400)
PLATELET MORPHOLOGY: ABNORMAL
POTASSIUM SERPL-SCNC: 4.5 MMOL/L (ref 3.5–5.1)
PROT SERPL-MCNC: 6.6 G/DL (ref 6.4–8.2)
RBC # BLD AUTO: 4.71 M/UL (ref 4.6–6.2)
SODIUM SERPL-SCNC: 133 MMOL/L (ref 136–145)
TARGETS BLD QL SMEAR: ABNORMAL
WBC # BLD AUTO: 4.89 K/UL (ref 4.5–11)

## 2022-03-01 PROCEDURE — 99233 SBSQ HOSP IP/OBS HIGH 50: CPT | Mod: ,,, | Performed by: INTERNAL MEDICINE

## 2022-03-01 PROCEDURE — 80053 COMPREHEN METABOLIC PANEL: CPT | Performed by: INTERNAL MEDICINE

## 2022-03-01 PROCEDURE — 99233 SBSQ HOSP IP/OBS HIGH 50: CPT | Mod: ,,, | Performed by: HOSPITALIST

## 2022-03-01 PROCEDURE — 85025 COMPLETE CBC W/AUTO DIFF WBC: CPT | Performed by: INTERNAL MEDICINE

## 2022-03-01 PROCEDURE — 63600175 PHARM REV CODE 636 W HCPCS: Performed by: NURSE PRACTITIONER

## 2022-03-01 PROCEDURE — 27000221 HC OXYGEN, UP TO 24 HOURS

## 2022-03-01 PROCEDURE — 83605 ASSAY OF LACTIC ACID: CPT | Performed by: STUDENT IN AN ORGANIZED HEALTH CARE EDUCATION/TRAINING PROGRAM

## 2022-03-01 PROCEDURE — 36415 COLL VENOUS BLD VENIPUNCTURE: CPT | Performed by: STUDENT IN AN ORGANIZED HEALTH CARE EDUCATION/TRAINING PROGRAM

## 2022-03-01 PROCEDURE — 11000001 HC ACUTE MED/SURG PRIVATE ROOM

## 2022-03-01 PROCEDURE — 94761 N-INVAS EAR/PLS OXIMETRY MLT: CPT

## 2022-03-01 PROCEDURE — 80162 ASSAY OF DIGOXIN TOTAL: CPT | Performed by: HOSPITALIST

## 2022-03-01 PROCEDURE — 25000003 PHARM REV CODE 250: Performed by: NURSE PRACTITIONER

## 2022-03-01 PROCEDURE — 99233 PR SUBSEQUENT HOSPITAL CARE,LEVL III: ICD-10-PCS | Mod: ,,, | Performed by: HOSPITALIST

## 2022-03-01 PROCEDURE — 82962 GLUCOSE BLOOD TEST: CPT

## 2022-03-01 PROCEDURE — 99233 PR SUBSEQUENT HOSPITAL CARE,LEVL III: ICD-10-PCS | Mod: ,,, | Performed by: INTERNAL MEDICINE

## 2022-03-01 PROCEDURE — 25000003 PHARM REV CODE 250: Performed by: INTERNAL MEDICINE

## 2022-03-01 RX ADMIN — INSULIN ASPART 2 UNITS: 100 INJECTION, SOLUTION INTRAVENOUS; SUBCUTANEOUS at 11:03

## 2022-03-01 RX ADMIN — DIGOXIN 0.12 MG: 125 TABLET ORAL at 08:03

## 2022-03-01 RX ADMIN — INSULIN ASPART 1 UNITS: 100 INJECTION, SOLUTION INTRAVENOUS; SUBCUTANEOUS at 08:03

## 2022-03-01 RX ADMIN — INSULIN ASPART 2 UNITS: 100 INJECTION, SOLUTION INTRAVENOUS; SUBCUTANEOUS at 05:03

## 2022-03-01 RX ADMIN — LATANOPROST 1 DROP: 50 SOLUTION/ DROPS OPHTHALMIC at 04:03

## 2022-03-01 RX ADMIN — APIXABAN 5 MG: 5 TABLET, FILM COATED ORAL at 08:03

## 2022-03-01 RX ADMIN — SENNOSIDES AND DOCUSATE SODIUM 1 TABLET: 50; 8.6 TABLET ORAL at 08:03

## 2022-03-01 RX ADMIN — TAMSULOSIN HYDROCHLORIDE 0.4 MG: 0.4 CAPSULE ORAL at 08:03

## 2022-03-01 RX ADMIN — LEVOTHYROXINE SODIUM 137 MCG: 25 TABLET ORAL at 05:03

## 2022-03-01 RX ADMIN — PANTOPRAZOLE SODIUM 40 MG: 40 TABLET, DELAYED RELEASE ORAL at 08:03

## 2022-03-01 RX ADMIN — ASPIRIN 81 MG: 81 TABLET, COATED ORAL at 08:03

## 2022-03-01 RX ADMIN — FUROSEMIDE 80 MG: 10 INJECTION, SOLUTION INTRAMUSCULAR; INTRAVENOUS at 04:03

## 2022-03-01 NOTE — ASSESSMENT & PLAN NOTE
HFrEF, nonsichemic cardiomyopathy: EF 15% with moderate-severe TR (12/8/21); NYHA III-IV Stage D  Plan for ICD placement by Dr. Carlson today but now postpned d/t CHF exacerbation   Started on dobutamine and iv lasix.   Cardiology on board  Tele monitoring.   Cardiac deit strick I/o     2/28: Resumed on Dobutamine due to hypotension, elevated lactic of 4.1; IV lasix continued   3/1: Improvement with Dobutamine; lactic down to 1.8 on today. IV lasix continued. Entresto discontinued for hypotension. Cards continuing to follow.

## 2022-03-01 NOTE — SUBJECTIVE & OBJECTIVE
Interval History: Patient with significant improvement on IV dobutamine. No UOP documented over night. He reports feeling much better, lactate normal today Creatinine still 2.0. Entresto has been held for the most part due to hypotension, one dose given last night and BP dropped to 70s/50s. Will discontinue entresto today.    Review of Systems   Constitutional: Negative for chills and fever.   Cardiovascular:  Positive for dyspnea on exertion, leg swelling and orthopnea. Negative for chest pain and palpitations.        Orthopnea improving   Respiratory:  Negative for shortness of breath.    Objective:     Vital Signs (Most Recent):  Temp: 97.2 °F (36.2 °C) (03/01/22 1100)  Pulse: 84 (03/01/22 1100)  Resp: 17 (03/01/22 1100)  BP: 111/73 (03/01/22 1100)  SpO2: 98 % (03/01/22 1100) Vital Signs (24h Range):  Temp:  [97 °F (36.1 °C)-98.4 °F (36.9 °C)] 97.2 °F (36.2 °C)  Pulse:  [62-96] 84  Resp:  [16-24] 17  SpO2:  [92 %-98 %] 98 %  BP: ()/(44-79) 111/73     Weight: 96.8 kg (213 lb 7.9 oz)  Body mass index is 29.78 kg/m².     SpO2: 98 %  O2 Device (Oxygen Therapy): nasal cannula    No intake or output data in the 24 hours ending 03/01/22 1153    Lines/Drains/Airways       Peripheral Intravenous Line  Duration                  Peripheral IV - Single Lumen 02/26/22 2051 20 G Left;Posterior Hand 2 days                    Physical Exam  Vitals reviewed.   Constitutional:       General: He is not in acute distress.  Neck:      Vascular: JVD present.   Cardiovascular:      Rate and Rhythm: Normal rate and regular rhythm.   Pulmonary:      Effort: Pulmonary effort is normal.      Breath sounds: Rales present. No wheezing or rhonchi.      Comments: Breath sounds and orthopnea improved  Abdominal:      General: Bowel sounds are normal.      Palpations: Abdomen is soft.   Musculoskeletal:      Cervical back: Neck supple.      Right lower leg: Edema present.      Left lower leg: Edema present.   Skin:     General: Skin is  warm and dry.      Coloration: Skin is not jaundiced.   Neurological:      Mental Status: He is alert and oriented to person, place, and time.       Significant Labs: ABG: No results for input(s): PH, PCO2, HCO3, POCSATURATED, BE in the last 48 hours., Blood Culture: No results for input(s): LABBLOO in the last 48 hours., BMP:   Recent Labs   Lab 02/28/22  1750 02/28/22 2009 03/01/22 0432   * 131* 109*   * 131* 133*   K 6.8* 5.7* 4.5   CL 94* 94* 95*   CO2 25 26 27   BUN 46* 46* 43*   CREATININE 1.96* 2.28* 2.10*   CALCIUM 8.4* 8.5 8.6   , CMP   Recent Labs   Lab 02/28/22  1512 02/28/22  1750 02/28/22 2009 03/01/22 0432   * 129* 131* 133*   K 5.9* 6.8* 5.7* 4.5   CL 95* 94* 94* 95*   CO2 19* 25 26 27   * 124* 131* 109*   BUN 44* 46* 46* 43*   CREATININE 0.42* 1.96* 2.28* 2.10*   CALCIUM 7.6* 8.4* 8.5 8.6   PROT 6.9 6.8  --  6.6   ALBUMIN 3.6 3.5  --  3.4*   BILITOT 1.1 1.3*  --  1.4*   ALKPHOS 289* 272*  --  233*   * 143*  --  90*   * 130*  --  112*   ANIONGAP 20* 17* 17* 16   EGFRNONAA 207 35* 29* 32*   , CBC   Recent Labs   Lab 02/28/22  0943 03/01/22 0432   WBC 5.22 4.89   HGB 14.3 12.7*   HCT 43.9 41.6    230   , INR No results for input(s): INR, PROTIME in the last 48 hours., Lipid Panel No results for input(s): CHOL, HDL, LDLCALC, TRIG, CHOLHDL in the last 48 hours., and Troponin No results for input(s): TROPONINI in the last 48 hours.    Significant Imaging: Echocardiogram: Transthoracic echo (TTE) complete (Cupid Only):   Results for orders placed or performed during the hospital encounter of 12/08/21   Echo   Result Value Ref Range    IVC diameter 2.24 cm    Left Ventricular Outflow Tract Mean Gradient 2.00 mmHg    AORTIC VALVE CUSP SEPERATION [id number] cm    LVIDd 6.23 (A) 3.5 - 6.0 cm    IVS 0.92 0.6 - 1.1 cm    Posterior Wall 0.92 0.6 - 1.1 cm    LVIDs 5.77 (A) 2.1 - 4.0 cm    FS 7 28 - 44 %    LV mass 236.68 g    LA size 4.63 cm    RVDD 3.77  cm    Left Ventricle Relative Wall Thickness 0.30 cm    AV mean gradient 4 mmHg    AV valve area 2.31 cm2    AV index (prosthetic) 0.70     E wave deceleration time 148 msec    LVOT diameter 2.05 cm    LVOT area 3.3 cm2    LVOT peak VTI 13.89 cm    Ao VTI 19.82 cm    LVOT stroke volume 45.82 cm3    MV Peak E Arash 0.76 m/s    TR Max Arash 3.42 m/s    LV Systolic Volume 164.59 mL    LV Diastolic Volume 196.13 mL    Echo EF Estimated 16 %    Triscuspid Valve Regurgitation Peak Gradient 47 mmHg    EF 15 %    Narrative    · Atrial fibrillation with frequent PVC's, rate 85 and occasional runs of   wide complex tachycardia.  · The left ventricle is moderately enlarged with  · Atrial fibrillation observed.  · Mild right ventricular enlargement.  · Moderate mitral regurgitation.  · Moderate to severe tricuspid regurgitation.  · There is pulmonary hypertension.  · Moderate left atrial enlargement.  · Mild right atrial enlargement.       and X-Ray: CXR: X-Ray Chest 1 View (CXR): No results found for this visit on 02/24/22.

## 2022-03-01 NOTE — PROGRESS NOTES
[address] Medicine  Progress Note    Patient Name: Jeanmarie Michelle  MRN: [id number]  Patient Class: IP- Inpatient   Admission Date: 2/24/2022  Length of Stay: 5 days  Attending Physician: Eliu Brooks MD  Primary Care Provider: Regina Sprague MD        Subjective:     Principal Problem:Dilated cardiomyopathy        HPI:  Pt is a 81-year-old male with a history of persistent atrial fibrillation on Eliquis, end-stage dilated cardiomyopathy, history of stage III CKD, type 2 diabetes and hypertension who was supposed to get his ICD placement with Dr Carlson today however pt seemed to be volumed overload and in decompensated heart failure, along with elevated sugars therefore medicine team requested for inpatient admission. Pt states that he has been having dyspnea on exertion, no CP and LE swelling, states he was not sure what medicine to stop his medications prior to procedure. He was seen prior to his ICD implantion by cardiology team and was found to be in florid HF with elevated JVD and massive LE swelling. Besides dyspnea on exertion he has no other symptoms. Of note his TSH >70 recently and has not been on any synthroid.       Overview/Hospital Course:  2/28: Awake, alert sitting on side of bed. States he does not feel well and requiring supplemental oxygen. He states he does not have an appetite. SOB with exertion; bilateral LE swelling; left greater than right.   3/1: Awake, alert ambulating to CC. Denies SOB on exertion. Denies chest pain, weakness, dizziness. Endorses breathing has improved and feels better than on yesterday. Dobutamine infusion continues along with IV Lasix. Hypotensive after receiving Entresto. Discontinued per Cardiology.       Interval History: AAOx3; denies SOB, CP, weakness, dizziness. Hypotensive BP trend after receiving Entresto.     Review of Systems  Objective:     Vital Signs (Most Recent):  Temp: 97.2 °F (36.2 °C) (03/01/22  1101)  Pulse: 84 (03/01/22 1101)  Resp: 18 (03/01/22 1101)  BP: 115/73 (03/01/22 1101)  SpO2: 98 % (03/01/22 1101) Vital Signs (24h Range):  Temp:  [97 °F (36.1 °C)-98.4 °F (36.9 °C)] 97.2 °F (36.2 °C)  Pulse:  [62-96] 84  Resp:  [16-20] 18  SpO2:  [92 %-98 %] 98 %  BP: ()/(44-79) 115/73     Weight: 96.8 kg (213 lb 7.9 oz)  Body mass index is 29.78 kg/m².    Intake/Output Summary (Last 24 hours) at 3/1/2022 1508  Last data filed at 3/1/2022 1200  Gross per 24 hour   Intake --   Output 800 ml   Net -800 ml      Physical Exam    Significant Labs: All pertinent labs within the past 24 hours have been reviewed.  Recent Lab Results         03/01/22  1103   03/01/22  0520   03/01/22  0432   02/28/22 2032 02/28/22 2009        Albumin/Globulin Ratio     1.1           Albumin     3.4           Alkaline Phosphatase     233           ALT     112           Anion Gap     16     17       Aniso     1+           AST     90           Baso #     0.01           Basophil %     0.2           BILIRUBIN TOTAL     1.4           BUN     43     46       BUN/CREAT RATIO     20     20       Calcium     8.6     8.5       Chloride     95     94       CO2     27     26       Creatinine     2.10     2.28       Differential Type     Manual           Digoxin Lvl     0.4           eGFR if non      32     29       Eos #     0.01           Eosinophil %     0.2                1           Globulin, Total     3.2           Glucose     109     131       Hematocrit     41.6           Hemoglobin     12.7           Hypo     Few           Immature Grans (Abs)     0.03           Immature Granulocytes     0.6           Lactate, Arie     1.8           Lymph #     0.61           Lymph %     12.5                9           MCH     27.0           MCHC     30.5           MCV     88.3           Mono #     0.48           Mono %     9.8                8           MPV     11.4           Neutrophils, Abs     3.75           Neutrophils Relative      76.7           nRBC     0.0           NUCLEATED RBC ABSOLUTE     0.00           Ovalocytes     Few           PLATELET MORPHOLOGY     Large & Giant Platelets           Platelets     230           POC Glucose 206   99     130         Potassium     4.5     5.7       PROTEIN TOTAL     6.6           RBC     4.71           RDW     19.6           Segmented Neutrophils, Man %     82           Sodium     133     131       Target Cells     Few           WBC     4.89                              02/28/22  1750   02/28/22  1650   02/28/22  1512        Albumin/Globulin Ratio 1.1     1.1       Albumin 3.5     3.6       Alkaline Phosphatase 272     289            136       Anion Gap 17     20       Aniso                132       Baso #           Basophil %           BILIRUBIN TOTAL 1.3     1.1       BUN 46     44       BUN/CREAT RATIO 23     105       Calcium 8.4     7.6       Chloride 94     95       CO2 25     19       Creatinine 1.96     0.42       Differential Type           Digoxin Lvl           eGFR if non  35     207       Eos #           Eosinophil %           Globulin, Total 3.3     3.3       Glucose 124     110       Hematocrit           Hemoglobin           Hypo           Immature Grans (Abs)           Immature Granulocytes           Lactate, Arie           Lymph #           Lymph %           MCH           MCHC           MCV           Mono #           Mono %           MPV           Neutrophils, Abs           Neutrophils Relative           nRBC           NUCLEATED RBC ABSOLUTE           Ovalocytes           PLATELET MORPHOLOGY           Platelets           POC Glucose   75         Potassium 6.8     5.9       PROTEIN TOTAL 6.8     6.9       RBC           RDW           Segmented Neutrophils, Man %           Sodium 129     128       Target Cells           WBC                   Significant Imaging: I have reviewed all pertinent imaging results/findings within the past 24  hours.      Assessment/Plan:      * Dilated cardiomyopathy  HFrEF, nonsichemic cardiomyopathy: EF 15% with moderate-severe TR (12/8/21); NYHA III-IV Stage D  Plan for ICD placement by Dr. Calrson today but now postpned d/t CHF exacerbation   Started on dobutamine and iv lasix.   Cardiology on board  Tele monitoring.   Cardiac deit strick I/o     2/28: Resumed on Dobutamine due to hypotension, elevated lactic of 4.1; IV lasix continued   3/1: Improvement with Dobutamine; lactic down to 1.8 on today. IV lasix continued. Entresto discontinued for hypotension. Cards continuing to follow.     Stage 3b chronic kidney disease  2/28: BUN/Creat 1.6/44 on yesterday; pending labs for today. Continue monitoring      Hypothyroid  tsh pta was >70  Repeat tsh and ft4  Will start on 137 mcg synthroid for now  Will need outpatient follow up with endocrine    3/1: Continue current POC of Levothyroxine 137mcg PO QD    Acute on chronic kidney failure  ctm , likely 2/2 htn, dm and heart failure      Chronic systolic congestive heart failure  2/28: Dobuatmine infusion resumed; IV Lasix BID continued; Pending ICD  3/1: Improvement from yesterday; Continue current POC      Heart failure    Cardiology consulted  Will need ICD-placement  Cont Dobutamin guided diuiresis      Chronic a-fib  Rate controlled  Cont AC.      Benign prostatic hyperplasia    Tamsulosin, no LUTS at this time.       Hyperlipidemia    Cont statin      Gastroesophageal reflux disease    Cont PPI      Diabetes mellitus without complication    SSI sliding scale and long acting, adjust as needed    2/28: Variable glucose trends; 36 on BMP this am. Hypoglycemic trends noted; decrease SSI to mild coverage. Continue to monitor    3/1: SSI coverage decreased to low coverage; glucose trends 60s-120s. Continue monitoring    Hypertension    Cont current medications.    2/28: BP regimens on hold due to hypotensive trends; Entresto continued for HF; continue monitoring BP  trend  3/1: All BP regimens on hold. Continue monitoring BP trends      VTE Risk Mitigation (From admission, onward)         Ordered     apixaban tablet 5 mg  2 times daily         02/24/22 1101                Discharge Planning   MADDIE:      Code Status: Full Code   Is the patient medically ready for discharge?:     Reason for patient still in hospital (select all that apply): Treatment and Consult recommendations  Discharge Plan A: Home                  ROLY Gomez  Department of Hospital Medicine   [address]

## 2022-03-01 NOTE — ASSESSMENT & PLAN NOTE
Cont current medications.    2/28: BP regimens on hold due to hypotensive trends; Entresto continued for HF; continue monitoring BP trend  3/1: All BP regimens on hold. Continue monitoring BP trends

## 2022-03-01 NOTE — ASSESSMENT & PLAN NOTE
2/28: Dobuatmine infusion resumed; IV Lasix BID continued; Pending ICD  3/1: Improvement from yesterday; Continue current POC

## 2022-03-01 NOTE — ASSESSMENT & PLAN NOTE
- Creatinine 2.7, baseline (1.3-1.5)  - Likely related to low cardiac output/decompensated heart failure    2/25/22:  - creatinine has improved to 2.28  - will continue to monitor with diuresis    2/28/2022:  - Creatinine down to 1.6 yesterday  - BMP today pending    3/1/2022:  - Creatinine 2.1

## 2022-03-01 NOTE — PROGRESS NOTES
[address]  Cardiology  Progress Note    Patient Name: Jeanmarie Michelle  MRN: [id number]  Admission Date: 2/24/2022  Hospital Length of Stay: 5 days  Code Status: Full Code   Attending Physician: Eliu Brooks MD   Primary Care Physician: Regina Sprague MD  Expected Discharge Date:   Principal Problem:Dilated cardiomyopathy    Subjective:     Hospital Course:   No notes on file    Interval History: Patient with significant improvement on IV dobutamine. No UOP documented over night. He reports feeling much better, lactate normal today Creatinine still 2.0. Entresto has been held for the most part due to hypotension, one dose given last night and BP dropped to 70s/50s. Will discontinue entresto today.    Review of Systems   Constitutional: Negative for chills and fever.   Cardiovascular:  Positive for dyspnea on exertion, leg swelling and orthopnea. Negative for chest pain and palpitations.        Orthopnea improving   Respiratory:  Negative for shortness of breath.    Objective:     Vital Signs (Most Recent):  Temp: 97.2 °F (36.2 °C) (03/01/22 1100)  Pulse: 84 (03/01/22 1100)  Resp: 17 (03/01/22 1100)  BP: 111/73 (03/01/22 1100)  SpO2: 98 % (03/01/22 1100) Vital Signs (24h Range):  Temp:  [97 °F (36.1 °C)-98.4 °F (36.9 °C)] 97.2 °F (36.2 °C)  Pulse:  [62-96] 84  Resp:  [16-24] 17  SpO2:  [92 %-98 %] 98 %  BP: ()/(44-79) 111/73     Weight: 96.8 kg (213 lb 7.9 oz)  Body mass index is 29.78 kg/m².     SpO2: 98 %  O2 Device (Oxygen Therapy): nasal cannula    No intake or output data in the 24 hours ending 03/01/22 1153    Lines/Drains/Airways       Peripheral Intravenous Line  Duration                  Peripheral IV - Single Lumen 02/26/22 2051 20 G Left;Posterior Hand 2 days                    Physical Exam  Vitals reviewed.   Constitutional:       General: He is not in acute distress.  Neck:      Vascular: JVD present.   Cardiovascular:      Rate and Rhythm: Normal rate  and regular rhythm.   Pulmonary:      Effort: Pulmonary effort is normal.      Breath sounds: Rales present. No wheezing or rhonchi.      Comments: Breath sounds and orthopnea improved  Abdominal:      General: Bowel sounds are normal.      Palpations: Abdomen is soft.   Musculoskeletal:      Cervical back: Neck supple.      Right lower leg: Edema present.      Left lower leg: Edema present.   Skin:     General: Skin is warm and dry.      Coloration: Skin is not jaundiced.   Neurological:      Mental Status: He is alert and oriented to person, place, and time.       Significant Labs: ABG: No results for input(s): PH, PCO2, HCO3, POCSATURATED, BE in the last 48 hours., Blood Culture: No results for input(s): LABBLOO in the last 48 hours., BMP:   Recent Labs   Lab 02/28/22  1750 02/28/22 2009 03/01/22 0432   * 131* 109*   * 131* 133*   K 6.8* 5.7* 4.5   CL 94* 94* 95*   CO2 25 26 27   BUN 46* 46* 43*   CREATININE 1.96* 2.28* 2.10*   CALCIUM 8.4* 8.5 8.6   , CMP   Recent Labs   Lab 02/28/22  1512 02/28/22  1750 02/28/22 2009 03/01/22 0432   * 129* 131* 133*   K 5.9* 6.8* 5.7* 4.5   CL 95* 94* 94* 95*   CO2 19* 25 26 27   * 124* 131* 109*   BUN 44* 46* 46* 43*   CREATININE 0.42* 1.96* 2.28* 2.10*   CALCIUM 7.6* 8.4* 8.5 8.6   PROT 6.9 6.8  --  6.6   ALBUMIN 3.6 3.5  --  3.4*   BILITOT 1.1 1.3*  --  1.4*   ALKPHOS 289* 272*  --  233*   * 143*  --  90*   * 130*  --  112*   ANIONGAP 20* 17* 17* 16   EGFRNONAA 207 35* 29* 32*   , CBC   Recent Labs   Lab 02/28/22  0943 03/01/22 0432   WBC 5.22 4.89   HGB 14.3 12.7*   HCT 43.9 41.6    230   , INR No results for input(s): INR, PROTIME in the last 48 hours., Lipid Panel No results for input(s): CHOL, HDL, LDLCALC, TRIG, CHOLHDL in the last 48 hours., and Troponin No results for input(s): TROPONINI in the last 48 hours.    Significant Imaging: Echocardiogram: Transthoracic echo (TTE) complete (Cupid Only):   Results for orders  placed or performed during the hospital encounter of 12/08/21   Echo   Result Value Ref Range    IVC diameter 2.24 cm    Left Ventricular Outflow Tract Mean Gradient 2.00 mmHg    AORTIC VALVE CUSP SEPERATION [id number] cm    LVIDd 6.23 (A) 3.5 - 6.0 cm    IVS 0.92 0.6 - 1.1 cm    Posterior Wall 0.92 0.6 - 1.1 cm    LVIDs 5.77 (A) 2.1 - 4.0 cm    FS 7 28 - 44 %    LV mass 236.68 g    LA size 4.63 cm    RVDD 3.77 cm    Left Ventricle Relative Wall Thickness 0.30 cm    AV mean gradient 4 mmHg    AV valve area 2.31 cm2    AV index (prosthetic) 0.70     E wave deceleration time 148 msec    LVOT diameter 2.05 cm    LVOT area 3.3 cm2    LVOT peak VTI 13.89 cm    Ao VTI 19.82 cm    LVOT stroke volume 45.82 cm3    MV Peak E Arash 0.76 m/s    TR Max Arash 3.42 m/s    LV Systolic Volume 164.59 mL    LV Diastolic Volume 196.13 mL    Echo EF Estimated 16 %    Triscuspid Valve Regurgitation Peak Gradient 47 mmHg    EF 15 %    Narrative    · Atrial fibrillation with frequent PVC's, rate 85 and occasional runs of   wide complex tachycardia.  · The left ventricle is moderately enlarged with  · Atrial fibrillation observed.  · Mild right ventricular enlargement.  · Moderate mitral regurgitation.  · Moderate to severe tricuspid regurgitation.  · There is pulmonary hypertension.  · Moderate left atrial enlargement.  · Mild right atrial enlargement.       and X-Ray: CXR: X-Ray Chest 1 View (CXR): No results found for this visit on 02/24/22.    Assessment and Plan:     Brief HPI: 82 y/o male with PMH of persistent atrial fibrillation, nonischemic ([facility] 2018) dilated cardiomyopathy (Stage D HF, EF 15%), CKD, HTN, DM, and newly diagnosed hypothyroidism who was scheduled for outpatient ICD placement by Dr. Carlson today but presented with acute on chronic decompensated heart failure with hyperglycemia and recent elevated TSH of 76.0 (not yet treated). He was admitted by hospital medicine and cardiology consulted for heart failure  exacerbation.      Patient was seen in cardiology clinic 2/10/22 at which time his aldactone and Entresto were not continued due to CKD, creatinine 2.1 (baseline 1.3-1.5). He presents today with increased sob, orthopnea, mild confusion (reported by family), worsening lower extremity edema and abdominal distention, and decreased oxygen saturation on RA (93%). On assessment he has significant lower extremity edema expanding to abdomen, JVD extending to mandible, all extremities are cool to touch with decreased breath sound bilaterally. Labs revealed Lactate 4.1, BNP 34,000 (was 15,000 last month), CONSUELO with creatinine 2.7 (baseline 1.3-1.5), and elevated liver enzymes.    Hypothyroid  - TSH 76 2/8/2022  - Repeat TSH, add FreeT4  - Being followed by primary medical team    2/28/2022:  - TSH 71.5, Free T4 0.46  - Levothyroxine 137mcg initiated 2/25  - Being followed by primary medical team    Low cardiac output syndrome  - Patient seen and evaluated by Dr. Robb  - Lactate 4.1, creatinine up to 2.7, with elevated liver enzymes, BNP 34,000 (doubled since last month)  - Holding home BB, start dobutamine 2.5 mcg/kg  - IV diuresis, 80mg BID  - Start hydralazine 25mg TID and Isosorbide 20mg TID  - Strict I & O and daily weights  - Monitor on telemetry  - BMP and lactate in am    2/25/22:  - lactate has normalized  - creatinine improving  - continue diuresis with dobutamine and IV lasix 80mg bid  - continue hydralazine and isosorbide  - daily weights, strict I&Os  - BMP in AM    2/28/2022:  - Creatinine down to 1.6 yesterday and lactate normal and dobutamine was dc'd. Today lactate back up to 4.1, he is sob requiring supplemental O2 @ 2L with bilateral crackles. He's had 750ml UOP in past 24 hours on IV lasix 80mg BID.  - Restart dobutamine @ 5 mcq/kg and discontinue metoprolol. Will leave Entresto on board for now and monitor BP closely.  - CMP and lactate in am   - Plan to optimize again with inotrope and IV diuresis,  once clinically euvolemic, will perform RHC prior to discontinuing dobutamine. If decompensates again without inotrope, we will need to discuss options including hospice care with patient and family    3/1/2022:  - Patient seen and evaluated by Dr. Valdovinos  - Improving   - Continue IV diuresis, dobutamine and oral dig  - Discontinued Entresto due to hypotension (BP 70s/50s after med given)  - Strict I & O and daily weight  - Continue monitoring    Acute on chronic kidney failure  - Creatinine 2.7, baseline (1.3-1.5)  - Likely related to low cardiac output/decompensated heart failure    2/25/22:  - creatinine has improved to 2.28  - will continue to monitor with diuresis    2/28/2022:  - Creatinine down to 1.6 yesterday  - BMP today pending    3/1/2022:  - Creatinine 2.1      Chronic a-fib  - Rate currently controlled  - On Eliquis for primary stroke prevention      Diabetes mellitus without complication  - Being followed by primary medical team          VTE Risk Mitigation (From admission, onward)         Ordered     apixaban tablet 5 mg  2 times daily         02/24/22 1101              PT seen and examined, chart reviewed, essentially agree with findings as documented.     CC: shortness of breath, lower extremity edema  HPI: Pt reports is more short of breath today, feel legs are more swollen    PE: agree with findings as above,     Labs, Xrays, tele reviewed    IMP/Plan:  1. Acute on chronic systolic heart failure, worsening off inotropic tx, associated with hypotnesion. Will DC entresto, resume dobutamine, monitor clinically.   Leonela Powell, SHIRLEYP  Cardiology  [facility] - [address] [place] [address]

## 2022-03-01 NOTE — ASSESSMENT & PLAN NOTE
SSI sliding scale and long acting, adjust as needed    2/28: Variable glucose trends; 36 on BMP this am. Hypoglycemic trends noted; decrease SSI to mild coverage. Continue to monitor    3/1: SSI coverage decreased to low coverage; glucose trends 60s-120s. Continue monitoring

## 2022-03-01 NOTE — ASSESSMENT & PLAN NOTE
- Patient seen and evaluated by Dr. Robb  - Lactate 4.1, creatinine up to 2.7, with elevated liver enzymes, BNP 34,000 (doubled since last month)  - Holding home BB, start dobutamine 2.5 mcg/kg  - IV diuresis, 80mg BID  - Start hydralazine 25mg TID and Isosorbide 20mg TID  - Strict I & O and daily weights  - Monitor on telemetry  - BMP and lactate in am    2/25/22:  - lactate has normalized  - creatinine improving  - continue diuresis with dobutamine and IV lasix 80mg bid  - continue hydralazine and isosorbide  - daily weights, strict I&Os  - BMP in AM    2/28/2022:  - Creatinine down to 1.6 yesterday and lactate normal and dobutamine was dc'd. Today lactate back up to 4.1, he is sob requiring supplemental O2 @ 2L with bilateral crackles. He's had 750ml UOP in past 24 hours on IV lasix 80mg BID.  - Restart dobutamine @ 5 mcq/kg and discontinue metoprolol. Will leave Entresto on board for now and monitor BP closely.  - CMP and lactate in am   - Plan to optimize again with inotrope and IV diuresis, once clinically euvolemic, will perform RHC prior to discontinuing dobutamine. If decompensates again without inotrope, we will need to discuss options including hospice care with patient and family    3/1/2022:  - Patient seen and evaluated by Dr. Valdovinos  - Improving   - Continue IV diuresis, dobutamine and oral dig  - Discontinued Entresto due to hypotension (BP 70s/50s after med given)  - Strict I & O and daily weight  - Continue monitoring

## 2022-03-01 NOTE — PLAN OF CARE
Problem: Adult Inpatient Plan of Care  Goal: Plan of Care Review  Outcome: Ongoing, Progressing  Goal: Patient-Specific Goal (Individualized)  Outcome: Ongoing, Progressing  Goal: Absence of Hospital-Acquired Illness or Injury  Outcome: Ongoing, Progressing  Goal: Optimal Comfort and Wellbeing  Outcome: Ongoing, Progressing  Goal: Readiness for Transition of Care  Outcome: Ongoing, Progressing     Problem: Diabetes Comorbidity  Goal: Blood Glucose Level Within Targeted Range  Outcome: Ongoing, Progressing     Problem: Fall Injury Risk  Goal: Absence of Fall and Fall-Related Injury  Outcome: Ongoing, Progressing     Problem: Adjustment to Device (Cardiac Rhythm Management Device)  Goal: Optimal Adjustment to Device  Outcome: Ongoing, Progressing     Problem: Bleeding (Cardiac Rhythm Management Device)  Goal: Absence of Bleeding  Outcome: Ongoing, Progressing

## 2022-03-02 LAB
ALBUMIN SERPL BCP-MCNC: 3.4 G/DL (ref 3.5–5)
ALBUMIN/GLOB SERPL: 1.1 {RATIO}
ALP SERPL-CCNC: 214 U/L (ref 45–115)
ALT SERPL W P-5'-P-CCNC: 117 U/L (ref 16–61)
ANION GAP SERPL CALCULATED.3IONS-SCNC: 12 MMOL/L (ref 7–16)
ANISOCYTOSIS BLD QL SMEAR: ABNORMAL
AST SERPL W P-5'-P-CCNC: 99 U/L (ref 15–37)
BACTERIA BLD CULT: NORMAL
BASOPHILS # BLD AUTO: 0.01 K/UL (ref 0–0.2)
BASOPHILS NFR BLD AUTO: 0.2 % (ref 0–1)
BILIRUB SERPL-MCNC: 1.6 MG/DL (ref 0–1.2)
BUN SERPL-MCNC: 40 MG/DL (ref 7–18)
BUN/CREAT SERPL: 21 (ref 6–20)
CALCIUM SERPL-MCNC: 9 MG/DL (ref 8.5–10.1)
CHLORIDE SERPL-SCNC: 97 MMOL/L (ref 98–107)
CO2 SERPL-SCNC: 30 MMOL/L (ref 21–32)
CREAT SERPL-MCNC: 1.91 MG/DL (ref 0.7–1.3)
DIFFERENTIAL METHOD BLD: ABNORMAL
EOSINOPHIL # BLD AUTO: 0.04 K/UL (ref 0–0.5)
EOSINOPHIL NFR BLD AUTO: 0.8 % (ref 1–4)
EOSINOPHIL NFR BLD MANUAL: 1 % (ref 1–4)
ERYTHROCYTE [DISTWIDTH] IN BLOOD BY AUTOMATED COUNT: 19.4 % (ref 11.5–14.5)
GLOBULIN SER-MCNC: 3.1 G/DL (ref 2–4)
GLUCOSE SERPL-MCNC: 149 MG/DL (ref 70–105)
GLUCOSE SERPL-MCNC: 159 MG/DL (ref 74–106)
GLUCOSE SERPL-MCNC: 194 MG/DL (ref 70–105)
GLUCOSE SERPL-MCNC: 247 MG/DL (ref 70–105)
GLUCOSE SERPL-MCNC: 273 MG/DL (ref 70–105)
HCT VFR BLD AUTO: 40 % (ref 40–54)
HGB BLD-MCNC: 12.5 G/DL (ref 13.5–18)
HYPOCHROMIA BLD QL SMEAR: ABNORMAL
IMM GRANULOCYTES # BLD AUTO: 0.03 K/UL (ref 0–0.04)
IMM GRANULOCYTES NFR BLD: 0.6 % (ref 0–0.4)
LACTATE SERPL-SCNC: 1.3 MMOL/L (ref 0.4–2)
LYMPHOCYTES # BLD AUTO: 0.43 K/UL (ref 1–4.8)
LYMPHOCYTES NFR BLD AUTO: 9.1 % (ref 27–41)
LYMPHOCYTES NFR BLD MANUAL: 7 % (ref 27–41)
MCH RBC QN AUTO: 27.1 PG (ref 27–31)
MCHC RBC AUTO-ENTMCNC: 31.3 G/DL (ref 32–36)
MCV RBC AUTO: 86.6 FL (ref 80–96)
MONOCYTES # BLD AUTO: 0.56 K/UL (ref 0–0.8)
MONOCYTES NFR BLD AUTO: 11.9 % (ref 2–6)
MONOCYTES NFR BLD MANUAL: 7 % (ref 2–6)
MPC BLD CALC-MCNC: 11.8 FL (ref 9.4–12.4)
NEUTROPHILS # BLD AUTO: 3.65 K/UL (ref 1.8–7.7)
NEUTROPHILS NFR BLD AUTO: 77.4 % (ref 53–65)
NEUTS SEG NFR BLD MANUAL: 85 % (ref 50–62)
NRBC # BLD AUTO: 0 X10E3/UL
NRBC BLD MANUAL-RTO: 1 /100 WBC
NRBC, AUTO (.00): 0 %
OVALOCYTES BLD QL SMEAR: ABNORMAL
PLATELET # BLD AUTO: 217 K/UL (ref 150–400)
PLATELET MORPHOLOGY: ABNORMAL
POLYCHROMASIA BLD QL SMEAR: ABNORMAL
POTASSIUM SERPL-SCNC: 3.8 MMOL/L (ref 3.5–5.1)
PROT SERPL-MCNC: 6.5 G/DL (ref 6.4–8.2)
RBC # BLD AUTO: 4.62 M/UL (ref 4.6–6.2)
SODIUM SERPL-SCNC: 135 MMOL/L (ref 136–145)
STOMATOCYTES BLD QL SMEAR: ABNORMAL
TARGETS BLD QL SMEAR: ABNORMAL
WBC # BLD AUTO: 4.72 K/UL (ref 4.5–11)

## 2022-03-02 PROCEDURE — 36415 COLL VENOUS BLD VENIPUNCTURE: CPT | Performed by: STUDENT IN AN ORGANIZED HEALTH CARE EDUCATION/TRAINING PROGRAM

## 2022-03-02 PROCEDURE — 63600175 PHARM REV CODE 636 W HCPCS: Performed by: NURSE PRACTITIONER

## 2022-03-02 PROCEDURE — 11000001 HC ACUTE MED/SURG PRIVATE ROOM

## 2022-03-02 PROCEDURE — 25000003 PHARM REV CODE 250: Performed by: NURSE PRACTITIONER

## 2022-03-02 PROCEDURE — 85025 COMPLETE CBC W/AUTO DIFF WBC: CPT | Performed by: INTERNAL MEDICINE

## 2022-03-02 PROCEDURE — 83605 ASSAY OF LACTIC ACID: CPT | Performed by: STUDENT IN AN ORGANIZED HEALTH CARE EDUCATION/TRAINING PROGRAM

## 2022-03-02 PROCEDURE — 99233 SBSQ HOSP IP/OBS HIGH 50: CPT | Mod: ,,, | Performed by: INTERNAL MEDICINE

## 2022-03-02 PROCEDURE — 99233 PR SUBSEQUENT HOSPITAL CARE,LEVL III: ICD-10-PCS | Mod: ,,, | Performed by: INTERNAL MEDICINE

## 2022-03-02 PROCEDURE — 99233 SBSQ HOSP IP/OBS HIGH 50: CPT | Mod: ,,, | Performed by: HOSPITALIST

## 2022-03-02 PROCEDURE — 99233 PR SUBSEQUENT HOSPITAL CARE,LEVL III: ICD-10-PCS | Mod: ,,, | Performed by: HOSPITALIST

## 2022-03-02 PROCEDURE — 80053 COMPREHEN METABOLIC PANEL: CPT | Performed by: INTERNAL MEDICINE

## 2022-03-02 PROCEDURE — 82962 GLUCOSE BLOOD TEST: CPT

## 2022-03-02 PROCEDURE — 25000003 PHARM REV CODE 250: Performed by: INTERNAL MEDICINE

## 2022-03-02 RX ORDER — DOBUTAMINE HYDROCHLORIDE 400 MG/100ML
2.5 INJECTION INTRAVENOUS CONTINUOUS
Status: ACTIVE | OUTPATIENT
Start: 2022-03-02 | End: 2022-03-02

## 2022-03-02 RX ORDER — POTASSIUM CHLORIDE 20 MEQ/1
20 TABLET, EXTENDED RELEASE ORAL ONCE
Status: COMPLETED | OUTPATIENT
Start: 2022-03-02 | End: 2022-03-02

## 2022-03-02 RX ADMIN — FUROSEMIDE 80 MG: 10 INJECTION, SOLUTION INTRAMUSCULAR; INTRAVENOUS at 04:03

## 2022-03-02 RX ADMIN — APIXABAN 5 MG: 5 TABLET, FILM COATED ORAL at 09:03

## 2022-03-02 RX ADMIN — INSULIN ASPART 1 UNITS: 100 INJECTION, SOLUTION INTRAVENOUS; SUBCUTANEOUS at 09:03

## 2022-03-02 RX ADMIN — ASPIRIN 81 MG: 81 TABLET, COATED ORAL at 09:03

## 2022-03-02 RX ADMIN — SENNOSIDES AND DOCUSATE SODIUM 1 TABLET: 50; 8.6 TABLET ORAL at 09:03

## 2022-03-02 RX ADMIN — POTASSIUM CHLORIDE 20 MEQ: 20 TABLET, EXTENDED RELEASE ORAL at 07:03

## 2022-03-02 RX ADMIN — LEVOTHYROXINE SODIUM 137 MCG: 25 TABLET ORAL at 05:03

## 2022-03-02 RX ADMIN — INSULIN ASPART 3 UNITS: 100 INJECTION, SOLUTION INTRAVENOUS; SUBCUTANEOUS at 11:03

## 2022-03-02 RX ADMIN — FUROSEMIDE 80 MG: 10 INJECTION, SOLUTION INTRAMUSCULAR; INTRAVENOUS at 07:03

## 2022-03-02 RX ADMIN — DOBUTAMINE HYDROCHLORIDE 5 MCG/KG/MIN: 400 INJECTION INTRAVENOUS at 01:03

## 2022-03-02 RX ADMIN — TAMSULOSIN HYDROCHLORIDE 0.4 MG: 0.4 CAPSULE ORAL at 09:03

## 2022-03-02 RX ADMIN — PANTOPRAZOLE SODIUM 40 MG: 40 TABLET, DELAYED RELEASE ORAL at 09:03

## 2022-03-02 RX ADMIN — DIGOXIN 0.12 MG: 125 TABLET ORAL at 09:03

## 2022-03-02 NOTE — ASSESSMENT & PLAN NOTE
- Creatinine 2.7, baseline (1.3-1.5)  - Likely related to low cardiac output/decompensated heart failure    2/25/22:  - creatinine has improved to 2.28  - will continue to monitor with diuresis    2/28/2022:  - Creatinine down to 1.6 yesterday  - BMP today pending    3/1/2022:  - Creatinine 2.1    3/2/2022:  - Creatinine 1.9

## 2022-03-02 NOTE — PLAN OF CARE
Problem: Adult Inpatient Plan of Care  Goal: Plan of Care Review  Outcome: Ongoing, Progressing  Goal: Patient-Specific Goal (Individualized)  Outcome: Ongoing, Progressing  Goal: Absence of Hospital-Acquired Illness or Injury  Outcome: Ongoing, Progressing  Goal: Optimal Comfort and Wellbeing  Outcome: Ongoing, Progressing  Goal: Readiness for Transition of Care  Outcome: Ongoing, Progressing     Problem: Diabetes Comorbidity  Goal: Blood Glucose Level Within Targeted Range  Outcome: Ongoing, Progressing     Problem: Fall Injury Risk  Goal: Absence of Fall and Fall-Related Injury  Outcome: Ongoing, Progressing     Problem: Adjustment to Device (Cardiac Rhythm Management Device)  Goal: Optimal Adjustment to Device  Outcome: Ongoing, Progressing     Problem: Bleeding (Cardiac Rhythm Management Device)  Goal: Absence of Bleeding  Outcome: Ongoing, Progressing     Problem: Device-Related Complication Risk (Cardiac Rhythm Management Device)  Goal: Effective Device Function  Outcome: Ongoing, Progressing     Problem: Infection (Cardiac Rhythm Management Device)  Goal: Absence of Infection Signs and Symptoms  Outcome: Ongoing, Progressing     Problem: Pain (Cardiac Rhythm Management Device)  Goal: Acceptable Pain Level  Outcome: Ongoing, Progressing     Problem: Respiratory Compromise (Cardiac Rhythm Management Device)  Goal: Effective Oxygenation and Ventilation  Outcome: Ongoing, Progressing     Problem: Fluid and Electrolyte Imbalance (Acute Kidney Injury/Impairment)  Goal: Fluid and Electrolyte Balance  Outcome: Ongoing, Progressing     Problem: Oral Intake Inadequate (Acute Kidney Injury/Impairment)  Goal: Optimal Nutrition Intake  Outcome: Ongoing, Progressing     Problem: Renal Function Impairment (Acute Kidney Injury/Impairment)  Goal: Effective Renal Function  Outcome: Ongoing, Progressing     Problem: Skin Injury Risk Increased  Goal: Skin Health and Integrity  Outcome: Ongoing, Progressing    Continue current  POC

## 2022-03-02 NOTE — SUBJECTIVE & OBJECTIVE
Interval History: NAEO. Pt denies SOB or CP.    Review of Systems   Constitutional:  Positive for appetite change. Negative for diaphoresis and fever.   HENT:  Negative for congestion.    Respiratory:  Negative for chest tightness and shortness of breath.    Cardiovascular:  Negative for chest pain and palpitations.   Gastrointestinal:  Negative for abdominal pain, constipation, diarrhea and nausea.   Neurological:  Negative for dizziness and headaches.   All other systems reviewed and are negative.  Objective:     Vital Signs (Most Recent):  Temp: 97.5 °F (36.4 °C) (03/02/22 0710)  Pulse: 68 (apical) (03/02/22 0902)  Resp: 18 (03/02/22 0710)  BP: 120/78 (03/02/22 0710)  SpO2: 98 % (03/02/22 0400) Vital Signs (24h Range):  Temp:  [97.5 °F (36.4 °C)-98.7 °F (37.1 °C)] 97.5 °F (36.4 °C)  Pulse:  [62-80] 68  Resp:  [16-18] 18  SpO2:  [98 %] 98 %  BP: (100-121)/(60-88) 120/78     Weight: 94.8 kg (209 lb)  Body mass index is 29.15 kg/m².    Intake/Output Summary (Last 24 hours) at 3/2/2022 1105  Last data filed at 3/2/2022 0600  Gross per 24 hour   Intake --   Output 1540 ml   Net -1540 ml      Physical Exam  Vitals and nursing note reviewed.   Constitutional:       General: He is not in acute distress.     Interventions: Nasal cannula in place.   HENT:      Head: Normocephalic and atraumatic.      Nose: Nose normal.      Mouth/Throat:      Mouth: Mucous membranes are moist.      Pharynx: No oropharyngeal exudate or posterior oropharyngeal erythema.   Eyes:      Extraocular Movements: Extraocular movements intact.      Conjunctiva/sclera: Conjunctivae normal.      Pupils: Pupils are equal, round, and reactive to light.   Cardiovascular:      Rate and Rhythm: Normal rate.      Heart sounds: No murmur heard.  Pulmonary:      Effort: No respiratory distress.      Breath sounds: No wheezing or rhonchi.   Abdominal:      General: Bowel sounds are normal. There is distension.      Palpations: Abdomen is soft.      Tenderness:  There is no abdominal tenderness.   Musculoskeletal:         General: Swelling present.      Cervical back: Normal range of motion and neck supple.      Right lower le+ Edema present.      Left lower le+ Edema present.   Skin:     General: Skin is warm.      Capillary Refill: Capillary refill takes less than 2 seconds.   Neurological:      General: No focal deficit present.      Mental Status: He is alert and oriented to person, place, and time.      Cranial Nerves: No cranial nerve deficit.      Sensory: No sensory deficit.   Psychiatric:         Mood and Affect: Mood normal.         Behavior: Behavior normal.       Significant Labs: All pertinent labs within the past 24 hours have been reviewed.    Significant Imaging: I have reviewed all pertinent imaging results/findings within the past 24 hours.

## 2022-03-02 NOTE — SUBJECTIVE & OBJECTIVE
Interval History: Patient seen and evaluated by Dr. Valdovinos. 24 hour UOP 620ml. 4 pound weight loss since restarting dobutamine. VSS.    Review of Systems   Constitutional: Negative for chills and fever.   Cardiovascular:  Positive for dyspnea on exertion and leg swelling. Negative for chest pain, orthopnea and palpitations.        Orthopnea improving   Respiratory:  Negative for shortness of breath.    Objective:     Vital Signs (Most Recent):  Temp: 97.2 °F (36.2 °C) (03/02/22 1109)  Pulse: 66 (03/02/22 1109)  Resp: 18 (03/02/22 1109)  BP: 112/68 (03/02/22 1109)  SpO2: 98 % (03/02/22 1109) Vital Signs (24h Range):  Temp:  [97.2 °F (36.2 °C)-98.7 °F (37.1 °C)] 97.2 °F (36.2 °C)  Pulse:  [62-80] 66  Resp:  [16-18] 18  SpO2:  [98 %] 98 %  BP: (100-121)/(60-88) 112/68     Weight: 94.8 kg (209 lb)  Body mass index is 29.15 kg/m².     SpO2: 98 %  O2 Device (Oxygen Therapy): nasal cannula      Intake/Output Summary (Last 24 hours) at 3/2/2022 1419  Last data filed at 3/2/2022 0600  Gross per 24 hour   Intake --   Output 740 ml   Net -740 ml       Lines/Drains/Airways       Peripheral Intravenous Line  Duration                  Peripheral IV - Single Lumen 03/02/22 0625 20 G Anterior;Right Forearm <1 day                    Physical Exam  Vitals reviewed.   Constitutional:       General: He is not in acute distress.  Neck:      Vascular: JVD present.   Cardiovascular:      Rate and Rhythm: Normal rate and regular rhythm.   Pulmonary:      Effort: Pulmonary effort is normal.      Breath sounds: No wheezing, rhonchi or rales.      Comments: Breath sounds and orthopnea improved  Abdominal:      General: Bowel sounds are normal.      Palpations: Abdomen is soft.   Musculoskeletal:      Cervical back: Neck supple.      Right lower leg: Edema present.      Left lower leg: Edema present.   Skin:     General: Skin is warm and dry.      Coloration: Skin is not jaundiced.   Neurological:      Mental Status: He is alert and oriented to  person, place, and time.       Significant Labs: BMP:   Recent Labs   Lab 02/28/22 2009 03/01/22 0432 03/02/22  0527   * 109* 159*   * 133* 135*   K 5.7* 4.5 3.8   CL 94* 95* 97*   CO2 26 27 30   BUN 46* 43* 40*   CREATININE 2.28* 2.10* 1.91*   CALCIUM 8.5 8.6 9.0   , CMP   Recent Labs   Lab 02/28/22  1750 02/28/22 2009 03/01/22 0432 03/02/22  0527   * 131* 133* 135*   K 6.8* 5.7* 4.5 3.8   CL 94* 94* 95* 97*   CO2 25 26 27 30   * 131* 109* 159*   BUN 46* 46* 43* 40*   CREATININE 1.96* 2.28* 2.10* 1.91*   CALCIUM 8.4* 8.5 8.6 9.0   PROT 6.8  --  6.6 6.5   ALBUMIN 3.5  --  3.4* 3.4*   BILITOT 1.3*  --  1.4* 1.6*   ALKPHOS 272*  --  233* 214*   *  --  90* 99*   *  --  112* 117*   ANIONGAP 17* 17* 16 12   EGFRNONAA 35* 29* 32* 36*   , and CBC   Recent Labs   Lab 03/01/22 0432 03/02/22  0527   WBC 4.89 4.72   HGB 12.7* 12.5*   HCT 41.6 40.0    217       Significant Imaging: Echocardiogram: Transthoracic echo (TTE) complete (Cupid Only):   Results for orders placed or performed during the hospital encounter of 12/08/21   Echo   Result Value Ref Range    IVC diameter 2.24 cm    Left Ventricular Outflow Tract Mean Gradient 2.00 mmHg    AORTIC VALVE CUSP SEPERATION [id number] cm    LVIDd 6.23 (A) 3.5 - 6.0 cm    IVS 0.92 0.6 - 1.1 cm    Posterior Wall 0.92 0.6 - 1.1 cm    LVIDs 5.77 (A) 2.1 - 4.0 cm    FS 7 28 - 44 %    LV mass 236.68 g    LA size 4.63 cm    RVDD 3.77 cm    Left Ventricle Relative Wall Thickness 0.30 cm    AV mean gradient 4 mmHg    AV valve area 2.31 cm2    AV index (prosthetic) 0.70     E wave deceleration time 148 msec    LVOT diameter 2.05 cm    LVOT area 3.3 cm2    LVOT peak VTI 13.89 cm    Ao VTI 19.82 cm    LVOT stroke volume 45.82 cm3    MV Peak E Arash 0.76 m/s    TR Max Arash 3.42 m/s    LV Systolic Volume 164.59 mL    LV Diastolic Volume 196.13 mL    Echo EF Estimated 16 %    Triscuspid Valve Regurgitation Peak Gradient 47 mmHg    EF 15 %     Narrative    · Atrial fibrillation with frequent PVC's, rate 85 and occasional runs of   wide complex tachycardia.  · The left ventricle is moderately enlarged with  · Atrial fibrillation observed.  · Mild right ventricular enlargement.  · Moderate mitral regurgitation.  · Moderate to severe tricuspid regurgitation.  · There is pulmonary hypertension.  · Moderate left atrial enlargement.  · Mild right atrial enlargement.       and X-Ray: CXR: X-Ray Chest 1 View (CXR): No results found for this visit on 02/24/22.

## 2022-03-02 NOTE — ASSESSMENT & PLAN NOTE
Cont current medications.    2/28: BP regimens on hold due to hypotensive trends; Entresto continued for HF; continue monitoring BP trend  3/1: All BP regimens on hold. Continue monitoring BP trends  3/2: /78

## 2022-03-02 NOTE — PROGRESS NOTES
[address]  Cardiology  Progress Note    Patient Name: Jeanmarie Michelle  MRN: [id number]  Admission Date: 2/24/2022  Hospital Length of Stay: 6 days  Code Status: Full Code   Attending Physician: Eliu Brooks MD   Primary Care Physician: Regina Sprague MD  Expected Discharge Date:   Principal Problem:Dilated cardiomyopathy    Subjective:     Hospital Course:   No notes on file    Interval History: Patient seen and evaluated by Dr. Valdovinos. 24 hour UOP 620ml. 4 pound weight loss since restarting dobutamine. VSS.    Review of Systems   Constitutional: Negative for chills and fever.   Cardiovascular:  Positive for dyspnea on exertion and leg swelling. Negative for chest pain, orthopnea and palpitations.        Orthopnea improving   Respiratory:  Negative for shortness of breath.    Objective:     Vital Signs (Most Recent):  Temp: 97.2 °F (36.2 °C) (03/02/22 1109)  Pulse: 66 (03/02/22 1109)  Resp: 18 (03/02/22 1109)  BP: 112/68 (03/02/22 1109)  SpO2: 98 % (03/02/22 1109) Vital Signs (24h Range):  Temp:  [97.2 °F (36.2 °C)-98.7 °F (37.1 °C)] 97.2 °F (36.2 °C)  Pulse:  [62-80] 66  Resp:  [16-18] 18  SpO2:  [98 %] 98 %  BP: (100-121)/(60-88) 112/68     Weight: 94.8 kg (209 lb)  Body mass index is 29.15 kg/m².     SpO2: 98 %  O2 Device (Oxygen Therapy): nasal cannula      Intake/Output Summary (Last 24 hours) at 3/2/2022 1419  Last data filed at 3/2/2022 0600  Gross per 24 hour   Intake --   Output 740 ml   Net -740 ml       Lines/Drains/Airways       Peripheral Intravenous Line  Duration                  Peripheral IV - Single Lumen 03/02/22 0625 20 G Anterior;Right Forearm <1 day                    Physical Exam  Vitals reviewed.   Constitutional:       General: He is not in acute distress.  Neck:      Vascular: JVD present.   Cardiovascular:      Rate and Rhythm: Normal rate and regular rhythm.   Pulmonary:      Effort: Pulmonary effort is normal.      Breath sounds: No wheezing,  rhonchi or rales.      Comments: Breath sounds and orthopnea improved  Abdominal:      General: Bowel sounds are normal.      Palpations: Abdomen is soft.   Musculoskeletal:      Cervical back: Neck supple.      Right lower leg: Edema present.      Left lower leg: Edema present.   Skin:     General: Skin is warm and dry.      Coloration: Skin is not jaundiced.   Neurological:      Mental Status: He is alert and oriented to person, place, and time.       Significant Labs: BMP:   Recent Labs   Lab 02/28/22 2009 03/01/22 0432 03/02/22  0527   * 109* 159*   * 133* 135*   K 5.7* 4.5 3.8   CL 94* 95* 97*   CO2 26 27 30   BUN 46* 43* 40*   CREATININE 2.28* 2.10* 1.91*   CALCIUM 8.5 8.6 9.0   , CMP   Recent Labs   Lab 02/28/22  1750 02/28/22 2009 03/01/22 0432 03/02/22  0527   * 131* 133* 135*   K 6.8* 5.7* 4.5 3.8   CL 94* 94* 95* 97*   CO2 25 26 27 30   * 131* 109* 159*   BUN 46* 46* 43* 40*   CREATININE 1.96* 2.28* 2.10* 1.91*   CALCIUM 8.4* 8.5 8.6 9.0   PROT 6.8  --  6.6 6.5   ALBUMIN 3.5  --  3.4* 3.4*   BILITOT 1.3*  --  1.4* 1.6*   ALKPHOS 272*  --  233* 214*   *  --  90* 99*   *  --  112* 117*   ANIONGAP 17* 17* 16 12   EGFRNONAA 35* 29* 32* 36*   , and CBC   Recent Labs   Lab 03/01/22 0432 03/02/22 0527   WBC 4.89 4.72   HGB 12.7* 12.5*   HCT 41.6 40.0    217       Significant Imaging: Echocardiogram: Transthoracic echo (TTE) complete (Cupid Only):   Results for orders placed or performed during the hospital encounter of 12/08/21   Echo   Result Value Ref Range    IVC diameter 2.24 cm    Left Ventricular Outflow Tract Mean Gradient 2.00 mmHg    AORTIC VALVE CUSP SEPERATION 15.277564113554894 cm    LVIDd 6.23 (A) 3.5 - 6.0 cm    IVS 0.92 0.6 - 1.1 cm    Posterior Wall 0.92 0.6 - 1.1 cm    LVIDs 5.77 (A) 2.1 - 4.0 cm    FS 7 28 - 44 %    LV mass 236.68 g    LA size 4.63 cm    RVDD 3.77 cm    Left Ventricle Relative Wall Thickness 0.30 cm    AV mean gradient 4 mmHg     AV valve area 2.31 cm2    AV index (prosthetic) 0.70     E wave deceleration time 148 msec    LVOT diameter 2.05 cm    LVOT area 3.3 cm2    LVOT peak VTI 13.89 cm    Ao VTI 19.82 cm    LVOT stroke volume 45.82 cm3    MV Peak E Arash 0.76 m/s    TR Max Arash 3.42 m/s    LV Systolic Volume 164.59 mL    LV Diastolic Volume 196.13 mL    Echo EF Estimated 16 %    Triscuspid Valve Regurgitation Peak Gradient 47 mmHg    EF 15 %    Narrative    · Atrial fibrillation with frequent PVC's, rate 85 and occasional runs of   wide complex tachycardia.  · The left ventricle is moderately enlarged with  · Atrial fibrillation observed.  · Mild right ventricular enlargement.  · Moderate mitral regurgitation.  · Moderate to severe tricuspid regurgitation.  · There is pulmonary hypertension.  · Moderate left atrial enlargement.  · Mild right atrial enlargement.       and X-Ray: CXR: X-Ray Chest 1 View (CXR): No results found for this visit on 02/24/22.    Assessment and Plan:     Brief HPI: 80 y/o male with PMH of persistent atrial fibrillation, nonischemic ([facility] 2018) dilated cardiomyopathy (Stage D HF, EF 15%), CKD, HTN, DM, and newly diagnosed hypothyroidism who was scheduled for outpatient ICD placement by Dr. Carlson today but presented with acute on chronic decompensated heart failure with hyperglycemia and recent elevated TSH of 76.0 (not yet treated). He was admitted by hospital medicine and cardiology consulted for heart failure exacerbation.      Patient was seen in cardiology clinic 2/10/22 at which time his aldactone and Entresto were not continued due to CKD, creatinine 2.1 (baseline 1.3-1.5). He presents today with increased sob, orthopnea, mild confusion (reported by family), worsening lower extremity edema and abdominal distention, and decreased oxygen saturation on RA (93%). On assessment he has significant lower extremity edema expanding to abdomen, JVD extending to mandible, all extremities are cool to touch with  decreased breath sound bilaterally. Labs revealed Lactate 4.1, BNP 34,000 (was 15,000 last month), CONSUELO with creatinine 2.7 (baseline 1.3-1.5), and elevated liver enzymes.    Hypothyroid  - TSH 76 2/8/2022  - Repeat TSH, add FreeT4  - Being followed by primary medical team    2/28/2022:  - TSH 71.5, Free T4 0.46  - Levothyroxine 137mcg initiated 2/25  - Being followed by primary medical team    Low cardiac output syndrome  - Patient seen and evaluated by Dr. Robb  - Lactate 4.1, creatinine up to 2.7, with elevated liver enzymes, BNP 34,000 (doubled since last month)  - Holding home BB, start dobutamine 2.5 mcg/kg  - IV diuresis, 80mg BID  - Start hydralazine 25mg TID and Isosorbide 20mg TID  - Strict I & O and daily weights  - Monitor on telemetry  - BMP and lactate in am    2/25/22:  - lactate has normalized  - creatinine improving  - continue diuresis with dobutamine and IV lasix 80mg bid  - continue hydralazine and isosorbide  - daily weights, strict I&Os  - BMP in AM    2/28/2022:  - Creatinine down to 1.6 yesterday and lactate normal and dobutamine was dc'd. Today lactate back up to 4.1, he is sob requiring supplemental O2 @ 2L with bilateral crackles. He's had 750ml UOP in past 24 hours on IV lasix 80mg BID.  - Restart dobutamine @ 5 mcq/kg and discontinue metoprolol. Will leave Entresto on board for now and monitor BP closely.  - CMP and lactate in am   - Plan to optimize again with inotrope and IV diuresis, once clinically euvolemic, will perform RHC prior to discontinuing dobutamine. If decompensates again without inotrope, we will need to discuss options including hospice care with patient and family    3/1/2022:  - Patient seen and evaluated by Dr. Valdovinos  - Improving   - Continue IV diuresis, dobutamine and oral dig  - Discontinued Entresto due to hypotension (BP 70s/50s after med given)  - Strict I & O and daily weight  - Continue monitoring    3/2/2022:  - Patient seen and evaluated by Dr. Valdovinos  -  Decreased dobutamine 2.5mcg/kg and discontinuing at 8pm tonight  - Continue IV diuresis and oral digoxin  - BP stable after discontinuing Entresto (110-120/60s-70s)  - Continue monitoring    Acute on chronic kidney failure  - Creatinine 2.7, baseline (1.3-1.5)  - Likely related to low cardiac output/decompensated heart failure    2/25/22:  - creatinine has improved to 2.28  - will continue to monitor with diuresis    2/28/2022:  - Creatinine down to 1.6 yesterday  - BMP today pending    3/1/2022:  - Creatinine 2.1    3/2/2022:  - Creatinine 1.9      Chronic a-fib  - Rate currently controlled  - On Eliquis for primary stroke prevention      Diabetes mellitus without complication  - Being followed by primary medical team          VTE Risk Mitigation (From admission, onward)         Ordered     apixaban tablet 5 mg  2 times daily         02/24/22 1101              PT seen and examined, chart reviewed, essentiallly agree with findings as documented    CC: Shortness of breath  HPI: Pt reports less short of breath, lower extremity edema has improved.  PE: agree with findings as documented  Labs, Xrays,EKGS, tele reviewed    IMP/Plan     1. Acute on chronic systolic heart failure, improving with inotropic tx, will try to wean dobutamine today, unable to restart Entresto, beta blockaide, aldactone due to hypotension  Leonela Powell, SHIRLEYP  Cardiology  [facility] - [address] [place] [address]

## 2022-03-02 NOTE — ASSESSMENT & PLAN NOTE
HFrEF, nonsichemic cardiomyopathy: EF 15% with moderate-severe TR (12/8/21); NYHA III-IV Stage D  Plan for ICD placement by Dr. Carlson today but now postpned d/t CHF exacerbation   Started on dobutamine and iv lasix.   Cardiology on board  Tele monitoring.   Cardiac deit strick I/o     2/28: Resumed on Dobutamine due to hypotension, elevated lactic of 4.1; IV lasix continued   3/1: Improvement with Dobutamine; lactic down to 1.8 on today. IV lasix continued. Entresto discontinued for hypotension. Cards continuing to follow.   3/2: Dobutamine continues. Urine o/p >1500 cc overnight. LA 1.3

## 2022-03-02 NOTE — ASSESSMENT & PLAN NOTE
SSI sliding scale and long acting, adjust as needed    2/28: Variable glucose trends; 36 on BMP this am. Hypoglycemic trends noted; decrease SSI to mild coverage. Continue to monitor    3/1: SSI coverage decreased to low coverage; glucose trends 60s-120s. Continue monitoring  3/2:  range today. Will monitor and adjust insulin

## 2022-03-02 NOTE — PLAN OF CARE
"Pt up to bedside. Removed IV line. Re orientated pt to location and situation. Attempting to gain IV access now. Pt is calm, states" This just came out while using the bathroom" Cleaned bathroom with purple top wipes. Will continue to monitor pt's neuro state.  "

## 2022-03-02 NOTE — ASSESSMENT & PLAN NOTE
- Patient seen and evaluated by Dr. Robb  - Lactate 4.1, creatinine up to 2.7, with elevated liver enzymes, BNP 34,000 (doubled since last month)  - Holding home BB, start dobutamine 2.5 mcg/kg  - IV diuresis, 80mg BID  - Start hydralazine 25mg TID and Isosorbide 20mg TID  - Strict I & O and daily weights  - Monitor on telemetry  - BMP and lactate in am    2/25/22:  - lactate has normalized  - creatinine improving  - continue diuresis with dobutamine and IV lasix 80mg bid  - continue hydralazine and isosorbide  - daily weights, strict I&Os  - BMP in AM    2/28/2022:  - Creatinine down to 1.6 yesterday and lactate normal and dobutamine was dc'd. Today lactate back up to 4.1, he is sob requiring supplemental O2 @ 2L with bilateral crackles. He's had 750ml UOP in past 24 hours on IV lasix 80mg BID.  - Restart dobutamine @ 5 mcq/kg and discontinue metoprolol. Will leave Entresto on board for now and monitor BP closely.  - CMP and lactate in am   - Plan to optimize again with inotrope and IV diuresis, once clinically euvolemic, will perform RHC prior to discontinuing dobutamine. If decompensates again without inotrope, we will need to discuss options including hospice care with patient and family    3/1/2022:  - Patient seen and evaluated by Dr. Valdovinos  - Improving   - Continue IV diuresis, dobutamine and oral dig  - Discontinued Entresto due to hypotension (BP 70s/50s after med given)  - Strict I & O and daily weight  - Continue monitoring    3/2/2022:  - Patient seen and evaluated by Dr. Valdovinos  - Decreased dobutamine 2.5mcg/kg and discontinuing at 8pm tonight  - Continue IV diuresis and oral digoxin  - BP stable after discontinuing Entresto (110-120/60s-70s)  - Continue monitoring

## 2022-03-02 NOTE — ASSESSMENT & PLAN NOTE
2/28: BUN/Creat 1.6/44 on yesterday; pending labs for today. Continue monitoring  3/2: sCre. 1.9 improving. BMP daily and monitor.

## 2022-03-02 NOTE — ASSESSMENT & PLAN NOTE
tsh pta was >70  Repeat tsh and ft4  Will start on 137 mcg synthroid for now  Will need outpatient follow up with endocrine    3/1: Continue current POC of Levothyroxine 137mcg PO QD

## 2022-03-02 NOTE — PROGRESS NOTES
[address] Medicine  Progress Note    Patient Name: Jeanmarie Michelle  MRN: [id number]  Patient Class: IP- Inpatient   Admission Date: 2/24/2022  Length of Stay: 6 days  Attending Physician: Eliu Brooks MD  Primary Care Provider: Regina Sprague MD        Subjective:     Principal Problem:Dilated cardiomyopathy        HPI:  Pt is a 81-year-old male with a history of persistent atrial fibrillation on Eliquis, end-stage dilated cardiomyopathy, history of stage III CKD, type 2 diabetes and hypertension who was supposed to get his ICD placement with Dr Carlson today however pt seemed to be volumed overload and in decompensated heart failure, along with elevated sugars therefore medicine team requested for inpatient admission. Pt states that he has been having dyspnea on exertion, no CP and LE swelling, states he was not sure what medicine to stop his medications prior to procedure. He was seen prior to his ICD implantion by cardiology team and was found to be in florid HF with elevated JVD and massive LE swelling. Besides dyspnea on exertion he has no other symptoms. Of note his TSH >70 recently and has not been on any synthroid.       Overview/Hospital Course:  2/28: Awake, alert sitting on side of bed. States he does not feel well and requiring supplemental oxygen. He states he does not have an appetite. SOB with exertion; bilateral LE swelling; left greater than right.   3/1: Awake, alert ambulating to [facility]. Denies SOB on exertion. Denies chest pain, weakness, dizziness. Endorses breathing has improved and feels better than on yesterday. Dobutamine infusion continues along with IV Lasix. Hypotensive after receiving Entresto. Discontinued per Cardiology.   3/2: Dobutamine continues. Urine o/p >1500 cc overnight.Follow up with endocrine at [place]. Hypothyroidism. Follow up with cardiology at [place].      Interval History: NAEO. Pt denies SOB or CP.    Review of Systems    Constitutional:  Positive for appetite change. Negative for diaphoresis and fever.   HENT:  Negative for congestion.    Respiratory:  Negative for chest tightness and shortness of breath.    Cardiovascular:  Negative for chest pain and palpitations.   Gastrointestinal:  Negative for abdominal pain, constipation, diarrhea and nausea.   Neurological:  Negative for dizziness and headaches.   All other systems reviewed and are negative.  Objective:     Vital Signs (Most Recent):  Temp: 97.5 °F (36.4 °C) (03/02/22 0710)  Pulse: 68 (apical) (03/02/22 0902)  Resp: 18 (03/02/22 0710)  BP: 120/78 (03/02/22 0710)  SpO2: 98 % (03/02/22 0400) Vital Signs (24h Range):  Temp:  [97.5 °F (36.4 °C)-98.7 °F (37.1 °C)] 97.5 °F (36.4 °C)  Pulse:  [62-80] 68  Resp:  [16-18] 18  SpO2:  [98 %] 98 %  BP: (100-121)/(60-88) 120/78     Weight: 94.8 kg (209 lb)  Body mass index is 29.15 kg/m².    Intake/Output Summary (Last 24 hours) at 3/2/2022 1105  Last data filed at 3/2/2022 0600  Gross per 24 hour   Intake --   Output 1540 ml   Net -1540 ml      Physical Exam  Vitals and nursing note reviewed.   Constitutional:       General: He is not in acute distress.     Interventions: Nasal cannula in place.   HENT:      Head: Normocephalic and atraumatic.      Nose: Nose normal.      Mouth/Throat:      Mouth: Mucous membranes are moist.      Pharynx: No oropharyngeal exudate or posterior oropharyngeal erythema.   Eyes:      Extraocular Movements: Extraocular movements intact.      Conjunctiva/sclera: Conjunctivae normal.      Pupils: Pupils are equal, round, and reactive to light.   Cardiovascular:      Rate and Rhythm: Normal rate.      Heart sounds: No murmur heard.  Pulmonary:      Effort: No respiratory distress.      Breath sounds: No wheezing or rhonchi.   Abdominal:      General: Bowel sounds are normal. There is distension.      Palpations: Abdomen is soft.      Tenderness: There is no abdominal tenderness.   Musculoskeletal:          General: Swelling present.      Cervical back: Normal range of motion and neck supple.      Right lower le+ Edema present.      Left lower le+ Edema present.   Skin:     General: Skin is warm.      Capillary Refill: Capillary refill takes less than 2 seconds.   Neurological:      General: No focal deficit present.      Mental Status: He is alert and oriented to person, place, and time.      Cranial Nerves: No cranial nerve deficit.      Sensory: No sensory deficit.   Psychiatric:         Mood and Affect: Mood normal.         Behavior: Behavior normal.       Significant Labs: All pertinent labs within the past 24 hours have been reviewed.    Significant Imaging: I have reviewed all pertinent imaging results/findings within the past 24 hours.      Assessment/Plan:      * Dilated cardiomyopathy  HFrEF, nonsichemic cardiomyopathy: EF 15% with moderate-severe TR (21); NYHA III-IV Stage D  Plan for ICD placement by Dr. Carlson today but now postpned d/t CHF exacerbation   Started on dobutamine and iv lasix.   Cardiology on board  Tele monitoring.   Cardiac deit strick I/o     : Resumed on Dobutamine due to hypotension, elevated lactic of 4.1; IV lasix continued   3/1: Improvement with Dobutamine; lactic down to 1.8 on today. IV lasix continued. Entresto discontinued for hypotension. Cards continuing to follow.   3/2: Dobutamine continues. Urine o/p >1500 cc overnight. LA 1.3    Stage 3b chronic kidney disease  : BUN/Creat 1.6/44 on yesterday; pending labs for today. Continue monitoring  3/2: sCre. 1.9 improving. BMP daily and monitor.      Hypothyroid  tsh pta was >70  Repeat tsh and ft4  Will start on 137 mcg synthroid for now  Will need outpatient follow up with endocrine    3/1: Continue current POC of Levothyroxine 137mcg PO QD    Acute on chronic kidney failure  ctm , likely 2/2 htn, dm and heart failure      Chronic systolic congestive heart failure  : Dobuatmine infusion resumed; IV Lasix  BID continued; Pending ICD  3/1: Improvement from yesterday; Continue current POC      Heart failure    Cardiology consulted  Will need ICD-placement  Cont Dobutamin guided diuiresis      Chronic a-fib  Rate controlled  Cont AC.      Benign prostatic hyperplasia    Tamsulosin, no LUTS at this time.       Hyperlipidemia    Cont statin      Gastroesophageal reflux disease    Cont PPI      Diabetes mellitus without complication    SSI sliding scale and long acting, adjust as needed    2/28: Variable glucose trends; 36 on BMP this am. Hypoglycemic trends noted; decrease SSI to mild coverage. Continue to monitor    3/1: SSI coverage decreased to low coverage; glucose trends 60s-120s. Continue monitoring  3/2:  range today. Will monitor and adjust insulin    Hypertension    Cont current medications.    2/28: BP regimens on hold due to hypotensive trends; Entresto continued for HF; continue monitoring BP trend  3/1: All BP regimens on hold. Continue monitoring BP trends  3/2: /78      VTE Risk Mitigation (From admission, onward)         Ordered     apixaban tablet 5 mg  2 times daily         02/24/22 1101                Discharge Planning   MADDIE:      Code Status: Full Code   Is the patient medically ready for discharge?:     Reason for patient still in hospital (select all that apply): Treatment  Discharge Plan A: Home                  TENISHA Hsieh  Department of Hospital Medicine   [address]

## 2022-03-03 LAB
ALBUMIN SERPL BCP-MCNC: 3.3 G/DL (ref 3.5–5)
ALBUMIN/GLOB SERPL: 1.1 {RATIO}
ALP SERPL-CCNC: 181 U/L (ref 45–115)
ALT SERPL W P-5'-P-CCNC: 95 U/L (ref 16–61)
ANION GAP SERPL CALCULATED.3IONS-SCNC: 15 MMOL/L (ref 7–16)
ANISOCYTOSIS BLD QL SMEAR: ABNORMAL
AST SERPL W P-5'-P-CCNC: 64 U/L (ref 15–37)
BASOPHILS # BLD AUTO: 0 K/UL (ref 0–0.2)
BASOPHILS NFR BLD AUTO: 0 % (ref 0–1)
BILIRUB SERPL-MCNC: 1.2 MG/DL (ref 0–1.2)
BUN SERPL-MCNC: 36 MG/DL (ref 7–18)
BUN/CREAT SERPL: 21 (ref 6–20)
CALCIUM SERPL-MCNC: 8.7 MG/DL (ref 8.5–10.1)
CHLORIDE SERPL-SCNC: 95 MMOL/L (ref 98–107)
CO2 SERPL-SCNC: 27 MMOL/L (ref 21–32)
CREAT SERPL-MCNC: 1.74 MG/DL (ref 0.7–1.3)
DIFFERENTIAL METHOD BLD: ABNORMAL
EOSINOPHIL # BLD AUTO: 0.04 K/UL (ref 0–0.5)
EOSINOPHIL NFR BLD AUTO: 0.8 % (ref 1–4)
EOSINOPHIL NFR BLD MANUAL: 3 % (ref 1–4)
ERYTHROCYTE [DISTWIDTH] IN BLOOD BY AUTOMATED COUNT: 18.6 % (ref 11.5–14.5)
GLOBULIN SER-MCNC: 3.1 G/DL (ref 2–4)
GLUCOSE SERPL-MCNC: 161 MG/DL (ref 74–106)
GLUCOSE SERPL-MCNC: 204 MG/DL (ref 70–105)
GLUCOSE SERPL-MCNC: 214 MG/DL (ref 70–105)
GLUCOSE SERPL-MCNC: 262 MG/DL (ref 70–105)
GLUCOSE SERPL-MCNC: 282 MG/DL (ref 70–105)
HCT VFR BLD AUTO: 36.8 % (ref 40–54)
HGB BLD-MCNC: 11.8 G/DL (ref 13.5–18)
IMM GRANULOCYTES # BLD AUTO: 0.02 K/UL (ref 0–0.04)
IMM GRANULOCYTES NFR BLD: 0.4 % (ref 0–0.4)
LACTATE SERPL-SCNC: 1.8 MMOL/L (ref 0.4–2)
LYMPHOCYTES # BLD AUTO: 0.54 K/UL (ref 1–4.8)
LYMPHOCYTES NFR BLD AUTO: 10.4 % (ref 27–41)
LYMPHOCYTES NFR BLD MANUAL: 6 % (ref 27–41)
MCH RBC QN AUTO: 26.9 PG (ref 27–31)
MCHC RBC AUTO-ENTMCNC: 32.1 G/DL (ref 32–36)
MCV RBC AUTO: 83.8 FL (ref 80–96)
MONOCYTES # BLD AUTO: 0.58 K/UL (ref 0–0.8)
MONOCYTES NFR BLD AUTO: 11.2 % (ref 2–6)
MONOCYTES NFR BLD MANUAL: 6 % (ref 2–6)
MPC BLD CALC-MCNC: 11.4 FL (ref 9.4–12.4)
NEUTROPHILS # BLD AUTO: 3.99 K/UL (ref 1.8–7.7)
NEUTROPHILS NFR BLD AUTO: 77.2 % (ref 53–65)
NEUTS SEG NFR BLD MANUAL: 85 % (ref 50–62)
NRBC # BLD AUTO: 0 X10E3/UL
NRBC, AUTO (.00): 0 %
OVALOCYTES BLD QL SMEAR: ABNORMAL
PLATELET # BLD AUTO: 173 K/UL (ref 150–400)
PLATELET MORPHOLOGY: ABNORMAL
POTASSIUM SERPL-SCNC: 3.6 MMOL/L (ref 3.5–5.1)
PROT SERPL-MCNC: 6.4 G/DL (ref 6.4–8.2)
RBC # BLD AUTO: 4.39 M/UL (ref 4.6–6.2)
SODIUM SERPL-SCNC: 133 MMOL/L (ref 136–145)
WBC # BLD AUTO: 5.17 K/UL (ref 4.5–11)

## 2022-03-03 PROCEDURE — 82962 GLUCOSE BLOOD TEST: CPT

## 2022-03-03 PROCEDURE — 83605 ASSAY OF LACTIC ACID: CPT | Performed by: STUDENT IN AN ORGANIZED HEALTH CARE EDUCATION/TRAINING PROGRAM

## 2022-03-03 PROCEDURE — 99233 SBSQ HOSP IP/OBS HIGH 50: CPT | Mod: ,,, | Performed by: INTERNAL MEDICINE

## 2022-03-03 PROCEDURE — 36415 COLL VENOUS BLD VENIPUNCTURE: CPT | Performed by: STUDENT IN AN ORGANIZED HEALTH CARE EDUCATION/TRAINING PROGRAM

## 2022-03-03 PROCEDURE — 25000003 PHARM REV CODE 250: Performed by: INTERNAL MEDICINE

## 2022-03-03 PROCEDURE — 36415 COLL VENOUS BLD VENIPUNCTURE: CPT | Performed by: INTERNAL MEDICINE

## 2022-03-03 PROCEDURE — 25000003 PHARM REV CODE 250: Performed by: NURSE PRACTITIONER

## 2022-03-03 PROCEDURE — 99233 PR SUBSEQUENT HOSPITAL CARE,LEVL III: ICD-10-PCS | Mod: ,,, | Performed by: HOSPITALIST

## 2022-03-03 PROCEDURE — 63600175 PHARM REV CODE 636 W HCPCS: Performed by: NURSE PRACTITIONER

## 2022-03-03 PROCEDURE — 99233 SBSQ HOSP IP/OBS HIGH 50: CPT | Mod: ,,, | Performed by: HOSPITALIST

## 2022-03-03 PROCEDURE — 99233 PR SUBSEQUENT HOSPITAL CARE,LEVL III: ICD-10-PCS | Mod: ,,, | Performed by: INTERNAL MEDICINE

## 2022-03-03 PROCEDURE — 80053 COMPREHEN METABOLIC PANEL: CPT | Performed by: INTERNAL MEDICINE

## 2022-03-03 PROCEDURE — 85025 COMPLETE CBC W/AUTO DIFF WBC: CPT | Performed by: INTERNAL MEDICINE

## 2022-03-03 PROCEDURE — 11000001 HC ACUTE MED/SURG PRIVATE ROOM

## 2022-03-03 RX ORDER — POTASSIUM CHLORIDE 20 MEQ/1
20 TABLET, EXTENDED RELEASE ORAL DAILY
Status: DISCONTINUED | OUTPATIENT
Start: 2022-03-03 | End: 2022-03-04 | Stop reason: HOSPADM

## 2022-03-03 RX ORDER — FUROSEMIDE 80 MG/1
80 TABLET ORAL 2 TIMES DAILY
Status: DISCONTINUED | OUTPATIENT
Start: 2022-03-03 | End: 2022-03-04 | Stop reason: HOSPADM

## 2022-03-03 RX ADMIN — ASPIRIN 81 MG: 81 TABLET, COATED ORAL at 09:03

## 2022-03-03 RX ADMIN — DIGOXIN 0.12 MG: 125 TABLET ORAL at 09:03

## 2022-03-03 RX ADMIN — APIXABAN 5 MG: 5 TABLET, FILM COATED ORAL at 09:03

## 2022-03-03 RX ADMIN — APIXABAN 5 MG: 5 TABLET, FILM COATED ORAL at 08:03

## 2022-03-03 RX ADMIN — FUROSEMIDE 80 MG: 10 INJECTION, SOLUTION INTRAMUSCULAR; INTRAVENOUS at 08:03

## 2022-03-03 RX ADMIN — PANTOPRAZOLE SODIUM 40 MG: 40 TABLET, DELAYED RELEASE ORAL at 09:03

## 2022-03-03 RX ADMIN — LEVOTHYROXINE SODIUM 137 MCG: 25 TABLET ORAL at 06:03

## 2022-03-03 RX ADMIN — POTASSIUM CHLORIDE 20 MEQ: 20 TABLET, EXTENDED RELEASE ORAL at 04:03

## 2022-03-03 RX ADMIN — TAMSULOSIN HYDROCHLORIDE 0.4 MG: 0.4 CAPSULE ORAL at 09:03

## 2022-03-03 RX ADMIN — FUROSEMIDE 80 MG: 80 TABLET ORAL at 06:03

## 2022-03-03 RX ADMIN — INSULIN ASPART 2 UNITS: 100 INJECTION, SOLUTION INTRAVENOUS; SUBCUTANEOUS at 04:03

## 2022-03-03 RX ADMIN — SENNOSIDES AND DOCUSATE SODIUM 1 TABLET: 50; 8.6 TABLET ORAL at 08:03

## 2022-03-03 RX ADMIN — INSULIN ASPART 3 UNITS: 100 INJECTION, SOLUTION INTRAVENOUS; SUBCUTANEOUS at 11:03

## 2022-03-03 RX ADMIN — SENNOSIDES AND DOCUSATE SODIUM 1 TABLET: 50; 8.6 TABLET ORAL at 09:03

## 2022-03-03 NOTE — ASSESSMENT & PLAN NOTE
2/28:Dobuatmine infusion resumed; IV Lasix BID continued; Pending ICD  3/1:  Improvement from yesterday; Continue current POC  3/3: Dobutamine 2.5mcg/kg and discontinuing at 8pm tonight.Continue IV diuresis and oral digoxin.BP stable after discontinuing Entresto (110-120/60s-70s)

## 2022-03-03 NOTE — PLAN OF CARE
Problem: Adult Inpatient Plan of Care  Goal: Plan of Care Review  Outcome: Ongoing, Progressing  Goal: Patient-Specific Goal (Individualized)  Outcome: Ongoing, Progressing  Goal: Absence of Hospital-Acquired Illness or Injury  Outcome: Ongoing, Progressing  Goal: Optimal Comfort and Wellbeing  Outcome: Ongoing, Progressing  Goal: Readiness for Transition of Care  Outcome: Ongoing, Progressing     Problem: Diabetes Comorbidity  Goal: Blood Glucose Level Within Targeted Range  Outcome: Ongoing, Progressing     Problem: Fall Injury Risk  Goal: Absence of Fall and Fall-Related Injury  Outcome: Ongoing, Progressing     Problem: Adjustment to Device (Cardiac Rhythm Management Device)  Goal: Optimal Adjustment to Device  Outcome: Ongoing, Progressing     Problem: Bleeding (Cardiac Rhythm Management Device)  Goal: Absence of Bleeding  Outcome: Ongoing, Progressing     Problem: Device-Related Complication Risk (Cardiac Rhythm Management Device)  Goal: Effective Device Function  Outcome: Ongoing, Progressing     Problem: Infection (Cardiac Rhythm Management Device)  Goal: Absence of Infection Signs and Symptoms  Outcome: Ongoing, Progressing     Problem: Pain (Cardiac Rhythm Management Device)  Goal: Acceptable Pain Level  Outcome: Ongoing, Progressing     Problem: Respiratory Compromise (Cardiac Rhythm Management Device)  Goal: Effective Oxygenation and Ventilation  Outcome: Ongoing, Progressing     Problem: Fluid and Electrolyte Imbalance (Acute Kidney Injury/Impairment)  Goal: Fluid and Electrolyte Balance  Outcome: Ongoing, Progressing     Problem: Oral Intake Inadequate (Acute Kidney Injury/Impairment)  Goal: Optimal Nutrition Intake  Outcome: Ongoing, Progressing     Problem: Renal Function Impairment (Acute Kidney Injury/Impairment)  Goal: Effective Renal Function  Outcome: Ongoing, Progressing     Problem: Skin Injury Risk Increased  Goal: Skin Health and Integrity  Outcome: Ongoing, Progressing

## 2022-03-03 NOTE — ASSESSMENT & PLAN NOTE
- Creatinine 2.7, baseline (1.3-1.5)  - Likely related to low cardiac output/decompensated heart failure    2/25/22:  - creatinine has improved to 2.28  - will continue to monitor with diuresis    2/28/2022:  - Creatinine down to 1.6 yesterday  - BMP today pending    3/1/2022:  - Creatinine 2.1    3/2/2022:  - Creatinine 1.9    3/3/2022:  - Creatinine 1.7  - BMP in am

## 2022-03-03 NOTE — PROGRESS NOTES
[address]  Cardiology  Progress Note    Patient Name: Jeanmarie Michelle  MRN: [id number]  Admission Date: 2/24/2022  Hospital Length of Stay: 7 days  Code Status: Full Code   Attending Physician: Eliu Brooks MD   Primary Care Physician: Regina Sprague MD  Expected Discharge Date:   Principal Problem:Dilated cardiomyopathy    Subjective:     Hospital Course:   No notes on file    Interval History: Patient sitting up in bed, states he just came from restroom and dyspnea with exertion has significantly improved. 24 hour UOP 1.5L. Dobutamine was to be discontinued at 8PM last night but is currently running at 2.5 mcg/kg.    Review of Systems   Constitutional: Negative for chills and fever.   Cardiovascular:  Positive for dyspnea on exertion and leg swelling. Negative for chest pain, orthopnea and palpitations.        Orthopnea, edema and dyspnea significantly improved   Respiratory:  Negative for shortness of breath.    Objective:     Vital Signs (Most Recent):  Temp: 97.6 °F (36.4 °C) (03/03/22 1125)  Pulse: 66 (03/03/22 1125)  Resp: 17 (03/03/22 1125)  BP: 99/72 (03/03/22 1125)  SpO2: 95 % (03/03/22 1125) Vital Signs (24h Range):  Temp:  [97.2 °F (36.2 °C)-97.8 °F (36.6 °C)] 97.6 °F (36.4 °C)  Pulse:  [66-90] 66  Resp:  [17-20] 17  SpO2:  [95 %-100 %] 95 %  BP: ()/(67-80) 99/72     Weight: 95.6 kg (210 lb 12.2 oz)  Body mass index is 29.4 kg/m².     SpO2: 95 %  O2 Device (Oxygen Therapy): nasal cannula      Intake/Output Summary (Last 24 hours) at 3/3/2022 1424  Last data filed at 3/2/2022 1705  Gross per 24 hour   Intake 350 ml   Output 200 ml   Net 150 ml       Lines/Drains/Airways       Peripheral Intravenous Line  Duration                  Peripheral IV - Single Lumen 03/02/22 0625 20 G Anterior;Right Forearm 1 day                    Physical Exam  Vitals reviewed.   Constitutional:       General: He is not in acute distress.  Neck:      Vascular: JVD present.    Cardiovascular:      Rate and Rhythm: Normal rate and regular rhythm.   Pulmonary:      Effort: Pulmonary effort is normal.      Breath sounds: No wheezing, rhonchi or rales.      Comments: Breath sounds and orthopnea improved  Abdominal:      General: Bowel sounds are normal.      Palpations: Abdomen is soft.   Musculoskeletal:      Cervical back: Neck supple.      Right lower leg: Edema present.      Left lower leg: Edema present.      Comments: Significantly improved    Skin:     General: Skin is warm and dry.      Coloration: Skin is not jaundiced.   Neurological:      Mental Status: He is alert and oriented to person, place, and time.       Significant Labs: BMP:   Recent Labs   Lab 03/02/22 0527 03/03/22 0252   * 161*   * 133*   K 3.8 3.6   CL 97* 95*   CO2 30 27   BUN 40* 36*   CREATININE 1.91* 1.74*   CALCIUM 9.0 8.7   , CMP   Recent Labs   Lab 03/02/22 0527 03/03/22 0252   * 133*   K 3.8 3.6   CL 97* 95*   CO2 30 27   * 161*   BUN 40* 36*   CREATININE 1.91* 1.74*   CALCIUM 9.0 8.7   PROT 6.5 6.4   ALBUMIN 3.4* 3.3*   BILITOT 1.6* 1.2   ALKPHOS 214* 181*   AST 99* 64*   * 95*   ANIONGAP 12 15   EGFRNONAA 36* 40*   , and CBC   Recent Labs   Lab 03/02/22 0527 03/03/22 0252   WBC 4.72 5.17   HGB 12.5* 11.8*   HCT 40.0 36.8*    173       Significant Imaging: Echocardiogram: Transthoracic echo (TTE) complete (Cupid Only):   Results for orders placed or performed during the hospital encounter of 12/08/21   Echo   Result Value Ref Range    IVC diameter 2.24 cm    Left Ventricular Outflow Tract Mean Gradient 2.00 mmHg    AORTIC VALVE CUSP SEPERATION [id number] cm    LVIDd 6.23 (A) 3.5 - 6.0 cm    IVS 0.92 0.6 - 1.1 cm    Posterior Wall 0.92 0.6 - 1.1 cm    LVIDs 5.77 (A) 2.1 - 4.0 cm    FS 7 28 - 44 %    LV mass 236.68 g    LA size 4.63 cm    RVDD 3.77 cm    Left Ventricle Relative Wall Thickness 0.30 cm    AV mean gradient 4 mmHg    AV valve area 2.31 cm2     AV index (prosthetic) 0.70     E wave deceleration time 148 msec    LVOT diameter 2.05 cm    LVOT area 3.3 cm2    LVOT peak VTI 13.89 cm    Ao VTI 19.82 cm    LVOT stroke volume 45.82 cm3    MV Peak E Arash 0.76 m/s    TR Max Arash 3.42 m/s    LV Systolic Volume 164.59 mL    LV Diastolic Volume 196.13 mL    Echo EF Estimated 16 %    Triscuspid Valve Regurgitation Peak Gradient 47 mmHg    EF 15 %    Narrative    · Atrial fibrillation with frequent PVC's, rate 85 and occasional runs of   wide complex tachycardia.  · The left ventricle is moderately enlarged with  · Atrial fibrillation observed.  · Mild right ventricular enlargement.  · Moderate mitral regurgitation.  · Moderate to severe tricuspid regurgitation.  · There is pulmonary hypertension.  · Moderate left atrial enlargement.  · Mild right atrial enlargement.       and X-Ray: CXR: X-Ray Chest 1 View (CXR): No results found for this visit on 02/24/22.    Assessment and Plan:     Brief HPI: 82 y/o male with PMH of persistent atrial fibrillation, nonischemic ([facility] 2018) dilated cardiomyopathy (Stage D HF, EF 15%), CKD, HTN, DM, and newly diagnosed hypothyroidism who was scheduled for outpatient ICD placement by Dr. Carlson today but presented with acute on chronic decompensated heart failure with hyperglycemia and recent elevated TSH of 76.0 (not yet treated). He was admitted by hospital medicine and cardiology consulted for heart failure exacerbation.      Patient was seen in cardiology clinic 2/10/22 at which time his aldactone and Entresto were not continued due to CKD, creatinine 2.1 (baseline 1.3-1.5). He presents today with increased sob, orthopnea, mild confusion (reported by family), worsening lower extremity edema and abdominal distention, and decreased oxygen saturation on RA (93%). On assessment he has significant lower extremity edema expanding to abdomen, JVD extending to mandible, all extremities are cool to touch with decreased breath sound  bilaterally. Labs revealed Lactate 4.1, BNP 34,000 (was 15,000 last month), CONSUELO with creatinine 2.7 (baseline 1.3-1.5), and elevated liver enzymes.    Hypothyroid  - TSH 76 2/8/2022  - Repeat TSH, add FreeT4  - Being followed by primary medical team    2/28/2022:  - TSH 71.5, Free T4 0.46  - Levothyroxine 137mcg initiated 2/25  - Being followed by primary medical team    Low cardiac output syndrome  - Patient seen and evaluated by Dr. Robb  - Lactate 4.1, creatinine up to 2.7, with elevated liver enzymes, BNP 34,000 (doubled since last month)  - Holding home BB, start dobutamine 2.5 mcg/kg  - IV diuresis, 80mg BID  - Start hydralazine 25mg TID and Isosorbide 20mg TID  - Strict I & O and daily weights  - Monitor on telemetry  - BMP and lactate in am    2/25/22:  - lactate has normalized  - creatinine improving  - continue diuresis with dobutamine and IV lasix 80mg bid  - continue hydralazine and isosorbide  - daily weights, strict I&Os  - BMP in AM    2/28/2022:  - Creatinine down to 1.6 yesterday and lactate normal and dobutamine was dc'd. Today lactate back up to 4.1, he is sob requiring supplemental O2 @ 2L with bilateral crackles. He's had 750ml UOP in past 24 hours on IV lasix 80mg BID.  - Restart dobutamine @ 5 mcq/kg and discontinue metoprolol. Will leave Entresto on board for now and monitor BP closely.  - CMP and lactate in am   - Plan to optimize again with inotrope and IV diuresis, once clinically euvolemic, will perform RHC prior to discontinuing dobutamine. If decompensates again without inotrope, we will need to discuss options including hospice care with patient and family    3/1/2022:  - Patient seen and evaluated by Dr. Valdovinos  - Improving   - Continue IV diuresis, dobutamine and oral dig  - Discontinued Entresto due to hypotension (BP 70s/50s after med given)  - Strict I & O and daily weight  - Continue monitoring    3/2/2022:  - Patient seen and evaluated by Dr. Valdovinos  - Decreased dobutamine  2.5mcg/kg and discontinuing at 8pm tonight  - Continue IV diuresis and oral digoxin  - BP stable after discontinuing Entresto (110-120/60s-70s)  - Continue monitoring    3/3/2022:  - Patient seen and evaluated by Dr. Valdovinos  - Dyspnea, orthopnea and edema has significantly improved. Bilateral crackles resolved.  - Dobutamine was to be discontinued at 8pm last night but was still running today. Will tell RN to discontinue dobutamine now and transition to oral lasix today  - Continue monitoring, BMP and lactate in am  - May consider adding afterload medication tomorrow    Acute on chronic kidney failure  - Creatinine 2.7, baseline (1.3-1.5)  - Likely related to low cardiac output/decompensated heart failure    2/25/22:  - creatinine has improved to 2.28  - will continue to monitor with diuresis    2/28/2022:  - Creatinine down to 1.6 yesterday  - BMP today pending    3/1/2022:  - Creatinine 2.1    3/2/2022:  - Creatinine 1.9    3/3/2022:  - Creatinine 1.7  - BMP in am      Chronic a-fib  - Rate currently controlled  - On Eliquis for primary stroke prevention      Diabetes mellitus without complication  - Being followed by primary medical team          VTE Risk Mitigation (From admission, onward)         Ordered     apixaban tablet 5 mg  2 times daily         02/24/22 1101              PT seen and examined, chart reviewed, essentially agree with findings as documented.     CC: Shortness of breath  HPI: Pt reports shortness of breath has improved,  PE: agree with above  Labs,Xrays, tele reviewed  IMP/Plan:    1. Acute on chronic systolic heart failure improving, dobutamine was not discontinued, will dc today, follow clinically.    Leonela Powell, FNP  Cardiology  [facility] - [address] [place] [address]

## 2022-03-03 NOTE — SUBJECTIVE & OBJECTIVE
Interval History: NAEO. Pt reports breathing has improved. No cp    Review of Systems   Constitutional:  Negative for diaphoresis and fever.   HENT:  Negative for congestion.    Respiratory:  Negative for chest tightness and shortness of breath.    Cardiovascular:  Negative for chest pain and palpitations.   Gastrointestinal:  Negative for abdominal pain, constipation, diarrhea and nausea.   Neurological:  Negative for dizziness and headaches.   All other systems reviewed and are negative.  Objective:     Vital Signs (Most Recent):  Temp: 97.5 °F (36.4 °C) (03/03/22 0715)  Pulse: 73 (03/03/22 0715)  Resp: 18 (03/03/22 0715)  BP: 110/80 (03/03/22 0715)  SpO2: 99 % (03/03/22 0715) Vital Signs (24h Range):  Temp:  [97.2 °F (36.2 °C)-97.8 °F (36.6 °C)] 97.5 °F (36.4 °C)  Pulse:  [68-90] 73  Resp:  [18-20] 18  SpO2:  [98 %-100 %] 99 %  BP: ()/(67-80) 110/80     Weight: 95.6 kg (210 lb 12.2 oz)  Body mass index is 29.4 kg/m².    Intake/Output Summary (Last 24 hours) at 3/3/2022 1148  Last data filed at 3/2/2022 1705  Gross per 24 hour   Intake 650 ml   Output 200 ml   Net 450 ml      Physical Exam  Vitals and nursing note reviewed.   Constitutional:       General: He is not in acute distress.     Interventions: Nasal cannula in place.   HENT:      Head: Normocephalic and atraumatic.      Nose: Nose normal.      Mouth/Throat:      Mouth: Mucous membranes are moist.      Pharynx: No oropharyngeal exudate or posterior oropharyngeal erythema.   Eyes:      Extraocular Movements: Extraocular movements intact.      Conjunctiva/sclera: Conjunctivae normal.      Pupils: Pupils are equal, round, and reactive to light.   Cardiovascular:      Rate and Rhythm: Normal rate.      Pulses: Normal pulses.   Pulmonary:      Effort: No respiratory distress.      Breath sounds: No wheezing or rhonchi.   Abdominal:      General: Bowel sounds are normal. There is distension.      Palpations: Abdomen is soft.      Tenderness: There is no  abdominal tenderness.   Musculoskeletal:         General: No swelling.      Cervical back: Normal range of motion and neck supple.      Right lower le+ Edema present.      Left lower le+ Edema present.   Skin:     General: Skin is warm.      Capillary Refill: Capillary refill takes less than 2 seconds.   Neurological:      General: No focal deficit present.      Mental Status: He is alert and oriented to person, place, and time.      Cranial Nerves: No cranial nerve deficit.      Sensory: No sensory deficit.   Psychiatric:         Mood and Affect: Mood normal.         Behavior: Behavior normal.       Significant Labs: All pertinent labs within the past 24 hours have been reviewed.    Significant Imaging: I have reviewed all pertinent imaging results/findings within the past 24 hours.

## 2022-03-03 NOTE — ASSESSMENT & PLAN NOTE
- Patient seen and evaluated by Dr. Robb  - Lactate 4.1, creatinine up to 2.7, with elevated liver enzymes, BNP 34,000 (doubled since last month)  - Holding home BB, start dobutamine 2.5 mcg/kg  - IV diuresis, 80mg BID  - Start hydralazine 25mg TID and Isosorbide 20mg TID  - Strict I & O and daily weights  - Monitor on telemetry  - BMP and lactate in am    2/25/22:  - lactate has normalized  - creatinine improving  - continue diuresis with dobutamine and IV lasix 80mg bid  - continue hydralazine and isosorbide  - daily weights, strict I&Os  - BMP in AM    2/28/2022:  - Creatinine down to 1.6 yesterday and lactate normal and dobutamine was dc'd. Today lactate back up to 4.1, he is sob requiring supplemental O2 @ 2L with bilateral crackles. He's had 750ml UOP in past 24 hours on IV lasix 80mg BID.  - Restart dobutamine @ 5 mcq/kg and discontinue metoprolol. Will leave Entresto on board for now and monitor BP closely.  - CMP and lactate in am   - Plan to optimize again with inotrope and IV diuresis, once clinically euvolemic, will perform RHC prior to discontinuing dobutamine. If decompensates again without inotrope, we will need to discuss options including hospice care with patient and family    3/1/2022:  - Patient seen and evaluated by Dr. Valdovinos  - Improving   - Continue IV diuresis, dobutamine and oral dig  - Discontinued Entresto due to hypotension (BP 70s/50s after med given)  - Strict I & O and daily weight  - Continue monitoring    3/2/2022:  - Patient seen and evaluated by Dr. Valdovinos  - Decreased dobutamine 2.5mcg/kg and discontinuing at 8pm tonight  - Continue IV diuresis and oral digoxin  - BP stable after discontinuing Entresto (110-120/60s-70s)  - Continue monitoring    3/3/2022:  - Patient seen and evaluated by Dr. Valdovinos  - Dyspnea, orthopnea and edema has significantly improved. Bilateral crackles resolved.  - Dobutamine was to be discontinued at 8pm last night but was still running today. Will  tell RN to discontinue dobutamine now and transition to oral lasix today  - Continue monitoring, BMP and lactate in am  - May consider adding afterload medication tomorrow

## 2022-03-03 NOTE — ASSESSMENT & PLAN NOTE
2/28: BUN/Creat 1.6/44 on yesterday; pending labs for today. Continue monitoring  3/2: sCre. 1.9 improving. BMP daily and monitor.  3/3: sCre. 1.74. improving

## 2022-03-03 NOTE — ASSESSMENT & PLAN NOTE
HFrEF, nonsichemic cardiomyopathy: EF 15% with moderate-severe TR (12/8/21); NYHA III-IV Stage D  Plan for ICD placement by Dr. Carlson today but now postpned d/t CHF exacerbation   Started on dobutamine and iv lasix.   Cardiology on board  Tele monitoring.   Cardiac deit strick I/o   2/28: Resumed on Dobutamine due to hypotension, elevated lactic of 4.1; IV lasix continued   3/1: Improvement with Dobutamine; lactic down to 1.8 on today. IV lasix continued. Entresto discontinued for hypotension. Cards continuing to follow.   3/2: Dobutamine continues. Urine o/p >1500 cc overnight. LA 1.3  3/3: Dobutamine 2.5mcg/kg and discontinuing at 8pm tonight. Sob improved per pt    03/14 Dobutamine stopped last night, Pt Stable and is ok to discharge from a cardiology standpoint.  Follow cardiology recs at discharge.

## 2022-03-03 NOTE — PROGRESS NOTES
[facility] [place] - [address] [place] [address] Medicine  Progress Note    Patient Name: Jeanmarie Michelle  MRN: [id number]  Patient Class: IP- Inpatient   Admission Date: 2/24/2022  Length of Stay: 7 days  Attending Physician: Eliu Brooks MD  Primary Care Provider: Regina Sprague MD        Subjective:     Principal Problem:Dilated cardiomyopathy        HPI:  Pt is a 81-year-old male with a history of persistent atrial fibrillation on Eliquis, end-stage dilated cardiomyopathy, history of stage III CKD, type 2 diabetes and hypertension who was supposed to get his ICD placement with Dr Carlson today however pt seemed to be volumed overload and in decompensated heart failure, along with elevated sugars therefore medicine team requested for inpatient admission. Pt states that he has been having dyspnea on exertion, no CP and LE swelling, states he was not sure what medicine to stop his medications prior to procedure. He was seen prior to his ICD implantion by cardiology team and was found to be in florid HF with elevated JVD and massive LE swelling. Besides dyspnea on exertion he has no other symptoms. Of note his TSH >70 recently and has not been on any synthroid.       Overview/Hospital Course:  2/28: Awake, alert sitting on side of bed. States he does not feel well and requiring supplemental oxygen. He states he does not have an appetite. SOB with exertion; bilateral LE swelling; left greater than right.   3/1: Awake, alert ambulating to [facility]. Denies SOB on exertion. Denies chest pain, weakness, dizziness. Endorses breathing has improved and feels better than on yesterday. Dobutamine infusion continues along with IV Lasix. Hypotensive after receiving Entresto. Discontinued per Cardiology.   3/2: Dobutamine continues. Urine o/p >1500 cc overnight.Follow up with endocrine at NH. Hypothyroidism. Follow up with cardiology at [facility].      Interval History: NAEO. Pt reports breathing has improved. No  cp    Review of Systems   Constitutional:  Negative for diaphoresis and fever.   HENT:  Negative for congestion.    Respiratory:  Negative for chest tightness and shortness of breath.    Cardiovascular:  Negative for chest pain and palpitations.   Gastrointestinal:  Negative for abdominal pain, constipation, diarrhea and nausea.   Neurological:  Negative for dizziness and headaches.   All other systems reviewed and are negative.  Objective:     Vital Signs (Most Recent):  Temp: 97.5 °F (36.4 °C) (03/03/22 0715)  Pulse: 73 (03/03/22 0715)  Resp: 18 (03/03/22 0715)  BP: 110/80 (03/03/22 0715)  SpO2: 99 % (03/03/22 0715) Vital Signs (24h Range):  Temp:  [97.2 °F (36.2 °C)-97.8 °F (36.6 °C)] 97.5 °F (36.4 °C)  Pulse:  [68-90] 73  Resp:  [18-20] 18  SpO2:  [98 %-100 %] 99 %  BP: ()/(67-80) 110/80     Weight: 95.6 kg (210 lb 12.2 oz)  Body mass index is 29.4 kg/m².    Intake/Output Summary (Last 24 hours) at 3/3/2022 1148  Last data filed at 3/2/2022 1705  Gross per 24 hour   Intake 650 ml   Output 200 ml   Net 450 ml      Physical Exam  Vitals and nursing note reviewed.   Constitutional:       General: He is not in acute distress.     Interventions: Nasal cannula in place.   HENT:      Head: Normocephalic and atraumatic.      Nose: Nose normal.      Mouth/Throat:      Mouth: Mucous membranes are moist.      Pharynx: No oropharyngeal exudate or posterior oropharyngeal erythema.   Eyes:      Extraocular Movements: Extraocular movements intact.      Conjunctiva/sclera: Conjunctivae normal.      Pupils: Pupils are equal, round, and reactive to light.   Cardiovascular:      Rate and Rhythm: Normal rate.      Pulses: Normal pulses.   Pulmonary:      Effort: No respiratory distress.      Breath sounds: No wheezing or rhonchi.   Abdominal:      General: Bowel sounds are normal. There is distension.      Palpations: Abdomen is soft.      Tenderness: There is no abdominal tenderness.   Musculoskeletal:         General: No  swelling.      Cervical back: Normal range of motion and neck supple.      Right lower le+ Edema present.      Left lower le+ Edema present.   Skin:     General: Skin is warm.      Capillary Refill: Capillary refill takes less than 2 seconds.   Neurological:      General: No focal deficit present.      Mental Status: He is alert and oriented to person, place, and time.      Cranial Nerves: No cranial nerve deficit.      Sensory: No sensory deficit.   Psychiatric:         Mood and Affect: Mood normal.         Behavior: Behavior normal.       Significant Labs: All pertinent labs within the past 24 hours have been reviewed.    Significant Imaging: I have reviewed all pertinent imaging results/findings within the past 24 hours.      Assessment/Plan:      * Dilated cardiomyopathy  HFrEF, nonsichemic cardiomyopathy: EF 15% with moderate-severe TR (21); NYHA III-IV Stage D  Plan for ICD placement by Dr. Carlson today but now postpned d/t CHF exacerbation   Started on dobutamine and iv lasix.   Cardiology on board  Tele monitoring.   Cardiac deit strick I/o     : Resumed on Dobutamine due to hypotension, elevated lactic of 4.1; IV lasix continued   3/1: Improvement with Dobutamine; lactic down to 1.8 on today. IV lasix continued. Entresto discontinued for hypotension. Cards continuing to follow.   3/2: Dobutamine continues. Urine o/p >1500 cc overnight. LA 1.3  3/3: Dobutamine 2.5mcg/kg and discontinuing at 8pm tonight. Sob improved per pt    Stage 3b chronic kidney disease  : BUN/Creat 1.6/44 on yesterday; pending labs for today. Continue monitoring  3/2: sCre. 1.9 improving. BMP daily and monitor.  3/3: sCre. 1.74. improving      Hypothyroid  tsh pta was >70  Repeat tsh and ft4  Will start on 137 mcg synthroid for now  Will need outpatient follow up with endocrine    3/1: Continue current POC of Levothyroxine 137mcg PO QD    Acute on chronic kidney failure  ctm , likely 2/2 htn, dm and heart  failure      Chronic systolic congestive heart failure  2/28:Dobuatmine infusion resumed; IV Lasix BID continued; Pending ICD  3/1:  Improvement from yesterday; Continue current POC  3/3: Dobutamine 2.5mcg/kg and discontinuing at 8pm tonight.Continue IV diuresis and oral digoxin.BP stable after discontinuing Entresto (110-120/60s-70s)         Heart failure    Cardiology consulted  Will need ICD-placement  Cont Dobutamin guided diuiresis      Chronic a-fib  Rate controlled  Cont AC.  3/3: Rate controlled with current tx      Benign prostatic hyperplasia    Tamsulosin, no LUTS at this time.       Hyperlipidemia    Cont statin      Gastroesophageal reflux disease    Cont PPI      Diabetes mellitus without complication    SSI sliding scale and long acting, adjust as needed    2/28: Variable glucose trends; 36 on BMP this am. Hypoglycemic trends noted; decrease SSI to mild coverage. Continue to monitor    3/1: SSI coverage decreased to low coverage; glucose trends 60s-120s. Continue monitoring  3/2:  range today. Will monitor and adjust insulin  3/3:  on a.m. labs    Hypertension    Cont current medications.    2/28: BP regimens on hold due to hypotensive trends; Entresto continued for HF; continue monitoring BP trend  3/1: All BP regimens on hold. Continue monitoring BP trends  3/2: /78      VTE Risk Mitigation (From admission, onward)         Ordered     apixaban tablet 5 mg  2 times daily         02/24/22 1101                Discharge Planning   MADDIE:      Code Status: Full Code   Is the patient medically ready for discharge?:     Reason for patient still in hospital (select all that apply): Treatment  Discharge Plan A: Home                  TENISHA Hsieh  Department of Hospital Medicine   [address]

## 2022-03-03 NOTE — ASSESSMENT & PLAN NOTE
SSI sliding scale and long acting, adjust as needed    2/28: Variable glucose trends; 36 on BMP this am. Hypoglycemic trends noted; decrease SSI to mild coverage. Continue to monitor    3/1: SSI coverage decreased to low coverage; glucose trends 60s-120s. Continue monitoring  3/2:  range today. Will monitor and adjust insulin  3/3:  on a.m. labs

## 2022-03-03 NOTE — SUBJECTIVE & OBJECTIVE
Interval History: Patient sitting up in bed, states he just came from restroom and dyspnea with exertion has significantly improved. 24 hour UOP 1.5L. Dobutamine was to be discontinued at 8PM last night but is currently running at 2.5 mcg/kg.    Review of Systems   Constitutional: Negative for chills and fever.   Cardiovascular:  Positive for dyspnea on exertion and leg swelling. Negative for chest pain, orthopnea and palpitations.        Orthopnea, edema and dyspnea significantly improved   Respiratory:  Negative for shortness of breath.    Objective:     Vital Signs (Most Recent):  Temp: 97.6 °F (36.4 °C) (03/03/22 1125)  Pulse: 66 (03/03/22 1125)  Resp: 17 (03/03/22 1125)  BP: 99/72 (03/03/22 1125)  SpO2: 95 % (03/03/22 1125) Vital Signs (24h Range):  Temp:  [97.2 °F (36.2 °C)-97.8 °F (36.6 °C)] 97.6 °F (36.4 °C)  Pulse:  [66-90] 66  Resp:  [17-20] 17  SpO2:  [95 %-100 %] 95 %  BP: ()/(67-80) 99/72     Weight: 95.6 kg (210 lb 12.2 oz)  Body mass index is 29.4 kg/m².     SpO2: 95 %  O2 Device (Oxygen Therapy): nasal cannula      Intake/Output Summary (Last 24 hours) at 3/3/2022 1424  Last data filed at 3/2/2022 1705  Gross per 24 hour   Intake 350 ml   Output 200 ml   Net 150 ml       Lines/Drains/Airways       Peripheral Intravenous Line  Duration                  Peripheral IV - Single Lumen 03/02/22 0625 20 G Anterior;Right Forearm 1 day                    Physical Exam  Vitals reviewed.   Constitutional:       General: He is not in acute distress.  Neck:      Vascular: JVD present.   Cardiovascular:      Rate and Rhythm: Normal rate and regular rhythm.   Pulmonary:      Effort: Pulmonary effort is normal.      Breath sounds: No wheezing, rhonchi or rales.      Comments: Breath sounds and orthopnea improved  Abdominal:      General: Bowel sounds are normal.      Palpations: Abdomen is soft.   Musculoskeletal:      Cervical back: Neck supple.      Right lower leg: Edema present.      Left lower leg: Edema  present.      Comments: Significantly improved    Skin:     General: Skin is warm and dry.      Coloration: Skin is not jaundiced.   Neurological:      Mental Status: He is alert and oriented to person, place, and time.       Significant Labs: BMP:   Recent Labs   Lab 03/02/22 0527 03/03/22 0252   * 161*   * 133*   K 3.8 3.6   CL 97* 95*   CO2 30 27   BUN 40* 36*   CREATININE 1.91* 1.74*   CALCIUM 9.0 8.7   , CMP   Recent Labs   Lab 03/02/22 0527 03/03/22 0252   * 133*   K 3.8 3.6   CL 97* 95*   CO2 30 27   * 161*   BUN 40* 36*   CREATININE 1.91* 1.74*   CALCIUM 9.0 8.7   PROT 6.5 6.4   ALBUMIN 3.4* 3.3*   BILITOT 1.6* 1.2   ALKPHOS 214* 181*   AST 99* 64*   * 95*   ANIONGAP 12 15   EGFRNONAA 36* 40*   , and CBC   Recent Labs   Lab 03/02/22 0527 03/03/22 0252   WBC 4.72 5.17   HGB 12.5* 11.8*   HCT 40.0 36.8*    173       Significant Imaging: Echocardiogram: Transthoracic echo (TTE) complete (Cupid Only):   Results for orders placed or performed during the hospital encounter of 12/08/21   Echo   Result Value Ref Range    IVC diameter 2.24 cm    Left Ventricular Outflow Tract Mean Gradient 2.00 mmHg    AORTIC VALVE CUSP SEPERATION 15.259560027068571 cm    LVIDd 6.23 (A) 3.5 - 6.0 cm    IVS 0.92 0.6 - 1.1 cm    Posterior Wall 0.92 0.6 - 1.1 cm    LVIDs 5.77 (A) 2.1 - 4.0 cm    FS 7 28 - 44 %    LV mass 236.68 g    LA size 4.63 cm    RVDD 3.77 cm    Left Ventricle Relative Wall Thickness 0.30 cm    AV mean gradient 4 mmHg    AV valve area 2.31 cm2    AV index (prosthetic) 0.70     E wave deceleration time 148 msec    LVOT diameter 2.05 cm    LVOT area 3.3 cm2    LVOT peak VTI 13.89 cm    Ao VTI 19.82 cm    LVOT stroke volume 45.82 cm3    MV Peak E Arash 0.76 m/s    TR Max Arash 3.42 m/s    LV Systolic Volume 164.59 mL    LV Diastolic Volume 196.13 mL    Echo EF Estimated 16 %    Triscuspid Valve Regurgitation Peak Gradient 47 mmHg    EF 15 %    Narrative    · Atrial  fibrillation with frequent PVC's, rate 85 and occasional runs of   wide complex tachycardia.  · The left ventricle is moderately enlarged with  · Atrial fibrillation observed.  · Mild right ventricular enlargement.  · Moderate mitral regurgitation.  · Moderate to severe tricuspid regurgitation.  · There is pulmonary hypertension.  · Moderate left atrial enlargement.  · Mild right atrial enlargement.       and X-Ray: CXR: X-Ray Chest 1 View (CXR): No results found for this visit on 02/24/22.

## 2022-03-03 NOTE — ASSESSMENT & PLAN NOTE
2/28: BUN/Creat 1.6/44 on yesterday; pending labs for today. Continue monitoring  3/2: sCre. 1.9 improving. BMP daily and monitor.  3/3: sCre. 1.74. improving    Estimated Creatinine Clearance: 39.3 mL/min (A) (based on SCr of 1.74 mg/dL (H)).     03/04--scr 1.64 today, improved, follow up with pcp in one week with repeat BMP

## 2022-03-03 NOTE — ASSESSMENT & PLAN NOTE
HFrEF, nonsichemic cardiomyopathy: EF 15% with moderate-severe TR (12/8/21); NYHA III-IV Stage D  Plan for ICD placement by Dr. Carlson today but now postpned d/t CHF exacerbation   Started on dobutamine and iv lasix.   Cardiology on board  Tele monitoring.   Cardiac deit strick I/o     2/28: Resumed on Dobutamine due to hypotension, elevated lactic of 4.1; IV lasix continued   3/1: Improvement with Dobutamine; lactic down to 1.8 on today. IV lasix continued. Entresto discontinued for hypotension. Cards continuing to follow.   3/2: Dobutamine continues. Urine o/p >1500 cc overnight. LA 1.3  3/3: Dobutamine 2.5mcg/kg and discontinuing at 8pm tonight. Sob improved per pt

## 2022-03-04 VITALS
RESPIRATION RATE: 18 BRPM | OXYGEN SATURATION: 97 % | BODY MASS INDEX: 29.5 KG/M2 | DIASTOLIC BLOOD PRESSURE: 64 MMHG | HEART RATE: 75 BPM | HEIGHT: 71 IN | TEMPERATURE: 98 F | WEIGHT: 210.75 LBS | SYSTOLIC BLOOD PRESSURE: 110 MMHG

## 2022-03-04 LAB
ALBUMIN SERPL BCP-MCNC: 3.5 G/DL (ref 3.5–5)
ALBUMIN/GLOB SERPL: 1.1 {RATIO}
ALP SERPL-CCNC: 188 U/L (ref 45–115)
ALT SERPL W P-5'-P-CCNC: 79 U/L (ref 16–61)
ANION GAP SERPL CALCULATED.3IONS-SCNC: 15 MMOL/L (ref 7–16)
AST SERPL W P-5'-P-CCNC: 50 U/L (ref 15–37)
BASOPHILS # BLD AUTO: 0 K/UL (ref 0–0.2)
BASOPHILS NFR BLD AUTO: 0 % (ref 0–1)
BILIRUB SERPL-MCNC: 1.3 MG/DL (ref 0–1.2)
BUN SERPL-MCNC: 33 MG/DL (ref 7–18)
BUN/CREAT SERPL: 20 (ref 6–20)
CALCIUM SERPL-MCNC: 8.4 MG/DL (ref 8.5–10.1)
CHLORIDE SERPL-SCNC: 93 MMOL/L (ref 98–107)
CO2 SERPL-SCNC: 27 MMOL/L (ref 21–32)
CREAT SERPL-MCNC: 1.64 MG/DL (ref 0.7–1.3)
DIFFERENTIAL METHOD BLD: ABNORMAL
EOSINOPHIL # BLD AUTO: 0.03 K/UL (ref 0–0.5)
EOSINOPHIL NFR BLD AUTO: 0.5 % (ref 1–4)
ERYTHROCYTE [DISTWIDTH] IN BLOOD BY AUTOMATED COUNT: 18.7 % (ref 11.5–14.5)
GLOBULIN SER-MCNC: 3.2 G/DL (ref 2–4)
GLUCOSE SERPL-MCNC: 226 MG/DL (ref 74–106)
GLUCOSE SERPL-MCNC: 231 MG/DL (ref 70–105)
GLUCOSE SERPL-MCNC: 246 MG/DL (ref 70–105)
HCT VFR BLD AUTO: 38 % (ref 40–54)
HGB BLD-MCNC: 12.4 G/DL (ref 13.5–18)
IMM GRANULOCYTES # BLD AUTO: 0.02 K/UL (ref 0–0.04)
IMM GRANULOCYTES NFR BLD: 0.3 % (ref 0–0.4)
LACTATE SERPL-SCNC: 1.9 MMOL/L (ref 0.4–2)
LYMPHOCYTES # BLD AUTO: 0.72 K/UL (ref 1–4.8)
LYMPHOCYTES NFR BLD AUTO: 12.2 % (ref 27–41)
MCH RBC QN AUTO: 27.2 PG (ref 27–31)
MCHC RBC AUTO-ENTMCNC: 32.6 G/DL (ref 32–36)
MCV RBC AUTO: 83.3 FL (ref 80–96)
MONOCYTES # BLD AUTO: 0.69 K/UL (ref 0–0.8)
MONOCYTES NFR BLD AUTO: 11.7 % (ref 2–6)
MPC BLD CALC-MCNC: 12.6 FL (ref 9.4–12.4)
NEUTROPHILS # BLD AUTO: 4.42 K/UL (ref 1.8–7.7)
NEUTROPHILS NFR BLD AUTO: 75.3 % (ref 53–65)
NRBC # BLD AUTO: 0 X10E3/UL
NRBC, AUTO (.00): 0 %
PLATELET # BLD AUTO: 160 K/UL (ref 150–400)
POTASSIUM SERPL-SCNC: 4 MMOL/L (ref 3.5–5.1)
PROT SERPL-MCNC: 6.7 G/DL (ref 6.4–8.2)
RBC # BLD AUTO: 4.56 M/UL (ref 4.6–6.2)
SODIUM SERPL-SCNC: 131 MMOL/L (ref 136–145)
WBC # BLD AUTO: 5.88 K/UL (ref 4.5–11)

## 2022-03-04 PROCEDURE — 83605 ASSAY OF LACTIC ACID: CPT | Performed by: STUDENT IN AN ORGANIZED HEALTH CARE EDUCATION/TRAINING PROGRAM

## 2022-03-04 PROCEDURE — 80053 COMPREHEN METABOLIC PANEL: CPT | Performed by: INTERNAL MEDICINE

## 2022-03-04 PROCEDURE — 99239 HOSP IP/OBS DSCHRG MGMT >30: CPT | Mod: ,,, | Performed by: HOSPITALIST

## 2022-03-04 PROCEDURE — 82962 GLUCOSE BLOOD TEST: CPT

## 2022-03-04 PROCEDURE — 85025 COMPLETE CBC W/AUTO DIFF WBC: CPT | Performed by: INTERNAL MEDICINE

## 2022-03-04 PROCEDURE — 25000003 PHARM REV CODE 250: Performed by: INTERNAL MEDICINE

## 2022-03-04 PROCEDURE — 25000003 PHARM REV CODE 250: Performed by: NURSE PRACTITIONER

## 2022-03-04 PROCEDURE — 99239 PR HOSPITAL DISCHARGE DAY,>30 MIN: ICD-10-PCS | Mod: ,,, | Performed by: HOSPITALIST

## 2022-03-04 PROCEDURE — 36415 COLL VENOUS BLD VENIPUNCTURE: CPT | Performed by: INTERNAL MEDICINE

## 2022-03-04 PROCEDURE — 99233 SBSQ HOSP IP/OBS HIGH 50: CPT | Mod: ,,, | Performed by: INTERNAL MEDICINE

## 2022-03-04 PROCEDURE — 99233 PR SUBSEQUENT HOSPITAL CARE,LEVL III: ICD-10-PCS | Mod: ,,, | Performed by: INTERNAL MEDICINE

## 2022-03-04 PROCEDURE — 63600175 PHARM REV CODE 636 W HCPCS: Performed by: NURSE PRACTITIONER

## 2022-03-04 RX ORDER — DAPAGLIFLOZIN 5 MG/1
5 TABLET, FILM COATED ORAL DAILY
Status: CANCELLED
Start: 2022-03-04

## 2022-03-04 RX ORDER — POTASSIUM CHLORIDE 20 MEQ/1
20 TABLET, EXTENDED RELEASE ORAL DAILY
Qty: 2 TABLET | Refills: 0 | Status: SHIPPED | OUTPATIENT
Start: 2022-03-05 | End: 2022-03-04 | Stop reason: SDUPTHER

## 2022-03-04 RX ORDER — FUROSEMIDE 80 MG/1
80 TABLET ORAL 2 TIMES DAILY
Qty: 60 TABLET | Refills: 11 | Status: SHIPPED | OUTPATIENT
Start: 2022-03-04 | End: 2023-03-04

## 2022-03-04 RX ORDER — POTASSIUM CHLORIDE 20 MEQ/1
20 TABLET, EXTENDED RELEASE ORAL DAILY
Qty: 10 TABLET | Refills: 0 | Status: SHIPPED | OUTPATIENT
Start: 2022-03-05

## 2022-03-04 RX ORDER — POTASSIUM CHLORIDE 20 MEQ/1
20 TABLET, EXTENDED RELEASE ORAL DAILY
Qty: 30 TABLET | Refills: 0 | Status: CANCELLED | OUTPATIENT
Start: 2022-03-05

## 2022-03-04 RX ORDER — LEVOTHYROXINE SODIUM 137 UG/1
137 TABLET ORAL
Qty: 30 TABLET | Refills: 0 | Status: SHIPPED | OUTPATIENT
Start: 2022-03-05 | End: 2023-03-05

## 2022-03-04 RX ORDER — DIGOXIN 125 MCG
0.12 TABLET ORAL DAILY
Qty: 30 TABLET | Refills: 0 | Status: CANCELLED | OUTPATIENT
Start: 2022-03-05 | End: 2023-03-05

## 2022-03-04 RX ORDER — DIGOXIN 125 MCG
0.12 TABLET ORAL DAILY
Qty: 30 TABLET | Refills: 11 | Status: SHIPPED | OUTPATIENT
Start: 2022-03-05 | End: 2023-03-05

## 2022-03-04 RX ADMIN — FUROSEMIDE 80 MG: 80 TABLET ORAL at 08:03

## 2022-03-04 RX ADMIN — POTASSIUM CHLORIDE 20 MEQ: 20 TABLET, EXTENDED RELEASE ORAL at 08:03

## 2022-03-04 RX ADMIN — INSULIN ASPART 2 UNITS: 100 INJECTION, SOLUTION INTRAVENOUS; SUBCUTANEOUS at 11:03

## 2022-03-04 RX ADMIN — APIXABAN 5 MG: 5 TABLET, FILM COATED ORAL at 08:03

## 2022-03-04 RX ADMIN — PANTOPRAZOLE SODIUM 40 MG: 40 TABLET, DELAYED RELEASE ORAL at 08:03

## 2022-03-04 RX ADMIN — TAMSULOSIN HYDROCHLORIDE 0.4 MG: 0.4 CAPSULE ORAL at 08:03

## 2022-03-04 RX ADMIN — LEVOTHYROXINE SODIUM 137 MCG: 25 TABLET ORAL at 05:03

## 2022-03-04 RX ADMIN — ASPIRIN 81 MG: 81 TABLET, COATED ORAL at 08:03

## 2022-03-04 RX ADMIN — SENNOSIDES AND DOCUSATE SODIUM 1 TABLET: 50; 8.6 TABLET ORAL at 08:03

## 2022-03-04 RX ADMIN — DIGOXIN 0.12 MG: 125 TABLET ORAL at 08:03

## 2022-03-04 RX ADMIN — INSULIN ASPART 2 UNITS: 100 INJECTION, SOLUTION INTRAVENOUS; SUBCUTANEOUS at 08:03

## 2022-03-04 NOTE — ASSESSMENT & PLAN NOTE
- Rate currently controlled  - On Eliquis for primary stroke prevention    3/4/2022:  - Continue digoxin and Eliquis at discharge  - Discontinued BB due to hypotension

## 2022-03-04 NOTE — SUBJECTIVE & OBJECTIVE
Interval History: Patient seen today, reports he feels good and is ready to go home. Lactate normal, liver enzymes stable, BP stable (low normal still holding BB and Entresto), now off dobutamine >12 hours and tolerating oral lasix.    Review of Systems   Constitutional: Negative for chills and fever.   Cardiovascular:  Positive for dyspnea on exertion and leg swelling. Negative for chest pain, orthopnea and palpitations.        Orthopnea, edema and dyspnea significantly improved   Respiratory:  Negative for shortness of breath.    Objective:     Vital Signs (Most Recent):  Temp: 97.9 °F (36.6 °C) (03/04/22 1042)  Pulse: 75 (03/04/22 1042)  Resp: 18 (03/04/22 1042)  BP: 110/64 (Manually checked on left arm with Dr Valdovinos at bedside) (03/04/22 1211)  SpO2: 97 % (03/04/22 1042) Vital Signs (24h Range):  Temp:  [97.6 °F (36.4 °C)-99.2 °F (37.3 °C)] 97.9 °F (36.6 °C)  Pulse:  [] 75  Resp:  [18-20] 18  SpO2:  [95 %-100 %] 97 %  BP: ()/() 110/64     Weight: 95.6 kg (210 lb 12.2 oz)  Body mass index is 29.4 kg/m².     SpO2: 97 %  O2 Device (Oxygen Therapy): nasal cannula      Intake/Output Summary (Last 24 hours) at 3/4/2022 1510  Last data filed at 3/4/2022 0853  Gross per 24 hour   Intake 800 ml   Output 900 ml   Net -100 ml       Lines/Drains/Airways       None                   Physical Exam  Vitals reviewed.   Constitutional:       General: He is not in acute distress.  Neck:      Vascular: JVD present.   Cardiovascular:      Rate and Rhythm: Normal rate and regular rhythm.   Pulmonary:      Effort: Pulmonary effort is normal.      Breath sounds: No wheezing, rhonchi or rales.      Comments: Breath sounds and orthopnea improved  Abdominal:      General: Bowel sounds are normal.      Palpations: Abdomen is soft.   Musculoskeletal:      Cervical back: Neck supple.      Right lower leg: Edema present.      Left lower leg: Edema present.      Comments: Significantly improved    Skin:     General: Skin is  warm and dry.      Coloration: Skin is not jaundiced.   Neurological:      Mental Status: He is alert and oriented to person, place, and time.       Significant Labs: Blood Culture: No results for input(s): LABBLOO in the last 48 hours., BMP:   Recent Labs   Lab 03/03/22  0252 03/04/22  0707   * 226*   * 131*   K 3.6 4.0   CL 95* 93*   CO2 27 27   BUN 36* 33*   CREATININE 1.74* 1.64*   CALCIUM 8.7 8.4*   , CMP   Recent Labs   Lab 03/03/22  0252 03/04/22  0707   * 131*   K 3.6 4.0   CL 95* 93*   CO2 27 27   * 226*   BUN 36* 33*   CREATININE 1.74* 1.64*   CALCIUM 8.7 8.4*   PROT 6.4 6.7   ALBUMIN 3.3* 3.5   BILITOT 1.2 1.3*   ALKPHOS 181* 188*   AST 64* 50*   ALT 95* 79*   ANIONGAP 15 15   EGFRNONAA 40* 43*   , CBC   Recent Labs   Lab 03/03/22  0252 03/04/22  0707   WBC 5.17 5.88   HGB 11.8* 12.4*   HCT 36.8* 38.0*    160   , INR No results for input(s): INR, PROTIME in the last 48 hours., Lipid Panel No results for input(s): CHOL, HDL, LDLCALC, TRIG, CHOLHDL in the last 48 hours., and Troponin No results for input(s): TROPONINI in the last 48 hours.    Significant Imaging: Echocardiogram: Transthoracic echo (TTE) complete (Cupid Only):   Results for orders placed or performed during the hospital encounter of 12/08/21   Echo   Result Value Ref Range    IVC diameter 2.24 cm    Left Ventricular Outflow Tract Mean Gradient 2.00 mmHg    AORTIC VALVE CUSP SEPERATION [id number] cm    LVIDd 6.23 (A) 3.5 - 6.0 cm    IVS 0.92 0.6 - 1.1 cm    Posterior Wall 0.92 0.6 - 1.1 cm    LVIDs 5.77 (A) 2.1 - 4.0 cm    FS 7 28 - 44 %    LV mass 236.68 g    LA size 4.63 cm    RVDD 3.77 cm    Left Ventricle Relative Wall Thickness 0.30 cm    AV mean gradient 4 mmHg    AV valve area 2.31 cm2    AV index (prosthetic) 0.70     E wave deceleration time 148 msec    LVOT diameter 2.05 cm    LVOT area 3.3 cm2    LVOT peak VTI 13.89 cm    Ao VTI 19.82 cm    LVOT stroke volume 45.82 cm3    MV Peak E Arash 0.76  m/s    TR Max Arash 3.42 m/s    LV Systolic Volume 164.59 mL    LV Diastolic Volume 196.13 mL    Echo EF Estimated 16 %    Triscuspid Valve Regurgitation Peak Gradient 47 mmHg    EF 15 %    Narrative    · Atrial fibrillation with frequent PVC's, rate 85 and occasional runs of   wide complex tachycardia.  · The left ventricle is moderately enlarged with  · Atrial fibrillation observed.  · Mild right ventricular enlargement.  · Moderate mitral regurgitation.  · Moderate to severe tricuspid regurgitation.  · There is pulmonary hypertension.  · Moderate left atrial enlargement.  · Mild right atrial enlargement.       and X-Ray: CXR: X-Ray Chest 1 View (CXR): No results found for this visit on 02/24/22.

## 2022-03-04 NOTE — ASSESSMENT & PLAN NOTE
tsh pta was >70  Repeat tsh and ft4  Will start on 137 mcg synthroid for now  Will need outpatient follow up with endocrine    3/1: Continue current POC of Levothyroxine 137mcg PO QD    03/04--Continue present management at discharge, establish care with endocrinology in 3-4 weeks

## 2022-03-04 NOTE — ASSESSMENT & PLAN NOTE
SSI sliding scale and long acting, adjust as needed    2/28: Variable glucose trends; 36 on BMP this am. Hypoglycemic trends noted; decrease SSI to mild coverage. Continue to monitor    3/1: SSI coverage decreased to low coverage; glucose trends 60s-120s. Continue monitoring  3/2:  range today. Will monitor and adjust insulin  3/3:  on a.m. labs    03/04--Resume glucotrol, Farxiga dc'rachid per cardiology, Will have HH to assist with monitoring blood glucose readings

## 2022-03-04 NOTE — PROGRESS NOTES
[address]  Cardiology  Progress Note    Patient Name: Jeanmarie Michelle  MRN: [id number]  Admission Date: 2/24/2022  Hospital Length of Stay: 8 days  Code Status: Full Code   Attending Physician: No att. providers found   Primary Care Physician: Regina Sprague MD  Expected Discharge Date: 3/4/2022  Principal Problem:Dilated cardiomyopathy    Subjective:     Hospital Course:   No notes on file    Interval History: Patient seen today, reports he feels good and is ready to go home. Lactate normal, liver enzymes stable, BP stable (low normal still holding BB and Entresto), now off dobutamine >12 hours and tolerating oral lasix.    Review of Systems   Constitutional: Negative for chills and fever.   Cardiovascular:  Positive for dyspnea on exertion and leg swelling. Negative for chest pain, orthopnea and palpitations.        Orthopnea, edema and dyspnea significantly improved   Respiratory:  Negative for shortness of breath.    Objective:     Vital Signs (Most Recent):  Temp: 97.9 °F (36.6 °C) (03/04/22 1042)  Pulse: 75 (03/04/22 1042)  Resp: 18 (03/04/22 1042)  BP: 110/64 (Manually checked on left arm with Dr Valdovinos at bedside) (03/04/22 1211)  SpO2: 97 % (03/04/22 1042) Vital Signs (24h Range):  Temp:  [97.6 °F (36.4 °C)-99.2 °F (37.3 °C)] 97.9 °F (36.6 °C)  Pulse:  [] 75  Resp:  [18-20] 18  SpO2:  [95 %-100 %] 97 %  BP: ()/() 110/64     Weight: 95.6 kg (210 lb 12.2 oz)  Body mass index is 29.4 kg/m².     SpO2: 97 %  O2 Device (Oxygen Therapy): nasal cannula      Intake/Output Summary (Last 24 hours) at 3/4/2022 1510  Last data filed at 3/4/2022 0853  Gross per 24 hour   Intake 800 ml   Output 900 ml   Net -100 ml       Lines/Drains/Airways       None                   Physical Exam  Vitals reviewed.   Constitutional:       General: He is not in acute distress.  Neck:      Vascular: JVD present.   Cardiovascular:      Rate and Rhythm: Normal rate and regular rhythm.    Pulmonary:      Effort: Pulmonary effort is normal.      Breath sounds: No wheezing, rhonchi or rales.      Comments: Breath sounds and orthopnea improved  Abdominal:      General: Bowel sounds are normal.      Palpations: Abdomen is soft.   Musculoskeletal:      Cervical back: Neck supple.      Right lower leg: Edema present.      Left lower leg: Edema present.      Comments: Significantly improved    Skin:     General: Skin is warm and dry.      Coloration: Skin is not jaundiced.   Neurological:      Mental Status: He is alert and oriented to person, place, and time.       Significant Labs: Blood Culture: No results for input(s): LABBLOO in the last 48 hours., BMP:   Recent Labs   Lab 03/03/22  0252 03/04/22  0707   * 226*   * 131*   K 3.6 4.0   CL 95* 93*   CO2 27 27   BUN 36* 33*   CREATININE 1.74* 1.64*   CALCIUM 8.7 8.4*   , CMP   Recent Labs   Lab 03/03/22  0252 03/04/22  0707   * 131*   K 3.6 4.0   CL 95* 93*   CO2 27 27   * 226*   BUN 36* 33*   CREATININE 1.74* 1.64*   CALCIUM 8.7 8.4*   PROT 6.4 6.7   ALBUMIN 3.3* 3.5   BILITOT 1.2 1.3*   ALKPHOS 181* 188*   AST 64* 50*   ALT 95* 79*   ANIONGAP 15 15   EGFRNONAA 40* 43*   , CBC   Recent Labs   Lab 03/03/22  0252 03/04/22  0707   WBC 5.17 5.88   HGB 11.8* 12.4*   HCT 36.8* 38.0*    160   , INR No results for input(s): INR, PROTIME in the last 48 hours., Lipid Panel No results for input(s): CHOL, HDL, LDLCALC, TRIG, CHOLHDL in the last 48 hours., and Troponin No results for input(s): TROPONINI in the last 48 hours.    Significant Imaging: Echocardiogram: Transthoracic echo (TTE) complete (Cupid Only):   Results for orders placed or performed during the hospital encounter of 12/08/21   Echo   Result Value Ref Range    IVC diameter 2.24 cm    Left Ventricular Outflow Tract Mean Gradient 2.00 mmHg    AORTIC VALVE CUSP SEPERATION 15.059400726610486 cm    LVIDd 6.23 (A) 3.5 - 6.0 cm    IVS 0.92 0.6 - 1.1 cm    Posterior Wall  0.92 0.6 - 1.1 cm    LVIDs 5.77 (A) 2.1 - 4.0 cm    FS 7 28 - 44 %    LV mass 236.68 g    LA size 4.63 cm    RVDD 3.77 cm    Left Ventricle Relative Wall Thickness 0.30 cm    AV mean gradient 4 mmHg    AV valve area 2.31 cm2    AV index (prosthetic) 0.70     E wave deceleration time 148 msec    LVOT diameter 2.05 cm    LVOT area 3.3 cm2    LVOT peak VTI 13.89 cm    Ao VTI 19.82 cm    LVOT stroke volume 45.82 cm3    MV Peak E Arash 0.76 m/s    TR Max Arash 3.42 m/s    LV Systolic Volume 164.59 mL    LV Diastolic Volume 196.13 mL    Echo EF Estimated 16 %    Triscuspid Valve Regurgitation Peak Gradient 47 mmHg    EF 15 %    Narrative    · Atrial fibrillation with frequent PVC's, rate 85 and occasional runs of   wide complex tachycardia.  · The left ventricle is moderately enlarged with  · Atrial fibrillation observed.  · Mild right ventricular enlargement.  · Moderate mitral regurgitation.  · Moderate to severe tricuspid regurgitation.  · There is pulmonary hypertension.  · Moderate left atrial enlargement.  · Mild right atrial enlargement.       and X-Ray: CXR: X-Ray Chest 1 View (CXR): No results found for this visit on 02/24/22.    Assessment and Plan:     Brief HPI: 80 y/o male with PMH of persistent atrial fibrillation, nonischemic ([facility] 2018) dilated cardiomyopathy (Stage D HF, EF 15%), CKD, HTN, DM, and newly diagnosed hypothyroidism who was scheduled for outpatient ICD placement by Dr. Carlson today but presented with acute on chronic decompensated heart failure with hyperglycemia and recent elevated TSH of 76.0 (not yet treated). He was admitted by hospital medicine and cardiology consulted for heart failure exacerbation.      Patient was seen in cardiology clinic 2/10/22 at which time his aldactone and Entresto were not continued due to CKD, creatinine 2.1 (baseline 1.3-1.5). He presents today with increased sob, orthopnea, mild confusion (reported by family), worsening lower extremity edema and abdominal  distention, and decreased oxygen saturation on RA (93%). On assessment he has significant lower extremity edema expanding to abdomen, JVD extending to mandible, all extremities are cool to touch with decreased breath sound bilaterally. Labs revealed Lactate 4.1, BNP 34,000 (was 15,000 last month), CONSUELO with creatinine 2.7 (baseline 1.3-1.5), and elevated liver enzymes.    Hypothyroid  - TSH 76 2/8/2022  - Repeat TSH, add FreeT4  - Being followed by primary medical team    2/28/2022:  - TSH 71.5, Free T4 0.46  - Levothyroxine 137mcg initiated 2/25  - Being followed by primary medical team    3/4/2022:  - Will need follow up with PCP at discharge    Low cardiac output syndrome  - Patient seen and evaluated by Dr. Robb  - Lactate 4.1, creatinine up to 2.7, with elevated liver enzymes, BNP 34,000 (doubled since last month)  - Holding home BB, start dobutamine 2.5 mcg/kg  - IV diuresis, 80mg BID  - Start hydralazine 25mg TID and Isosorbide 20mg TID  - Strict I & O and daily weights  - Monitor on telemetry  - BMP and lactate in am    2/25/22:  - lactate has normalized  - creatinine improving  - continue diuresis with dobutamine and IV lasix 80mg bid  - continue hydralazine and isosorbide  - daily weights, strict I&Os  - BMP in AM    2/28/2022:  - Creatinine down to 1.6 yesterday and lactate normal and dobutamine was dc'd. Today lactate back up to 4.1, he is sob requiring supplemental O2 @ 2L with bilateral crackles. He's had 750ml UOP in past 24 hours on IV lasix 80mg BID.  - Restart dobutamine @ 5 mcq/kg and discontinue metoprolol. Will leave Entresto on board for now and monitor BP closely.  - CMP and lactate in am   - Plan to optimize again with inotrope and IV diuresis, once clinically euvolemic, will perform RHC prior to discontinuing dobutamine. If decompensates again without inotrope, we will need to discuss options including hospice care with patient and family    3/1/2022:  - Patient seen and evaluated by  Dr. Valdovinos  - Improving   - Continue IV diuresis, dobutamine and oral dig  - Discontinued Entresto due to hypotension (BP 70s/50s after med given)  - Strict I & O and daily weight  - Continue monitoring    3/2/2022:  - Patient seen and evaluated by Dr. Valdovinos  - Decreased dobutamine 2.5mcg/kg and discontinuing at 8pm tonight  - Continue IV diuresis and oral digoxin  - BP stable after discontinuing Entresto (110-120/60s-70s)  - Continue monitoring    3/3/2022:  - Patient seen and evaluated by Dr. Valdovinos  - Dyspnea, orthopnea and edema has significantly improved. Bilateral crackles resolved.  - Dobutamine was to be discontinued at 8pm last night but was still running today. Will tell RN to discontinue dobutamine now and transition to oral lasix today  - Continue monitoring, BMP and lactate in am  - May consider adding afterload medication tomorrow    3/4/2022:  - Patient seen and evaluated by Dr. Valdovinos  - Lactate normal, liver enzymes stable, BP stable (low normal still holding BB and Entresto), now off dobutamine >12 hours and tolerating oral lasix.  - Euvolemic on assessment, still low-normal BP; will not restart BB or Enstresto at this time.  - Okay to discharge home today with Home Health and follow up scheduled with ANKUSH Barker in 1 week; may consider BB or entresto at follow up if BP will tolerate    Acute on chronic kidney failure  - Creatinine 2.7, baseline (1.3-1.5)  - Likely related to low cardiac output/decompensated heart failure    2/25/22:  - creatinine has improved to 2.28  - will continue to monitor with diuresis    2/28/2022:  - Creatinine down to 1.6 yesterday  - BMP today pending    3/1/2022:  - Creatinine 2.1    3/2/2022:  - Creatinine 1.9    3/3/2022:  - Creatinine 1.7  - BMP in am    3/4/2022:  - Stable, creatinine 1.6 today    Chronic a-fib  - Rate currently controlled  - On Eliquis for primary stroke prevention    3/4/2022:  - Continue digoxin and Eliquis at discharge  - Discontinued BB due to  hypotension    Diabetes mellitus without complication  - Being followed by primary medical team          VTE Risk Mitigation (From admission, onward)         Ordered     apixaban tablet 5 mg  2 times daily         02/24/22 1101            PT seen and examined, chart reviewed, essentially agree with findings as documented.     CC: Shortness of breath  HPI: Pt reports shortness of breath and lower extremity edema have resolved  PE: agree with above  IMP/Plan  1. Acute on chronic systolic heart failure: pt now adequately compensated, off dobutamine, tolerating low dose Coreg, unable to tolerated ACE, Entresto, aldactone due to low blood pressure.  PT is failing medical tx, may be candidate for continuous dobutamine infusion if decompensates.  Ok for discharge to home care, will continue to follow as outpatient.     Leonela Powell, SHIRLEYP  Cardiology  [facility], [place] - [address] [place] [address]

## 2022-03-04 NOTE — ASSESSMENT & PLAN NOTE
2/28:Dobuatmine infusion resumed; IV Lasix BID continued; Pending ICD  3/1:  Improvement from yesterday; Continue current POC  3/3: Dobutamine 2.5mcg/kg and discontinuing at 8pm tonight.Continue IV diuresis and oral digoxin.BP stable after discontinuing Entresto (110-120/60s-70s)    03/04--Discharge with cardiology recs, HH, follow up with cardiology in one week

## 2022-03-04 NOTE — DISCHARGE SUMMARY
[address] Medicine  Discharge Summary      Patient Name: Jeanmarie Michelle  MRN: [id number]  Patient Class: IP- Inpatient  Admission Date: 2/24/2022  Hospital Length of Stay: 8 days  Discharge Date and Time:  03/04/2022 2:45 PM  Attending Physician: No att. providers found   Discharging Provider: ROLY Amador  Primary Care Provider: Regina Sprague MD      HPI:   Pt is a 81-year-old male with a history of persistent atrial fibrillation on Eliquis, end-stage dilated cardiomyopathy, history of stage III CKD, type 2 diabetes and hypertension who was supposed to get his ICD placement with Dr Carlson today however pt seemed to be volumed overload and in decompensated heart failure, along with elevated sugars therefore medicine team requested for inpatient admission. Pt states that he has been having dyspnea on exertion, no CP and LE swelling, states he was not sure what medicine to stop his medications prior to procedure. He was seen prior to his ICD implantion by cardiology team and was found to be in florid HF with elevated JVD and massive LE swelling. Besides dyspnea on exertion he has no other symptoms. Of note his TSH >70 recently and has not been on any synthroid.       Procedure(s) (LRB):  INSERTION, ICD GENERATOR, SINGLE CHAMBER (Left)      Hospital Course:   2/28: Awake, alert sitting on side of bed. States he does not feel well and requiring supplemental oxygen. He states he does not have an appetite. SOB with exertion; bilateral LE swelling; left greater than right.   3/1: Awake, alert ambulating to [facility]. Denies SOB on exertion. Denies chest pain, weakness, dizziness. Endorses breathing has improved and feels better than on yesterday. Dobutamine infusion continues along with IV Lasix. Hypotensive after receiving Entresto. Discontinued per Cardiology.   3/2: Dobutamine continues. Urine o/p >1500 cc overnight.[facility]. Hypothyroidism.  Follow up with cardiology at [facility].  3/3:3/3: Dobutamine 2.5mcg/kg and discontinuing at 8pm tonight. Sob improved per pt.  03/04--Pt doing well, states feels much better and would like to be discharged today.  Pt admitted to the hospital for acute on chronic decompensated heart failure with hyperglycemia and an elevated TSH of 76, TSH rechecked and was 71.5, Free T4 0.46; Synthroid 137 mcg, will have pt establish care with endocrinologist.  Has been followed by cardiology during this admission and will follow cardiology recs at discharge;med rec completed with assistance from cardiology.  Will need Home health for daily monitoring of blood pressure and weight, etc, will follow up with cardiology outpatient in one week as well as PCP with repeat BMP.  Has met maximum benefit from this hospitalization and is stable for discharge.         Goals of Care Treatment Preferences:  Code Status: Full Code      Consults:   Consults (From admission, onward)        Status Ordering Provider     Inpatient consult to Social Work  Once        Provider:  (Not yet assigned)    Completed YVONNE EDWARDS     Inpatient consult to Social Work  Once        Provider:  (Not yet assigned)    Completed DONTA BEST     Inpatient consult to Cardiology  Once        Provider:  Jude Robb MD    Completed LOKESH, REHMAT U          * Dilated cardiomyopathy  HFrEF, nonsichemic cardiomyopathy: EF 15% with moderate-severe TR (12/8/21); NYHA III-IV Stage D  Plan for ICD placement by Dr. Carlson today but now postpned d/t CHF exacerbation   Started on dobutamine and iv lasix.   Cardiology on board  Tele monitoring.   Cardiac deit strick I/o   2/28: Resumed on Dobutamine due to hypotension, elevated lactic of 4.1; IV lasix continued   3/1: Improvement with Dobutamine; lactic down to 1.8 on today. IV lasix continued. Entresto discontinued for hypotension. Cards continuing to follow.   3/2: Dobutamine continues. Urine o/p >1500 cc overnight. LA  1.3  3/3: Dobutamine 2.5mcg/kg and discontinuing at 8pm tonight. Sob improved per pt    03/14 Dobutamine stopped last night, Pt Stable and is ok to discharge from a cardiology standpoint.  Follow cardiology recs at discharge.     Stage 3b chronic kidney disease  2/28: BUN/Creat 1.6/44 on yesterday; pending labs for today. Continue monitoring  3/2: sCre. 1.9 improving. BMP daily and monitor.  3/3: sCre. 1.74. improving    Estimated Creatinine Clearance: 39.3 mL/min (A) (based on SCr of 1.74 mg/dL (H)).     03/04--scr 1.64 today, improved, follow up with pcp in one week with repeat BMP    Hypothyroid  tsh pta was >70  Repeat tsh and ft4  Will start on 137 mcg synthroid for now  Will need outpatient follow up with endocrine    3/1: Continue current POC of Levothyroxine 137mcg PO QD    03/04--Continue present management at discharge, establish care with endocrinology in 3-4 weeks    Low cardiac output syndrome        Acute on chronic kidney failure  ctm , likely 2/2 htn, dm and heart failure    03/04--follow up with pcp in one week with BMP      Chronic systolic congestive heart failure  2/28:Dobuatmine infusion resumed; IV Lasix BID continued; Pending ICD  3/1:  Improvement from yesterday; Continue current POC  3/3: Dobutamine 2.5mcg/kg and discontinuing at 8pm tonight.Continue IV diuresis and oral digoxin.BP stable after discontinuing Entresto (110-120/60s-70s)    03/04--Discharge with cardiology recs, , follow up with cardiology in one week         Heart failure    Cardiology consulted  Will need ICD-placement  Cont Dobutamin guided diuiresis    03/04--compression stockings, Lasix, BID      Chronic a-fib  Rate controlled  Cont AC.  3/3: Rate controlled with current tx      Benign prostatic hyperplasia    Tamsulosin, no LUTS at this time.       Hyperlipidemia    Cont statin      Gastroesophageal reflux disease    Cont PPI      Diabetes mellitus without complication    SSI sliding scale and long acting, adjust as  needed    2/28: Variable glucose trends; 36 on BMP this am. Hypoglycemic trends noted; decrease SSI to mild coverage. Continue to monitor    3/1: SSI coverage decreased to low coverage; glucose trends 60s-120s. Continue monitoring  3/2:  range today. Will monitor and adjust insulin  3/3:  on a.m. labs    03/04--Resume glucotrol, Farxiga dc'd per cardiology, Will have HH to assist with monitoring blood glucose readings    Hypertension    Cont current medications.    2/28: BP regimens on hold due to hypotensive trends; Entresto continued for HF; continue monitoring BP trend  3/1: All BP regimens on hold. Continue monitoring BP trends  3/2: /78    03/04--Coreg d/c'd at discharge per cardiology, Will Need HH for end stage heart failure for daily monitoring of  BP, Fluid status, etc      Final Active Diagnoses:    Diagnosis Date Noted POA    PRINCIPAL PROBLEM:  Dilated cardiomyopathy [I42.0] 12/06/2021 Yes     Chronic    Stage 3b chronic kidney disease [N18.32]  Yes     Chronic    Hypothyroid [E03.9] 02/24/2022 Yes     Chronic    Low cardiac output syndrome [I50.9]  Yes    Acute on chronic kidney failure [N17.9, N18.9] 01/21/2022 Yes    Chronic systolic congestive heart failure [I50.22] 01/21/2022 Yes     Chronic    Hypertension [I10] 10/20/2021 Yes    Diabetes mellitus without complication [E11.9] 10/20/2021 Yes    Hyperlipidemia [E78.5] 10/20/2021 Yes    Benign prostatic hyperplasia [N40.0] 10/20/2021 Yes    Chronic a-fib [I48.20] 10/20/2021 Yes    Gastroesophageal reflux disease [K21.9] 10/20/2021 Yes    Heart failure [I50.9] 10/20/2021 Yes      Problems Resolved During this Admission:    Diagnosis Date Noted Date Resolved POA    Pre-operative cardiovascular examination [Z01.810]  03/04/2022 Not Applicable       Discharged Condition: stable    Disposition: Home or Self Care    Follow Up:   Follow-up Information     Regina Sprague MD Follow up in 1 week(s).    Specialty: Family  Medicine  Why: Hospital follow up with BMP  Contact information:  [address]  [facility] [place]  [place] [address]  [phone]             TENISHA May Follow up in 1 week(s).    Specialty: Cardiology  Why: Hospital discharge, low cardiac output syndrome, end stage heart failure; was planned for outpatient ICD placement but was in decompensated heart failure: Will need fluid status evaluation and BP check  Contact information:  [address] [facility]  [place] [address]  [phone]             J Woody Sistrunk, MD Follow up in 3 week(s).    Specialty: Endocrinology  Why: Hospital follow-up/establish care in 2-3 weeks for Hypothyroidism  Contact information:  [address] [place] [address] [facility]  [place] [address]  [phone]                       Patient Instructions:      Diet Cardiac     Notify your health care provider if you experience any of the following:  temperature >100.4     Notify your health care provider if you experience any of the following:  persistent nausea and vomiting or diarrhea     Notify your health care provider if you experience any of the following:  severe uncontrolled pain     Notify your health care provider if you experience any of the following:  redness, tenderness, or signs of infection (pain, swelling, redness, odor or green/yellow discharge around incision site)     Notify your health care provider if you experience any of the following:  difficulty breathing or increased cough     Notify your health care provider if you experience any of the following:  severe persistent headache     Notify your health care provider if you experience any of the following:  worsening rash     Notify your health care provider if you experience any of the following:  persistent dizziness, light-headedness, or visual disturbances     Notify your health care provider if you experience any of the following:  increased confusion  or weakness     Activity as tolerated       Significant Diagnostic Studies: Labs:   BMP:   Recent Labs   Lab 03/03/22  0252 03/04/22  0707   * 226*   * 131*   K 3.6 4.0   CL 95* 93*   CO2 27 27   BUN 36* 33*   CREATININE 1.74* 1.64*   CALCIUM 8.7 8.4*    and CBC   Recent Labs   Lab 03/03/22  0252 03/04/22  0707   WBC 5.17 5.88   HGB 11.8* 12.4*   HCT 36.8* 38.0*    160       Pending Diagnostic Studies:     Procedure Component Value Units Date/Time    EKG 12-lead [[id number]] Collected: 02/28/22 2048    Order Status: Sent Lab Status: In process Updated: 03/01/22 0603    Narrative:      Test Reason : E87.5,    Vent. Rate : 087 BPM     Atrial Rate : 129 BPM     P-R Int : 000 ms          QRS Dur : 124 ms      QT Int : 432 ms       P-R-T Axes : 000 198 041 degrees     QTc Int : 519 ms    Atrial fibrillation with premature ventricular or aberrantly conducted  complexes  Anterolateral infarct (cited on or before 26-JAN-2022)  Abnormal ECG  When compared with ECG of 28-FEB-2022 02:14,  Atrial fibrillation has replaced Junctional rhythm  Serial changes of Anterior infarct Present    Referred By: WILBERT HULL           Confirmed By:     EKG 12-lead [[id number]]     Order Status: Sent Lab Status: No result     EKG 12-lead [[id number]] Collected: 02/28/22 0214    Order Status: Sent Lab Status: In process Updated: 02/28/22 0609    Narrative:      Test Reason : R07.9,    Vent. Rate : 078 BPM     Atrial Rate : 079 BPM     P-R Int : 000 ms          QRS Dur : 110 ms      QT Int : 492 ms       P-R-T Axes : 000 190 261 degrees     QTc Int : 560 ms    Accelerated Junctional rhythm with frequent and consecutive Premature  ventricular complexes  Right superior axis deviation  Low voltage QRS  Inferior infarct ,age undetermined  Cannot rule out Anterior infarct (cited on or before 26-JAN-2022)  T wave abnormality, consider lateral ischemia  Prolonged QT  Abnormal ECG  When compared with ECG of 24-FEB-2022  09:30,  Significant changes have occurred    Referred By: WILBERT HULL           Confirmed By:     EXTRA TUBES [[id number]] Collected: 03/03/22 0920    Order Status: Sent Lab Status: In process Updated: 03/03/22 0938    Specimen: Blood, Venous     Narrative:      The following orders were created for panel order EXTRA TUBES.  Procedure                               Abnormality         Status                     ---------                               -----------         ------                     Light Green Top Hold[[id number]]                             In process                 Lavender Top Hold[[id number]]                                In process                   Please view results for these tests on the individual orders.    EXTRA TUBES [[id number]] Collected: 02/28/22 2009    Order Status: Sent Lab Status: In process Updated: 02/28/22 2016    Specimen: Blood, Venous     Narrative:      The following orders were created for panel order EXTRA TUBES.  Procedure                               Abnormality         Status                     ---------                               -----------         ------                     Lavender Top Hold[[id number]]                                In process                   Please view results for these tests on the individual orders.         Medications:  Reconciled Home Medications:      Medication List      START taking these medications    digoxin 125 mcg tablet  Commonly known as: LANOXIN  Take 1 tablet (0.125 mg total) by mouth once daily.  Start taking on: March 5, 2022     levothyroxine 137 MCG Tab tablet  Commonly known as: SYNTHROID  Take 1 tablet (137 mcg total) by mouth before breakfast.  Start taking on: March 5, 2022     potassium chloride SA 20 MEQ tablet  Commonly known as: K-DUR,KLOR-CON  Take 1 tablet (20 mEq total) by mouth once daily.  Start taking on: March 5, 2022        CHANGE how you take these medications    furosemide 80 MG tablet  Commonly known  as: LASIX  Take 1 tablet (80 mg total) by mouth 2 (two) times daily.  What changed:   · when to take this  · additional instructions        CONTINUE taking these medications    apixaban 5 mg Tab  Commonly known as: ELIQUIS  Take 1 tablet (5 mg total) by mouth 2 (two) times daily.     aspirin 81 MG EC tablet  Commonly known as: ECOTRIN  Take 81 mg by mouth once daily.     dorzolamide-timolol 2-0.5% 22.3-6.8 mg/mL ophthalmic solution  Commonly known as: COSOPT  Place 1 drop into both eyes 2 (two) times a day.     ezetimibe 10 mg tablet  Commonly known as: ZETIA  Take 1 tablet (10 mg total) by mouth once daily.     glipiZIDE 10 MG tablet  Commonly known as: GLUCOTROL  Take 10 mg by mouth 2 (two) times daily before meals.     latanoprost 0.005 % ophthalmic solution  Place 1 drop into both eyes Daily.     omeprazole 40 MG capsule  Commonly known as: PRILOSEC  Take 1 capsule (40 mg total) by mouth once daily.     tamsulosin 0.4 mg Cap  Commonly known as: FLOMAX  Take 1 capsule (0.4 mg total) by mouth once daily.        STOP taking these medications    carvediloL 6.25 MG tablet  Commonly known as: COREG     FARXIGA 5 mg Tab tablet  Generic drug: dapagliflozin            Indwelling Lines/Drains at time of discharge:   Lines/Drains/Airways     None                 Time spent on the discharge of patient: >30 minutes         ROLY Amador  Department of Hospital Medicine  [address]

## 2022-03-04 NOTE — ASSESSMENT & PLAN NOTE
Cont current medications.    2/28: BP regimens on hold due to hypotensive trends; Entresto continued for HF; continue monitoring BP trend  3/1: All BP regimens on hold. Continue monitoring BP trends  3/2: /78    03/04--Coreg d/c'd at discharge per cardiology, Will Need HH for end stage heart failure for daily monitoring of  BP, Fluid status, etc

## 2022-03-04 NOTE — ASSESSMENT & PLAN NOTE
Cardiology consulted  Will need ICD-placement  Cont Dobutamin guided diuiresis    03/04--compression stockings, Lasix, BID

## 2022-03-04 NOTE — ASSESSMENT & PLAN NOTE
- Patient seen and evaluated by Dr. Robb  - Lactate 4.1, creatinine up to 2.7, with elevated liver enzymes, BNP 34,000 (doubled since last month)  - Holding home BB, start dobutamine 2.5 mcg/kg  - IV diuresis, 80mg BID  - Start hydralazine 25mg TID and Isosorbide 20mg TID  - Strict I & O and daily weights  - Monitor on telemetry  - BMP and lactate in am    2/25/22:  - lactate has normalized  - creatinine improving  - continue diuresis with dobutamine and IV lasix 80mg bid  - continue hydralazine and isosorbide  - daily weights, strict I&Os  - BMP in AM    2/28/2022:  - Creatinine down to 1.6 yesterday and lactate normal and dobutamine was dc'd. Today lactate back up to 4.1, he is sob requiring supplemental O2 @ 2L with bilateral crackles. He's had 750ml UOP in past 24 hours on IV lasix 80mg BID.  - Restart dobutamine @ 5 mcq/kg and discontinue metoprolol. Will leave Entresto on board for now and monitor BP closely.  - CMP and lactate in am   - Plan to optimize again with inotrope and IV diuresis, once clinically euvolemic, will perform RHC prior to discontinuing dobutamine. If decompensates again without inotrope, we will need to discuss options including hospice care with patient and family    3/1/2022:  - Patient seen and evaluated by Dr. Valdovinos  - Improving   - Continue IV diuresis, dobutamine and oral dig  - Discontinued Entresto due to hypotension (BP 70s/50s after med given)  - Strict I & O and daily weight  - Continue monitoring    3/2/2022:  - Patient seen and evaluated by Dr. Valdovinos  - Decreased dobutamine 2.5mcg/kg and discontinuing at 8pm tonight  - Continue IV diuresis and oral digoxin  - BP stable after discontinuing Entresto (110-120/60s-70s)  - Continue monitoring    3/3/2022:  - Patient seen and evaluated by Dr. Valdovinos  - Dyspnea, orthopnea and edema has significantly improved. Bilateral crackles resolved.  - Dobutamine was to be discontinued at 8pm last night but was still running today. Will  tell RN to discontinue dobutamine now and transition to oral lasix today  - Continue monitoring, BMP and lactate in am  - May consider adding afterload medication tomorrow    3/4/2022:  - Patient seen and evaluated by Dr. Valdovinos  - Lactate normal, liver enzymes stable, BP stable (low normal still holding BB and Entresto), now off dobutamine >12 hours and tolerating oral lasix.  - Euvolemic on assessment, still low-normal BP; will not restart BB or Enstresto at this time.  - Okay to discharge home today with Home Health and follow up scheduled with ANKUSH Barker in 1 week; may consider BB or entresto at follow up if BP will tolerate

## 2022-03-04 NOTE — PLAN OF CARE
SS rec'd consult for thigh high compression stockings to be delivered to pt. Spoke with Amara at [employer] who states they are not billable by ins, cost is $52, and pt has to come into store to be fitted for them. RN on floor informed and states she will inform pt.    SS rec'd consult for HH. Spoke with pt and obtained choice for [facility]. Referral faxed.

## 2022-03-04 NOTE — PLAN OF CARE
[facility]  Discharge Final Note    Primary Care Provider: Regina Sprague MD    Expected Discharge Date: 3/4/2022    Final Discharge Note (most recent)     Final Note - 03/04/22 1451        Final Note    Assessment Type Final Discharge Note     Anticipated Discharge Disposition Home-Health Care Svc        Post-Acute Status    Post-Acute Authorization Home Health     Home Health Status Set-up Complete/Auth obtained     Patient choice form signed by patient/caregiver List with quality metrics by geographic area provided;List from CMS Compare;List from System Post-Acute Care     Discharge Delays None known at this time                 Important Message from Medicare  Important Message from Medicare regarding Discharge Appeal Rights: Given to patient/caregiver, Explained to patient/caregiver, Signed/date by patient/caregiver     Date IMM was signed: 02/25/22  Time IMM was signed: 1130    Contact Info     Regina Sprague MD   Specialty: Family Medicine   Relationship: PCP - General    [address]  [facility] [place] [address]   Phone: [phone]       Next Steps: Follow up in 1 week(s)    Instructions: Hospital follow up with TENISHA Dubon   Specialty: Cardiology    [address]  [facility]  [place] [address]   Phone: [phone]       Next Steps: Follow up in 1 week(s)    Instructions: Hospital discharge, low cardiac output syndrome, end stage heart failure; was planned for outpatient ICD placement but was in decompensated heart failure: Will need fluid status evaluation and BP check    J Woody Sistrunk, MD   Specialty: Endocrinology    [address] [place] [address] [facility]  [place] [address]   Phone: [phone]       Next Steps: Follow up in 3 week(s)    Instructions: Hospital follow-up/establish care in 2-3 weeks for Hypothyroidism      pt to d/c home with [facility]. Will fax d/c  summary when available. No further needs.

## 2022-03-04 NOTE — ASSESSMENT & PLAN NOTE
- TSH 76 2/8/2022  - Repeat TSH, add FreeT4  - Being followed by primary medical team    2/28/2022:  - TSH 71.5, Free T4 0.46  - Levothyroxine 137mcg initiated 2/25  - Being followed by primary medical team    3/4/2022:  - Will need follow up with PCP at discharge

## 2022-03-04 NOTE — ASSESSMENT & PLAN NOTE
- Creatinine 2.7, baseline (1.3-1.5)  - Likely related to low cardiac output/decompensated heart failure    2/25/22:  - creatinine has improved to 2.28  - will continue to monitor with diuresis    2/28/2022:  - Creatinine down to 1.6 yesterday  - BMP today pending    3/1/2022:  - Creatinine 2.1    3/2/2022:  - Creatinine 1.9    3/3/2022:  - Creatinine 1.7  - BMP in am    3/4/2022:  - Stable, creatinine 1.6 today

## 2022-03-07 ENCOUNTER — TELEPHONE (OUTPATIENT)
Dept: CARDIOLOGY | Facility: HOSPITAL | Age: 82
End: 2022-03-07
Payer: COMMERCIAL

## 2022-03-07 NOTE — TELEPHONE ENCOUNTER
Discharge Call Back Questionnaire   How are you feeling? Any questions or concerns?   When did you get your medications? (if prescribed new medications)   When is your follow-up appointment? Do you have a ride to get to your appointment?   Do you have enough of your daily medications until your next doctor appointment?   Are you weighing yourself at home? Any changes in weight?   Have you experienced any of the following symptoms- more shortness of breath than usual, difficulty breathing when lying down, a dry hacking cough, new or worsening confusion, having difficulty thinking clearly?  No answer

## 2022-03-07 NOTE — TELEPHONE ENCOUNTER
3/7/22-1058spoke with pt daughter.gianni Buckner. States he was still very weak.States she has bought him a rollator,bsc and shower chair to use. Daily wts discussed. states his wt yesterday was 204lbs.states his feet/legs were still swelled but has gone down from when he was in the hospital. States he is still short of breath with activity. No complaints of chest pain. She states [facility] came this weekend and will come three times a week. She states they also set up his medications. She states he is incontinent of urine due to being weak and not making it to the bathroom in time.  Discussed the increase in Lasix dose will increase urination. Discussed his follow appts with pcp and cardiology. Pt lives alone but  States she will be taking him to those appts and is on her way back to [place] where she lives. States her cousin Jean-Claude Peter is with him right now.  Instructed to bring all meds to follow up visit and to call office for questions/concerns. Also to seek medical attention for any new or worsening sx. Vu. She inquired about more assistance for pt. Gave her the number to [facility] [employer] and let her know about the medicaid waiver for homemaker services, transportation and meals on wheels he may qualify for.  Shivani

## 2022-03-15 RX ORDER — POTASSIUM CHLORIDE 20 MEQ/1
20 TABLET, EXTENDED RELEASE ORAL DAILY
Qty: 10 TABLET | Refills: 0 | OUTPATIENT
Start: 2022-03-15

## 2022-08-17 NOTE — ASSESSMENT & PLAN NOTE
tsh pta was >70  Repeat tsh and ft4  Will start on 137 mcg synthroid for now  Will need outpatient follow up with endocrine    3/1: Continue current POC of Levothyroxine 137mcg PO QD   Information: Selecting Yes will display possible errors in your note based on the variables you have selected. This validation is only offered as a suggestion for you. PLEASE NOTE THAT THE VALIDATION TEXT WILL BE REMOVED WHEN YOU FINALIZE YOUR NOTE. IF YOU WANT TO FAX A PRELIMINARY NOTE YOU WILL NEED TO TOGGLE THIS TO 'NO' IF YOU DO NOT WANT IT IN YOUR FAXED NOTE.

## 2022-11-09 DIAGNOSIS — Z71.89 COMPLEX CARE COORDINATION: ICD-10-CM

## 2022-12-14 NOTE — PLAN OF CARE
Problem: Adult Inpatient Plan of Care  Goal: Plan of Care Review  Outcome: Ongoing, Progressing  Goal: Patient-Specific Goal (Individualized)  Outcome: Ongoing, Progressing  Goal: Absence of Hospital-Acquired Illness or Injury  Outcome: Ongoing, Progressing  Goal: Optimal Comfort and Wellbeing  Outcome: Ongoing, Progressing  Goal: Readiness for Transition of Care  Outcome: Ongoing, Progressing     Problem: Fall Injury Risk  Goal: Absence of Fall and Fall-Related Injury  Outcome: Ongoing, Progressing     Problem: Bleeding (Cardiac Rhythm Management Device)  Goal: Absence of Bleeding  Outcome: Ongoing, Progressing     Problem: Infection (Cardiac Rhythm Management Device)  Goal: Absence of Infection Signs and Symptoms  Outcome: Ongoing, Progressing     Problem: Respiratory Compromise (Cardiac Rhythm Management Device)  Goal: Effective Oxygenation and Ventilation  Outcome: Ongoing, Progressing     Problem: Oral Intake Inadequate (Acute Kidney Injury/Impairment)  Goal: Optimal Nutrition Intake  Outcome: Ongoing, Progressing      Nurse called to report CBI not draining well. Irrigated with no clot return. Urine color light pink. CBI tubing changed and now draining better on slow gtt. Will continue to irrigate as needed.

## 2023-05-24 NOTE — PROGRESS NOTES
[address] Medicine  Progress Note    Patient Name: Jeanmarie Michelle  MRN: [id number]  Patient Class: IP- Inpatient   Admission Date: 2/24/2022  Length of Stay: 3 days  Attending Physician: Geneva Li MD  Primary Care Provider: Regina Sprague MD        Subjective:     Principal Problem:Dilated cardiomyopathy        HPI:  Pt is a 81-year-old male with a history of persistent atrial fibrillation on Eliquis, end-stage dilated cardiomyopathy, history of stage III CKD, type 2 diabetes and hypertension who was supposed to get his ICD placement with Dr Carlson today however pt seemed to be volumed overload and in decompensated heart failure, along with elevated sugars therefore medicine team requested for inpatient admission. Pt states that he has been having dyspnea on exertion, no CP and LE swelling, states he was not sure what medicine to stop his medications prior to procedure. He was seen prior to his ICD implantion by cardiology team and was found to be in florid HF with elevated JVD and massive LE swelling. Besides dyspnea on exertion he has no other symptoms. Of note his TSH >70 recently and has not been on any synthroid.       Overview/Hospital Course:  No notes on file    Interval History:     ISELA  Says he feels improved, has no new complains, having breakfast, says he cant eat a whole lot. Insulin to be decreased.     Review of Systems   Constitutional:  Positive for fatigue. Negative for fever.   HENT: Negative.     Eyes: Negative.    Respiratory:  Positive for shortness of breath.    Cardiovascular:  Positive for leg swelling. Negative for chest pain and palpitations.   Gastrointestinal: Negative.    Endocrine: Negative.    Genitourinary: Negative.  Negative for hematuria.   Musculoskeletal: Negative.    Skin: Negative.    Allergic/Immunologic: Negative.    Neurological: Negative.    Hematological: Negative.    Psychiatric/Behavioral: Negative.     Objective:      Vital Signs (Most Recent):  Temp: 96.3 °F (35.7 °C) (02/27/22 0441)  Pulse: 92 (02/27/22 0441)  Resp: 19 (02/27/22 0441)  BP: (!) 89/60 (02/27/22 0441)  SpO2: 96 % (02/26/22 2002) Vital Signs (24h Range):  Temp:  [96.3 °F (35.7 °C)-98.5 °F (36.9 °C)] 96.3 °F (35.7 °C)  Pulse:  [] 92  Resp:  [18-20] 19  SpO2:  [96 %-98 %] 96 %  BP: ()/(57-79) 89/60     Weight: 96.2 kg (212 lb 1.6 oz)  Body mass index is 29.58 kg/m².    Intake/Output Summary (Last 24 hours) at 2/27/2022 1032  Last data filed at 2/26/2022 2133  Gross per 24 hour   Intake 1088.56 ml   Output 1950 ml   Net -861.44 ml        Physical Exam  Vitals reviewed.   Constitutional:       Appearance: Normal appearance.   HENT:      Head: Normocephalic and atraumatic.      Nose: Nose normal. No congestion.      Mouth/Throat:      Pharynx: Oropharynx is clear.   Eyes:      Extraocular Movements: Extraocular movements intact.      Conjunctiva/sclera: Conjunctivae normal.      Pupils: Pupils are equal, round, and reactive to light.   Cardiovascular:      Rate and Rhythm: Normal rate and regular rhythm.      Pulses: Normal pulses.      Heart sounds: Normal heart sounds.   Pulmonary:      Effort: Pulmonary effort is normal. No respiratory distress.      Breath sounds: Normal breath sounds. No wheezing.   Abdominal:      General: Bowel sounds are normal. There is no distension.      Palpations: Abdomen is soft.      Tenderness: There is no abdominal tenderness.   Musculoskeletal:         General: No swelling or tenderness. Normal range of motion.      Right lower leg: Edema present.      Left lower leg: Edema present.   Lymphadenopathy:      Cervical: No cervical adenopathy.   Skin:     General: Skin is warm.   Neurological:      General: No focal deficit present.      Mental Status: He is alert.   Psychiatric:         Mood and Affect: Mood normal.       Significant Labs: All pertinent labs within the past 24 hours have been reviewed.    Significant  Imaging: I have reviewed all pertinent imaging results/findings within the past 24 hours.      Assessment/Plan:      * Dilated cardiomyopathy  HFrEF, nonsichemic cardiomyopathy: EF 15% with moderate-severe TR (12/8/21); NYHA III-IV Stage D  Plan for ICD placement by Dr. Carlson today but now postpned d/t CHF exacerbation   Started on dobutamine and iv lasix.   Cardiology on board  Tele monitoring.   Cardiac deit strick I/o       Hypothyroid  tsh pta was >70  Repeat tsh and ft4  Will start on 137 mcg synthroid for now  Will need outpatient follow up with endocrine      Acute on chronic kidney failure  ctm , likely 2/2 htn, dm and heart failure      Heart failure    Cardiology consulted  Will need ICD-placement  Cont Dobutamin guided diuiresis      Chronic a-fib  Rate controlled  Cont AC.      Benign prostatic hyperplasia    Tamsulosin, no LUTS at this time.       Hyperlipidemia    Cont statin      Gastroesophageal reflux disease    Cont PPI      Diabetes mellitus without complication    SSI sliding scale and long acting, adjust as needed      Hypertension    Cont current medications.        VTE Risk Mitigation (From admission, onward)         Ordered     apixaban tablet 5 mg  2 times daily         02/24/22 1101                Discharge Planning   MADDIE:      Code Status: Full Code   Is the patient medically ready for discharge?:     Reason for patient still in hospital (select all that apply): Treatment  Discharge Plan A: Home                  Rehmat JAY Li MD  Department of Hospital Medicine   [address]   When Was Your Last Botox Treatment?: 02/15/2023